# Patient Record
Sex: FEMALE | Race: WHITE | NOT HISPANIC OR LATINO
[De-identification: names, ages, dates, MRNs, and addresses within clinical notes are randomized per-mention and may not be internally consistent; named-entity substitution may affect disease eponyms.]

---

## 2018-03-27 ENCOUNTER — TRANSCRIPTION ENCOUNTER (OUTPATIENT)
Age: 73
End: 2018-03-27

## 2018-03-27 RX ORDER — ACETAMINOPHEN 500 MG
650 TABLET ORAL EVERY 6 HOURS
Qty: 0 | Refills: 0 | Status: DISCONTINUED | OUTPATIENT
Start: 2018-03-28 | End: 2018-03-31

## 2018-03-27 RX ORDER — ONDANSETRON 8 MG/1
4 TABLET, FILM COATED ORAL EVERY 6 HOURS
Qty: 0 | Refills: 0 | Status: DISCONTINUED | OUTPATIENT
Start: 2018-03-28 | End: 2018-03-31

## 2018-03-27 RX ORDER — DOCUSATE SODIUM 100 MG
100 CAPSULE ORAL THREE TIMES A DAY
Qty: 0 | Refills: 0 | Status: DISCONTINUED | OUTPATIENT
Start: 2018-03-28 | End: 2018-03-31

## 2018-03-27 RX ORDER — OXYCODONE HYDROCHLORIDE 5 MG/1
20 TABLET ORAL ONCE
Qty: 0 | Refills: 0 | Status: DISCONTINUED | OUTPATIENT
Start: 2018-03-28 | End: 2018-03-28

## 2018-03-27 RX ORDER — MAGNESIUM HYDROXIDE 400 MG/1
30 TABLET, CHEWABLE ORAL DAILY
Qty: 0 | Refills: 0 | Status: DISCONTINUED | OUTPATIENT
Start: 2018-03-28 | End: 2018-03-31

## 2018-03-27 RX ORDER — ACETAMINOPHEN 500 MG
650 TABLET ORAL ONCE
Qty: 0 | Refills: 0 | Status: COMPLETED | OUTPATIENT
Start: 2018-03-28 | End: 2018-03-28

## 2018-03-27 RX ORDER — SENNA PLUS 8.6 MG/1
2 TABLET ORAL AT BEDTIME
Qty: 0 | Refills: 0 | Status: DISCONTINUED | OUTPATIENT
Start: 2018-03-28 | End: 2018-03-31

## 2018-03-27 RX ORDER — OXYCODONE HYDROCHLORIDE 5 MG/1
10 TABLET ORAL EVERY 4 HOURS
Qty: 0 | Refills: 0 | Status: DISCONTINUED | OUTPATIENT
Start: 2018-03-28 | End: 2018-03-31

## 2018-03-27 RX ORDER — OXYCODONE HYDROCHLORIDE 5 MG/1
5 TABLET ORAL EVERY 4 HOURS
Qty: 0 | Refills: 0 | Status: DISCONTINUED | OUTPATIENT
Start: 2018-03-28 | End: 2018-03-31

## 2018-03-27 RX ORDER — CELECOXIB 200 MG/1
200 CAPSULE ORAL
Qty: 0 | Refills: 0 | Status: DISCONTINUED | OUTPATIENT
Start: 2018-03-29 | End: 2018-03-29

## 2018-03-27 RX ORDER — MORPHINE SULFATE 50 MG/1
4 CAPSULE, EXTENDED RELEASE ORAL ONCE
Qty: 0 | Refills: 0 | Status: DISCONTINUED | OUTPATIENT
Start: 2018-03-28 | End: 2018-03-31

## 2018-03-27 RX ORDER — SODIUM CHLORIDE 9 MG/ML
1000 INJECTION, SOLUTION INTRAVENOUS
Qty: 0 | Refills: 0 | Status: DISCONTINUED | OUTPATIENT
Start: 2018-03-28 | End: 2018-03-29

## 2018-03-27 RX ORDER — DEXAMETHASONE 0.5 MG/5ML
10 ELIXIR ORAL ONCE
Qty: 0 | Refills: 0 | Status: COMPLETED | OUTPATIENT
Start: 2018-03-29 | End: 2018-03-29

## 2018-03-27 RX ORDER — PANTOPRAZOLE SODIUM 20 MG/1
40 TABLET, DELAYED RELEASE ORAL DAILY
Qty: 0 | Refills: 0 | Status: DISCONTINUED | OUTPATIENT
Start: 2018-03-28 | End: 2018-03-31

## 2018-03-27 RX ORDER — ASCORBIC ACID 60 MG
500 TABLET,CHEWABLE ORAL
Qty: 0 | Refills: 0 | Status: DISCONTINUED | OUTPATIENT
Start: 2018-03-28 | End: 2018-03-31

## 2018-03-27 RX ORDER — POLYETHYLENE GLYCOL 3350 17 G/17G
17 POWDER, FOR SOLUTION ORAL DAILY
Qty: 0 | Refills: 0 | Status: DISCONTINUED | OUTPATIENT
Start: 2018-03-28 | End: 2018-03-31

## 2018-03-27 RX ORDER — ASPIRIN/CALCIUM CARB/MAGNESIUM 324 MG
325 TABLET ORAL
Qty: 0 | Refills: 0 | Status: DISCONTINUED | OUTPATIENT
Start: 2018-03-29 | End: 2018-03-29

## 2018-03-28 ENCOUNTER — TRANSCRIPTION ENCOUNTER (OUTPATIENT)
Age: 73
End: 2018-03-28

## 2018-03-28 ENCOUNTER — INPATIENT (INPATIENT)
Facility: HOSPITAL | Age: 73
LOS: 2 days | Discharge: HOME HEALTH | End: 2018-03-31
Attending: ORTHOPAEDIC SURGERY | Admitting: ORTHOPAEDIC SURGERY
Payer: OTHER MISCELLANEOUS

## 2018-03-28 ENCOUNTER — RESULT REVIEW (OUTPATIENT)
Age: 73
End: 2018-03-28

## 2018-03-28 VITALS
HEART RATE: 75 BPM | SYSTOLIC BLOOD PRESSURE: 136 MMHG | TEMPERATURE: 97 F | DIASTOLIC BLOOD PRESSURE: 60 MMHG | WEIGHT: 229.94 LBS | HEIGHT: 64 IN | RESPIRATION RATE: 14 BRPM | OXYGEN SATURATION: 96 %

## 2018-03-28 DIAGNOSIS — K44.9 DIAPHRAGMATIC HERNIA WITHOUT OBSTRUCTION OR GANGRENE: Chronic | ICD-10-CM

## 2018-03-28 DIAGNOSIS — Z96.611 PRESENCE OF RIGHT ARTIFICIAL SHOULDER JOINT: Chronic | ICD-10-CM

## 2018-03-28 DIAGNOSIS — Z96.651 PRESENCE OF RIGHT ARTIFICIAL KNEE JOINT: Chronic | ICD-10-CM

## 2018-03-28 LAB
ALBUMIN SERPL ELPH-MCNC: 2.3 G/DL — LOW (ref 3.3–5)
ALP SERPL-CCNC: 49 U/L — SIGNIFICANT CHANGE UP (ref 40–120)
ALT FLD-CCNC: 15 U/L — SIGNIFICANT CHANGE UP (ref 12–78)
ANION GAP SERPL CALC-SCNC: 11 MMOL/L — SIGNIFICANT CHANGE UP (ref 5–17)
APTT BLD: 31.7 SEC — SIGNIFICANT CHANGE UP (ref 27.5–37.4)
AST SERPL-CCNC: 18 U/L — SIGNIFICANT CHANGE UP (ref 15–37)
BILIRUB DIRECT SERPL-MCNC: 0.05 MG/DL — SIGNIFICANT CHANGE UP (ref 0.05–0.2)
BILIRUB INDIRECT FLD-MCNC: 0.2 MG/DL — SIGNIFICANT CHANGE UP (ref 0.2–1)
BILIRUB SERPL-MCNC: 0.2 MG/DL — SIGNIFICANT CHANGE UP (ref 0.2–1.2)
BLD GP AB SCN SERPL QL: SIGNIFICANT CHANGE UP
BUN SERPL-MCNC: 27 MG/DL — HIGH (ref 7–23)
CALCIUM SERPL-MCNC: 6 MG/DL — CRITICAL LOW (ref 8.5–10.1)
CHLORIDE SERPL-SCNC: 114 MMOL/L — HIGH (ref 96–108)
CO2 SERPL-SCNC: 19 MMOL/L — LOW (ref 22–31)
CREAT SERPL-MCNC: 1.31 MG/DL — HIGH (ref 0.5–1.3)
GLUCOSE SERPL-MCNC: 158 MG/DL — HIGH (ref 70–99)
GRAM STN FLD: SIGNIFICANT CHANGE UP
HCT VFR BLD CALC: 32.4 % — LOW (ref 34.5–45)
HGB BLD-MCNC: 10.5 G/DL — LOW (ref 11.5–15.5)
INR BLD: 1.02 RATIO — SIGNIFICANT CHANGE UP (ref 0.88–1.16)
MAGNESIUM SERPL-MCNC: 1.3 MG/DL — LOW (ref 1.6–2.6)
MCHC RBC-ENTMCNC: 28.8 PG — SIGNIFICANT CHANGE UP (ref 27–34)
MCHC RBC-ENTMCNC: 32.4 GM/DL — SIGNIFICANT CHANGE UP (ref 32–36)
MCV RBC AUTO: 89 FL — SIGNIFICANT CHANGE UP (ref 80–100)
NRBC # BLD: 0 /100 WBCS — SIGNIFICANT CHANGE UP (ref 0–0)
PHOSPHATE SERPL-MCNC: 4.1 MG/DL — SIGNIFICANT CHANGE UP (ref 2.5–4.5)
PLATELET # BLD AUTO: 265 K/UL — SIGNIFICANT CHANGE UP (ref 150–400)
POTASSIUM SERPL-MCNC: 3.5 MMOL/L — SIGNIFICANT CHANGE UP (ref 3.5–5.3)
POTASSIUM SERPL-SCNC: 3.5 MMOL/L — SIGNIFICANT CHANGE UP (ref 3.5–5.3)
PROT SERPL-MCNC: 4.8 GM/DL — LOW (ref 6–8.3)
PROTHROM AB SERPL-ACNC: 11.1 SEC — SIGNIFICANT CHANGE UP (ref 9.8–12.7)
RBC # BLD: 3.64 M/UL — LOW (ref 3.8–5.2)
RBC # FLD: 14.4 % — SIGNIFICANT CHANGE UP (ref 10.3–14.5)
SODIUM SERPL-SCNC: 144 MMOL/L — SIGNIFICANT CHANGE UP (ref 135–145)
SPECIMEN SOURCE: SIGNIFICANT CHANGE UP
SPECIMEN SOURCE: SIGNIFICANT CHANGE UP
WBC # BLD: 12.68 K/UL — HIGH (ref 3.8–10.5)
WBC # FLD AUTO: 12.68 K/UL — HIGH (ref 3.8–10.5)

## 2018-03-28 PROCEDURE — 88331 PATH CONSLTJ SURG 1 BLK 1SPC: CPT | Mod: 26

## 2018-03-28 PROCEDURE — 88305 TISSUE EXAM BY PATHOLOGIST: CPT | Mod: 26

## 2018-03-28 PROCEDURE — 99223 1ST HOSP IP/OBS HIGH 75: CPT

## 2018-03-28 PROCEDURE — 73560 X-RAY EXAM OF KNEE 1 OR 2: CPT | Mod: 26,RT

## 2018-03-28 PROCEDURE — 88300 SURGICAL PATH GROSS: CPT | Mod: 26,59

## 2018-03-28 RX ORDER — SUCRALFATE 1 G
1 TABLET ORAL
Qty: 0 | Refills: 0 | COMMUNITY

## 2018-03-28 RX ORDER — MAGNESIUM SULFATE 500 MG/ML
2 VIAL (ML) INJECTION ONCE
Qty: 0 | Refills: 0 | Status: COMPLETED | OUTPATIENT
Start: 2018-03-28 | End: 2018-03-28

## 2018-03-28 RX ORDER — CELECOXIB 200 MG/1
200 CAPSULE ORAL ONCE
Qty: 0 | Refills: 0 | Status: COMPLETED | OUTPATIENT
Start: 2018-03-28 | End: 2018-03-28

## 2018-03-28 RX ORDER — SODIUM CHLORIDE 9 MG/ML
1000 INJECTION, SOLUTION INTRAVENOUS
Qty: 0 | Refills: 0 | Status: DISCONTINUED | OUTPATIENT
Start: 2018-03-28 | End: 2018-03-28

## 2018-03-28 RX ORDER — SUCRALFATE 1 G
1 TABLET ORAL
Qty: 0 | Refills: 0 | Status: DISCONTINUED | OUTPATIENT
Start: 2018-03-28 | End: 2018-03-31

## 2018-03-28 RX ORDER — VANCOMYCIN HCL 1 G
1000 VIAL (EA) INTRAVENOUS EVERY 24 HOURS
Qty: 0 | Refills: 0 | Status: DISCONTINUED | OUTPATIENT
Start: 2018-03-28 | End: 2018-03-31

## 2018-03-28 RX ORDER — ACETAMINOPHEN 500 MG
1000 TABLET ORAL ONCE
Qty: 0 | Refills: 0 | Status: COMPLETED | OUTPATIENT
Start: 2018-03-28 | End: 2018-03-28

## 2018-03-28 RX ORDER — SUCRALFATE 1 G
1 TABLET ORAL
Qty: 0 | Refills: 0 | Status: DISCONTINUED | OUTPATIENT
Start: 2018-03-28 | End: 2018-03-28

## 2018-03-28 RX ORDER — SIMVASTATIN 20 MG/1
20 TABLET, FILM COATED ORAL AT BEDTIME
Qty: 0 | Refills: 0 | Status: DISCONTINUED | OUTPATIENT
Start: 2018-03-28 | End: 2018-03-31

## 2018-03-28 RX ORDER — CALCIUM GLUCONATE 100 MG/ML
1 VIAL (ML) INTRAVENOUS ONCE
Qty: 0 | Refills: 0 | Status: COMPLETED | OUTPATIENT
Start: 2018-03-28 | End: 2018-03-28

## 2018-03-28 RX ORDER — SODIUM CHLORIDE 9 MG/ML
500 INJECTION INTRAMUSCULAR; INTRAVENOUS; SUBCUTANEOUS ONCE
Qty: 0 | Refills: 0 | Status: COMPLETED | OUTPATIENT
Start: 2018-03-28 | End: 2018-03-28

## 2018-03-28 RX ORDER — HYDROMORPHONE HYDROCHLORIDE 2 MG/ML
1 INJECTION INTRAMUSCULAR; INTRAVENOUS; SUBCUTANEOUS
Qty: 0 | Refills: 0 | Status: DISCONTINUED | OUTPATIENT
Start: 2018-03-28 | End: 2018-03-28

## 2018-03-28 RX ORDER — ONDANSETRON 8 MG/1
4 TABLET, FILM COATED ORAL ONCE
Qty: 0 | Refills: 0 | Status: DISCONTINUED | OUTPATIENT
Start: 2018-03-28 | End: 2018-03-28

## 2018-03-28 RX ORDER — ACETAMINOPHEN 500 MG
1000 TABLET ORAL ONCE
Qty: 0 | Refills: 0 | Status: COMPLETED | OUTPATIENT
Start: 2018-03-28 | End: 2018-03-29

## 2018-03-28 RX ORDER — LOSARTAN POTASSIUM 100 MG/1
100 TABLET, FILM COATED ORAL DAILY
Qty: 0 | Refills: 0 | Status: DISCONTINUED | OUTPATIENT
Start: 2018-03-28 | End: 2018-03-29

## 2018-03-28 RX ORDER — TRANEXAMIC ACID 100 MG/ML
1000 INJECTION, SOLUTION INTRAVENOUS ONCE
Qty: 0 | Refills: 0 | Status: COMPLETED | OUTPATIENT
Start: 2018-03-28 | End: 2018-03-28

## 2018-03-28 RX ADMIN — Medication 650 MILLIGRAM(S): at 08:13

## 2018-03-28 RX ADMIN — Medication 100 MILLIGRAM(S): at 18:17

## 2018-03-28 RX ADMIN — Medication 500 MILLIGRAM(S): at 18:18

## 2018-03-28 RX ADMIN — Medication 50 GRAM(S): at 22:33

## 2018-03-28 RX ADMIN — Medication 1000 MILLIGRAM(S): at 13:45

## 2018-03-28 RX ADMIN — Medication 400 MILLIGRAM(S): at 13:26

## 2018-03-28 RX ADMIN — HYDROMORPHONE HYDROCHLORIDE 1 MILLIGRAM(S): 2 INJECTION INTRAMUSCULAR; INTRAVENOUS; SUBCUTANEOUS at 14:15

## 2018-03-28 RX ADMIN — Medication 200 GRAM(S): at 20:12

## 2018-03-28 RX ADMIN — SODIUM CHLORIDE 100 MILLILITER(S): 9 INJECTION, SOLUTION INTRAVENOUS at 18:51

## 2018-03-28 RX ADMIN — OXYCODONE HYDROCHLORIDE 10 MILLIGRAM(S): 5 TABLET ORAL at 21:42

## 2018-03-28 RX ADMIN — Medication 100 MILLIGRAM(S): at 21:11

## 2018-03-28 RX ADMIN — CELECOXIB 200 MILLIGRAM(S): 200 CAPSULE ORAL at 08:13

## 2018-03-28 RX ADMIN — SODIUM CHLORIDE 100 MILLILITER(S): 9 INJECTION, SOLUTION INTRAVENOUS at 21:12

## 2018-03-28 RX ADMIN — OXYCODONE HYDROCHLORIDE 20 MILLIGRAM(S): 5 TABLET ORAL at 08:13

## 2018-03-28 RX ADMIN — SODIUM CHLORIDE 75 MILLILITER(S): 9 INJECTION, SOLUTION INTRAVENOUS at 12:51

## 2018-03-28 RX ADMIN — TRANEXAMIC ACID 220 MILLIGRAM(S): 100 INJECTION, SOLUTION INTRAVENOUS at 12:46

## 2018-03-28 RX ADMIN — Medication 250 MILLIGRAM(S): at 21:09

## 2018-03-28 RX ADMIN — OXYCODONE HYDROCHLORIDE 10 MILLIGRAM(S): 5 TABLET ORAL at 22:40

## 2018-03-28 RX ADMIN — SIMVASTATIN 20 MILLIGRAM(S): 20 TABLET, FILM COATED ORAL at 21:11

## 2018-03-28 RX ADMIN — SODIUM CHLORIDE 1000 MILLILITER(S): 9 INJECTION INTRAMUSCULAR; INTRAVENOUS; SUBCUTANEOUS at 16:39

## 2018-03-28 RX ADMIN — HYDROMORPHONE HYDROCHLORIDE 1 MILLIGRAM(S): 2 INJECTION INTRAMUSCULAR; INTRAVENOUS; SUBCUTANEOUS at 14:00

## 2018-03-28 NOTE — DISCHARGE NOTE ADULT - CARE PROVIDER_API CALL
Fermin Cummings), Orthopaedic Surgery  [address] [place] [address]  Phone: [phone]  Fax: [phone]

## 2018-03-28 NOTE — H&P ADULT - NSHPPHYSICALEXAM_GEN_ALL_CORE
Lungs: CTAB  Heart: S1, S2 no murmurs   Abdomen: Bowel sounds present in all quadrants  RLE: Incision present well healed. +ROM hip/knee/ankle/toes. Calf: soft, compressible and nontender. DP/PT 2+ NVI.

## 2018-03-28 NOTE — DISCHARGE NOTE ADULT - NS AS ACTIVITY OBS
Stairs allowed/Showering allowed/No Heavy lifting/straining/Walking-Indoors allowed/Do not make important decisions/Do not drive or operate machinery/Walking-Outdoors allowed

## 2018-03-28 NOTE — H&P ADULT - HISTORY OF PRESENT ILLNESS
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 Pt is now present of for R TKA re-implantation. Pt admits currently she is in 0/10 pain, no pain radiating anywhere. Pt as occasional burning but nothing at this time. Pt would like to get her re-implantation of R TKA. Pt is currently ambulating with a cane. Pt currently takes an ASA 325mg once a day which she has stopped for surgery. Pt currently denies CP/SOB/N/V/D/numbness/tingling/bowel or bladder dysfunction.

## 2018-03-28 NOTE — DISCHARGE NOTE ADULT - MEDICATION SUMMARY - MEDICATIONS TO STOP TAKING
I will STOP taking the medications listed below when I get home from the hospital:    Aspir 81    Percocet 10/325    AcipHex 20 mg oral delayed release tablet  -- 1 tab(s) by mouth once a day I will STOP taking the medications listed below when I get home from the hospital:    Aspir 81    Percocet 10/325    AcipHex 20 mg oral delayed release tablet  -- 1 tab(s) by mouth once a day    Benicar HCT 40 mg-12.5 mg oral tablet  -- 1 tab(s) by mouth once a day

## 2018-03-28 NOTE — DISCHARGE NOTE ADULT - ADDITIONAL INSTRUCTIONS
Follow up with  in 2 weeks. Please call [phone] for appointment.      Please call your MD, if you have new onset of fevers, increased drainage, increased pain or increased redness around the incision site. Please return to the Emergency Department if you have chest pain or shortness of breath. Follow up with  in 2 weeks. Please call [phone] for appointment.      Please follow up with your primary care doctor in 1 week to monitor your kidney function levels.     Please call your MD, if you have new onset of fevers, increased drainage, increased pain or increased redness around the incision site. Please return to the Emergency Department if you have chest pain or shortness of breath. Follow up with  in 2 weeks. Please call [phone] for appointment.      Please follow up with your primary care doctor in 1 week to monitor your kidney function levels and to go over your blood pressure medications which was adjusted during your hospital stay.     Please call your MD, if you have new onset of fevers, increased drainage, increased pain or increased redness around the incision site. Please return to the Emergency Department if you have chest pain or shortness of breath.

## 2018-03-28 NOTE — DISCHARGE NOTE ADULT - PATIENT PORTAL LINK FT
You can access the [facility] Portal, offered by [facility], by registering with the following website: [email or web address]

## 2018-03-28 NOTE — PROGRESS NOTE ADULT - SUBJECTIVE AND OBJECTIVE BOX
72yFemale s/p R TKA re-implant POD#0. Pt seen and examined in NAD. Pain controlled. Pt denies any new complaints. Pt denies CP/SOB/N/V/D/numbness/tingling/bowel or bladder dysfunction.     PE:   RLE: dressing c/d/i. +ROM ankle/toes. Calf: soft, compressible and nontender. DP/PT 2+ NVI.                           10.5   12.68 )-----------( 265      ( 28 Mar 2018 13:07 )             32.4                 A/P: 72yFemale s/p R TKA re-implant POD#0.  Continue abx until final cultures are back   Pain controlled  PT: WBAT   DVT ppx: SCDs/ASA 325mg BID    Wound care, Isometric exercises, incentive spirometry   Discharge: planning for home   All the above discussed and understood by pt

## 2018-03-28 NOTE — CONSULT NOTE ADULT - ASSESSMENT
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 Pt is now present of for R TKA re-implantation. Pt admits currently she is in 0/10 pain, no pain radiating anywhere. Pt as occasional burning but nothing at this time. Pt would like to get her re-implantation of R TKA. Pt is currently ambulating with a cane. Pt currently takes an ASA 325mg once a day which she has stopped for surgery. Pt currently denies CP/SOB/N/V/D/numbness/tingling/bowel or bladder dysfunction. (28 Mar 2018 07:29)    S/p R TKA re-implant POD#0.   c/w pain control, bowel regiment  dvt ppx, , pt   abx as per ortho     Post OP-Hypotension  -give bolus of ns  -monitor bp  -hold anti htn meds for now  -continue to monitor

## 2018-03-28 NOTE — BRIEF OPERATIVE NOTE - PRE-OP DX
Chronic infection of knee joint prosthesis, subsequent encounter  03/28/2018    Active  Clayton Wilhelm

## 2018-03-28 NOTE — CONSULT NOTE ADULT - SUBJECTIVE AND OBJECTIVE BOX
HPI:  72F pmh HTN, multiple R knee surgery s/p R TKA infection with R TKA removal in 2017. Pt is now present of for R TKA re-implantation. Pt admits currently she is in 0/10 pain, no pain radiating anywhere. Pt as occasional burning but nothing at this time. Pt would like to get her re-implantation of R TKA. Pt is currently ambulating with a cane. Pt currently takes an ASA 325mg once a day which she has stopped for surgery. Pt currently denies CP/SOB/N/V/D/numbness/tingling/bowel or bladder dysfunction. (28 Mar 2018 07:29)    S/p R TKA re-implant POD#0. Seen at bedside, patient hypotensive, states she feels a littly lightheaded. Receiving fluid bolus. Denies headache,dizziness, chest pain, palpitations, sob.        PAST MEDICAL & SURGICAL HISTORY:  GERD (gastroesophageal reflux disease)  Hyperlipemia  Essential hypertension  S/P total knee arthroplasty, right  Status post total shoulder arthroplasty, right      REVIEW OF SYSTEMS:    CONSTITUTIONAL: No fever, weight loss, or fatigue  EYES: No eye pain, visual disturbances, or discharge  ENMT:  No difficulty hearing, tinnitus, vertigo; No sinus or throat pain  NECK: No pain or stiffness  BREASTS: No pain, masses, or nipple discharge  RESPIRATORY: No cough, wheezing, chills or hemoptysis; No shortness of breath  CARDIOVASCULAR: No chest pain, palpitations, dizziness, or leg swelling  GASTROINTESTINAL: No abdominal or epigastric pain. No nausea, vomiting, or hematemesis; No diarrhea or constipation. No melena or hematochezia.  GENITOURINARY: No dysuria, frequency, hematuria, or incontinence  NEUROLOGICAL: No headaches, memory loss, loss of strength, numbness, or tremors  SKIN: No itching, burning, rashes, or lesions   LYMPH NODES: No enlarged glands  ENDOCRINE: No heat or cold intolerance; No hair loss  MUSCULOSKELETAL: No joint pain or swelling; No muscle, back, or extremity pain  PSYCHIATRIC: No depression, anxiety, mood swings, or difficulty sleeping  HEME/LYMPH: No easy bruising, or bleeding gums  ALLERGY AND IMMUNOLOGIC: No hives or eczema      MEDICATIONS  (STANDING):  ascorbic acid 500 milliGRAM(s) Oral two times a day  clindamycin IVPB 900 milliGRAM(s) IV Intermittent every 8 hours  docusate sodium 100 milliGRAM(s) Oral three times a day  lactated ringers. 1000 milliLiter(s) (100 mL/Hr) IV Continuous <Continuous>  morphine  - Injectable 4 milliGRAM(s) IV Push once  multivitamin 1 Tablet(s) Oral daily  pantoprazole    Tablet 40 milliGRAM(s) Oral daily  polyethylene glycol 3350 17 Gram(s) Oral daily  vancomycin  IVPB 1000 milliGRAM(s) IV Intermittent every 12 hours    MEDICATIONS  (PRN):  acetaminophen   Tablet 650 milliGRAM(s) Oral every 6 hours PRN For Temp over 38.3 C (100.94 F)  aluminum hydroxide/magnesium hydroxide/simethicone Suspension 30 milliLiter(s) Oral four times a day PRN Indigestion  magnesium hydroxide Suspension 30 milliLiter(s) Oral daily PRN Constipation  ondansetron Injectable 4 milliGRAM(s) IV Push every 6 hours PRN Nausea and/or Vomiting  oxyCODONE    IR 5 milliGRAM(s) Oral every 4 hours PRN Mild Pain  oxyCODONE    IR 10 milliGRAM(s) Oral every 4 hours PRN Moderate Pain  senna 2 Tablet(s) Oral at bedtime PRN Constipation      Allergies    ceftriaxone (Rash)  Cipro (Unknown)  Flagyl (Diarrhea; Rash)    Intolerances        SOCIAL HISTORY:  no alcohol/tobacco/drugs  FAMILY HISTORY:    no pertinent fam hx  Vital Signs Last 24 Hrs  T(C): 35.4 (28 Mar 2018 16:33), Max: 36.3 (28 Mar 2018 12:24)  T(F): 95.8 (28 Mar 2018 16:33), Max: 97.4 (28 Mar 2018 12:24)  HR: 75 (28 Mar 2018 16:33) (63 - 87)  BP: 71/39 (28 Mar 2018 16:33) (71/39 - 136/60)  BP(mean): --  RR: 16 (28 Mar 2018 16:33) (11 - 20)  SpO2: 98% (28 Mar 2018 16:33) (96% - 98%)    PHYSICAL EXAM:    GENERAL: NAD, well-groomed, well-developed  HEAD:  Atraumatic, Normocephalic  EYES: EOMI, PERRLA, conjunctiva and sclera clear  ENMT: No tonsillar erythema, exudates, or enlargement; Moist mucous membranes, Good dentition, No lesions  NECK: Supple, No JVD, Normal thyroid  NERVOUS SYSTEM:  Alert & Oriented X3, Good concentration; Motor Strength 5/5 B/L upper and lower extremities; DTRs 2+ intact and symmetric  CHEST/LUNG: Clear to percussion bilaterally; No rales, rhonchi, wheezing, or rubs  HEART: Regular rate and rhythm; No murmurs, rubs, or gallops  ABDOMEN: Soft, Nontender, Nondistended; Bowel sounds present, obese  EXTREMITIES:  2+ Peripheral Pulses, No clubbing, cyanosis, or edema  RLE: dressing c/d/i  LYMPH: No lymphadenopathy noted  SKIN: No rashes or lesions      LABS:                        10.5   12.68 )-----------( 265      ( 28 Mar 2018 13:07 )             32.4           PT/INR - ( 28 Mar 2018 07:15 )   PT: 11.1 sec;   INR: 1.02 ratio         PTT - ( 28 Mar 2018 07:15 )  PTT:31.7 sec      RADIOLOGY & ADDITIONAL STUDIES:

## 2018-03-28 NOTE — DISCHARGE NOTE ADULT - MEDICATION SUMMARY - MEDICATIONS TO TAKE
I will START or STAY ON the medications listed below when I get home from the hospital:    acetaminophen 325 mg oral tablet  -- 3 tab(s) by mouth every 8 hours, As Needed for pain   -- Indication: For for pain control    oxyCODONE 10 mg oral tablet  -- 1 tab(s) by mouth every 4 hours, As needed for pain  MDD:6 tablets  -- Indication: For for pain control    aspirin 325 mg oral tablet  -- 1 tab(s) by mouth every 12 hours to prevent blood clots MDD:2  -- Indication: For to prevent blood clots     Zocor 20 mg oral tablet  -- 1 tab(s) by mouth once a day (at bedtime)  -- Indication: For for cholesterol    Benicar HCT 40 mg-12.5 mg oral tablet  -- 1 tab(s) by mouth once a day  -- Indication: For for blood pressure    bisacodyl 10 mg rectal suppository  -- 1 suppository(ies) rectally once a day, As needed, If no bowel movement by postoperative day #2  -- Indication: For for constipation    docusate sodium 100 mg oral capsule  -- 1 cap(s) by mouth 3 times a day  -- Indication: For to prevent constipation    polyethylene glycol 3350 oral powder for reconstitution  -- 17 gram(s) by mouth once a day  -- Indication: For to prevent constipation    Carafate 1 g oral tablet  -- resume home dose   -- Indication: For REsume home medication    Acidophilus oral capsule  -- resume home dose   -- Indication: For to protect your stomach    pantoprazole 40 mg oral delayed release tablet  -- 1 tab(s) by mouth once a day  -- Indication: For to protect your stomach lining    sulfamethoxazole-trimethoprim 800 mg-160 mg oral tablet  -- 1 tab(s) by mouth every 12 hours MDD:2 tablets  -- Indication: For for prophylaxis    Multiple Vitamins oral tablet  -- 1 tab(s) by mouth once a day  -- Indication: For  for wound healing    ascorbic acid 500 mg oral tablet  -- 1 tab(s) by mouth 2 times a day  -- Indication: For for wound healing I will START or STAY ON the medications listed below when I get home from the hospital:    acetaminophen 325 mg oral tablet  -- 3 tab(s) by mouth every 8 hours, As Needed for pain   -- Indication: For for pain control    oxyCODONE 10 mg oral tablet  -- 1 tab(s) by mouth every 4 hours, As needed for pain  MDD:6 tablets  -- Indication: For for pain control    aspirin 325 mg oral tablet  -- 1 tab(s) by mouth every 12 hours to prevent blood clots MDD:2  -- Indication: For to prevent blood clots     Benicar 20 mg oral tablet  -- 1 tab(s) by mouth once a day MDD:1  -- Do not take this drug if you are pregnant.  It is very important that you take or use this exactly as directed.  Do not skip doses or discontinue unless directed by your doctor.  Some non-prescription drugs may aggravate your condition.  Read all labels carefully.  If a warning appears, check with your doctor before taking.    -- Indication: For for blood pressure - this medication was re-adjusted     Zocor 20 mg oral tablet  -- 1 tab(s) by mouth once a day (at bedtime)  -- Indication: For for cholesterol    bisacodyl 10 mg rectal suppository  -- 1 suppository(ies) rectally once a day, As needed, If no bowel movement by postoperative day #2  -- Indication: For for constipation    docusate sodium 100 mg oral capsule  -- 1 cap(s) by mouth 3 times a day  -- Indication: For to prevent constipation    polyethylene glycol 3350 oral powder for reconstitution  -- 17 gram(s) by mouth once a day  -- Indication: For to prevent constipation    Carafate 1 g oral tablet  -- resume home dose   -- Indication: For REsume home medication    Acidophilus oral capsule  -- resume home dose   -- Indication: For to protect your stomach    pantoprazole 40 mg oral delayed release tablet  -- 1 tab(s) by mouth once a day  -- Indication: For to protect your stomach lining    sulfamethoxazole-trimethoprim 800 mg-160 mg oral tablet  -- 1 tab(s) by mouth every 12 hours MDD:2 tablets  -- Indication: For for prophylaxis    Multiple Vitamins oral tablet  -- 1 tab(s) by mouth once a day  -- Indication: For  for wound healing    ascorbic acid 500 mg oral tablet  -- 1 tab(s) by mouth 2 times a day  -- Indication: For for wound healing

## 2018-03-28 NOTE — DISCHARGE NOTE ADULT - HOSPITAL COURSE
72yFemale with history of R total knee infection with placement of antibiotic spacer presenting for R TKA re-implantation/revision and removal of antibiotic spacer by Dr. Cummings on 3/28/18. Risk and benefits of surgery were explained to the patient. The patient understood and agreed to proceed with surgery. Patient underwent the procedure with no intraoperative complications. Pt was brought in stable condition to the PACU. Once stable in PACU, pt was brought to the floor. During hospital stay pt was followed by Medicine, home care  during this admission. Pt had an uneventful hospital course. Pt is stable for discharge to home. 72yFemale with history of R total knee infection with placement of antibiotic spacer presenting for R TKA re-implantation/revision and removal of antibiotic spacer by Dr. Cummings on 3/28/18. Risk and benefits of surgery were explained to the patient. The patient understood and agreed to proceed with surgery. Patient underwent the procedure with no intraoperative complications. Pt was brought in stable condition to the PACU. Once stable in PACU, pt was brought to the floor. During hospital stay pt was followed by Medicine, home care  during this admission. Pt had some elevated creatinine levels which as due to vancomycin/celebrex which was stopped. Pt creatinine has improved within normal limits. Pt otherwise had an uneventful hospital course. Pt is stable for discharge to home. 72yFemale with history of R total knee infection with placement of antibiotic spacer presenting for R TKA re-implantation/revision and removal of antibiotic spacer by Dr. Cummings on 3/28/18. Risk and benefits of surgery were explained to the patient. The patient understood and agreed to proceed with surgery. Patient underwent the procedure with no intraoperative complications. Pt was brought in stable condition to the PACU. Once stable in PACU, pt was brought to the floor. During hospital stay pt was followed by Medicine, home care  during this admission. Pt had some elevated creatinine levels which as due to vancomycin/celebrex which was stopped.  Pt creatinine has improved within normal limits. Pt was also having some low blood pressures - and pt blood pressure medication was changed to benicar 20 once a day by medical team. Pt otherwise had an uneventful hospital course. Pt is stable for discharge to home.

## 2018-03-28 NOTE — BRIEF OPERATIVE NOTE - POST-OP DX
Chronic infection of knee joint prosthesis, initial encounter  03/28/2018    Active  Clayton Wilhelm

## 2018-03-28 NOTE — BRIEF OPERATIVE NOTE - PROCEDURE
<<-----Click on this checkbox to enter Procedure Revision total knee arthroplasty  03/28/2018    Active  [id number]

## 2018-03-28 NOTE — DISCHARGE NOTE ADULT - CARE PLAN
Principal Discharge DX:	S/P total knee arthroplasty, right  Goal:	improve ADLs/Decrease pain  Assessment and plan of treatment:	Keep Prineo Dressing Clean, Dry and Intact. May shower with Prineo Dressing. Please do not scrub, soak, peel or pick at the prineo dressing. No creams, lotions, or oils over dressing. May shower and let water run over incision, no baths. Pat dry once out of shower. Dressing to be removed in office at follow up visit in 2 weeks.

## 2018-03-29 LAB
ANION GAP SERPL CALC-SCNC: 9 MMOL/L — SIGNIFICANT CHANGE UP (ref 5–17)
BUN SERPL-MCNC: 39 MG/DL — HIGH (ref 7–23)
CALCIUM SERPL-MCNC: 8.6 MG/DL — SIGNIFICANT CHANGE UP (ref 8.5–10.1)
CHLORIDE SERPL-SCNC: 103 MMOL/L — SIGNIFICANT CHANGE UP (ref 96–108)
CO2 SERPL-SCNC: 24 MMOL/L — SIGNIFICANT CHANGE UP (ref 22–31)
CREAT SERPL-MCNC: 2 MG/DL — HIGH (ref 0.5–1.3)
GLUCOSE SERPL-MCNC: 119 MG/DL — HIGH (ref 70–99)
HCT VFR BLD CALC: 27.9 % — LOW (ref 34.5–45)
HGB BLD-MCNC: 9.2 G/DL — LOW (ref 11.5–15.5)
MAGNESIUM SERPL-MCNC: 2.5 MG/DL — SIGNIFICANT CHANGE UP (ref 1.6–2.6)
MCHC RBC-ENTMCNC: 29.4 PG — SIGNIFICANT CHANGE UP (ref 27–34)
MCHC RBC-ENTMCNC: 33 GM/DL — SIGNIFICANT CHANGE UP (ref 32–36)
MCV RBC AUTO: 89.1 FL — SIGNIFICANT CHANGE UP (ref 80–100)
NRBC # BLD: 0 /100 WBCS — SIGNIFICANT CHANGE UP (ref 0–0)
PHOSPHATE SERPL-MCNC: 5.5 MG/DL — HIGH (ref 2.5–4.5)
PLATELET # BLD AUTO: 271 K/UL — SIGNIFICANT CHANGE UP (ref 150–400)
POTASSIUM SERPL-MCNC: 4.3 MMOL/L — SIGNIFICANT CHANGE UP (ref 3.5–5.3)
POTASSIUM SERPL-SCNC: 4.3 MMOL/L — SIGNIFICANT CHANGE UP (ref 3.5–5.3)
RBC # BLD: 3.13 M/UL — LOW (ref 3.8–5.2)
RBC # FLD: 14.9 % — HIGH (ref 10.3–14.5)
SODIUM SERPL-SCNC: 136 MMOL/L — SIGNIFICANT CHANGE UP (ref 135–145)
SURGICAL PATHOLOGY FINAL REPORT - CH: SIGNIFICANT CHANGE UP
VANCOMYCIN TROUGH SERPL-MCNC: 9.8 UG/ML — LOW (ref 10–20)
WBC # BLD: 18.99 K/UL — HIGH (ref 3.8–10.5)
WBC # FLD AUTO: 18.99 K/UL — HIGH (ref 3.8–10.5)

## 2018-03-29 PROCEDURE — 99233 SBSQ HOSP IP/OBS HIGH 50: CPT

## 2018-03-29 RX ORDER — SODIUM CHLORIDE 9 MG/ML
500 INJECTION INTRAMUSCULAR; INTRAVENOUS; SUBCUTANEOUS ONCE
Qty: 0 | Refills: 0 | Status: COMPLETED | OUTPATIENT
Start: 2018-03-29 | End: 2018-03-29

## 2018-03-29 RX ORDER — SODIUM CHLORIDE 9 MG/ML
1000 INJECTION INTRAMUSCULAR; INTRAVENOUS; SUBCUTANEOUS
Qty: 0 | Refills: 0 | Status: DISCONTINUED | OUTPATIENT
Start: 2018-03-29 | End: 2018-03-30

## 2018-03-29 RX ORDER — HEPARIN SODIUM 5000 [USP'U]/ML
5000 INJECTION INTRAVENOUS; SUBCUTANEOUS EVERY 8 HOURS
Qty: 0 | Refills: 0 | Status: DISCONTINUED | OUTPATIENT
Start: 2018-03-29 | End: 2018-03-31

## 2018-03-29 RX ADMIN — SODIUM CHLORIDE 100 MILLILITER(S): 9 INJECTION INTRAMUSCULAR; INTRAVENOUS; SUBCUTANEOUS at 12:21

## 2018-03-29 RX ADMIN — Medication 500 MILLIGRAM(S): at 05:11

## 2018-03-29 RX ADMIN — Medication 100 MILLIGRAM(S): at 05:11

## 2018-03-29 RX ADMIN — LOSARTAN POTASSIUM 100 MILLIGRAM(S): 100 TABLET, FILM COATED ORAL at 07:11

## 2018-03-29 RX ADMIN — OXYCODONE HYDROCHLORIDE 10 MILLIGRAM(S): 5 TABLET ORAL at 23:48

## 2018-03-29 RX ADMIN — Medication 100 MILLIGRAM(S): at 17:17

## 2018-03-29 RX ADMIN — OXYCODONE HYDROCHLORIDE 10 MILLIGRAM(S): 5 TABLET ORAL at 05:51

## 2018-03-29 RX ADMIN — Medication 1000 MILLIGRAM(S): at 00:45

## 2018-03-29 RX ADMIN — Medication 100 MILLIGRAM(S): at 01:30

## 2018-03-29 RX ADMIN — SIMVASTATIN 20 MILLIGRAM(S): 20 TABLET, FILM COATED ORAL at 21:48

## 2018-03-29 RX ADMIN — HEPARIN SODIUM 5000 UNIT(S): 5000 INJECTION INTRAVENOUS; SUBCUTANEOUS at 15:31

## 2018-03-29 RX ADMIN — CELECOXIB 200 MILLIGRAM(S): 200 CAPSULE ORAL at 05:12

## 2018-03-29 RX ADMIN — OXYCODONE HYDROCHLORIDE 10 MILLIGRAM(S): 5 TABLET ORAL at 21:49

## 2018-03-29 RX ADMIN — Medication 100 MILLIGRAM(S): at 21:48

## 2018-03-29 RX ADMIN — Medication 1 GRAM(S): at 15:31

## 2018-03-29 RX ADMIN — OXYCODONE HYDROCHLORIDE 10 MILLIGRAM(S): 5 TABLET ORAL at 06:35

## 2018-03-29 RX ADMIN — HEPARIN SODIUM 5000 UNIT(S): 5000 INJECTION INTRAVENOUS; SUBCUTANEOUS at 21:45

## 2018-03-29 RX ADMIN — SODIUM CHLORIDE 1000 MILLILITER(S): 9 INJECTION INTRAMUSCULAR; INTRAVENOUS; SUBCUTANEOUS at 08:35

## 2018-03-29 RX ADMIN — OXYCODONE HYDROCHLORIDE 10 MILLIGRAM(S): 5 TABLET ORAL at 14:30

## 2018-03-29 RX ADMIN — PANTOPRAZOLE SODIUM 40 MILLIGRAM(S): 20 TABLET, DELAYED RELEASE ORAL at 08:05

## 2018-03-29 RX ADMIN — Medication 325 MILLIGRAM(S): at 05:11

## 2018-03-29 RX ADMIN — Medication 500 MILLIGRAM(S): at 17:17

## 2018-03-29 RX ADMIN — Medication 100 MILLIGRAM(S): at 08:05

## 2018-03-29 RX ADMIN — OXYCODONE HYDROCHLORIDE 10 MILLIGRAM(S): 5 TABLET ORAL at 13:30

## 2018-03-29 RX ADMIN — Medication 102 MILLIGRAM(S): at 05:12

## 2018-03-29 RX ADMIN — Medication 1 TABLET(S): at 12:05

## 2018-03-29 RX ADMIN — Medication 250 MILLIGRAM(S): at 21:45

## 2018-03-29 RX ADMIN — CELECOXIB 200 MILLIGRAM(S): 200 CAPSULE ORAL at 06:10

## 2018-03-29 RX ADMIN — SODIUM CHLORIDE 100 MILLILITER(S): 9 INJECTION INTRAMUSCULAR; INTRAVENOUS; SUBCUTANEOUS at 21:44

## 2018-03-29 RX ADMIN — Medication 400 MILLIGRAM(S): at 00:21

## 2018-03-29 NOTE — OCCUPATIONAL THERAPY INITIAL EVALUATION ADULT - ADDITIONAL COMMENTS
Patient lives in a private house with 3 steps to enter with bilateral handrails. Once inside, the patient will set-up on the first floor which has a bathroom and bedroom. The bathroom has a tub/shower combination with a regular toilet. The patient reports owning a commode. The patient ambulates with a rolling walker and owns a cane and w/c and crutches. The patient is R handed, wears glasses and does not drive.

## 2018-03-29 NOTE — PHYSICAL THERAPY INITIAL EVALUATION ADULT - PASSIVE RANGE OF MOTION EXAMINATION, REHAB EVAL
Limited R knee PROM/deficits as listed below R knee 5-50 degrees in flexion/deficits as listed below

## 2018-03-29 NOTE — PHYSICAL THERAPY INITIAL EVALUATION ADULT - ACTIVE RANGE OF MOTION EXAMINATION, REHAB EVAL
Limited R knee AROM/deficits as listed below deficits as listed below/R knee 5-50 degrees in flexion

## 2018-03-29 NOTE — PHYSICAL THERAPY INITIAL EVALUATION ADULT - TRANSFER SAFETY CONCERNS NOTED: SIT/STAND, REHAB EVAL
losing balance/decreased sequencing ability/decreased step length/decreased balance during turns decreased sequencing ability/decreased step length/decreased weight-shifting ability/decreased balance during turns

## 2018-03-29 NOTE — PHYSICAL THERAPY INITIAL EVALUATION ADULT - IMPAIRMENTS FOUND, PT EVAL
joint integrity and mobility/ROM/sensory integrity/circulation/aerobic capacity/endurance/muscle strength/posture/decreased midline orientation/fine motor/gait, locomotion, and balance/ergonomics and body mechanics/gross motor ROM/gait, locomotion, and balance/decreased midline orientation/aerobic capacity/endurance/posture/sensory integrity/ergonomics and body mechanics/joint integrity and mobility/muscle strength/circulation

## 2018-03-29 NOTE — OCCUPATIONAL THERAPY INITIAL EVALUATION ADULT - MODIFIED CLINICAL TEST OF SENSORY INTEGRATION IN BALANCE TEST
Barthel Index: Feeding Score___10___, Bathing Score___5___, Grooming Score__5___, Dressing Score__10___, Bowel Score___10__, Bladder Score___10___, Toilet Score__10___, Transfer Score___10___, Mobility Score_10____, Stairs Score__10___, Total Score_90____.

## 2018-03-29 NOTE — OCCUPATIONAL THERAPY INITIAL EVALUATION ADULT - SOCIAL CONCERNS
None/As per patient "My family will be around to help me after surgery with any of my daily tasks and needs when I get home".

## 2018-03-29 NOTE — OCCUPATIONAL THERAPY INITIAL EVALUATION ADULT - RANGE OF MOTION EXAMINATION, LOWER EXTREMITY
Left LE Passive ROM was WFL (w/i functional limits)/RLE AROM hip flexion WFL, RLE AROM knee flexion 0-80, RLE AROM distally to knee WFL/Left LE Active ROM was WFL (within functional limits)

## 2018-03-29 NOTE — OCCUPATIONAL THERAPY INITIAL EVALUATION ADULT - TRANSFER SAFETY CONCERNS NOTED: BED/CHAIR, REHAB EVAL
decreased step length/decreased balance during turns/decreased sequencing ability/inability to maintain weight-bearing restrictions w/o assist/decreased weight-shifting ability

## 2018-03-29 NOTE — PHYSICAL THERAPY INITIAL EVALUATION ADULT - CRITERIA FOR SKILLED THERAPEUTIC INTERVENTIONS
functional limitations in following categories/risk reduction/prevention/anticipated discharge recommendation/therapy frequency/impairments found/rehab potential/anticipated equipment needs at discharge/predicted duration of therapy intervention

## 2018-03-29 NOTE — PROGRESS NOTE ADULT - SUBJECTIVE AND OBJECTIVE BOX
Postoperative Day # 1 s/p Right TKR revision    Patient seen and examined bedside resting comfortably.  States that the cryovac wrap "is too tight" She has refused to wear it.  Denies nausea and vomiting. Tolerating diet.  Denies chest pain, dyspnea, cough.  T(F): 98.5 (03-29-18 @ 06:29), Max: 98.5 (03-29-18 @ 06:29)  HR: 85 (03-29-18 @ 06:29) (63 - 88)  BP: 115/57 (03-29-18 @ 06:29) (71/39 - 129/65)  RR: 18 (03-29-18 @ 06:29) (11 - 20)  SpO2: 94% (03-29-18 @ 06:29) (94% - 98%)    PHYSICAL EXAM:  General: NAD, obese   Neuro:  Alert & oriented x 3  right leg: Ace wrap removed from the right leg. Prenio dressing in place. Wound looks good. Incisional tenderness. NVS of right foot good.  Venodynes reapplied.      LABS:                        9.2    18.99 )-----------( 271      ( 29 Mar 2018 06:13 )             27.9     03-29    136  |  103  |  39<H>  ----------------------------<  119<H>  4.3   |  24  |  2.00<H>    Ca    8.6      29 Mar 2018 06:13  Phos  5.5     03-29  Mg     2.5     03-29    TPro  4.8<L>  /  Alb  2.3<L>  /  TBili  0.2  /  DBili  .05  /  AST  18  /  ALT  15  /  AlkPhos  49  03-28    PT/INR - ( 28 Mar 2018 07:15 )   PT: 11.1 sec;   INR: 1.02 ratio         PTT - ( 28 Mar 2018 07:15 )  PTT:31.7 sec    Culture - Tissue with Gram Stain (03.28.18 @ 18:07) in 2 specimens    Gram Stain:   Rare polymorphonuclear leukocytes per low power field  No organisms seen per oil power field    Specimen Source: .Tissue right knee synovium c/s #2        I&O's Detail    28 Mar 2018 07:01  -  29 Mar 2018 07:00  --------------------------------------------------------  IN:    lactated ringers.: 150 mL    lactated ringers.: 1400 mL    Oral Fluid: 500 mL    Sodium Chloride 0.9% IV Bolus: 500 mL    Solution: 110 mL    Solution: 200 mL    Solution: 50 mL    Solution: 50 mL  Total IN: 2960 mL    OUT:    Voided: 200 mL  Total OUT: 200 mL    Total NET: 2760 mL    medical consult noted.    Impression: 72y Female Postoperative Day # 1 s/p Right TKR revision  elevated BUN/Cr  leukocytosis - given steroids intraoperatively  hyperphosphatemia    Plan:  -continue VTE prophylaxis -venodynes & ASA  - continue IVAB - await final C&S report  - continue medical f/u  -Increase activity with PT, OOB, Ambulate  -Patient instructed on and encouraged incentive spirometry use  -continue local wound care  -f/u AM labs  -will discuss with attending about changing Vancomycin in view of increasing BUN/Cr.

## 2018-03-29 NOTE — PHYSICAL THERAPY INITIAL EVALUATION ADULT - PLANNED THERAPY INTERVENTIONS, PT EVAL
gait training/transfer training/joint mobilization/postural re-education/strengthening/balance training/bed mobility training/ROM/stretching

## 2018-03-29 NOTE — PHYSICAL THERAPY INITIAL EVALUATION ADULT - IMPAIRMENTS CONTRIBUTING IMPAIRED BED MOBILITY, REHAB EVAL
impaired sensory feedback/decreased strength/pain/impaired balance/impaired postural control/impaired sensation of R knee/decreased flexibility/decreased ROM/decreased sensation

## 2018-03-29 NOTE — PHYSICAL THERAPY INITIAL EVALUATION ADULT - IMPAIRMENTS CONTRIBUTING TO GAIT DEVIATIONS, PT EVAL
decreased sensation/impaired sensory feedback/decreased ROM/impaired balance/impaired coordination/impaired sensation of R knee/decreased strength/decreased flexibility/pain/impaired postural control

## 2018-03-29 NOTE — OCCUPATIONAL THERAPY INITIAL EVALUATION ADULT - TRANSFER SAFETY CONCERNS NOTED: SIT/STAND, REHAB EVAL
decreased safety awareness/decreased step length/inability to maintain weight-bearing restrictions w/o assist/decreased weight-shifting ability/decreased sequencing ability

## 2018-03-29 NOTE — OCCUPATIONAL THERAPY INITIAL EVALUATION ADULT - GENERAL OBSERVATIONS, REHAB EVAL
Pt was encountered OOB in chair; NAD, s/p R TKR POD 1, RLE WBAT, R knee dressing clean, dry and intact, AXOX4, cooperative, followed commands; pt c/o pain in R knee due to s/p R TKR which impacts pt performance with functional ADL's/transfers and mobility.

## 2018-03-29 NOTE — PHYSICAL THERAPY INITIAL EVALUATION ADULT - IMPAIRED TRANSFERS: SIT/STAND, REHAB EVAL
impaired sensation of R knee/impaired postural control/decreased ROM/impaired balance/impaired coordination/pain/impaired sensory feedback/decreased strength/decreased flexibility/decreased sensation

## 2018-03-29 NOTE — PHYSICAL THERAPY INITIAL EVALUATION ADULT - ADDITIONAL COMMENTS
Pt has 3 steps with bilateral rails (far apart) to enter home and 1 flight of stairs to go upstairs (but pt verbalized she will stay on ground floor). Pt already has a rolling walker, 3:1 commode, and crutches.

## 2018-03-29 NOTE — PHYSICAL THERAPY INITIAL EVALUATION ADULT - BALANCE DISTURBANCE, IDENTIFIED IMPAIRMENT CONTRIBUTE, REHAB EVAL
pain/impaired sensory feedback/decreased sensation/decreased strength/impaired postural control/decreased ROM/impaired sensation of R knee

## 2018-03-29 NOTE — PHYSICAL THERAPY INITIAL EVALUATION ADULT - MANUAL MUSCLE TESTING RESULTS, REHAB EVAL
except bilateral shoulder flexion 3+/5, L hip flexion 3+/5, R hip flexion 2+/5, Ankle dorsiflexion 3+/5, R Knee extension 2/5/no strength deficits were identified except bilateral shoulder flexion 3+/5, L hip flexion 3+/5, R hip flexion 2+/5, Ankle dorsiflexion 3+/5, R Knee extension 3-/5 due to pain and surgery/no strength deficits were identified except bilateral shoulder flexion 3+/5, L hip flexion 3+/5, R hip flexion 3-/5, R Ankle dorsiflexion 3+/5, R Knee extension 3-/5 due to pain and surgery/no strength deficits were identified

## 2018-03-29 NOTE — PHYSICAL THERAPY INITIAL EVALUATION ADULT - GAIT DEVIATIONS NOTED, PT EVAL
decreased weight-shifting ability/decreased velocity of limb motion/decreased step length/decreased stride length/decreased farida/increased time in double stance

## 2018-03-29 NOTE — OCCUPATIONAL THERAPY INITIAL EVALUATION ADULT - TRANSFER SAFETY CONCERNS NOTED: TOILET, REHAB EVAL
inability to maintain weight-bearing restrictions w/o assist/decreased weight-shifting ability/decreased sequencing ability/decreased balance during turns/decreased step length

## 2018-03-29 NOTE — PROGRESS NOTE ADULT - SUBJECTIVE AND OBJECTIVE BOX
Patient is a 72y old  Female who presents with a chief complaint of Right Total Knee Re-implantation/Revision (28 Mar 2018 14:36)      INTERVAL HPI/OVERNIGHT EVENTS:    MEDICATIONS  (STANDING):  ascorbic acid 500 milliGRAM(s) Oral two times a day  clindamycin IVPB 900 milliGRAM(s) IV Intermittent every 8 hours  docusate sodium 100 milliGRAM(s) Oral three times a day  heparin  Injectable 5000 Unit(s) SubCutaneous every 8 hours  losartan 100 milliGRAM(s) Oral daily  morphine  - Injectable 4 milliGRAM(s) IV Push once  multivitamin 1 Tablet(s) Oral daily  pantoprazole    Tablet 40 milliGRAM(s) Oral daily  polyethylene glycol 3350 17 Gram(s) Oral daily  simvastatin 20 milliGRAM(s) Oral at bedtime  sodium chloride 0.9%. 1000 milliLiter(s) (100 mL/Hr) IV Continuous <Continuous>  sucralfate 1 Gram(s) Oral <User Schedule>  vancomycin  IVPB 1000 milliGRAM(s) IV Intermittent every 24 hours    MEDICATIONS  (PRN):  acetaminophen   Tablet 650 milliGRAM(s) Oral every 6 hours PRN For Temp over 38.3 C (100.94 F)  aluminum hydroxide/magnesium hydroxide/simethicone Suspension 30 milliLiter(s) Oral four times a day PRN Indigestion  magnesium hydroxide Suspension 30 milliLiter(s) Oral daily PRN Constipation  ondansetron Injectable 4 milliGRAM(s) IV Push every 6 hours PRN Nausea and/or Vomiting  oxyCODONE    IR 5 milliGRAM(s) Oral every 4 hours PRN Mild Pain  oxyCODONE    IR 10 milliGRAM(s) Oral every 4 hours PRN Moderate Pain  senna 2 Tablet(s) Oral at bedtime PRN Constipation      Allergies    ceftriaxone (Rash)  Cipro (Unknown)  Flagyl (Diarrhea; Rash)    Intolerances        REVIEW OF SYSTEMS:  CONSTITUTIONAL: No fever, weight loss, or fatigue  EYES: No eye pain, visual disturbances, or discharge  ENMT:  No difficulty hearing, tinnitus, vertigo; No sinus or throat pain  NECK: No pain or stiffness  BREASTS: No pain, masses, or nipple discharge  RESPIRATORY: No cough, wheezing, chills or hemoptysis; No shortness of breath  CARDIOVASCULAR: No chest pain, palpitations, dizziness, or leg swelling  GASTROINTESTINAL: No abdominal or epigastric pain. No nausea, vomiting, or hematemesis; No diarrhea or constipation. No melena or hematochezia.  GENITOURINARY: No dysuria, frequency, hematuria, or incontinence  NEUROLOGICAL: No headaches, memory loss, loss of strength, numbness, or tremors  SKIN: No itching, burning, rashes, or lesions   LYMPH NODES: No enlarged glands  ENDOCRINE: No heat or cold intolerance; No hair loss  MUSCULOSKELETAL: No joint pain or swelling; No muscle, back, or extremity pain  PSYCHIATRIC: No depression, anxiety, mood swings, or difficulty sleeping  HEME/LYMPH: No easy bruising, or bleeding gums  ALLERGY AND IMMUNOLOGIC: No hives or eczema    Vital Signs Last 24 Hrs  T(C): 37 (29 Mar 2018 08:28), Max: 37 (29 Mar 2018 08:28)  T(F): 98.6 (29 Mar 2018 08:28), Max: 98.6 (29 Mar 2018 08:28)  HR: 83 (29 Mar 2018 08:28) (63 - 88)  BP: 96/46 (29 Mar 2018 08:28) (71/39 - 129/65)  BP(mean): --  RR: 16 (29 Mar 2018 08:28) (11 - 20)  SpO2: 94% (29 Mar 2018 08:28) (94% - 98%)    PHYSICAL EXAM:  GENERAL: NAD, well-groomed, well-developed  HEAD:  Atraumatic, Normocephalic  EYES: EOMI, PERRLA, conjunctiva and sclera clear  ENMT: No tonsillar erythema, exudates, or enlargement; Moist mucous membranes, Good dentition, No lesions  NECK: Supple, No JVD, Normal thyroid  NERVOUS SYSTEM:  Alert & Oriented X3, Good concentration; Motor Strength 5/5 B/L upper and lower extremities; DTRs 2+ intact and symmetric  CHEST/LUNG: Clear to percussion bilaterally; No rales, rhonchi, wheezing, or rubs  HEART: Regular rate and rhythm; No murmurs, rubs, or gallops  ABDOMEN: Soft, Nontender, Nondistended; Bowel sounds present, obese  EXTREMITIES:  2+ Peripheral Pulses, No clubbing, cyanosis, or edema  RLE: dressing c/d/i  LYMPH: No lymphadenopathy noted  SKIN: No rashes or lesions    LABS:                        9.2    18.99 )-----------( 271      ( 29 Mar 2018 06:13 )             27.9     03-29    136  |  103  |  39<H>  ----------------------------<  119<H>  4.3   |  24  |  2.00<H>    Ca    8.6      29 Mar 2018 06:13  Phos  5.5     03-29  Mg     2.5     03-29    TPro  4.8<L>  /  Alb  2.3<L>  /  TBili  0.2  /  DBili  .05  /  AST  18  /  ALT  15  /  AlkPhos  49  03-28    PT/INR - ( 28 Mar 2018 07:15 )   PT: 11.1 sec;   INR: 1.02 ratio         PTT - ( 28 Mar 2018 07:15 )  PTT:31.7 sec    CAPILLARY BLOOD GLUCOSE          RADIOLOGY & ADDITIONAL TESTS:    Imaging Personally Reviewed:  [ ] YES  [ ] NO    Consultant(s) Notes Reviewed:  [ ] YES  [ ] NO    Care Discussed with Consultants/Other Providers [ ] YES  [ ] NO

## 2018-03-30 LAB
ANION GAP SERPL CALC-SCNC: 9 MMOL/L — SIGNIFICANT CHANGE UP (ref 5–17)
BUN SERPL-MCNC: 36 MG/DL — HIGH (ref 7–23)
CALCIUM SERPL-MCNC: 9.3 MG/DL — SIGNIFICANT CHANGE UP (ref 8.5–10.1)
CHLORIDE SERPL-SCNC: 109 MMOL/L — HIGH (ref 96–108)
CO2 SERPL-SCNC: 24 MMOL/L — SIGNIFICANT CHANGE UP (ref 22–31)
CREAT SERPL-MCNC: 1.29 MG/DL — SIGNIFICANT CHANGE UP (ref 0.5–1.3)
GLUCOSE SERPL-MCNC: 106 MG/DL — HIGH (ref 70–99)
HCT VFR BLD CALC: 28.3 % — LOW (ref 34.5–45)
HGB BLD-MCNC: 9.2 G/DL — LOW (ref 11.5–15.5)
MCHC RBC-ENTMCNC: 29 PG — SIGNIFICANT CHANGE UP (ref 27–34)
MCHC RBC-ENTMCNC: 32.5 GM/DL — SIGNIFICANT CHANGE UP (ref 32–36)
MCV RBC AUTO: 89.3 FL — SIGNIFICANT CHANGE UP (ref 80–100)
NRBC # BLD: 0 /100 WBCS — SIGNIFICANT CHANGE UP (ref 0–0)
PLATELET # BLD AUTO: 249 K/UL — SIGNIFICANT CHANGE UP (ref 150–400)
POTASSIUM SERPL-MCNC: 4 MMOL/L — SIGNIFICANT CHANGE UP (ref 3.5–5.3)
POTASSIUM SERPL-SCNC: 4 MMOL/L — SIGNIFICANT CHANGE UP (ref 3.5–5.3)
RBC # BLD: 3.17 M/UL — LOW (ref 3.8–5.2)
RBC # FLD: 14.9 % — HIGH (ref 10.3–14.5)
SODIUM SERPL-SCNC: 142 MMOL/L — SIGNIFICANT CHANGE UP (ref 135–145)
WBC # BLD: 11.44 K/UL — HIGH (ref 3.8–10.5)
WBC # FLD AUTO: 11.44 K/UL — HIGH (ref 3.8–10.5)

## 2018-03-30 PROCEDURE — 99233 SBSQ HOSP IP/OBS HIGH 50: CPT

## 2018-03-30 PROCEDURE — 93970 EXTREMITY STUDY: CPT | Mod: 26

## 2018-03-30 RX ORDER — ACETAMINOPHEN 500 MG
1000 TABLET ORAL ONCE
Qty: 0 | Refills: 0 | Status: COMPLETED | OUTPATIENT
Start: 2018-03-30 | End: 2018-03-30

## 2018-03-30 RX ADMIN — OXYCODONE HYDROCHLORIDE 10 MILLIGRAM(S): 5 TABLET ORAL at 22:12

## 2018-03-30 RX ADMIN — Medication 100 MILLIGRAM(S): at 01:06

## 2018-03-30 RX ADMIN — Medication 100 MILLIGRAM(S): at 17:44

## 2018-03-30 RX ADMIN — OXYCODONE HYDROCHLORIDE 10 MILLIGRAM(S): 5 TABLET ORAL at 23:12

## 2018-03-30 RX ADMIN — HEPARIN SODIUM 5000 UNIT(S): 5000 INJECTION INTRAVENOUS; SUBCUTANEOUS at 21:23

## 2018-03-30 RX ADMIN — SIMVASTATIN 20 MILLIGRAM(S): 20 TABLET, FILM COATED ORAL at 21:23

## 2018-03-30 RX ADMIN — Medication 250 MILLIGRAM(S): at 21:23

## 2018-03-30 RX ADMIN — Medication 1000 MILLIGRAM(S): at 21:38

## 2018-03-30 RX ADMIN — Medication 1 GRAM(S): at 18:05

## 2018-03-30 RX ADMIN — OXYCODONE HYDROCHLORIDE 10 MILLIGRAM(S): 5 TABLET ORAL at 13:15

## 2018-03-30 RX ADMIN — OXYCODONE HYDROCHLORIDE 10 MILLIGRAM(S): 5 TABLET ORAL at 19:03

## 2018-03-30 RX ADMIN — Medication 400 MILLIGRAM(S): at 21:23

## 2018-03-30 RX ADMIN — OXYCODONE HYDROCHLORIDE 10 MILLIGRAM(S): 5 TABLET ORAL at 10:14

## 2018-03-30 RX ADMIN — OXYCODONE HYDROCHLORIDE 10 MILLIGRAM(S): 5 TABLET ORAL at 09:14

## 2018-03-30 RX ADMIN — HEPARIN SODIUM 5000 UNIT(S): 5000 INJECTION INTRAVENOUS; SUBCUTANEOUS at 05:57

## 2018-03-30 RX ADMIN — Medication 100 MILLIGRAM(S): at 09:15

## 2018-03-30 RX ADMIN — PANTOPRAZOLE SODIUM 40 MILLIGRAM(S): 20 TABLET, DELAYED RELEASE ORAL at 11:53

## 2018-03-30 RX ADMIN — HEPARIN SODIUM 5000 UNIT(S): 5000 INJECTION INTRAVENOUS; SUBCUTANEOUS at 13:18

## 2018-03-30 RX ADMIN — OXYCODONE HYDROCHLORIDE 10 MILLIGRAM(S): 5 TABLET ORAL at 14:15

## 2018-03-30 RX ADMIN — OXYCODONE HYDROCHLORIDE 10 MILLIGRAM(S): 5 TABLET ORAL at 18:03

## 2018-03-30 RX ADMIN — Medication 500 MILLIGRAM(S): at 17:45

## 2018-03-30 RX ADMIN — Medication 500 MILLIGRAM(S): at 05:57

## 2018-03-30 RX ADMIN — Medication 1 TABLET(S): at 11:53

## 2018-03-30 NOTE — PROGRESS NOTE ADULT - SUBJECTIVE AND OBJECTIVE BOX
72yFemale s/p R TKA POD#2. Pt seen and examined in NAD. Pain uncontrolled. Pt denies any new complaints. Pt denies CP/SOB/N/V/D/numbness/tingling/bowel or bladder dysfunction.     PE:   RLE: dressing c/d/i. +ROM ankle/toes. Calf: firm and tender but compressible . DP/PT 2+ NVI.                           9.2    11.44 )-----------( 249      ( 30 Mar 2018 09:03 )             28.3       03-30    142  |  109<H>  |  36<H>  ----------------------------<  106<H>  4.0   |  24  |  1.29    Ca    9.3      30 Mar 2018 09:03  Phos  5.5     03-29  Mg     2.5     03-29    TPro  4.8<L>  /  Alb  2.3<L>  /  TBili  0.2  /  DBili  .05  /  AST  18  /  ALT  15  /  AlkPhos  49  03-28        A/P: 72yFemale s/p R TKA POD#2.  F/u Bilateral LE venous doppler  Pain control: Pt has not taken pain medication since 9:49pm last night - which is most likely the reason she is currently having pain - encouraged pt to stay on top of pain by taking adequate amount of pain medications   PT: WBAT   DVT ppx: SCDs/ASA 325mg bid - pt was placed on heparin 5000 q8H due to creatinine increase - will discuss change with Dr. Cummings -   Elevated creatinine has improved.   Wound care, Isometric exercises, incentive spirometry   Discharge: planning to home on saturday  All the above discussed and understood by pt

## 2018-03-30 NOTE — PROGRESS NOTE ADULT - SUBJECTIVE AND OBJECTIVE BOX
Patient is a 72y old  Female who presents with a chief complaint of Right Total Knee Re-implantation/Revision (28 Mar 2018 14:36)      INTERVAL HPI/OVERNIGHT EVENTS: no events     MEDICATIONS  (STANDING):  ascorbic acid 500 milliGRAM(s) Oral two times a day  clindamycin IVPB 900 milliGRAM(s) IV Intermittent every 8 hours  docusate sodium 100 milliGRAM(s) Oral three times a day  heparin  Injectable 5000 Unit(s) SubCutaneous every 8 hours  morphine  - Injectable 4 milliGRAM(s) IV Push once  multivitamin 1 Tablet(s) Oral daily  pantoprazole    Tablet 40 milliGRAM(s) Oral daily  polyethylene glycol 3350 17 Gram(s) Oral daily  simvastatin 20 milliGRAM(s) Oral at bedtime  sodium chloride 0.9%. 1000 milliLiter(s) (100 mL/Hr) IV Continuous <Continuous>  sucralfate 1 Gram(s) Oral <User Schedule>  vancomycin  IVPB 1000 milliGRAM(s) IV Intermittent every 24 hours    MEDICATIONS  (PRN):  acetaminophen   Tablet 650 milliGRAM(s) Oral every 6 hours PRN For Temp over 38.3 C (100.94 F)  aluminum hydroxide/magnesium hydroxide/simethicone Suspension 30 milliLiter(s) Oral four times a day PRN Indigestion  bisacodyl Suppository 10 milliGRAM(s) Rectal daily PRN If no bowel movement by postoperative day #2  magnesium hydroxide Suspension 30 milliLiter(s) Oral daily PRN Constipation  ondansetron Injectable 4 milliGRAM(s) IV Push every 6 hours PRN Nausea and/or Vomiting  oxyCODONE    IR 5 milliGRAM(s) Oral every 4 hours PRN Mild Pain  oxyCODONE    IR 10 milliGRAM(s) Oral every 4 hours PRN Moderate Pain  senna 2 Tablet(s) Oral at bedtime PRN Constipation      Allergies    ceftriaxone (Rash)  Cipro (Unknown)  Flagyl (Diarrhea; Rash)    Intolerances        REVIEW OF SYSTEMS:  CONSTITUTIONAL: No fever, weight loss, or fatigue  EYES: No eye pain, visual disturbances, or discharge  ENMT:  No difficulty hearing, tinnitus, vertigo; No sinus or throat pain  NECK: No pain or stiffness  BREASTS: No pain, masses, or nipple discharge  RESPIRATORY: No cough, wheezing, chills or hemoptysis; No shortness of breath  CARDIOVASCULAR: No chest pain, palpitations, dizziness, or leg swelling  GASTROINTESTINAL: No abdominal or epigastric pain. No nausea, vomiting, or hematemesis; No diarrhea or constipation. No melena or hematochezia.  GENITOURINARY: No dysuria, frequency, hematuria, or incontinence  NEUROLOGICAL: No headaches, memory loss, loss of strength, numbness, or tremors  SKIN: No itching, burning, rashes, or lesions   LYMPH NODES: No enlarged glands  ENDOCRINE: No heat or cold intolerance; No hair loss  MUSCULOSKELETAL: No joint pain or swelling; No muscle, back, or extremity pain  PSYCHIATRIC: No depression, anxiety, mood swings, or difficulty sleeping  HEME/LYMPH: No easy bruising, or bleeding gums  ALLERGY AND IMMUNOLOGIC: No hives or eczema    Vital Signs Last 24 Hrs  T(C): 36.6 (30 Mar 2018 05:00), Max: 37 (29 Mar 2018 21:23)  T(F): 97.9 (30 Mar 2018 05:00), Max: 98.6 (29 Mar 2018 21:23)  HR: 83 (30 Mar 2018 13:21) (83 - 86)  BP: 150/70 (30 Mar 2018 13:21) (113/58 - 150/70)  BP(mean): --  RR: 16 (30 Mar 2018 13:21) (16 - 18)  SpO2: 95% (30 Mar 2018 13:21) (95% - 97%)    PHYSICAL EXAM:  GENERAL: NAD, well-groomed, well-developed  HEAD:  Atraumatic, Normocephalic  EYES: EOMI, PERRLA, conjunctiva and sclera clear  ENMT: No tonsillar erythema, exudates, or enlargement; Moist mucous membranes, Good dentition, No lesions  NECK: Supple, No JVD, Normal thyroid  NERVOUS SYSTEM:  Alert & Oriented X3, Good concentration; Motor Strength 5/5 B/L upper and lower extremities; DTRs 2+ intact and symmetric  CHEST/LUNG: Clear to percussion bilaterally; No rales, rhonchi, wheezing, or rubs  HEART: Regular rate and rhythm; No murmurs, rubs, or gallops  ABDOMEN: Soft, Nontender, Nondistended; Bowel sounds present, obese  EXTREMITIES:  2+ Peripheral Pulses, No clubbing, cyanosis, or edema  RLE: dressing c/d/i  LYMPH: No lymphadenopathy noted  SKIN: No rashes or lesions    LABS:                        9.2    11.44 )-----------( 249      ( 30 Mar 2018 09:03 )             28.3     03-30    142  |  109<H>  |  36<H>  ----------------------------<  106<H>  4.0   |  24  |  1.29    Ca    9.3      30 Mar 2018 09:03  Phos  5.5     03-29  Mg     2.5     03-29          CAPILLARY BLOOD GLUCOSE          RADIOLOGY & ADDITIONAL TESTS:    Imaging Personally Reviewed:  [ ] YES  [ ] NO    Consultant(s) Notes Reviewed:  [ ] YES  [ ] NO    Care Discussed with Consultants/Other Providers [ ] YES  [ ] NO

## 2018-03-31 VITALS
HEART RATE: 89 BPM | DIASTOLIC BLOOD PRESSURE: 80 MMHG | OXYGEN SATURATION: 96 % | SYSTOLIC BLOOD PRESSURE: 126 MMHG | RESPIRATION RATE: 17 BRPM | TEMPERATURE: 98 F

## 2018-03-31 LAB
ANION GAP SERPL CALC-SCNC: 8 MMOL/L — SIGNIFICANT CHANGE UP (ref 5–17)
BUN SERPL-MCNC: 28 MG/DL — HIGH (ref 7–23)
CALCIUM SERPL-MCNC: 8.1 MG/DL — LOW (ref 8.5–10.1)
CHLORIDE SERPL-SCNC: 108 MMOL/L — SIGNIFICANT CHANGE UP (ref 96–108)
CO2 SERPL-SCNC: 24 MMOL/L — SIGNIFICANT CHANGE UP (ref 22–31)
CREAT SERPL-MCNC: 1.1 MG/DL — SIGNIFICANT CHANGE UP (ref 0.5–1.3)
GLUCOSE SERPL-MCNC: 122 MG/DL — HIGH (ref 70–99)
HCT VFR BLD CALC: 26 % — LOW (ref 34.5–45)
HGB BLD-MCNC: 8.6 G/DL — LOW (ref 11.5–15.5)
MCHC RBC-ENTMCNC: 29.3 PG — SIGNIFICANT CHANGE UP (ref 27–34)
MCHC RBC-ENTMCNC: 33.1 GM/DL — SIGNIFICANT CHANGE UP (ref 32–36)
MCV RBC AUTO: 88.4 FL — SIGNIFICANT CHANGE UP (ref 80–100)
NRBC # BLD: 0 /100 WBCS — SIGNIFICANT CHANGE UP (ref 0–0)
PLATELET # BLD AUTO: 237 K/UL — SIGNIFICANT CHANGE UP (ref 150–400)
POTASSIUM SERPL-MCNC: 3.9 MMOL/L — SIGNIFICANT CHANGE UP (ref 3.5–5.3)
POTASSIUM SERPL-SCNC: 3.9 MMOL/L — SIGNIFICANT CHANGE UP (ref 3.5–5.3)
RBC # BLD: 2.94 M/UL — LOW (ref 3.8–5.2)
RBC # FLD: 14.9 % — HIGH (ref 10.3–14.5)
SODIUM SERPL-SCNC: 140 MMOL/L — SIGNIFICANT CHANGE UP (ref 135–145)
WBC # BLD: 9.6 K/UL — SIGNIFICANT CHANGE UP (ref 3.8–10.5)
WBC # FLD AUTO: 9.6 K/UL — SIGNIFICANT CHANGE UP (ref 3.8–10.5)

## 2018-03-31 PROCEDURE — 99233 SBSQ HOSP IP/OBS HIGH 50: CPT

## 2018-03-31 RX ORDER — ASPIRIN/CALCIUM CARB/MAGNESIUM 324 MG
1 TABLET ORAL
Qty: 60 | Refills: 0 | OUTPATIENT
Start: 2018-03-31 | End: 2018-04-29

## 2018-03-31 RX ORDER — SUCRALFATE 1 G
1 TABLET ORAL
Qty: 0 | Refills: 0 | COMMUNITY

## 2018-03-31 RX ORDER — OXYCODONE HYDROCHLORIDE 5 MG/1
1 TABLET ORAL
Qty: 30 | Refills: 0 | OUTPATIENT
Start: 2018-03-31 | End: 2018-04-04

## 2018-03-31 RX ORDER — OLMESARTAN MEDOXOMIL-HYDROCHLOROTHIAZIDE 25; 40 MG/1; MG/1
1 TABLET, FILM COATED ORAL
Qty: 0 | Refills: 0 | COMMUNITY

## 2018-03-31 RX ORDER — ASPIRIN/CALCIUM CARB/MAGNESIUM 324 MG
325 TABLET ORAL EVERY 12 HOURS
Qty: 0 | Refills: 0 | Status: DISCONTINUED | OUTPATIENT
Start: 2018-03-31 | End: 2018-03-31

## 2018-03-31 RX ORDER — ACETAMINOPHEN 500 MG
3 TABLET ORAL
Qty: 0 | Refills: 0 | COMMUNITY
Start: 2018-03-31

## 2018-03-31 RX ORDER — POLYETHYLENE GLYCOL 3350 17 G/17G
17 POWDER, FOR SOLUTION ORAL
Qty: 0 | Refills: 0 | COMMUNITY
Start: 2018-03-31

## 2018-03-31 RX ORDER — DOCUSATE SODIUM 100 MG
1 CAPSULE ORAL
Qty: 0 | Refills: 0 | COMMUNITY
Start: 2018-03-31

## 2018-03-31 RX ORDER — OLMESARTAN MEDOXOMIL 5 MG/1
1 TABLET, FILM COATED ORAL
Qty: 30 | Refills: 0 | OUTPATIENT
Start: 2018-03-31 | End: 2018-04-29

## 2018-03-31 RX ORDER — LACTOBACILLUS ACIDOPHILUS 100MM CELL
0 CAPSULE ORAL
Qty: 0 | Refills: 0 | COMMUNITY

## 2018-03-31 RX ORDER — RABEPRAZOLE 20 MG/1
1 TABLET, DELAYED RELEASE ORAL
Qty: 0 | Refills: 0 | COMMUNITY

## 2018-03-31 RX ORDER — ASCORBIC ACID 60 MG
1 TABLET,CHEWABLE ORAL
Qty: 0 | Refills: 0 | COMMUNITY
Start: 2018-03-31

## 2018-03-31 RX ORDER — PANTOPRAZOLE SODIUM 20 MG/1
1 TABLET, DELAYED RELEASE ORAL
Qty: 0 | Refills: 0 | COMMUNITY
Start: 2018-03-31

## 2018-03-31 RX ORDER — ASPIRIN/CALCIUM CARB/MAGNESIUM 324 MG
0 TABLET ORAL
Qty: 0 | Refills: 0 | COMMUNITY

## 2018-03-31 RX ADMIN — OXYCODONE HYDROCHLORIDE 10 MILLIGRAM(S): 5 TABLET ORAL at 10:48

## 2018-03-31 RX ADMIN — Medication 100 MILLIGRAM(S): at 00:42

## 2018-03-31 RX ADMIN — HEPARIN SODIUM 5000 UNIT(S): 5000 INJECTION INTRAVENOUS; SUBCUTANEOUS at 06:12

## 2018-03-31 RX ADMIN — OXYCODONE HYDROCHLORIDE 10 MILLIGRAM(S): 5 TABLET ORAL at 02:28

## 2018-03-31 RX ADMIN — Medication 1 TABLET(S): at 11:21

## 2018-03-31 RX ADMIN — Medication 500 MILLIGRAM(S): at 06:12

## 2018-03-31 RX ADMIN — Medication 1 GRAM(S): at 10:01

## 2018-03-31 RX ADMIN — OXYCODONE HYDROCHLORIDE 10 MILLIGRAM(S): 5 TABLET ORAL at 06:29

## 2018-03-31 RX ADMIN — OXYCODONE HYDROCHLORIDE 10 MILLIGRAM(S): 5 TABLET ORAL at 07:29

## 2018-03-31 RX ADMIN — Medication 100 MILLIGRAM(S): at 10:02

## 2018-03-31 RX ADMIN — OXYCODONE HYDROCHLORIDE 10 MILLIGRAM(S): 5 TABLET ORAL at 03:28

## 2018-03-31 RX ADMIN — PANTOPRAZOLE SODIUM 40 MILLIGRAM(S): 20 TABLET, DELAYED RELEASE ORAL at 11:22

## 2018-03-31 RX ADMIN — OXYCODONE HYDROCHLORIDE 10 MILLIGRAM(S): 5 TABLET ORAL at 11:48

## 2018-03-31 NOTE — PROGRESS NOTE ADULT - SUBJECTIVE AND OBJECTIVE BOX
Patient is a 72y old  Female who presents with a chief complaint of Right Total Knee Re-implantation/Revision (28 Mar 2018 14:36)      INTERVAL HPI/OVERNIGHT EVENTS: no events     MEDICATIONS  (STANDING):  ascorbic acid 500 milliGRAM(s) Oral two times a day  aspirin 325 milliGRAM(s) Oral every 12 hours  clindamycin IVPB 900 milliGRAM(s) IV Intermittent every 8 hours  docusate sodium 100 milliGRAM(s) Oral three times a day  heparin  Injectable 5000 Unit(s) SubCutaneous every 8 hours  morphine  - Injectable 4 milliGRAM(s) IV Push once  multivitamin 1 Tablet(s) Oral daily  pantoprazole    Tablet 40 milliGRAM(s) Oral daily  polyethylene glycol 3350 17 Gram(s) Oral daily  simvastatin 20 milliGRAM(s) Oral at bedtime  sucralfate 1 Gram(s) Oral <User Schedule>  trimethoprim  160 mG/sulfamethoxazole 800 mG 1 Tablet(s) Oral every 12 hours  vancomycin  IVPB 1000 milliGRAM(s) IV Intermittent every 24 hours    MEDICATIONS  (PRN):  acetaminophen   Tablet 650 milliGRAM(s) Oral every 6 hours PRN For Temp over 38.3 C (100.94 F)  aluminum hydroxide/magnesium hydroxide/simethicone Suspension 30 milliLiter(s) Oral four times a day PRN Indigestion  bisacodyl Suppository 10 milliGRAM(s) Rectal daily PRN If no bowel movement by postoperative day #2  magnesium hydroxide Suspension 30 milliLiter(s) Oral daily PRN Constipation  ondansetron Injectable 4 milliGRAM(s) IV Push every 6 hours PRN Nausea and/or Vomiting  oxyCODONE    IR 5 milliGRAM(s) Oral every 4 hours PRN Mild Pain  oxyCODONE    IR 10 milliGRAM(s) Oral every 4 hours PRN Moderate Pain  senna 2 Tablet(s) Oral at bedtime PRN Constipation      Allergies    ceftriaxone (Rash)  Cipro (Unknown)  Flagyl (Diarrhea; Rash)    Intolerances        REVIEW OF SYSTEMS:  CONSTITUTIONAL: No fever, weight loss, or fatigue  EYES: No eye pain, visual disturbances, or discharge  ENMT:  No difficulty hearing, tinnitus, vertigo; No sinus or throat pain  NECK: No pain or stiffness  BREASTS: No pain, masses, or nipple discharge  RESPIRATORY: No cough, wheezing, chills or hemoptysis; No shortness of breath  CARDIOVASCULAR: No chest pain, palpitations, dizziness, or leg swelling  GASTROINTESTINAL: No abdominal or epigastric pain. No nausea, vomiting, or hematemesis; No diarrhea or constipation. No melena or hematochezia.  GENITOURINARY: No dysuria, frequency, hematuria, or incontinence  NEUROLOGICAL: No headaches, memory loss, loss of strength, numbness, or tremors  SKIN: No itching, burning, rashes, or lesions   LYMPH NODES: No enlarged glands  ENDOCRINE: No heat or cold intolerance; No hair loss  MUSCULOSKELETAL: No joint pain or swelling; No muscle, back, or extremity pain  PSYCHIATRIC: No depression, anxiety, mood swings, or difficulty sleeping  HEME/LYMPH: No easy bruising, or bleeding gums  ALLERGY AND IMMUNOLOGIC: No hives or eczema    Vital Signs Last 24 Hrs  T(C): 36.9 (31 Mar 2018 06:26), Max: 37.5 (31 Mar 2018 00:16)  T(F): 98.4 (31 Mar 2018 06:26), Max: 99.5 (31 Mar 2018 00:16)  HR: 78 (31 Mar 2018 06:26) (78 - 92)  BP: 121/67 (31 Mar 2018 06:26) (121/67 - 150/70)  BP(mean): --  RR: 16 (31 Mar 2018 06:26) (16 - 17)  SpO2: 95% (31 Mar 2018 06:26) (95% - 95%)    PHYSICAL EXAM:  GENERAL: NAD, well-groomed, well-developed  HEAD:  Atraumatic, Normocephalic  EYES: EOMI, PERRLA, conjunctiva and sclera clear  ENMT: No tonsillar erythema, exudates, or enlargement; Moist mucous membranes, Good dentition, No lesions  NECK: Supple, No JVD, Normal thyroid  NERVOUS SYSTEM:  Alert & Oriented X3, Good concentration; Motor Strength 5/5 B/L upper and lower extremities; DTRs 2+ intact and symmetric  CHEST/LUNG: Clear to percussion bilaterally; No rales, rhonchi, wheezing, or rubs  HEART: Regular rate and rhythm; No murmurs, rubs, or gallops  ABDOMEN: Soft, Nontender, Nondistended; Bowel sounds present, obese  EXTREMITIES:  2+ Peripheral Pulses, No clubbing, cyanosis, or edema  RLE: dressing c/d/i  LYMPH: No lymphadenopathy noted  SKIN: No rashes or lesions  LABS:                        9.2    11.44 )-----------( 249      ( 30 Mar 2018 09:03 )             28.3     03-30    142  |  109<H>  |  36<H>  ----------------------------<  106<H>  4.0   |  24  |  1.29    Ca    9.3      30 Mar 2018 09:03          CAPILLARY BLOOD GLUCOSE          RADIOLOGY & ADDITIONAL TESTS:    Imaging Personally Reviewed:  [ ] YES  [ ] NO    Consultant(s) Notes Reviewed:  [ ] YES  [ ] NO    Care Discussed with Consultants/Other Providers [ ] YES  [ ] NO

## 2018-03-31 NOTE — PROGRESS NOTE ADULT - ASSESSMENT
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 Pt is now present of for R TKA re-implantation. Pt admits currently she is in 0/10 pain, no pain radiating anywhere. Pt as occasional burning but nothing at this time. Pt would like to get her re-implantation of R TKA. Pt is currently ambulating with a cane. Pt currently takes an ASA 325mg once a day which she has stopped for surgery. Pt currently denies CP/SOB/N/V/D/numbness/tingling/bowel or bladder dysfunction. (28 Mar 2018 07:29)      OSVALDO  -improving   -resume aspirin at d/c, can resume bp meds at dc as well. Monitor bp overnight, trending up  -abx as per ortho    hypomagnesemia/calcemia  -corrected, monitor    S/p R TKA re-implant POD#1   c/w pain control, bowel regiment  dvt ppx, , pt   abx as per ortho     Post OP-Hypotension  -monitor bp
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 Pt is now present of for R TKA re-implantation. Pt admits currently she is in 0/10 pain, no pain radiating anywhere. Pt as occasional burning but nothing at this time. Pt would like to get her re-implantation of R TKA. Pt is currently ambulating with a cane. Pt currently takes an ASA 325mg once a day which she has stopped for surgery. Pt currently denies CP/SOB/N/V/D/numbness/tingling/bowel or bladder dysfunction. (28 Mar 2018 07:29)      OSVALDO  -improving   -resume aspirin at d/c, can d/c patient on benicar 20mg daily, stop benicar hct, As bp has been lower off meds. Pt to f/u for bp check in 1 week. All explained to patient.   -abx as per ortho    hypomagnesemia/calcemia  -corrected, monitor    S/p R TKA re-implant  c/w pain control, bowel regiment  dvt ppx, , pt   abx as per ortho
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 Pt is now present of for R TKA re-implantation. Pt admits currently she is in 0/10 pain, no pain radiating anywhere. Pt as occasional burning but nothing at this time. Pt would like to get her re-implantation of R TKA. Pt is currently ambulating with a cane. Pt currently takes an ASA 325mg once a day which she has stopped for surgery. Pt currently denies CP/SOB/N/V/D/numbness/tingling/bowel or bladder dysfunction. (28 Mar 2018 07:29)      OSVALDO  -start ns ivf  -stop nsaids, switch to heparin dvt ppx, hold losartan   -vanco trough before next dose    hypomagnesemia/calcemia  -corrected, monitor    S/p R TKA re-implant POD#1   c/w pain control, bowel regiment  dvt ppx, , pt   abx as per ortho     Post OP-Hypotension  -monitor bp  -hold anti htn meds for now  -continue to monitor
72yFemale s/p R TKA POD#3
R TKA POD#2
R TKA re-implant POD#0

## 2018-03-31 NOTE — PROGRESS NOTE ADULT - SUBJECTIVE AND OBJECTIVE BOX
72yFemale s/p R TKA revision POD#3. Pt seen and examined in NAD. Pain controlled. Pt denies any new complaints. Pt denies CP/SOB/N/V/D/numbness/tingling/bowel or bladder dysfunction.     PE:   RLE: dressing c/d/i. +ROM ankle/toes. Calf: firm and tender but compressible . DP/PT 2+ NVI.         A/P: 72yFemale s/p R TKA  revision POD#3.  F/u AM labs   B/L venous doppler - negative for DVT.   Pain controlled.  PT: WBAT   DVT ppx: SCDs/ will restart ASA 325mg BID on discharge  Pt will be discharged on Bactrim DS orally - will discontinue IV abx   Elevated creatinine has improved.   Wound care, Isometric exercises, incentive spirometry   Discharge: home today  All the above discussed and understood by pt 72yFemale s/p R TKA revision POD#3. Pt seen and examined in NAD. Pain controlled. Pt denies any new complaints. Pt denies CP/dizziness, lightheadness,SOB/N/V/D/numbness/tingling/bowel or bladder dysfunction.     PE:   RLE: dressing c/d/i. +ROM ankle/toes. Calf: firm and tender but compressible . DP/PT 2+ NVI.         A/P: 72yFemale s/p R TKA  revision POD#3.  F/u AM labs   B/L venous doppler - negative for DVT.   Pain controlled.  PT: WBAT   DVT ppx: SCDs/ will restart ASA 325mg BID on discharge  Pt will be discharged on Bactrim DS orally - will discontinue IV abx   Elevated creatinine has improved.   Wound care, Isometric exercises, incentive spirometry   Discharge: home today  All the above discussed and understood by pt 72yFemale s/p R TKA revision POD#3. Pt seen and examined in NAD. Pain controlled. Pt denies any new complaints. Pt denies CP/dizziness, lightheadness,SOB/N/V/D/numbness/tingling/bowel or bladder dysfunction.     PE:   RLE: dressing c/d/i. +ROM ankle/toes. Calf: firm and tender but compressible . DP/PT 2+ NVI.         A/P: 72yFemale s/p R TKA  revision POD#3.  F/u AM labs   As per medical team - will d/c pt on benicar 20mg once a day - pt to follow up with PCP in 1 week to monitor blood pressure and kidney function  B/L venous doppler - negative for DVT.   Pain controlled.  PT: WBAT   DVT ppx: SCDs/ will restart ASA 325mg BID on discharge  Pt will be discharged on Bactrim DS orally - will discontinue IV abx   Elevated creatinine has improved.   Wound care, Isometric exercises, incentive spirometry   Discharge: home today  All the above discussed and understood by pt

## 2018-04-02 LAB
CULTURE RESULTS: SIGNIFICANT CHANGE UP
CULTURE RESULTS: SIGNIFICANT CHANGE UP
GRAM STN FLD: SIGNIFICANT CHANGE UP
GRAM STN FLD: SIGNIFICANT CHANGE UP
SPECIMEN SOURCE: SIGNIFICANT CHANGE UP
SPECIMEN SOURCE: SIGNIFICANT CHANGE UP

## 2018-04-04 DIAGNOSIS — I95.81 POSTPROCEDURAL HYPOTENSION: ICD-10-CM

## 2018-04-04 DIAGNOSIS — K21.9 GASTRO-ESOPHAGEAL REFLUX DISEASE WITHOUT ESOPHAGITIS: ICD-10-CM

## 2018-04-04 DIAGNOSIS — Z96.652 PRESENCE OF LEFT ARTIFICIAL KNEE JOINT: ICD-10-CM
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
## 2018-04-04 DIAGNOSIS — E83.51 HYPOCALCEMIA: ICD-10-CM

## 2018-04-04 DIAGNOSIS — M17.11 UNILATERAL PRIMARY OSTEOARTHRITIS, RIGHT KNEE: ICD-10-CM

## 2018-04-04 DIAGNOSIS — Z96.611 PRESENCE OF RIGHT ARTIFICIAL SHOULDER JOINT: ICD-10-CM

## 2018-04-04 DIAGNOSIS — I10 ESSENTIAL (PRIMARY) HYPERTENSION: ICD-10-CM

## 2018-04-04 DIAGNOSIS — E78.5 HYPERLIPIDEMIA, UNSPECIFIED: ICD-10-CM

## 2018-04-04 DIAGNOSIS — E83.42 HYPOMAGNESEMIA: ICD-10-CM

## 2019-02-15 PROBLEM — I10 ESSENTIAL (PRIMARY) HYPERTENSION: Chronic | Status: ACTIVE | Noted: 2018-03-28

## 2019-02-15 PROBLEM — K21.9 GASTRO-ESOPHAGEAL REFLUX DISEASE WITHOUT ESOPHAGITIS: Chronic | Status: ACTIVE | Noted: 2018-03-28

## 2019-02-15 PROBLEM — E78.5 HYPERLIPIDEMIA, UNSPECIFIED: Chronic | Status: ACTIVE | Noted: 2018-03-28

## 2019-02-25 ENCOUNTER — OUTPATIENT (OUTPATIENT)
Dept: OUTPATIENT SERVICES | Facility: HOSPITAL | Age: 74
LOS: 1 days | Discharge: HOME | End: 2019-02-25

## 2019-02-25 VITALS
WEIGHT: 277.78 LBS | DIASTOLIC BLOOD PRESSURE: 64 MMHG | SYSTOLIC BLOOD PRESSURE: 150 MMHG | TEMPERATURE: 98 F | HEART RATE: 72 BPM | OXYGEN SATURATION: 97 % | RESPIRATION RATE: 17 BRPM | HEIGHT: 64 IN

## 2019-02-25 DIAGNOSIS — Z96.611 PRESENCE OF RIGHT ARTIFICIAL SHOULDER JOINT: Chronic | ICD-10-CM

## 2019-02-25 DIAGNOSIS — Z96.659 PRESENCE OF UNSPECIFIED ARTIFICIAL KNEE JOINT: Chronic | ICD-10-CM

## 2019-02-25 DIAGNOSIS — M19.012 PRIMARY OSTEOARTHRITIS, LEFT SHOULDER: ICD-10-CM

## 2019-02-25 DIAGNOSIS — Z01.812 ENCOUNTER FOR PREPROCEDURAL LABORATORY EXAMINATION: ICD-10-CM

## 2019-02-25 DIAGNOSIS — M25.519 PAIN IN UNSPECIFIED SHOULDER: ICD-10-CM

## 2019-02-25 DIAGNOSIS — Z96.651 PRESENCE OF RIGHT ARTIFICIAL KNEE JOINT: Chronic | ICD-10-CM

## 2019-02-25 DIAGNOSIS — Z01.818 ENCOUNTER FOR OTHER PREPROCEDURAL EXAMINATION: ICD-10-CM

## 2019-02-25 DIAGNOSIS — Z90.49 ACQUIRED ABSENCE OF OTHER SPECIFIED PARTS OF DIGESTIVE TRACT: Chronic | ICD-10-CM

## 2019-02-25 DIAGNOSIS — I10 ESSENTIAL (PRIMARY) HYPERTENSION: ICD-10-CM

## 2019-02-25 DIAGNOSIS — K21.9 GASTRO-ESOPHAGEAL REFLUX DISEASE WITHOUT ESOPHAGITIS: ICD-10-CM

## 2019-02-25 DIAGNOSIS — E78.5 HYPERLIPIDEMIA, UNSPECIFIED: ICD-10-CM

## 2019-02-25 LAB
ANION GAP SERPL CALC-SCNC: 10 MMOL/L — SIGNIFICANT CHANGE UP (ref 5–17)
APTT BLD: 32.3 SEC — SIGNIFICANT CHANGE UP (ref 27.5–36.3)
BUN SERPL-MCNC: 20 MG/DL — SIGNIFICANT CHANGE UP (ref 7–23)
CALCIUM SERPL-MCNC: 8.9 MG/DL — SIGNIFICANT CHANGE UP (ref 8.5–10.1)
CHLORIDE SERPL-SCNC: 105 MMOL/L — SIGNIFICANT CHANGE UP (ref 96–108)
CO2 SERPL-SCNC: 25 MMOL/L — SIGNIFICANT CHANGE UP (ref 22–31)
CREAT SERPL-MCNC: 1.04 MG/DL — SIGNIFICANT CHANGE UP (ref 0.5–1.3)
GLUCOSE SERPL-MCNC: 114 MG/DL — HIGH (ref 70–99)
HBA1C BLD-MCNC: 5.8 % — HIGH (ref 4–5.6)
HCT VFR BLD CALC: 39.1 % — SIGNIFICANT CHANGE UP (ref 34.5–45)
HGB BLD-MCNC: 12.9 G/DL — SIGNIFICANT CHANGE UP (ref 11.5–15.5)
INR BLD: 0.97 RATIO — SIGNIFICANT CHANGE UP (ref 0.88–1.16)
MCHC RBC-ENTMCNC: 30.3 PG — SIGNIFICANT CHANGE UP (ref 27–34)
MCHC RBC-ENTMCNC: 33 GM/DL — SIGNIFICANT CHANGE UP (ref 32–36)
MCV RBC AUTO: 91.8 FL — SIGNIFICANT CHANGE UP (ref 80–100)
MRSA PCR RESULT.: SIGNIFICANT CHANGE UP
NRBC # BLD: 0 /100 WBCS — SIGNIFICANT CHANGE UP (ref 0–0)
PLATELET # BLD AUTO: 302 K/UL — SIGNIFICANT CHANGE UP (ref 150–400)
POTASSIUM SERPL-MCNC: 3.8 MMOL/L — SIGNIFICANT CHANGE UP (ref 3.5–5.3)
POTASSIUM SERPL-SCNC: 3.8 MMOL/L — SIGNIFICANT CHANGE UP (ref 3.5–5.3)
PROTHROM AB SERPL-ACNC: 10.8 SEC — SIGNIFICANT CHANGE UP (ref 10–12.9)
RBC # BLD: 4.26 M/UL — SIGNIFICANT CHANGE UP (ref 3.8–5.2)
RBC # FLD: 13.9 % — SIGNIFICANT CHANGE UP (ref 10.3–14.5)
S AUREUS DNA NOSE QL NAA+PROBE: SIGNIFICANT CHANGE UP
SODIUM SERPL-SCNC: 140 MMOL/L — SIGNIFICANT CHANGE UP (ref 135–145)
WBC # BLD: 11.14 K/UL — HIGH (ref 3.8–10.5)
WBC # FLD AUTO: 11.14 K/UL — HIGH (ref 3.8–10.5)

## 2019-02-25 RX ORDER — SUCRALFATE 1 G
1 TABLET ORAL
Qty: 0 | Refills: 0 | COMMUNITY

## 2019-02-25 RX ORDER — LACTOBACILLUS ACIDOPHILUS 100MM CELL
0 CAPSULE ORAL
Qty: 0 | Refills: 0 | COMMUNITY

## 2019-02-25 RX ORDER — SODIUM CHLORIDE 9 MG/ML
3 INJECTION INTRAMUSCULAR; INTRAVENOUS; SUBCUTANEOUS EVERY 8 HOURS
Qty: 0 | Refills: 0 | Status: DISCONTINUED | OUTPATIENT
Start: 2019-03-11 | End: 2019-03-11

## 2019-02-25 NOTE — H&P PST ADULT - ASSESSMENT
left shoulder osteoarthritis-   CAPRINI SCORE    AGE RELATED RISK FACTORS                                                       MOBILITY RELATED FACTORS  [ ] Age 41-60 years                                            (1 Point)                  [ ] Bed rest                                                        (1 Point)  [ ] Age: 61-74 years                                           (2 Points)                [ ] Plaster cast                                                   (2 Points)  [x ] Age= 75 years                                              (3 Points)                 [ ] Bed bound for more than 72 hours                   (2 Points)    DISEASE RELATED RISK FACTORS                                               GENDER SPECIFIC FACTORS  [x Edema in the lower extremities                       (1 Point)                  [ ] Pregnancy                                                     (1 Point)  [ ] Varicose veins                                               (1 Point)                  [ ] Post-partum < 6 weeks                                   (1 Point)             [x ] BMI > 25 Kg/m2                                            (1 Point)                  [ ] Hormonal therapy  or oral contraception            (1 Point)                 [ ] Sepsis (in the previous month)                        (1 Point)                  [ ] History of pregnancy complications  [ ] Pneumonia or serious lung disease                                               [ ] Unexplained or recurrent                       (1 Point)           (in the previous month)                               (1 Point)  [ ] Abnormal pulmonary function test                     (1 Point)                 SURGERY RELATED RISK FACTORS  [ ] Acute myocardial infarction                              (1 Point)                 [ ]  Section                                            (1 Point)  [ ] Congestive heart failure (in the previous month)  (1 Point)                 [ ] Minor surgery                                                 (1 Point)   [ ] Inflammatory bowel disease                             (1 Point)                 [ ] Arthroscopic surgery                                        (2 Points)  [ ] Central venous access                                    (2 Points)                [ ] General surgery lasting more than 45 minutes   (2 Points)       [ ] Stroke (in the previous month)                          (5 Points)               [x ] Elective arthroplasty                                        (5 Points)                                                                                                                                               HEMATOLOGY RELATED FACTORS                                                 TRAUMA RELATED RISK FACTORS  [ ] Prior episodes of VTE                                     (3 Points)                 [ ] Fracture of the hip, pelvis, or leg                       (5 Points)  [ ] Positive family history for VTE                         (3 Points)                 [ ] Acute spinal cord injury (in the previous month)  (5 Points)  [ ] Prothrombin 83734 A                                      (3 Points)                 [ ] Paralysis  (less than 1 month)                          (5 Points)  [ ] Factor V Leiden                                             (3 Points)                 [ ] Multiple Trauma within 1 month                         (5 Points)  [ ] Lupus anticoagulants                                     (3 Points)                                                           [ ] Anticardiolipin antibodies                                (3 Points)                                                       [ ] High homocysteine in the blood                      (3 Points)                                             [ ] Other congenital or acquired thrombophilia       (3 Points)                                                [ ] Heparin induced thrombocytopenia                  (3 Points)                                          Total Score [      10    ]

## 2019-02-25 NOTE — H&P PST ADULT - PSH
S/P cholecystectomy    S/P knee replacement  2004 left knee and revision  S/P knee replacement  Left ( 2007 )  S/P total knee arthroplasty, right    Status post total shoulder arthroplasty, right

## 2019-02-25 NOTE — H&P PST ADULT - FAMILY HISTORY
Mother  Still living? No  Family history of diabetes mellitus, Age at diagnosis: Age Unknown  Family history of stroke, Age at diagnosis: Age Unknown     Father  Still living? No  Family history of stroke, Age at diagnosis: Age Unknown

## 2019-02-25 NOTE — H&P PST ADULT - PROBLEM SELECTOR PROBLEM 3
c/o right lower pelvic pain that began three days ago pt seen by GYN and given Naproxen but has had no relief, denies n/v/d
Hyperlipemia

## 2019-03-10 ENCOUNTER — TRANSCRIPTION ENCOUNTER (OUTPATIENT)
Age: 74
End: 2019-03-10

## 2019-03-11 ENCOUNTER — RESULT REVIEW (OUTPATIENT)
Age: 74
End: 2019-03-11

## 2019-03-11 ENCOUNTER — INPATIENT (INPATIENT)
Facility: HOSPITAL | Age: 74
LOS: 1 days | Discharge: HOME | End: 2019-03-13
Attending: ORTHOPAEDIC SURGERY | Admitting: ORTHOPAEDIC SURGERY
Payer: OTHER MISCELLANEOUS

## 2019-03-11 ENCOUNTER — TRANSCRIPTION ENCOUNTER (OUTPATIENT)
Age: 74
End: 2019-03-11

## 2019-03-11 VITALS
RESPIRATION RATE: 18 BRPM | HEART RATE: 70 BPM | SYSTOLIC BLOOD PRESSURE: 119 MMHG | TEMPERATURE: 97 F | OXYGEN SATURATION: 96 % | HEIGHT: 64 IN | WEIGHT: 237 LBS | DIASTOLIC BLOOD PRESSURE: 56 MMHG

## 2019-03-11 DIAGNOSIS — Z90.49 ACQUIRED ABSENCE OF OTHER SPECIFIED PARTS OF DIGESTIVE TRACT: Chronic | ICD-10-CM

## 2019-03-11 DIAGNOSIS — Z96.651 PRESENCE OF RIGHT ARTIFICIAL KNEE JOINT: Chronic | ICD-10-CM

## 2019-03-11 DIAGNOSIS — Z96.659 PRESENCE OF UNSPECIFIED ARTIFICIAL KNEE JOINT: Chronic | ICD-10-CM

## 2019-03-11 DIAGNOSIS — Z96.611 PRESENCE OF RIGHT ARTIFICIAL SHOULDER JOINT: Chronic | ICD-10-CM

## 2019-03-11 LAB
HCT VFR BLD CALC: 39.1 % — SIGNIFICANT CHANGE UP (ref 34.5–45)
HGB BLD-MCNC: 12.5 G/DL — SIGNIFICANT CHANGE UP (ref 11.5–15.5)
MCHC RBC-ENTMCNC: 29.9 PG — SIGNIFICANT CHANGE UP (ref 27–34)
MCHC RBC-ENTMCNC: 32 GM/DL — SIGNIFICANT CHANGE UP (ref 32–36)
MCV RBC AUTO: 93.5 FL — SIGNIFICANT CHANGE UP (ref 80–100)
NRBC # BLD: 0 /100 WBCS — SIGNIFICANT CHANGE UP (ref 0–0)
PLATELET # BLD AUTO: 309 K/UL — SIGNIFICANT CHANGE UP (ref 150–400)
RBC # BLD: 4.18 M/UL — SIGNIFICANT CHANGE UP (ref 3.8–5.2)
RBC # FLD: 14 % — SIGNIFICANT CHANGE UP (ref 10.3–14.5)
WBC # BLD: 16.19 K/UL — HIGH (ref 3.8–10.5)
WBC # FLD AUTO: 16.19 K/UL — HIGH (ref 3.8–10.5)

## 2019-03-11 PROCEDURE — 88311 DECALCIFY TISSUE: CPT | Mod: 26

## 2019-03-11 PROCEDURE — 88309 TISSUE EXAM BY PATHOLOGIST: CPT | Mod: 26

## 2019-03-11 RX ORDER — SENNA PLUS 8.6 MG/1
2 TABLET ORAL AT BEDTIME
Qty: 0 | Refills: 0 | Status: DISCONTINUED | OUTPATIENT
Start: 2019-03-11 | End: 2019-03-13

## 2019-03-11 RX ORDER — SIMVASTATIN 20 MG/1
20 TABLET, FILM COATED ORAL AT BEDTIME
Qty: 0 | Refills: 0 | Status: DISCONTINUED | OUTPATIENT
Start: 2019-03-11 | End: 2019-03-13

## 2019-03-11 RX ORDER — SODIUM CHLORIDE 9 MG/ML
1000 INJECTION, SOLUTION INTRAVENOUS
Qty: 0 | Refills: 0 | Status: DISCONTINUED | OUTPATIENT
Start: 2019-03-11 | End: 2019-03-12

## 2019-03-11 RX ORDER — VANCOMYCIN HCL 1 G
1500 VIAL (EA) INTRAVENOUS ONCE
Qty: 0 | Refills: 0 | Status: COMPLETED | OUTPATIENT
Start: 2019-03-11 | End: 2019-03-11

## 2019-03-11 RX ORDER — HYDROCHLOROTHIAZIDE 25 MG
12.5 TABLET ORAL DAILY
Qty: 0 | Refills: 0 | Status: DISCONTINUED | OUTPATIENT
Start: 2019-03-11 | End: 2019-03-12

## 2019-03-11 RX ORDER — DOCUSATE SODIUM 100 MG
100 CAPSULE ORAL THREE TIMES A DAY
Qty: 0 | Refills: 0 | Status: DISCONTINUED | OUTPATIENT
Start: 2019-03-11 | End: 2019-03-13

## 2019-03-11 RX ORDER — SODIUM CHLORIDE 9 MG/ML
1000 INJECTION, SOLUTION INTRAVENOUS
Qty: 0 | Refills: 0 | Status: DISCONTINUED | OUTPATIENT
Start: 2019-03-11 | End: 2019-03-11

## 2019-03-11 RX ORDER — ONDANSETRON 8 MG/1
4 TABLET, FILM COATED ORAL
Qty: 0 | Refills: 0 | Status: DISCONTINUED | OUTPATIENT
Start: 2019-03-11 | End: 2019-03-11

## 2019-03-11 RX ORDER — HYDROMORPHONE HYDROCHLORIDE 2 MG/ML
0.5 INJECTION INTRAMUSCULAR; INTRAVENOUS; SUBCUTANEOUS
Qty: 0 | Refills: 0 | Status: DISCONTINUED | OUTPATIENT
Start: 2019-03-11 | End: 2019-03-11

## 2019-03-11 RX ORDER — OXYCODONE HYDROCHLORIDE 5 MG/1
5 TABLET ORAL EVERY 4 HOURS
Qty: 0 | Refills: 0 | Status: DISCONTINUED | OUTPATIENT
Start: 2019-03-11 | End: 2019-03-13

## 2019-03-11 RX ORDER — ACETAMINOPHEN 500 MG
650 TABLET ORAL EVERY 6 HOURS
Qty: 0 | Refills: 0 | Status: DISCONTINUED | OUTPATIENT
Start: 2019-03-11 | End: 2019-03-13

## 2019-03-11 RX ORDER — HYDROMORPHONE HYDROCHLORIDE 2 MG/ML
1 INJECTION INTRAMUSCULAR; INTRAVENOUS; SUBCUTANEOUS
Qty: 0 | Refills: 0 | Status: DISCONTINUED | OUTPATIENT
Start: 2019-03-11 | End: 2019-03-11

## 2019-03-11 RX ORDER — ASPIRIN/CALCIUM CARB/MAGNESIUM 324 MG
325 TABLET ORAL DAILY
Qty: 0 | Refills: 0 | Status: DISCONTINUED | OUTPATIENT
Start: 2019-03-11 | End: 2019-03-11

## 2019-03-11 RX ORDER — OXYCODONE HYDROCHLORIDE 5 MG/1
10 TABLET ORAL EVERY 4 HOURS
Qty: 0 | Refills: 0 | Status: DISCONTINUED | OUTPATIENT
Start: 2019-03-11 | End: 2019-03-13

## 2019-03-11 RX ORDER — MORPHINE SULFATE 50 MG/1
2 CAPSULE, EXTENDED RELEASE ORAL
Qty: 0 | Refills: 0 | Status: DISCONTINUED | OUTPATIENT
Start: 2019-03-11 | End: 2019-03-13

## 2019-03-11 RX ORDER — LOSARTAN POTASSIUM 100 MG/1
50 TABLET, FILM COATED ORAL DAILY
Qty: 0 | Refills: 0 | Status: DISCONTINUED | OUTPATIENT
Start: 2019-03-11 | End: 2019-03-12

## 2019-03-11 RX ORDER — POLYETHYLENE GLYCOL 3350 17 G/17G
17 POWDER, FOR SOLUTION ORAL DAILY
Qty: 0 | Refills: 0 | Status: DISCONTINUED | OUTPATIENT
Start: 2019-03-11 | End: 2019-03-13

## 2019-03-11 RX ORDER — ONDANSETRON 8 MG/1
4 TABLET, FILM COATED ORAL EVERY 6 HOURS
Qty: 0 | Refills: 0 | Status: DISCONTINUED | OUTPATIENT
Start: 2019-03-11 | End: 2019-03-13

## 2019-03-11 RX ORDER — ASPIRIN/CALCIUM CARB/MAGNESIUM 324 MG
325 TABLET ORAL
Qty: 0 | Refills: 0 | Status: DISCONTINUED | OUTPATIENT
Start: 2019-03-12 | End: 2019-03-13

## 2019-03-11 RX ADMIN — Medication 650 MILLIGRAM(S): at 18:08

## 2019-03-11 RX ADMIN — Medication 650 MILLIGRAM(S): at 12:26

## 2019-03-11 RX ADMIN — Medication 650 MILLIGRAM(S): at 18:11

## 2019-03-11 RX ADMIN — OXYCODONE HYDROCHLORIDE 10 MILLIGRAM(S): 5 TABLET ORAL at 15:24

## 2019-03-11 RX ADMIN — Medication 100 MILLIGRAM(S): at 14:50

## 2019-03-11 RX ADMIN — SODIUM CHLORIDE 75 MILLILITER(S): 9 INJECTION, SOLUTION INTRAVENOUS at 12:26

## 2019-03-11 RX ADMIN — Medication 300 MILLIGRAM(S): at 20:49

## 2019-03-11 RX ADMIN — Medication 650 MILLIGRAM(S): at 12:50

## 2019-03-11 RX ADMIN — POLYETHYLENE GLYCOL 3350 17 GRAM(S): 17 POWDER, FOR SOLUTION ORAL at 12:26

## 2019-03-11 RX ADMIN — OXYCODONE HYDROCHLORIDE 10 MILLIGRAM(S): 5 TABLET ORAL at 16:15

## 2019-03-11 RX ADMIN — SODIUM CHLORIDE 75 MILLILITER(S): 9 INJECTION, SOLUTION INTRAVENOUS at 18:08

## 2019-03-11 RX ADMIN — SIMVASTATIN 20 MILLIGRAM(S): 20 TABLET, FILM COATED ORAL at 21:18

## 2019-03-11 RX ADMIN — SODIUM CHLORIDE 100 MILLILITER(S): 9 INJECTION, SOLUTION INTRAVENOUS at 11:01

## 2019-03-11 RX ADMIN — OXYCODONE HYDROCHLORIDE 10 MILLIGRAM(S): 5 TABLET ORAL at 20:48

## 2019-03-11 RX ADMIN — OXYCODONE HYDROCHLORIDE 10 MILLIGRAM(S): 5 TABLET ORAL at 21:48

## 2019-03-11 RX ADMIN — SODIUM CHLORIDE 3 MILLILITER(S): 9 INJECTION INTRAMUSCULAR; INTRAVENOUS; SUBCUTANEOUS at 06:57

## 2019-03-11 RX ADMIN — Medication 1 TABLET(S): at 12:26

## 2019-03-11 NOTE — DISCHARGE NOTE PROVIDER - NSDCFUADDINST_GEN_ALL_CORE_FT
Please call your MD, if you have new onset of fevers, increased drainage, increased pain or increased redness around the incision site. Please return to the Emergency Department if you have chest pain or shortness of breath, F/U with Labs with your PMD within 1 week    Keep Mepelix Dressing Clean, Dry and Intact. May shower on POD#5 with Dressing on. Dressing may be removed on POD#7. Please do not scrub, soak, peel or pick at the mepelix dressing. No creams, lotions, or oils over dressing. May shower on POD#5 and let water run over dressing, no baths. Pat dry once out of shower.     If dressing is saturated from border to border. Remove dressing and cover with clean dry dressing.

## 2019-03-11 NOTE — DISCHARGE NOTE PROVIDER - NSDCCPCAREPLAN_GEN_ALL_CORE_FT
PRINCIPAL DISCHARGE DIAGNOSIS  Problem: Osteoarthritis of left glenohumeral joint  Assessment and Plan of Treatment:

## 2019-03-11 NOTE — PHYSICAL THERAPY INITIAL EVALUATION ADULT - ADDITIONAL COMMENTS
Patient reports, with daughter at bed side, that patient lives with daughter in private house with 3 steps to enter with 2 hand rails. Pt has 1 flight to bedroom but reports she will be sleeping in recliner on main level upon d/c home. Pt has standard toilet, tub/shower combo, 3:1 commode and straight cane at home. Pt reports she was independent with straight cane with functional mobility and basic ADLs. Pt and daughter report at bed side that patient's daughter will assist patient PRN upon d/c home.  Pt's daughter also reports pt has fall history during right total shoulder replacement

## 2019-03-11 NOTE — PROGRESS NOTE ADULT - SUBJECTIVE AND OBJECTIVE BOX
Post-op Check   POD#0 S/P Left TSA   73yFemale Patient seen and examined, Pain controlled  Patient Denies SOB, CP, N/V/D       PE: Left Shoulder/UE: Dressing C/D/I, HV intact, Sensation/motor intact, Radial 2+, Moving wrist/fingers                          12.5   16.19 )-----------( 309      ( 11 Mar 2019 11:12 )             39.1                 A: As above   P: Pain Control       DVT Prophylaxis      Incentive spirometry      Monitor HV Output       PT WBAT NWB LUE       PROM FF: 140 ER:40      Isometric exercises      Discharge Planning      All the above discussed and understood by pt       Ortho to F/U

## 2019-03-11 NOTE — OCCUPATIONAL THERAPY INITIAL EVALUATION ADULT - ADDITIONAL COMMENTS
Patient reports, with daughter at bed side, that patient lives with daughter in private house with 3 steps to enter with 2 hand rails. Pt has 1 flight to bedroom but reports she will be sleeping in recliner on main level upon d/c home. Pt has standard toilet, tub/shower combo, 3:1 commode and straight cane at home. Pt reports she was independent with straight cane with functional mobility and basic ADLs. Pt and daughter report at bed side that patient's daughter will assist patient PRN upon d/c home.

## 2019-03-11 NOTE — OCCUPATIONAL THERAPY INITIAL EVALUATION ADULT - RANGE OF MOTION EXAMINATION, UPPER EXTREMITY
Right UE Active ROM was WFL (within functional limits)/Left UE no shoulder AROM, elbow/wrist/hand allowed

## 2019-03-11 NOTE — DISCHARGE NOTE PROVIDER - CARE PROVIDER_API CALL
Amarjit Eaton)  Orthopaedic Surgery  [address] [place] [address]  Phone: [phone]  Fax: [phone]  Follow Up Time:

## 2019-03-11 NOTE — CONSULT NOTE ADULT - SUBJECTIVE AND OBJECTIVE BOX
EVERETT WAY is a 73y Female s/p LEFT TOTAL SHOULDER ARTHROPLASTY REVERSE SHOULDER ARTHROPLAS    w/ h/o Eczema  Obese  GERD (gastroesophageal reflux disease)  Hyperlipemia  Essential hypertension    denies any chest pain shortness of breath palpitation dizziness lightheadedness nausea vomiting fever or chills    S/P knee replacement  S/P cholecystectomy  S/P knee replacement  S/P total knee arthroplasty, right  Status post total shoulder arthroplasty, right  Hiatal hernia    Family history of stroke (Mother, Father)  Family history of diabetes mellitus (Mother)    SH: doesnot smoke or drink at this time    Bee Stings (Anaphylaxis)  ceftriaxone (Rash)  Cipro (Unknown)  Flagyl (Diarrhea; Rash)  hibiclens - skin swelling (Other)    acetaminophen   Tablet .. 650 milliGRAM(s) Oral every 6 hours  aluminum hydroxide/magnesium hydroxide/simethicone Suspension 30 milliLiter(s) Oral four times a day PRN  docusate sodium 100 milliGRAM(s) Oral three times a day  hydrochlorothiazide 12.5 milliGRAM(s) Oral daily  lactated ringers. 1000 milliLiter(s) IV Continuous <Continuous>  losartan 50 milliGRAM(s) Oral daily  morphine  - Injectable 2 milliGRAM(s) IV Push every 3 hours PRN  multivitamin 1 Tablet(s) Oral daily  ondansetron Injectable 4 milliGRAM(s) IV Push every 6 hours PRN  oxyCODONE    IR 5 milliGRAM(s) Oral every 4 hours PRN  oxyCODONE    IR 10 milliGRAM(s) Oral every 4 hours PRN  polyethylene glycol 3350 17 Gram(s) Oral daily  senna 2 Tablet(s) Oral at bedtime PRN  simvastatin 20 milliGRAM(s) Oral at bedtime  vancomycin  IVPB 1500 milliGRAM(s) IV Intermittent once    T(C): 36.5 (03-11-19 @ 14:30), Max: 36.7 (03-11-19 @ 09:59)  HR: 78 (03-11-19 @ 15:24) (58 - 80)  BP: 124/44 (03-11-19 @ 15:24) (96/48 - 124/44)  RR: 16 (03-11-19 @ 15:24) (13 - 19)  SpO2: 92% (03-11-19 @ 15:24) (92% - 98%)  HEENT unremarkable  neck no JVD or bruit  heart normal S1 S2 RRR no gallops or rubs  chest clear to auscultation  abd sof nontender non distended +bs  ext no calf tenderness    A/P   DVT PX  pain control  bowel regimen   wound care as per ortho  GI PX  antiemetics prn  incentive spirometer  chol control  bp control

## 2019-03-11 NOTE — DISCHARGE NOTE PROVIDER - NSDCACTIVITY_GEN_ALL_CORE
Do not drive or operate machinery/Walking - Outdoors allowed/Showering allowed/No heavy lifting/straining/Stairs allowed/Walking - Indoors allowed

## 2019-03-11 NOTE — ASU PREOP CHECKLIST - PATIENT PROBLEMS/NEEDS
Patient expressed no known problems or needs
Patient/Caregiver provided printed discharge information.

## 2019-03-11 NOTE — BRIEF OPERATIVE NOTE - PROCEDURE
<<-----Click on this checkbox to enter Procedure Total shoulder arthroplasty  03/11/2019    Active  Cornell Goyal

## 2019-03-11 NOTE — DISCHARGE NOTE PROVIDER - HOSPITAL COURSE
73yFemale with history of Left Shoulder Pain presenting for Left TSA by Dr. Eaton on 3/11/19. Risk and benefits of surgery were explained to the patient. The patient understood and agreed to proceed with surgery. Patient underwent the procedure with no intraoperative complications. Pt was brought in stable condition to the PACU. Once stable in PACU, pt was brought to the floor. During hospital stay pt was followed by Medicine during this admission. Pt had an uneventful hospital course. Pt is stable for discharge to Home with [facility] and PT

## 2019-03-11 NOTE — OCCUPATIONAL THERAPY INITIAL EVALUATION ADULT - PRECAUTIONS/LIMITATIONS, REHAB EVAL
No shoulder AROM, PROM shoulder 140 degrees forward flexion, external rotation 40 degrees, sling donned

## 2019-03-12 ENCOUNTER — TRANSCRIPTION ENCOUNTER (OUTPATIENT)
Age: 74
End: 2019-03-12

## 2019-03-12 LAB
ANION GAP SERPL CALC-SCNC: 9 MMOL/L — SIGNIFICANT CHANGE UP (ref 5–17)
BUN SERPL-MCNC: 48 MG/DL — HIGH (ref 7–23)
CALCIUM SERPL-MCNC: 8 MG/DL — LOW (ref 8.5–10.1)
CHLORIDE SERPL-SCNC: 104 MMOL/L — SIGNIFICANT CHANGE UP (ref 96–108)
CO2 SERPL-SCNC: 23 MMOL/L — SIGNIFICANT CHANGE UP (ref 22–31)
CREAT SERPL-MCNC: 1.94 MG/DL — HIGH (ref 0.5–1.3)
GLUCOSE SERPL-MCNC: 131 MG/DL — HIGH (ref 70–99)
HCT VFR BLD CALC: 34.3 % — LOW (ref 34.5–45)
HGB BLD-MCNC: 11.3 G/DL — LOW (ref 11.5–15.5)
MCHC RBC-ENTMCNC: 30.5 PG — SIGNIFICANT CHANGE UP (ref 27–34)
MCHC RBC-ENTMCNC: 32.9 GM/DL — SIGNIFICANT CHANGE UP (ref 32–36)
MCV RBC AUTO: 92.5 FL — SIGNIFICANT CHANGE UP (ref 80–100)
NRBC # BLD: 0 /100 WBCS — SIGNIFICANT CHANGE UP (ref 0–0)
PLATELET # BLD AUTO: 296 K/UL — SIGNIFICANT CHANGE UP (ref 150–400)
POTASSIUM SERPL-MCNC: 4.2 MMOL/L — SIGNIFICANT CHANGE UP (ref 3.5–5.3)
POTASSIUM SERPL-SCNC: 4.2 MMOL/L — SIGNIFICANT CHANGE UP (ref 3.5–5.3)
RBC # BLD: 3.71 M/UL — LOW (ref 3.8–5.2)
RBC # FLD: 14.2 % — SIGNIFICANT CHANGE UP (ref 10.3–14.5)
SODIUM SERPL-SCNC: 136 MMOL/L — SIGNIFICANT CHANGE UP (ref 135–145)
WBC # BLD: 18.57 K/UL — HIGH (ref 3.8–10.5)
WBC # FLD AUTO: 18.57 K/UL — HIGH (ref 3.8–10.5)

## 2019-03-12 PROCEDURE — 73030 X-RAY EXAM OF SHOULDER: CPT | Mod: 26,LT

## 2019-03-12 RX ORDER — SODIUM CHLORIDE 9 MG/ML
1000 INJECTION, SOLUTION INTRAVENOUS
Qty: 0 | Refills: 0 | Status: DISCONTINUED | OUTPATIENT
Start: 2019-03-12 | End: 2019-03-13

## 2019-03-12 RX ORDER — LOPERAMIDE HCL 2 MG
2 TABLET ORAL DAILY
Qty: 0 | Refills: 0 | Status: DISCONTINUED | OUTPATIENT
Start: 2019-03-12 | End: 2019-03-13

## 2019-03-12 RX ORDER — SODIUM CHLORIDE 9 MG/ML
1000 INJECTION INTRAMUSCULAR; INTRAVENOUS; SUBCUTANEOUS ONCE
Qty: 0 | Refills: 0 | Status: COMPLETED | OUTPATIENT
Start: 2019-03-12 | End: 2019-03-12

## 2019-03-12 RX ADMIN — Medication 325 MILLIGRAM(S): at 10:21

## 2019-03-12 RX ADMIN — ONDANSETRON 4 MILLIGRAM(S): 8 TABLET, FILM COATED ORAL at 00:36

## 2019-03-12 RX ADMIN — OXYCODONE HYDROCHLORIDE 10 MILLIGRAM(S): 5 TABLET ORAL at 08:22

## 2019-03-12 RX ADMIN — SODIUM CHLORIDE 100 MILLILITER(S): 9 INJECTION, SOLUTION INTRAVENOUS at 15:20

## 2019-03-12 RX ADMIN — Medication 650 MILLIGRAM(S): at 07:12

## 2019-03-12 RX ADMIN — Medication 650 MILLIGRAM(S): at 01:39

## 2019-03-12 RX ADMIN — OXYCODONE HYDROCHLORIDE 10 MILLIGRAM(S): 5 TABLET ORAL at 09:22

## 2019-03-12 RX ADMIN — SIMVASTATIN 20 MILLIGRAM(S): 20 TABLET, FILM COATED ORAL at 22:42

## 2019-03-12 RX ADMIN — Medication 650 MILLIGRAM(S): at 17:23

## 2019-03-12 RX ADMIN — OXYCODONE HYDROCHLORIDE 10 MILLIGRAM(S): 5 TABLET ORAL at 22:42

## 2019-03-12 RX ADMIN — SODIUM CHLORIDE 1000 MILLILITER(S): 9 INJECTION INTRAMUSCULAR; INTRAVENOUS; SUBCUTANEOUS at 13:05

## 2019-03-12 RX ADMIN — Medication 650 MILLIGRAM(S): at 13:06

## 2019-03-12 RX ADMIN — Medication 650 MILLIGRAM(S): at 06:31

## 2019-03-12 RX ADMIN — Medication 650 MILLIGRAM(S): at 14:06

## 2019-03-12 RX ADMIN — Medication 30 MILLILITER(S): at 16:47

## 2019-03-12 RX ADMIN — OXYCODONE HYDROCHLORIDE 10 MILLIGRAM(S): 5 TABLET ORAL at 23:40

## 2019-03-12 RX ADMIN — Medication 650 MILLIGRAM(S): at 02:45

## 2019-03-12 RX ADMIN — Medication 1 TABLET(S): at 13:06

## 2019-03-12 RX ADMIN — Medication 650 MILLIGRAM(S): at 18:23

## 2019-03-12 RX ADMIN — Medication 2 MILLIGRAM(S): at 10:22

## 2019-03-12 NOTE — DISCHARGE NOTE NURSING/CASE MANAGEMENT/SOCIAL WORK - NSDCDPATPORTLINK_GEN_ALL_CORE
You can access the [facility] Portal, offered by [facility], by registering with the following website: [email or web address]

## 2019-03-12 NOTE — PHARMACY COMMUNICATION NOTE - COMMENTS
3/12/19 Spoke w Talon Hawkins. He wants given as daily ( not prn) for now. Will monitor & reassess if needed

## 2019-03-12 NOTE — PROGRESS NOTE ADULT - SUBJECTIVE AND OBJECTIVE BOX
EVERETT WAY is a 73y Female s/p LEFT TOTAL SHOULDER ARTHROPLASTY REVERSE SHOULDER ARTHROPLAS      denies any chest pain shortness of breath palpitation dizziness lightheadedness nausea vomiting fever or chills    T(C): 36.6 (03-12-19 @ 11:34), Max: 36.6 (03-12-19 @ 08:15)  HR: 84 (03-12-19 @ 11:34) (71 - 98)  BP: 116/55 (03-12-19 @ 11:34) (92/44 - 133/56)  RR: 17 (03-12-19 @ 11:34) (16 - 17)  SpO2: 94% (03-12-19 @ 11:34) (91% - 95%)  no jvd/bruit  s1 s2 rrr  cta  s/nt/nd  no calf tend                        11.3   18.57 )-----------( 296      ( 12 Mar 2019 08:06 )             34.3   03-12    136  |  104  |  48<H>  ----------------------------<  131<H>  4.2   |  23  |  1.94<H>    Ca    8.0<L>      12 Mar 2019 08:06    ^wbc post op fu op  ^cr st f/u op  c/o diarrhea h/o ibs f/u pmd o/p  cont dvt px  pain control  bowel regimen  antiemetics  incentive spirometer EVERETT WAY is a 73y Female s/p LEFT TOTAL SHOULDER ARTHROPLASTY REVERSE SHOULDER ARTHROPLAS      denies any chest pain shortness of breath palpitation dizziness lightheadedness nausea vomiting fever or chills    T(C): 36.6 (03-12-19 @ 11:34), Max: 36.6 (03-12-19 @ 08:15)  HR: 84 (03-12-19 @ 11:34) (71 - 98)  BP: 116/55 (03-12-19 @ 11:34) (92/44 - 133/56)  RR: 17 (03-12-19 @ 11:34) (16 - 17)  SpO2: 94% (03-12-19 @ 11:34) (91% - 95%)  no jvd/bruit  s1 s2 rrr  cta  s/nt/nd  no calf tend                        11.3   18.57 )-----------( 296      ( 12 Mar 2019 08:06 )             34.3   03-12    136  |  104  |  48<H>  ----------------------------<  131<H>  4.2   |  23  |  1.94<H>    Ca    8.0<L>      12 Mar 2019 08:06    ^wbc post op fu op  ^cr d/c hctz losartan iv hydration f/u labs  c/o diarrhea h/o ibs agree w/ immodium f/u pmd o/p  cont dvt px  pain control  bowel regimen  antiemetics  incentive spirometer

## 2019-03-12 NOTE — PROGRESS NOTE ADULT - SUBJECTIVE AND OBJECTIVE BOX
POD#1 S/P Right Reverse TSA  73yFemale Patient seen and examined, Pain controlled  Patient Denies SOB, CP, N/V/D       PE: Right Shoulder/UE: Dressing C/D/I removed, HV intact removed, Steri-strips C/D/I, No Erythema, Sensation/motor intact, Radial 2+, FROM wrist/fingers   New Sterile Dressing applied                           11.3   18.57 )-----------( 296      ( 12 Mar 2019 08:06 )             34.3       03-12    136  |  104  |  48<H>  ----------------------------<  131<H>  4.2   |  23  |  1.94<H>    Ca    8.0<L>      12 Mar 2019 08:06          A: As above   P: Pain Control       DVT Prophylaxis      Incentive spirometry      Passive /40      PT NWB RUE      Isometric exercises      Discharge Planning      All the above discussed and understood by pt       Ortho to F/U POD#1 S/P Right Reverse TSA  73yFemale Patient seen and examined, Pain controlled  Patient Denies SOB, CP, N/V/D       PE: Right Shoulder/UE: Dressing C/D/I removed, HV intact removed, Steri-strips C/D/I, No Erythema, Sensation/motor intact, Radial 2+, FROM wrist/fingers   New Sterile Dressing applied                           11.3   18.57 )-----------( 296      ( 12 Mar 2019 08:06 )             34.3       03-12    136  |  104  |  48<H>  ----------------------------<  131<H>  4.2   |  23  |  1.94<H>    Ca    8.0<L>      12 Mar 2019 08:06          A: As above   P: Pain Control       DVT Prophylaxis      Incentive spirometry      Passive /40      PT NWB RUE      Monitor B/Cr      IV Fluids       Hydration       Isometric exercises      Discharge Planning      All the above discussed and understood by pt       Ortho to F/U

## 2019-03-13 VITALS
HEART RATE: 84 BPM | RESPIRATION RATE: 16 BRPM | DIASTOLIC BLOOD PRESSURE: 49 MMHG | SYSTOLIC BLOOD PRESSURE: 121 MMHG | TEMPERATURE: 99 F | OXYGEN SATURATION: 91 %

## 2019-03-13 LAB
ANION GAP SERPL CALC-SCNC: 7 MMOL/L — SIGNIFICANT CHANGE UP (ref 5–17)
BUN SERPL-MCNC: 38 MG/DL — HIGH (ref 7–23)
CALCIUM SERPL-MCNC: 8.4 MG/DL — LOW (ref 8.5–10.1)
CHLORIDE SERPL-SCNC: 108 MMOL/L — SIGNIFICANT CHANGE UP (ref 96–108)
CO2 SERPL-SCNC: 24 MMOL/L — SIGNIFICANT CHANGE UP (ref 22–31)
CREAT SERPL-MCNC: 1.13 MG/DL — SIGNIFICANT CHANGE UP (ref 0.5–1.3)
GLUCOSE SERPL-MCNC: 105 MG/DL — HIGH (ref 70–99)
POTASSIUM SERPL-MCNC: 4.5 MMOL/L — SIGNIFICANT CHANGE UP (ref 3.5–5.3)
POTASSIUM SERPL-SCNC: 4.5 MMOL/L — SIGNIFICANT CHANGE UP (ref 3.5–5.3)
SODIUM SERPL-SCNC: 139 MMOL/L — SIGNIFICANT CHANGE UP (ref 135–145)

## 2019-03-13 RX ORDER — ASPIRIN/CALCIUM CARB/MAGNESIUM 324 MG
1 TABLET ORAL
Qty: 30 | Refills: 0
Start: 2019-03-13 | End: 2019-04-11

## 2019-03-13 RX ORDER — OXYCODONE HYDROCHLORIDE 5 MG/1
1 TABLET ORAL
Qty: 42 | Refills: 0
Start: 2019-03-13 | End: 2019-03-19

## 2019-03-13 RX ORDER — ASPIRIN/CALCIUM CARB/MAGNESIUM 324 MG
1 TABLET ORAL
Qty: 0 | Refills: 0 | COMMUNITY

## 2019-03-13 RX ADMIN — OXYCODONE HYDROCHLORIDE 10 MILLIGRAM(S): 5 TABLET ORAL at 11:49

## 2019-03-13 RX ADMIN — Medication 325 MILLIGRAM(S): at 10:49

## 2019-03-13 RX ADMIN — Medication 650 MILLIGRAM(S): at 12:25

## 2019-03-13 RX ADMIN — MORPHINE SULFATE 2 MILLIGRAM(S): 50 CAPSULE, EXTENDED RELEASE ORAL at 03:28

## 2019-03-13 RX ADMIN — Medication 650 MILLIGRAM(S): at 02:25

## 2019-03-13 RX ADMIN — MORPHINE SULFATE 2 MILLIGRAM(S): 50 CAPSULE, EXTENDED RELEASE ORAL at 03:13

## 2019-03-13 RX ADMIN — Medication 650 MILLIGRAM(S): at 07:15

## 2019-03-13 RX ADMIN — OXYCODONE HYDROCHLORIDE 10 MILLIGRAM(S): 5 TABLET ORAL at 10:49

## 2019-03-13 RX ADMIN — Medication 650 MILLIGRAM(S): at 01:04

## 2019-03-13 RX ADMIN — Medication 650 MILLIGRAM(S): at 13:25

## 2019-03-13 RX ADMIN — Medication 2 MILLIGRAM(S): at 12:25

## 2019-03-13 NOTE — PROGRESS NOTE ADULT - SUBJECTIVE AND OBJECTIVE BOX
POD#2 S/P Left TSA   73yFemale Patient seen and examined, Pain controlled  Patient Denies SOB, CP, N/V/D       PE: Left Shoulder/UE: Dressing C/D/I, Sensation/motor intact, Radial 2+, FROM wrist/fingers   B/L UE: Skin intact. +ROM shoulder/elbow/wrist/fingers. +ok/thumbsup/fingercross signs.  strength: 5/5.  RP2+ NVI                          11.3   18.57 )-----------( 296      ( 12 Mar 2019 08:06 )             34.3       03-13    139  |  108  |  38<H>  ----------------------------<  105<H>  4.5   |  24  |  1.13    Ca    8.4<L>      13 Mar 2019 06:45          A: As above   P: Pain Control       DVT Prophylaxis      Incentive spirometry      Passive ROM FF: 140 ER: 40      PT NWB LUE      Isometric exercises      Discharge Planning      All the above discussed and understood by pt       Ortho to F/U

## 2019-03-13 NOTE — PROGRESS NOTE ADULT - SUBJECTIVE AND OBJECTIVE BOX
EVERETT WAY is a 73y Female s/p LEFT TOTAL SHOULDER ARTHROPLASTY REVERSE SHOULDER ARTHROPLAS      denies any chest pain shortness of breath palpitation dizziness lightheadedness nausea vomiting fever or chills    T(C): 36.6 (03-13-19 @ 05:30), Max: 37.1 (03-12-19 @ 23:30)  HR: 81 (03-13-19 @ 05:30) (81 - 86)  BP: 150/47 (03-13-19 @ 05:30) (107/51 - 150/47)  RR: 18 (03-13-19 @ 05:30) (17 - 18)  SpO2: 92% (03-13-19 @ 05:30) (92% - 96%)  no jvd/bruit  s1 s2 rrr  cta  s/nt/nd  no calf tend    cr improved   cont dvt px  pain control  bowel regimen  antiemetics  incentive spirometer

## 2019-03-19 DIAGNOSIS — Z88.0 ALLERGY STATUS TO PENICILLIN: ICD-10-CM

## 2019-03-19 DIAGNOSIS — M19.012 PRIMARY OSTEOARTHRITIS, LEFT SHOULDER: ICD-10-CM

## 2019-03-19 DIAGNOSIS — L30.9 DERMATITIS, UNSPECIFIED: ICD-10-CM

## 2019-03-19 DIAGNOSIS — Z91.030 BEE ALLERGY STATUS: ICD-10-CM

## 2019-03-19 DIAGNOSIS — I10 ESSENTIAL (PRIMARY) HYPERTENSION: ICD-10-CM

## 2019-03-19 DIAGNOSIS — K21.9 GASTRO-ESOPHAGEAL REFLUX DISEASE WITHOUT ESOPHAGITIS: ICD-10-CM

## 2019-03-19 DIAGNOSIS — Z96.651 PRESENCE OF RIGHT ARTIFICIAL KNEE JOINT: ICD-10-CM

## 2019-03-19 DIAGNOSIS — E78.5 HYPERLIPIDEMIA, UNSPECIFIED: ICD-10-CM

## 2020-02-18 PROBLEM — L30.9 DERMATITIS, UNSPECIFIED: Chronic | Status: ACTIVE | Noted: 2019-02-25

## 2020-02-18 PROBLEM — E66.9 OBESITY, UNSPECIFIED: Chronic | Status: ACTIVE | Noted: 2019-02-25

## 2020-02-24 ENCOUNTER — OUTPATIENT (OUTPATIENT)
Dept: OUTPATIENT SERVICES | Facility: HOSPITAL | Age: 75
LOS: 1 days | Discharge: HOME | End: 2020-02-24
Payer: OTHER MISCELLANEOUS

## 2020-02-24 VITALS
WEIGHT: 273.59 LBS | OXYGEN SATURATION: 96 % | DIASTOLIC BLOOD PRESSURE: 89 MMHG | HEART RATE: 70 BPM | SYSTOLIC BLOOD PRESSURE: 142 MMHG | HEIGHT: 64 IN | RESPIRATION RATE: 17 BRPM | TEMPERATURE: 98 F

## 2020-02-24 DIAGNOSIS — M25.519 PAIN IN UNSPECIFIED SHOULDER: ICD-10-CM

## 2020-02-24 DIAGNOSIS — Z96.612 PRESENCE OF LEFT ARTIFICIAL SHOULDER JOINT: ICD-10-CM

## 2020-02-24 DIAGNOSIS — Z96.651 PRESENCE OF RIGHT ARTIFICIAL KNEE JOINT: Chronic | ICD-10-CM

## 2020-02-24 DIAGNOSIS — E78.5 HYPERLIPIDEMIA, UNSPECIFIED: ICD-10-CM

## 2020-02-24 DIAGNOSIS — Z96.659 PRESENCE OF UNSPECIFIED ARTIFICIAL KNEE JOINT: Chronic | ICD-10-CM

## 2020-02-24 DIAGNOSIS — I10 ESSENTIAL (PRIMARY) HYPERTENSION: ICD-10-CM

## 2020-02-24 DIAGNOSIS — Z90.49 ACQUIRED ABSENCE OF OTHER SPECIFIED PARTS OF DIGESTIVE TRACT: Chronic | ICD-10-CM

## 2020-02-24 DIAGNOSIS — Z96.611 PRESENCE OF RIGHT ARTIFICIAL SHOULDER JOINT: Chronic | ICD-10-CM

## 2020-02-24 DIAGNOSIS — Z01.818 ENCOUNTER FOR OTHER PREPROCEDURAL EXAMINATION: ICD-10-CM

## 2020-02-24 LAB
ANION GAP SERPL CALC-SCNC: 8 MMOL/L — SIGNIFICANT CHANGE UP (ref 5–17)
BASOPHILS # BLD AUTO: 0.06 K/UL — SIGNIFICANT CHANGE UP (ref 0–0.2)
BASOPHILS NFR BLD AUTO: 0.6 % — SIGNIFICANT CHANGE UP (ref 0–2)
BUN SERPL-MCNC: 19 MG/DL — SIGNIFICANT CHANGE UP (ref 7–23)
CALCIUM SERPL-MCNC: 9.5 MG/DL — SIGNIFICANT CHANGE UP (ref 8.5–10.1)
CHLORIDE SERPL-SCNC: 109 MMOL/L — HIGH (ref 96–108)
CO2 SERPL-SCNC: 24 MMOL/L — SIGNIFICANT CHANGE UP (ref 22–31)
CREAT SERPL-MCNC: 1.08 MG/DL — SIGNIFICANT CHANGE UP (ref 0.5–1.3)
EOSINOPHIL # BLD AUTO: 0.19 K/UL — SIGNIFICANT CHANGE UP (ref 0–0.5)
EOSINOPHIL NFR BLD AUTO: 1.8 % — SIGNIFICANT CHANGE UP (ref 0–6)
GLUCOSE SERPL-MCNC: 128 MG/DL — HIGH (ref 70–99)
HCT VFR BLD CALC: 42.6 % — SIGNIFICANT CHANGE UP (ref 34.5–45)
HGB BLD-MCNC: 14 G/DL — SIGNIFICANT CHANGE UP (ref 11.5–15.5)
IMM GRANULOCYTES NFR BLD AUTO: 0.5 % — SIGNIFICANT CHANGE UP (ref 0–1.5)
LYMPHOCYTES # BLD AUTO: 19.1 % — SIGNIFICANT CHANGE UP (ref 13–44)
LYMPHOCYTES # BLD AUTO: 2.03 K/UL — SIGNIFICANT CHANGE UP (ref 1–3.3)
MCHC RBC-ENTMCNC: 30.2 PG — SIGNIFICANT CHANGE UP (ref 27–34)
MCHC RBC-ENTMCNC: 32.9 GM/DL — SIGNIFICANT CHANGE UP (ref 32–36)
MCV RBC AUTO: 91.8 FL — SIGNIFICANT CHANGE UP (ref 80–100)
MONOCYTES # BLD AUTO: 0.61 K/UL — SIGNIFICANT CHANGE UP (ref 0–0.9)
MONOCYTES NFR BLD AUTO: 5.7 % — SIGNIFICANT CHANGE UP (ref 2–14)
NEUTROPHILS # BLD AUTO: 7.67 K/UL — HIGH (ref 1.8–7.4)
NEUTROPHILS NFR BLD AUTO: 72.3 % — SIGNIFICANT CHANGE UP (ref 43–77)
NRBC # BLD: 0 /100 WBCS — SIGNIFICANT CHANGE UP (ref 0–0)
PLATELET # BLD AUTO: 347 K/UL — SIGNIFICANT CHANGE UP (ref 150–400)
POTASSIUM SERPL-MCNC: 4.2 MMOL/L — SIGNIFICANT CHANGE UP (ref 3.5–5.3)
POTASSIUM SERPL-SCNC: 4.2 MMOL/L — SIGNIFICANT CHANGE UP (ref 3.5–5.3)
RBC # BLD: 4.64 M/UL — SIGNIFICANT CHANGE UP (ref 3.8–5.2)
RBC # FLD: 14.3 % — SIGNIFICANT CHANGE UP (ref 10.3–14.5)
SODIUM SERPL-SCNC: 141 MMOL/L — SIGNIFICANT CHANGE UP (ref 135–145)
WBC # BLD: 10.61 K/UL — HIGH (ref 3.8–10.5)
WBC # FLD AUTO: 10.61 K/UL — HIGH (ref 3.8–10.5)

## 2020-02-24 PROCEDURE — 93010 ELECTROCARDIOGRAM REPORT: CPT

## 2020-02-24 RX ORDER — SODIUM CHLORIDE 9 MG/ML
3 INJECTION INTRAMUSCULAR; INTRAVENOUS; SUBCUTANEOUS EVERY 8 HOURS
Refills: 0 | Status: DISCONTINUED | OUTPATIENT
Start: 2020-03-02 | End: 2020-03-02

## 2020-02-24 NOTE — H&P PST ADULT - ASSESSMENT
left shoulder osteoarthritis-   CAPRINI SCORE    AGE RELATED RISK FACTORS                                                       MOBILITY RELATED FACTORS  [ ] Age 41-60 years                                            (1 Point)                  [ ] Bed rest                                                        (1 Point)  [ ] Age: 61-74 years                                           (2 Points)                [ ] Plaster cast                                                   (2 Points)  [x ] Age= 75 years                                              (3 Points)                 [ ] Bed bound for more than 72 hours                   (2 Points)    DISEASE RELATED RISK FACTORS                                               GENDER SPECIFIC FACTORS  [x Edema in the lower extremities                       (1 Point)                  [ ] Pregnancy                                                     (1 Point)  [ ] Varicose veins                                               (1 Point)                  [ ] Post-partum < 6 weeks                                   (1 Point)             [x ] BMI > 25 Kg/m2                                            (1 Point)                  [ ] Hormonal therapy  or oral contraception            (1 Point)                 [ ] Sepsis (in the previous month)                        (1 Point)                  [ ] History of pregnancy complications  [ ] Pneumonia or serious lung disease                                               [ ] Unexplained or recurrent                       (1 Point)           (in the previous month)                               (1 Point)  [ ] Abnormal pulmonary function test                     (1 Point)                 SURGERY RELATED RISK FACTORS  [ ] Acute myocardial infarction                              (1 Point)                 [ ]  Section                                            (1 Point)  [ ] Congestive heart failure (in the previous month)  (1 Point)                 [ ] Minor surgery                                                 (1 Point)   [ ] Inflammatory bowel disease                             (1 Point)                 [ ] Arthroscopic surgery                                        (2 Points)  [ ] Central venous access                                    (2 Points)                [ ] General surgery lasting more than 45 minutes   (2 Points)       [ ] Stroke (in the previous month)                          (5 Points)               [x ] Elective arthroplasty                                        (5 Points)                                                                                                                                               HEMATOLOGY RELATED FACTORS                                                 TRAUMA RELATED RISK FACTORS  [ ] Prior episodes of VTE                                     (3 Points)                 [ ] Fracture of the hip, pelvis, or leg                       (5 Points)  [ ] Positive family history for VTE                         (3 Points)                 [ ] Acute spinal cord injury (in the previous month)  (5 Points)  [ ] Prothrombin 71769 A                                      (3 Points)                 [ ] Paralysis  (less than 1 month)                          (5 Points)  [ ] Factor V Leiden                                             (3 Points)                 [ ] Multiple Trauma within 1 month                         (5 Points)  [ ] Lupus anticoagulants                                     (3 Points)                                                           [ ] Anticardiolipin antibodies                                (3 Points)                                                       [ ] High homocysteine in the blood                      (3 Points)                                             [ ] Other congenital or acquired thrombophilia       (3 Points)                                                [ ] Heparin induced thrombocytopenia                  (3 Points)                                          Total Score [      10    ]  Caprini Score 0-2: Low risk, No VTE Prophylaxis required for most patient's, encourage ambulation  Caprini Score 3-6: At Risk, Pharmacologic VTE prophylaxis is indicated for most patients ( in the absence of a contraindication)  Caprini Score Greater than or = 7: High Risk , pharmacologic VTE is indicated for most patients ( in the absence of a contraindication)    Caprini score indicates that the patient is high risk for VTE event ( score 6 or greater). Surgical patient's in this group will benefit from both pharmacologic prophylaxis and intermittent compression devices . Surgical team will determine the balance between VTE  risk and bleeding risk and other clinical considerations

## 2020-02-24 NOTE — H&P PST ADULT - NSICDXPASTSURGICALHX_GEN_ALL_CORE_FT
PAST SURGICAL HISTORY:  S/P cholecystectomy     S/P knee replacement 2004 left knee and revision    S/P knee replacement Left ( 2007 )    S/P total knee arthroplasty, right     Status post total shoulder arthroplasty, right

## 2020-02-24 NOTE — H&P PST ADULT - NSICDXFAMILYHX_GEN_ALL_CORE_FT
FAMILY HISTORY:  Father  Still living? No  Family history of stroke, Age at diagnosis: Age Unknown    Mother  Still living? No  Family history of diabetes mellitus, Age at diagnosis: Age Unknown  Family history of stroke, Age at diagnosis: Age Unknown

## 2020-02-24 NOTE — H&P PST ADULT - NSICDXPASTMEDICALHX_GEN_ALL_CORE_FT
PAST MEDICAL HISTORY:  Eczema     Essential hypertension     GERD (gastroesophageal reflux disease)     Hyperlipemia     Obese

## 2020-02-24 NOTE — H&P PST ADULT - HISTORY OF PRESENT ILLNESS
75 yo female s/p left shoulder arthroplasty , now c/o decrease movement and severe pain - scheduled for left shoulder arthroscopy

## 2020-02-24 NOTE — H&P PST ADULT - NSICDXPROBLEM_GEN_ALL_CORE_FT
PROBLEM DIAGNOSES  Problem: Shoulder pain  Assessment and Plan: scheduled for left shoulder arthroscopy    Problem: HLD (hyperlipidemia)  Assessment and Plan: continue meds    Problem: HTN (hypertension)  Assessment and Plan: continue meds

## 2020-03-01 ENCOUNTER — TRANSCRIPTION ENCOUNTER (OUTPATIENT)
Age: 75
End: 2020-03-01

## 2020-03-02 ENCOUNTER — OUTPATIENT (OUTPATIENT)
Dept: OUTPATIENT SERVICES | Facility: HOSPITAL | Age: 75
LOS: 1 days | Discharge: HOME | End: 2020-03-02

## 2020-03-02 VITALS
HEART RATE: 73 BPM | DIASTOLIC BLOOD PRESSURE: 70 MMHG | OXYGEN SATURATION: 97 % | HEIGHT: 64 IN | TEMPERATURE: 98 F | RESPIRATION RATE: 18 BRPM | WEIGHT: 273.37 LBS | SYSTOLIC BLOOD PRESSURE: 147 MMHG

## 2020-03-02 VITALS
HEART RATE: 78 BPM | DIASTOLIC BLOOD PRESSURE: 56 MMHG | OXYGEN SATURATION: 96 % | RESPIRATION RATE: 16 BRPM | SYSTOLIC BLOOD PRESSURE: 102 MMHG | TEMPERATURE: 98 F

## 2020-03-02 DIAGNOSIS — Z96.659 PRESENCE OF UNSPECIFIED ARTIFICIAL KNEE JOINT: Chronic | ICD-10-CM

## 2020-03-02 DIAGNOSIS — Z96.611 PRESENCE OF RIGHT ARTIFICIAL SHOULDER JOINT: Chronic | ICD-10-CM

## 2020-03-02 DIAGNOSIS — Z96.651 PRESENCE OF RIGHT ARTIFICIAL KNEE JOINT: Chronic | ICD-10-CM

## 2020-03-02 DIAGNOSIS — Z90.49 ACQUIRED ABSENCE OF OTHER SPECIFIED PARTS OF DIGESTIVE TRACT: Chronic | ICD-10-CM

## 2020-03-02 RX ORDER — CALCIUM CARBONATE 500(1250)
2 TABLET ORAL
Qty: 0 | Refills: 0 | DISCHARGE

## 2020-03-02 RX ORDER — HYDROMORPHONE HYDROCHLORIDE 2 MG/ML
0.5 INJECTION INTRAMUSCULAR; INTRAVENOUS; SUBCUTANEOUS
Refills: 0 | Status: DISCONTINUED | OUTPATIENT
Start: 2020-03-02 | End: 2020-03-02

## 2020-03-02 RX ORDER — SIMVASTATIN 20 MG/1
1 TABLET, FILM COATED ORAL
Qty: 0 | Refills: 0 | DISCHARGE

## 2020-03-02 RX ORDER — LACTOBACILLUS ACIDOPHILUS 100MM CELL
1 CAPSULE ORAL
Qty: 0 | Refills: 0 | DISCHARGE

## 2020-03-02 RX ORDER — ONDANSETRON 8 MG/1
4 TABLET, FILM COATED ORAL ONCE
Refills: 0 | Status: DISCONTINUED | OUTPATIENT
Start: 2020-03-02 | End: 2020-03-02

## 2020-03-02 RX ORDER — SODIUM CHLORIDE 9 MG/ML
1000 INJECTION, SOLUTION INTRAVENOUS
Refills: 0 | Status: DISCONTINUED | OUTPATIENT
Start: 2020-03-02 | End: 2020-03-02

## 2020-03-02 RX ORDER — SUCRALFATE 1 G
1 TABLET ORAL
Qty: 0 | Refills: 0 | DISCHARGE

## 2020-03-02 RX ORDER — HYDROMORPHONE HYDROCHLORIDE 2 MG/ML
1 INJECTION INTRAMUSCULAR; INTRAVENOUS; SUBCUTANEOUS
Refills: 0 | Status: DISCONTINUED | OUTPATIENT
Start: 2020-03-02 | End: 2020-03-02

## 2020-03-02 RX ORDER — OLMESARTAN MEDOXOMIL-HYDROCHLOROTHIAZIDE 25; 40 MG/1; MG/1
1 TABLET, FILM COATED ORAL
Qty: 0 | Refills: 0 | DISCHARGE

## 2020-03-02 RX ADMIN — SODIUM CHLORIDE 150 MILLILITER(S): 9 INJECTION, SOLUTION INTRAVENOUS at 16:35

## 2020-03-02 RX ADMIN — HYDROMORPHONE HYDROCHLORIDE 1 MILLIGRAM(S): 2 INJECTION INTRAMUSCULAR; INTRAVENOUS; SUBCUTANEOUS at 17:23

## 2020-03-02 RX ADMIN — SODIUM CHLORIDE 3 MILLILITER(S): 9 INJECTION INTRAMUSCULAR; INTRAVENOUS; SUBCUTANEOUS at 16:40

## 2020-03-02 RX ADMIN — HYDROMORPHONE HYDROCHLORIDE 1 MILLIGRAM(S): 2 INJECTION INTRAMUSCULAR; INTRAVENOUS; SUBCUTANEOUS at 18:09

## 2020-03-02 RX ADMIN — HYDROMORPHONE HYDROCHLORIDE 1 MILLIGRAM(S): 2 INJECTION INTRAMUSCULAR; INTRAVENOUS; SUBCUTANEOUS at 16:48

## 2020-03-02 RX ADMIN — HYDROMORPHONE HYDROCHLORIDE 1 MILLIGRAM(S): 2 INJECTION INTRAMUSCULAR; INTRAVENOUS; SUBCUTANEOUS at 17:29

## 2020-03-02 NOTE — ASU PATIENT PROFILE, ADULT - PSH
S/P cholecystectomy    S/P knee replacement  2004 left knee and revision  S/P knee replacement  Left ( 2007 )  S/P total knee arthroplasty, right    Status post total shoulder arthroplasty, right S/P cholecystectomy    S/P knee replacement  2004 left knee and revision  S/P knee replacement  Left ( 2007 )  S/P total knee arthroplasty, right    Status post total shoulder arthroplasty, right  and left

## 2020-03-06 DIAGNOSIS — Z96.612 PRESENCE OF LEFT ARTIFICIAL SHOULDER JOINT: ICD-10-CM

## 2020-03-06 DIAGNOSIS — Z79.82 LONG TERM (CURRENT) USE OF ASPIRIN: ICD-10-CM

## 2020-03-06 DIAGNOSIS — Z96.653 PRESENCE OF ARTIFICIAL KNEE JOINT, BILATERAL: ICD-10-CM

## 2020-03-06 DIAGNOSIS — E78.5 HYPERLIPIDEMIA, UNSPECIFIED: ICD-10-CM

## 2020-03-06 DIAGNOSIS — I10 ESSENTIAL (PRIMARY) HYPERTENSION: ICD-10-CM

## 2020-03-06 DIAGNOSIS — M25.512 PAIN IN LEFT SHOULDER: ICD-10-CM

## 2020-03-06 DIAGNOSIS — K21.9 GASTRO-ESOPHAGEAL REFLUX DISEASE WITHOUT ESOPHAGITIS: ICD-10-CM

## 2020-03-06 DIAGNOSIS — Z79.891 LONG TERM (CURRENT) USE OF OPIATE ANALGESIC: ICD-10-CM

## 2020-06-02 ENCOUNTER — OUTPATIENT (OUTPATIENT)
Dept: OUTPATIENT SERVICES | Facility: HOSPITAL | Age: 75
LOS: 1 days | Discharge: HOME | End: 2020-06-02
Payer: OTHER MISCELLANEOUS

## 2020-06-02 VITALS
RESPIRATION RATE: 18 BRPM | TEMPERATURE: 98 F | OXYGEN SATURATION: 98 % | HEIGHT: 64 IN | DIASTOLIC BLOOD PRESSURE: 79 MMHG | HEART RATE: 72 BPM | SYSTOLIC BLOOD PRESSURE: 168 MMHG | WEIGHT: 273.37 LBS

## 2020-06-02 DIAGNOSIS — Z96.9 PRESENCE OF FUNCTIONAL IMPLANT, UNSPECIFIED: ICD-10-CM

## 2020-06-02 DIAGNOSIS — Z98.890 OTHER SPECIFIED POSTPROCEDURAL STATES: Chronic | ICD-10-CM

## 2020-06-02 DIAGNOSIS — Z01.818 ENCOUNTER FOR OTHER PREPROCEDURAL EXAMINATION: ICD-10-CM

## 2020-06-02 DIAGNOSIS — Z96.611 PRESENCE OF RIGHT ARTIFICIAL SHOULDER JOINT: Chronic | ICD-10-CM

## 2020-06-02 DIAGNOSIS — Z96.659 PRESENCE OF UNSPECIFIED ARTIFICIAL KNEE JOINT: Chronic | ICD-10-CM

## 2020-06-02 DIAGNOSIS — M19.012 PRIMARY OSTEOARTHRITIS, LEFT SHOULDER: ICD-10-CM

## 2020-06-02 DIAGNOSIS — I10 ESSENTIAL (PRIMARY) HYPERTENSION: ICD-10-CM

## 2020-06-02 DIAGNOSIS — Z96.651 PRESENCE OF RIGHT ARTIFICIAL KNEE JOINT: Chronic | ICD-10-CM

## 2020-06-02 DIAGNOSIS — E78.5 HYPERLIPIDEMIA, UNSPECIFIED: ICD-10-CM

## 2020-06-02 DIAGNOSIS — Z90.49 ACQUIRED ABSENCE OF OTHER SPECIFIED PARTS OF DIGESTIVE TRACT: Chronic | ICD-10-CM

## 2020-06-02 DIAGNOSIS — E66.9 OBESITY, UNSPECIFIED: ICD-10-CM

## 2020-06-02 LAB
A1C WITH ESTIMATED AVERAGE GLUCOSE RESULT: 6 % — HIGH (ref 4–5.6)
ANION GAP SERPL CALC-SCNC: 2 MMOL/L — LOW (ref 5–17)
APTT BLD: 33.2 SEC — SIGNIFICANT CHANGE UP (ref 27.5–36.3)
BASOPHILS # BLD AUTO: 0.08 K/UL — SIGNIFICANT CHANGE UP (ref 0–0.2)
BASOPHILS NFR BLD AUTO: 0.7 % — SIGNIFICANT CHANGE UP (ref 0–2)
BLD GP AB SCN SERPL QL: SIGNIFICANT CHANGE UP
BUN SERPL-MCNC: 21 MG/DL — SIGNIFICANT CHANGE UP (ref 7–23)
CALCIUM SERPL-MCNC: 9.3 MG/DL — SIGNIFICANT CHANGE UP (ref 8.5–10.1)
CHLORIDE SERPL-SCNC: 107 MMOL/L — SIGNIFICANT CHANGE UP (ref 96–108)
CO2 SERPL-SCNC: 30 MMOL/L — SIGNIFICANT CHANGE UP (ref 22–31)
CREAT SERPL-MCNC: 1.01 MG/DL — SIGNIFICANT CHANGE UP (ref 0.5–1.3)
EOSINOPHIL # BLD AUTO: 0.29 K/UL — SIGNIFICANT CHANGE UP (ref 0–0.5)
EOSINOPHIL NFR BLD AUTO: 2.7 % — SIGNIFICANT CHANGE UP (ref 0–6)
ESTIMATED AVERAGE GLUCOSE: 126 MG/DL — HIGH (ref 68–114)
GLUCOSE SERPL-MCNC: 106 MG/DL — HIGH (ref 70–99)
HCT VFR BLD CALC: 43.1 % — SIGNIFICANT CHANGE UP (ref 34.5–45)
HGB BLD-MCNC: 13.9 G/DL — SIGNIFICANT CHANGE UP (ref 11.5–15.5)
IMM GRANULOCYTES NFR BLD AUTO: 0.6 % — SIGNIFICANT CHANGE UP (ref 0–1.5)
INR BLD: 1.04 RATIO — SIGNIFICANT CHANGE UP (ref 0.88–1.16)
LYMPHOCYTES # BLD AUTO: 2.37 K/UL — SIGNIFICANT CHANGE UP (ref 1–3.3)
LYMPHOCYTES # BLD AUTO: 21.9 % — SIGNIFICANT CHANGE UP (ref 13–44)
MCHC RBC-ENTMCNC: 30.4 PG — SIGNIFICANT CHANGE UP (ref 27–34)
MCHC RBC-ENTMCNC: 32.3 GM/DL — SIGNIFICANT CHANGE UP (ref 32–36)
MCV RBC AUTO: 94.3 FL — SIGNIFICANT CHANGE UP (ref 80–100)
MONOCYTES # BLD AUTO: 0.67 K/UL — SIGNIFICANT CHANGE UP (ref 0–0.9)
MONOCYTES NFR BLD AUTO: 6.2 % — SIGNIFICANT CHANGE UP (ref 2–14)
MRSA PCR RESULT.: SIGNIFICANT CHANGE UP
NEUTROPHILS # BLD AUTO: 7.33 K/UL — SIGNIFICANT CHANGE UP (ref 1.8–7.4)
NEUTROPHILS NFR BLD AUTO: 67.9 % — SIGNIFICANT CHANGE UP (ref 43–77)
NRBC # BLD: 0 /100 WBCS — SIGNIFICANT CHANGE UP (ref 0–0)
PLATELET # BLD AUTO: 381 K/UL — SIGNIFICANT CHANGE UP (ref 150–400)
POTASSIUM SERPL-MCNC: 4.4 MMOL/L — SIGNIFICANT CHANGE UP (ref 3.5–5.3)
POTASSIUM SERPL-SCNC: 4.4 MMOL/L — SIGNIFICANT CHANGE UP (ref 3.5–5.3)
PROTHROM AB SERPL-ACNC: 11.6 SEC — SIGNIFICANT CHANGE UP (ref 10–12.9)
RBC # BLD: 4.57 M/UL — SIGNIFICANT CHANGE UP (ref 3.8–5.2)
RBC # FLD: 14.4 % — SIGNIFICANT CHANGE UP (ref 10.3–14.5)
S AUREUS DNA NOSE QL NAA+PROBE: SIGNIFICANT CHANGE UP
SODIUM SERPL-SCNC: 139 MMOL/L — SIGNIFICANT CHANGE UP (ref 135–145)
WBC # BLD: 10.8 K/UL — HIGH (ref 3.8–10.5)
WBC # FLD AUTO: 10.8 K/UL — HIGH (ref 3.8–10.5)

## 2020-06-02 PROCEDURE — 93010 ELECTROCARDIOGRAM REPORT: CPT

## 2020-06-02 RX ORDER — SODIUM CHLORIDE 9 MG/ML
3 INJECTION INTRAMUSCULAR; INTRAVENOUS; SUBCUTANEOUS EVERY 8 HOURS
Refills: 0 | Status: DISCONTINUED | OUTPATIENT
Start: 2020-06-15 | End: 2020-06-15

## 2020-06-02 NOTE — H&P PST ADULT - NSICDXPROBLEM_GEN_ALL_CORE_FT
PROBLEM DIAGNOSES  Problem: Presence of other functional implants  Assessment and Plan: Left shoulder removal of prosthesis, incision and drainage with placement of antibiotic spacer  labs - cbc,pt/ptt,bmp,t&s,nose cx,ekg  M/C required  preop 3 day hibiclens instruction reviewed and given .instructed on if  nose cx positive use mupuricin 5 days and checklist given  take routine meds DOS with sips of water. avoid NSAID and OTC supplements. verbalized understanding  information on proper nutrition , increase protein and better food choices provided in packet   Patient to return on 6- for COVID testing    Problem: Primary osteoarthritis of left shoulder  Assessment and Plan: left shoulder removal of prosthesis, insicion and drainage with placement of antibiotic spacer    Problem: HTN (hypertension)  Assessment and Plan: Continue current regimen and medications.     Problem: Obesity  Assessment and Plan: ABDIEL oercautions    Problem: HLD (hyperlipidemia)  Assessment and Plan: Continue current regimen and medications.

## 2020-06-02 NOTE — H&P PST ADULT - ASSESSMENT
74F pmh eczema htn, gerd, hld c/o left shoulder pain and decreased after left shoulder arthroplasty ,left shoulder arthroscopy performed 3/2020 patient here today for PST for scheduled Left shoulder removal of prosthesis, incision and drainage with placement of antibiotic spacer.  CAPRINI SCORE    AGE RELATED RISK FACTORS                                                       MOBILITY RELATED FACTORS  [ ] Age 41-60 years                                            (1 Point)                  [ ] Bed rest                                                        (1 Point)  [ ] Age: 61-74 years                                           (2 Points)                [ ] Plaster cast                                                   (2 Points)  [ x] Age= 75 years                                              (3 Points)                 [ ] Bed bound for more than 72 hours                   (2 Points)    DISEASE RELATED RISK FACTORS                                               GENDER SPECIFIC FACTORS  [x ] Edema in the lower extremities                       (1 Point)                  [ ] Pregnancy                                                     (1 Point)  [ ] Varicose veins                                               (1 Point)                  [ ] Post-partum < 6 weeks                                   (1 Point)             [x ] BMI > 25 Kg/m2                                            (1 Point)                  [ ] Hormonal therapy  or oral contraception            (1 Point)                 [ ] Sepsis (in the previous month)                        (1 Point)                  [ ] History of pregnancy complications  [ ] Pneumonia or serious lung disease                                               [ ] Unexplained or recurrent                       (1 Point)           (in the previous month)                               (1 Point)  [ ] Abnormal pulmonary function test                     (1 Point)                 SURGERY RELATED RISK FACTORS  [ ] Acute myocardial infarction                              (1 Point)                 [ ]  Section                                            (1 Point)  [ ] Congestive heart failure (in the previous month)  (1 Point)                 [ ] Minor surgery                                                 (1 Point)   [ ] Inflammatory bowel disease                             (1 Point)                 [ ] Arthroscopic surgery                                        (2 Points)  [ ] Central venous access                                    (2 Points)                [x ] General surgery lasting more than 45 minutes   (2 Points)       [ ] Stroke (in the previous month)                          (5 Points)               [ ] Elective arthroplasty                                        (5 Points)                                                                                                                                               HEMATOLOGY RELATED FACTORS                                                 TRAUMA RELATED RISK FACTORS  [ ] Prior episodes of VTE                                     (3 Points)                 [ ] Fracture of the hip, pelvis, or leg                       (5 Points)  [ ] Positive family history for VTE                         (3 Points)                 [ ] Acute spinal cord injury (in the previous month)  (5 Points)  [ ] Prothrombin 96577 A                                      (3 Points)                 [ ] Paralysis  (less than 1 month)                          (5 Points)  [ ] Factor V Leiden                                             (3 Points)                 [ ] Multiple Trauma within 1 month                         (5 Points)  [ ] Lupus anticoagulants                                     (3 Points)                                                           [ ] Anticardiolipin antibodies                                (3 Points)                                                       [ ] High homocysteine in the blood                      (3 Points)                                             [ ] Other congenital or acquired thrombophilia       (3 Points)                                                [ ] Heparin induced thrombocytopenia                  (3 Points)                                          Total Score [        7  ]

## 2020-06-02 NOTE — H&P PST ADULT - NSICDXPASTSURGICALHX_GEN_ALL_CORE_FT
PAST SURGICAL HISTORY:  S/P arthroscopy of shoulder left shoulder    S/P cholecystectomy     S/P knee replacement 2004 left knee and revision    S/P knee replacement Left ( 2007 )    S/P total knee arthroplasty, right     Status post total shoulder arthroplasty, right and left

## 2020-06-02 NOTE — H&P PST ADULT - HISTORY OF PRESENT ILLNESS
74F pmh eczema htn, gerd, hld c/o left shoulder pain and decreased after left shoulder arthroplasty ,left shoulder arthroscopy performed 3/2020 patient here today for PST for scheduled Left shoulder removal of prosthesis, incision and drainage with placement of antibiotic spacer.  This patient denies any fever, cough, sob, rash, flu like symptoms or travel outside of the [place] in the past 30 days

## 2020-06-12 LAB — SARS-COV-2 RNA SPEC QL NAA+PROBE: SIGNIFICANT CHANGE UP

## 2020-06-14 ENCOUNTER — TRANSCRIPTION ENCOUNTER (OUTPATIENT)
Age: 75
End: 2020-06-14

## 2020-06-15 ENCOUNTER — TRANSCRIPTION ENCOUNTER (OUTPATIENT)
Age: 75
End: 2020-06-15

## 2020-06-15 ENCOUNTER — RESULT REVIEW (OUTPATIENT)
Age: 75
End: 2020-06-15

## 2020-06-15 ENCOUNTER — INPATIENT (INPATIENT)
Facility: HOSPITAL | Age: 75
LOS: 8 days | Discharge: HOME HEALTH | End: 2020-06-24
Attending: ORTHOPAEDIC SURGERY | Admitting: ORTHOPAEDIC SURGERY
Payer: OTHER MISCELLANEOUS

## 2020-06-15 VITALS
HEIGHT: 64 IN | SYSTOLIC BLOOD PRESSURE: 150 MMHG | OXYGEN SATURATION: 98 % | RESPIRATION RATE: 16 BRPM | TEMPERATURE: 98 F | HEART RATE: 73 BPM | WEIGHT: 273.37 LBS | DIASTOLIC BLOOD PRESSURE: 60 MMHG

## 2020-06-15 DIAGNOSIS — Z98.890 OTHER SPECIFIED POSTPROCEDURAL STATES: Chronic | ICD-10-CM

## 2020-06-15 DIAGNOSIS — Z96.651 PRESENCE OF RIGHT ARTIFICIAL KNEE JOINT: Chronic | ICD-10-CM
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
## 2020-06-15 DIAGNOSIS — Z96.659 PRESENCE OF UNSPECIFIED ARTIFICIAL KNEE JOINT: Chronic | ICD-10-CM

## 2020-06-15 DIAGNOSIS — Z90.49 ACQUIRED ABSENCE OF OTHER SPECIFIED PARTS OF DIGESTIVE TRACT: Chronic | ICD-10-CM

## 2020-06-15 DIAGNOSIS — Z96.611 PRESENCE OF RIGHT ARTIFICIAL SHOULDER JOINT: Chronic | ICD-10-CM

## 2020-06-15 LAB
ERYTHROCYTE [SEDIMENTATION RATE] IN BLOOD: 28 MM/HR — HIGH (ref 0–20)
HCT VFR BLD CALC: 37.2 % — SIGNIFICANT CHANGE UP (ref 34.5–45)
HGB BLD-MCNC: 12.6 G/DL — SIGNIFICANT CHANGE UP (ref 11.5–15.5)
MCHC RBC-ENTMCNC: 31.2 PG — SIGNIFICANT CHANGE UP (ref 27–34)
MCHC RBC-ENTMCNC: 33.9 GM/DL — SIGNIFICANT CHANGE UP (ref 32–36)
MCV RBC AUTO: 92.1 FL — SIGNIFICANT CHANGE UP (ref 80–100)
NRBC # BLD: 0 /100 WBCS — SIGNIFICANT CHANGE UP (ref 0–0)
PLATELET # BLD AUTO: 336 K/UL — SIGNIFICANT CHANGE UP (ref 150–400)
RBC # BLD: 4.04 M/UL — SIGNIFICANT CHANGE UP (ref 3.8–5.2)
RBC # FLD: 14.4 % — SIGNIFICANT CHANGE UP (ref 10.3–14.5)
WBC # BLD: 16.64 K/UL — HIGH (ref 3.8–10.5)
WBC # FLD AUTO: 16.64 K/UL — HIGH (ref 3.8–10.5)

## 2020-06-15 PROCEDURE — 36573 INSJ PICC RS&I 5 YR+: CPT

## 2020-06-15 PROCEDURE — 88300 SURGICAL PATH GROSS: CPT | Mod: 26,59

## 2020-06-15 RX ORDER — SODIUM CHLORIDE 9 MG/ML
1000 INJECTION, SOLUTION INTRAVENOUS
Refills: 0 | Status: DISCONTINUED | OUTPATIENT
Start: 2020-06-15 | End: 2020-06-15

## 2020-06-15 RX ORDER — OXYCODONE HYDROCHLORIDE 5 MG/1
5 TABLET ORAL EVERY 4 HOURS
Refills: 0 | Status: DISCONTINUED | OUTPATIENT
Start: 2020-06-15 | End: 2020-06-16

## 2020-06-15 RX ORDER — POLYETHYLENE GLYCOL 3350 17 G/17G
17 POWDER, FOR SOLUTION ORAL DAILY
Refills: 0 | Status: DISCONTINUED | OUTPATIENT
Start: 2020-06-15 | End: 2020-06-24

## 2020-06-15 RX ORDER — ASPIRIN/CALCIUM CARB/MAGNESIUM 324 MG
325 TABLET ORAL DAILY
Refills: 0 | Status: DISCONTINUED | OUTPATIENT
Start: 2020-06-16 | End: 2020-06-24

## 2020-06-15 RX ORDER — ACETAMINOPHEN 500 MG
650 TABLET ORAL EVERY 6 HOURS
Refills: 0 | Status: DISCONTINUED | OUTPATIENT
Start: 2020-06-15 | End: 2020-06-24

## 2020-06-15 RX ORDER — SENNA PLUS 8.6 MG/1
2 TABLET ORAL AT BEDTIME
Refills: 0 | Status: DISCONTINUED | OUTPATIENT
Start: 2020-06-15 | End: 2020-06-24

## 2020-06-15 RX ORDER — FENTANYL CITRATE 50 UG/ML
50 INJECTION INTRAVENOUS
Refills: 0 | Status: DISCONTINUED | OUTPATIENT
Start: 2020-06-15 | End: 2020-06-15

## 2020-06-15 RX ORDER — OXYCODONE HYDROCHLORIDE 5 MG/1
10 TABLET ORAL EVERY 4 HOURS
Refills: 0 | Status: DISCONTINUED | OUTPATIENT
Start: 2020-06-15 | End: 2020-06-16

## 2020-06-15 RX ORDER — MAGNESIUM HYDROXIDE 400 MG/1
30 TABLET, CHEWABLE ORAL DAILY
Refills: 0 | Status: DISCONTINUED | OUTPATIENT
Start: 2020-06-15 | End: 2020-06-24

## 2020-06-15 RX ORDER — HYDROMORPHONE HYDROCHLORIDE 2 MG/ML
0.5 INJECTION INTRAMUSCULAR; INTRAVENOUS; SUBCUTANEOUS
Refills: 0 | Status: DISCONTINUED | OUTPATIENT
Start: 2020-06-15 | End: 2020-06-20

## 2020-06-15 RX ORDER — ONDANSETRON 8 MG/1
4 TABLET, FILM COATED ORAL EVERY 6 HOURS
Refills: 0 | Status: DISCONTINUED | OUTPATIENT
Start: 2020-06-15 | End: 2020-06-24

## 2020-06-15 RX ORDER — ONDANSETRON 8 MG/1
4 TABLET, FILM COATED ORAL ONCE
Refills: 0 | Status: DISCONTINUED | OUTPATIENT
Start: 2020-06-15 | End: 2020-06-15

## 2020-06-15 RX ORDER — ACETAMINOPHEN 500 MG
1000 TABLET ORAL ONCE
Refills: 0 | Status: COMPLETED | OUTPATIENT
Start: 2020-06-15 | End: 2020-06-15

## 2020-06-15 RX ORDER — LANOLIN ALCOHOL/MO/W.PET/CERES
3 CREAM (GRAM) TOPICAL AT BEDTIME
Refills: 0 | Status: DISCONTINUED | OUTPATIENT
Start: 2020-06-15 | End: 2020-06-24

## 2020-06-15 RX ORDER — FENTANYL CITRATE 50 UG/ML
25 INJECTION INTRAVENOUS
Refills: 0 | Status: DISCONTINUED | OUTPATIENT
Start: 2020-06-15 | End: 2020-06-15

## 2020-06-15 RX ADMIN — SODIUM CHLORIDE 100 MILLILITER(S): 9 INJECTION, SOLUTION INTRAVENOUS at 16:51

## 2020-06-15 RX ADMIN — HYDROMORPHONE HYDROCHLORIDE 0.5 MILLIGRAM(S): 2 INJECTION INTRAMUSCULAR; INTRAVENOUS; SUBCUTANEOUS at 22:23

## 2020-06-15 RX ADMIN — OXYCODONE HYDROCHLORIDE 10 MILLIGRAM(S): 5 TABLET ORAL at 19:52

## 2020-06-15 RX ADMIN — FENTANYL CITRATE 50 MICROGRAM(S): 50 INJECTION INTRAVENOUS at 16:59

## 2020-06-15 RX ADMIN — Medication 400 MILLIGRAM(S): at 16:49

## 2020-06-15 RX ADMIN — Medication 3 MILLIGRAM(S): at 22:57

## 2020-06-15 NOTE — DISCHARGE NOTE PROVIDER - HOSPITAL COURSE
75yFemale with history of Left shoulder I&D with insertion of ABX spacer by Dr. Eaton on 6/15/2020. Risk and benefits of surgery were explained to the patient. The patient understood and agreed to proceed with surgery. Patient underwent the procedure with no intraoperative complications. Pt was brought in stable condition to the PACU. Once stable in PACU, pt was brought to the floor. During hospital stay pt was followed by Medicine, physical therapy, Home Care during this admission. Pt had an uneventful hospital course. Pt is stable for discharge to home 75yFemale with history of Left shoulder I&D with insertion of ABX spacer by Dr. Eaton on 6/15/2020. Risk and benefits of surgery were explained to the patient. The patient understood and agreed to proceed with surgery. Patient underwent the procedure with no intraoperative complications. Pt was brought in stable condition to the PACU. Once stable in PACU, pt was brought to the floor. During hospital stay pt was followed by Medicine, physical therapy, Home Care during this admission. Pt had an uneventful hospital course. Pt is stable for discharge to home POD #8 75yFemale with history of Left shoulder I&D with insertion of ABX spacer by Dr. Eaton on 6/15/2020. Risk and benefits of surgery were explained to the patient. The patient understood and agreed to proceed with surgery. Patient underwent the procedure with no intraoperative complications. Pt was brought in stable condition to the PACU. Once stable in PACU, pt was brought to the floor. During hospital stay pt was followed by Medicine, physical therapy, Home Care during this admission. Pt had an uneventful hospital course. Patient had a PICC placed on 6/18/2020 for at home IV Antibiotic treatment. Pt is stable for discharge to home POD #9

## 2020-06-15 NOTE — PROGRESS NOTE ADULT - SUBJECTIVE AND OBJECTIVE BOX
Post op check  75yFemale s/p L shoulder I&D/insertion of ABX spacer POD #0  Patient c/o procedural pain in L shoulder.  Pt seen and examined in NAD. Pt denies any new complaints.   Pt denies CP/SOB/N/V/D/numbness/tingling/bowel or bladder dysfunction.     PE:   L Shoulder: Dressing c/d/i +HV. Arm in sling  LUE: +ROM elbow/wrist/fingers. +ok/thumbsup/fingercross signs.  strength: 5/5.  RP2+ NVI.   RUE: +ROM Shoulder/elbow/wrist/fingers. +ok/thumbsup/fingercross signs.  strength: 5/5.  RP2+ NVI.                             12.6   16.64 )-----------( 336      ( 15 Adalid 2020 18:08 )             37.2     A/P: 75yFemale s/p L shoulder I&D/insertion of ABX spacer POD #0  Dressing CDI   Pain control  Shoulder precautions  PT: Left arm non weight bearing  DVT ppx: SCDs and ASA  Wound care, Isometric exercises, incentive spirometry   Discharge: planning   All the above discussed and understood by pt Post op check  75yFemale s/p L shoulder I&D/insertion of ABX spacer POD #0  Patient c/o procedural pain in L shoulder.  Pt seen and examined in NAD. Pt denies any new complaints.   Pt denies CP/SOB/N/V/D/numbness/tingling/bowel or bladder dysfunction.     PE:   L Shoulder: Dressing c/d/i +HV. Arm in sling  LUE: +ROM elbow/wrist/fingers. +ok/thumbsup/fingercross signs.  strength: 5/5.  RP2+ NVI.   RUE: +ROM Shoulder/elbow/wrist/fingers. +ok/thumbsup/fingercross signs.  strength: 5/5.  RP2+ NVI.                             12.6   16.64 )-----------( 336      ( 15 Adalid 2020 18:08 )             37.2     A/P: 75yFemale s/p L shoulder I&D/insertion of ABX spacer POD #0  Dressing CDI   Pain control  Shoulder precautions Sling/Non weight bearing operative shoulder.      than 24 hours call your surgeon.   PT: Left arm non weight bearing  DVT ppx: SCDs and ASA  Wound care, Isometric exercises, incentive spirometry   F/u ID recommendations  F/u OR cultures/BCx  Discharge: planning   All the above discussed and understood by pt

## 2020-06-15 NOTE — DISCHARGE NOTE PROVIDER - NSDCMRMEDTOKEN_GEN_ALL_CORE_FT
Acidophilus: 1 tab(s) orally 2 times a day  AcipHex: 1 tab(s) orally once a day  aspirin 81 mg oral tablet: 1 tab(s) orally once a day  Benicar HCT: 1 tab(s) orally once a day  biotin: 1 tab(s) orally once a day  Carafate: 1 tab(s) orally once a day  Vitamin D2: 1 tab(s) orally once a day  Zocor: 1 tab(s) orally once a day acetaminophen 325 mg oral tablet: 2 tab(s) orally every 6 hours, As needed, Mild Pain (1 - 3), Moderate Pain (4 - 6), Severe Pain (7 - 10)  Adult Aspirin 325 mg oral tablet: 1 tab(s) orally once a day MDD:1   Benicar HCT: 1 tab(s) orally once a day  biotin: 1 tab(s) orally once a day  Carafate: 1 tab(s) orally once a day  Colace 50 mg oral capsule: 1 cap(s) orally 2 times a day  HYDROmorphone 2 mg oral tablet: 1 tab(s) orally every 4 to 6 hours, As Needed -pain  MDD:8   meropenem: 1 gram(s) intravenous every 8 hours  polyethylene glycol 3350 oral powder for reconstitution: 17 gram(s) orally once a day  senna oral tablet: 2 tab(s) orally once a day (at bedtime), As needed, Constipation  vancomycin: 1500 milligram(s) intravenous once a day  Vitamin D2: 1 tab(s) orally once a day  Zocor: 1 tab(s) orally once a day

## 2020-06-15 NOTE — DISCHARGE NOTE PROVIDER - NSDCFUADDAPPT_GEN_ALL_CORE_FT
Follow up with your surgeon in two weeks. Call for appointment.  If you need more pain medication, call your surgeon's office. For medication refills or authorizations, please call [phone] xt 2301  We recommend that you call and schedule a follow up appointment with your primary care physician for repeat blood work (CBC and BMP) for post hospital discharge follow-up care.  Call your surgeon if you have increased redness/pain/drainage or fever. Return to ER for shortness of breath/calf tenderness. Follow up with your surgeon in two weeks. Call for appointment.  If you need more pain medication, call your surgeon's office. For medication refills or authorizations, please call [phone] xt 2301  We recommend that you call and schedule a follow up appointment with your primary care physician for repeat blood work (CBC and BMP) for post hospital discharge follow-up care.  Call your surgeon if you have increased redness/pain/drainage or fever. Return to ER for shortness of breath/calf tenderness.    Weekly lab work while on Antibiotics: CBC, BMP, Vanco trough Follow up with your surgeon in two weeks. Call for appointment.  If you need more pain medication, call your surgeon's office. For medication refills or authorizations, please call [phone] xt 2301  We recommend that you call and schedule a follow up appointment with your primary care physician for repeat blood work (CBC and BMP) for post hospital discharge follow-up care.  Call your surgeon if you have increased redness/pain/drainage or fever. Return to ER for shortness of breath/calf tenderness.    Weekly lab work while on Antibiotics: CBC, CMP, ESR, CRP, Vanco trough.   Fax to ID specialist    Follow up with ID 2-3 weeks after discharge Follow up with your surgeon in two weeks. Call for appointment.  If you need more pain medication, call your surgeon's office. For medication refills or authorizations, please call [phone] xt 2301  We recommend that you call and schedule a follow up appointment with your primary care physician for repeat blood work (CBC and BMP) for post hospital discharge follow-up care.  Call your surgeon if you have increased redness/pain/drainage or fever. Return to ER for shortness of breath/calf tenderness.    Weekly lab work while on Antibiotics: CBC, CMP, ESR, CRP, Vanco trough.   Fax results to ID specialist    Follow up with ID 2-3 weeks after discharge Follow up with your surgeon in two weeks. Call for appointment.  If you need more pain medication, call your surgeon's office. For medication refills or authorizations, please call [phone] xt 2301  We recommend that you call and schedule a follow up appointment with your primary care physician for repeat blood work (CBC and BMP) for post hospital discharge follow-up care.  Call your surgeon if you have increased redness/pain/drainage or fever. Return to ER for shortness of breath/calf tenderness.    Weekly lab work while on Antibiotics: CBC, CMP, ESR, CRP, Vanco trough.   ABX to finish 7/27  Fax results to ID specialist    Follow up with ID 2-3 weeks after discharge

## 2020-06-15 NOTE — BRIEF OPERATIVE NOTE - NSICDXBRIEFPROCEDURE_GEN_ALL_CORE_FT
PROCEDURES:  Incision and drainage of left shoulder 15-Adalid-2020 16:35:24  Eveline Clifton  Insertion of spacer into shoulder joint 15-Adalid-2020 16:35:06  Eveline Clifton  Removal, hardware, metallic, shoulder 15-Adalid-2020 16:34:38  Eveline Clifton

## 2020-06-15 NOTE — DISCHARGE NOTE PROVIDER - PROVIDER TOKENS
PROVIDER:[TOKEN:[[id number]]] PROVIDER:[TOKEN:[[id number]]],PROVIDER:[TOKEN:[[id number]]]

## 2020-06-15 NOTE — DISCHARGE NOTE PROVIDER - NSDCFUADDINST_GEN_ALL_CORE_FT
Sling/Non weight bearing operative shoulder. Range of motion 140/40.    If you have a new onset of numbness or tingling in your arm or hand that was not present before surgery that lasts longer than 24 hours call your surgeon. Sling/Non weight bearing operative shoulder. Range of motion 140/40.    If you have a new onset of numbness or tingling in your arm or hand that was not present before surgery that lasts longer than 24 hours call your surgeon.    Keep Prineo Dressing Clean, Dry and Intact. May shower with Prineo Dressing. Please do not scrub, soak, peel or pick at the prineo dressing. No creams, lotions, or oils over dressing. May shower and let water run over incision, no baths. Pat dry once out of shower. Dressing to be removed in office at follow up visit in 2 weeks. There are no staples or stitches that need to be removed.    PICC Line care daily Sling/Non weight bearing operative shoulder.   If you have a new onset of numbness or tingling in your arm or hand that was not present before surgery that lasts longer than 24 hours call your surgeon.    Keep Prineo Dressing Clean, Dry and Intact. May shower with Prineo Dressing. Please do not scrub, soak, peel or pick at the prineo dressing. No creams, lotions, or oils over dressing. May shower and let water run over incision, no baths. Pat dry once out of shower. Dressing to be removed in office at follow up visit in 2 weeks. There are no staples or stitches that need to be removed.    PICC Line care daily

## 2020-06-15 NOTE — DISCHARGE NOTE PROVIDER - CARE PROVIDER_API CALL
Amarjit Eaton  ORTHOPAEDIC SURGERY  [address]  [place] [address]  Phone: [phone]  Fax: [phone]  Follow Up Time: Amarjit Eaton  ORTHOPAEDIC SURGERY  [address]  [place] [address]  Phone: [phone]  Fax: [phone]  Follow Up Time:     Alma Delia Paredes  INFECTIOUS DISEASE  [address], [address]  [place] [address]  Phone: [phone]  Fax: [phone]  Follow Up Time:

## 2020-06-15 NOTE — DISCHARGE NOTE PROVIDER - NSDCACTIVITY_GEN_ALL_CORE
Stairs allowed/Do not drive or operate machinery/Showering allowed/No heavy lifting/straining/Walking - Indoors allowed/Walking - Outdoors allowed

## 2020-06-15 NOTE — ASU PATIENT PROFILE, ADULT - PSH
S/P arthroscopy of shoulder  left shoulder  S/P cholecystectomy    S/P knee replacement  2004 left knee and revision  S/P knee replacement  Left ( 2007 )  S/P total knee arthroplasty, right    Status post total shoulder arthroplasty, right  and left

## 2020-06-16 LAB
ANION GAP SERPL CALC-SCNC: 10 MMOL/L — SIGNIFICANT CHANGE UP (ref 5–17)
BUN SERPL-MCNC: 34 MG/DL — HIGH (ref 7–23)
CALCIUM SERPL-MCNC: 8.3 MG/DL — LOW (ref 8.5–10.1)
CHLORIDE SERPL-SCNC: 103 MMOL/L — SIGNIFICANT CHANGE UP (ref 96–108)
CO2 SERPL-SCNC: 23 MMOL/L — SIGNIFICANT CHANGE UP (ref 22–31)
CREAT SERPL-MCNC: 1.89 MG/DL — HIGH (ref 0.5–1.3)
CRP SERPL-MCNC: 0.96 MG/DL — HIGH (ref 0–0.4)
GLUCOSE SERPL-MCNC: 140 MG/DL — HIGH (ref 70–99)
HCT VFR BLD CALC: 38.2 % — SIGNIFICANT CHANGE UP (ref 34.5–45)
HGB BLD-MCNC: 12.1 G/DL — SIGNIFICANT CHANGE UP (ref 11.5–15.5)
MCHC RBC-ENTMCNC: 30.1 PG — SIGNIFICANT CHANGE UP (ref 27–34)
MCHC RBC-ENTMCNC: 31.7 GM/DL — LOW (ref 32–36)
MCV RBC AUTO: 95 FL — SIGNIFICANT CHANGE UP (ref 80–100)
NRBC # BLD: 0 /100 WBCS — SIGNIFICANT CHANGE UP (ref 0–0)
PLATELET # BLD AUTO: 393 K/UL — SIGNIFICANT CHANGE UP (ref 150–400)
POTASSIUM SERPL-MCNC: 4.3 MMOL/L — SIGNIFICANT CHANGE UP (ref 3.5–5.3)
POTASSIUM SERPL-SCNC: 4.3 MMOL/L — SIGNIFICANT CHANGE UP (ref 3.5–5.3)
RBC # BLD: 4.02 M/UL — SIGNIFICANT CHANGE UP (ref 3.8–5.2)
RBC # FLD: 14.6 % — HIGH (ref 10.3–14.5)
SODIUM SERPL-SCNC: 136 MMOL/L — SIGNIFICANT CHANGE UP (ref 135–145)
WBC # BLD: 20.74 K/UL — HIGH (ref 3.8–10.5)
WBC # FLD AUTO: 20.74 K/UL — HIGH (ref 3.8–10.5)

## 2020-06-16 RX ORDER — VANCOMYCIN HCL 1 G
1750 VIAL (EA) INTRAVENOUS EVERY 12 HOURS
Refills: 0 | Status: DISCONTINUED | OUTPATIENT
Start: 2020-06-16 | End: 2020-06-16

## 2020-06-16 RX ORDER — VANCOMYCIN HCL 1 G
1500 VIAL (EA) INTRAVENOUS EVERY 24 HOURS
Refills: 0 | Status: DISCONTINUED | OUTPATIENT
Start: 2020-06-17 | End: 2020-06-24

## 2020-06-16 RX ORDER — HYDROMORPHONE HYDROCHLORIDE 2 MG/ML
2 INJECTION INTRAMUSCULAR; INTRAVENOUS; SUBCUTANEOUS
Refills: 0 | Status: DISCONTINUED | OUTPATIENT
Start: 2020-06-16 | End: 2020-06-18

## 2020-06-16 RX ORDER — LACTOBACILLUS ACIDOPHILUS 100MM CELL
1 CAPSULE ORAL
Refills: 0 | Status: DISCONTINUED | OUTPATIENT
Start: 2020-06-16 | End: 2020-06-24

## 2020-06-16 RX ORDER — SODIUM CHLORIDE 9 MG/ML
500 INJECTION INTRAMUSCULAR; INTRAVENOUS; SUBCUTANEOUS ONCE
Refills: 0 | Status: COMPLETED | OUTPATIENT
Start: 2020-06-16 | End: 2020-06-16

## 2020-06-16 RX ORDER — VANCOMYCIN HCL 1 G
VIAL (EA) INTRAVENOUS
Refills: 0 | Status: DISCONTINUED | OUTPATIENT
Start: 2020-06-16 | End: 2020-06-24

## 2020-06-16 RX ORDER — PIPERACILLIN AND TAZOBACTAM 4; .5 G/20ML; G/20ML
3.38 INJECTION, POWDER, LYOPHILIZED, FOR SOLUTION INTRAVENOUS EVERY 8 HOURS
Refills: 0 | Status: DISCONTINUED | OUTPATIENT
Start: 2020-06-16 | End: 2020-06-23

## 2020-06-16 RX ORDER — HYDROMORPHONE HYDROCHLORIDE 2 MG/ML
4 INJECTION INTRAMUSCULAR; INTRAVENOUS; SUBCUTANEOUS
Refills: 0 | Status: DISCONTINUED | OUTPATIENT
Start: 2020-06-16 | End: 2020-06-23

## 2020-06-16 RX ORDER — VANCOMYCIN HCL 1 G
1500 VIAL (EA) INTRAVENOUS ONCE
Refills: 0 | Status: COMPLETED | OUTPATIENT
Start: 2020-06-16 | End: 2020-06-16

## 2020-06-16 RX ORDER — PIPERACILLIN AND TAZOBACTAM 4; .5 G/20ML; G/20ML
3.38 INJECTION, POWDER, LYOPHILIZED, FOR SOLUTION INTRAVENOUS ONCE
Refills: 0 | Status: DISCONTINUED | OUTPATIENT
Start: 2020-06-16 | End: 2020-06-16

## 2020-06-16 RX ORDER — VANCOMYCIN HCL 1 G
VIAL (EA) INTRAVENOUS
Refills: 0 | Status: DISCONTINUED | OUTPATIENT
Start: 2020-06-16 | End: 2020-06-16

## 2020-06-16 RX ADMIN — PIPERACILLIN AND TAZOBACTAM 25 GRAM(S): 4; .5 INJECTION, POWDER, LYOPHILIZED, FOR SOLUTION INTRAVENOUS at 19:25

## 2020-06-16 RX ADMIN — Medication 300 MILLIGRAM(S): at 14:56

## 2020-06-16 RX ADMIN — HYDROMORPHONE HYDROCHLORIDE 4 MILLIGRAM(S): 2 INJECTION INTRAMUSCULAR; INTRAVENOUS; SUBCUTANEOUS at 21:06

## 2020-06-16 RX ADMIN — Medication 3 MILLIGRAM(S): at 21:08

## 2020-06-16 RX ADMIN — HYDROMORPHONE HYDROCHLORIDE 0.5 MILLIGRAM(S): 2 INJECTION INTRAMUSCULAR; INTRAVENOUS; SUBCUTANEOUS at 03:14

## 2020-06-16 RX ADMIN — Medication 325 MILLIGRAM(S): at 11:48

## 2020-06-16 RX ADMIN — HYDROMORPHONE HYDROCHLORIDE 0.5 MILLIGRAM(S): 2 INJECTION INTRAMUSCULAR; INTRAVENOUS; SUBCUTANEOUS at 06:12

## 2020-06-16 RX ADMIN — SODIUM CHLORIDE 500 MILLILITER(S): 9 INJECTION INTRAMUSCULAR; INTRAVENOUS; SUBCUTANEOUS at 15:13

## 2020-06-16 RX ADMIN — HYDROMORPHONE HYDROCHLORIDE 0.5 MILLIGRAM(S): 2 INJECTION INTRAMUSCULAR; INTRAVENOUS; SUBCUTANEOUS at 11:44

## 2020-06-16 RX ADMIN — Medication 1 TABLET(S): at 18:47

## 2020-06-16 RX ADMIN — OXYCODONE HYDROCHLORIDE 10 MILLIGRAM(S): 5 TABLET ORAL at 00:59

## 2020-06-16 NOTE — PHYSICAL THERAPY INITIAL EVALUATION ADULT - ADDITIONAL COMMENTS
pt uses w/c, rw, sac, has 3 steps to get into the house and 12 steps to her bedroom. used sac in the room, rw and w/c for community amb.

## 2020-06-16 NOTE — CONSULT NOTE ADULT - ASSESSMENT
74 year old female patient with medical history of eczema htn, gerd, hld c/o left shoulder pain and decreased after left shoulder arthroplasty ,left shoulder arthroscopy performed 3/2020.  Admitted for Left shoulder removal of prosthesis, incision and drainage with placement of antibiotic spacer.  Consulted to ID for further antibiotics recommendations.    s/p left shoulder explant/abx spacer (6/15)  Left shoulder fluid cx: negative so far    No fever  Significant leukocytosis noted.  ESR: 28  CRP: 0.96    COVID-19 PCR: negative  MRSA PCR: negative    Dx:   s/p left shoulder explant/abx spacer     Rec:  -Continue broad spectrum Abx  -Left shoulder fluid cx in progress  -BC x2    Thank you for the courtesy of this consultation.  Patient will be follow up closely with you. 74 year old female patient with medical history of eczema htn, gerd, hld c/o left shoulder pain and decreased after left shoulder arthroplasty ,left shoulder arthroscopy performed 3/2020.  History of right knee joint infection in the past (as per patient)    Admitted for Left shoulder removal of prosthesis, incision and drainage with placement of antibiotic spacer.  Consulted to ID for further antibiotics recommendations.    s/p left shoulder explant/abx spacer (6/15)  Left shoulder fluid cx: negative so far    No fever  Significant leukocytosis noted.  ESR: 28  CRP: 0.96    COVID-19 PCR: negative  MRSA PCR: negative    Dx:   s/p left shoulder explant/abx spacer     Rec:  -Continue broad spectrum Abx  -Vanco level in AM  -Left shoulder fluid cx in progress  -BC x2    Thank you for the courtesy of this consultation.  Patient will be follow up closely with you.

## 2020-06-16 NOTE — OCCUPATIONAL THERAPY INITIAL EVALUATION ADULT - ADDITIONAL COMMENTS
Social history taken from previous surgeries & confirmed at bedside: Patient reports that patient lives with daughter in private house with steps to enter. Pt has 2 3:1 commodes and straight cane at home. Pt a that patient's daughter will assist patient PRN upon d/c home.

## 2020-06-16 NOTE — CONSULT NOTE ADULT - SUBJECTIVE AND OBJECTIVE BOX
HPI:  74 year old female patient with medical history of eczema htn, gerd, hld c/o left shoulder pain and decreased after left shoulder arthroplasty ,left shoulder arthroscopy performed 3/2020.  Admitted for Left shoulder removal of prosthesis, incision and drainage with placement of antibiotic spacer.  Consulted to ID for further antibiotics recommendations    PAST MEDICAL & SURGICAL HISTORY:  Eczema  Obese  GERD (gastroesophageal reflux disease)  Hyperlipemia  Essential hypertension  S/P arthroscopy of shoulder: left shoulder  S/P knee replacement: 2004 left knee and revision  S/P cholecystectomy  S/P knee replacement: Left ( 2007 )  S/P total knee arthroplasty, right  Status post total shoulder arthroplasty, right: and left      SOCHX:   tobacco,  -  alcohol    FMHX: FA/MO  - contributory       Recent Travel:    Immunizations:    Allergies    Bee Stings (Anaphylaxis)  ceftriaxone (Rash)  Cipro (Unknown)  Flagyl (Diarrhea; Rash)  hibiclens - skin swelling (Other)    Intolerances        MEDICATIONS  (STANDING):  aspirin enteric coated 325 milliGRAM(s) Oral daily  melatonin 3 milliGRAM(s) Oral at bedtime  piperacillin/tazobactam IVPB. 3.375 Gram(s) IV Intermittent once  piperacillin/tazobactam IVPB.. 3.375 Gram(s) IV Intermittent every 8 hours  polyethylene glycol 3350 17 Gram(s) Oral daily  vancomycin  IVPB        MEDICATIONS  (PRN):  acetaminophen   Tablet .. 650 milliGRAM(s) Oral every 6 hours PRN Mild Pain (1 - 3), Moderate Pain (4 - 6), Severe Pain (7 - 10)  aluminum hydroxide/magnesium hydroxide/simethicone Suspension 30 milliLiter(s) Oral four times a day PRN Indigestion  HYDROmorphone   Tablet 2 milliGRAM(s) Oral every 3 hours PRN pain (0-5)  HYDROmorphone   Tablet 4 milliGRAM(s) Oral every 3 hours PRN pain (6-10)  HYDROmorphone  Injectable 0.5 milliGRAM(s) IV Push every 3 hours PRN Breakthrough pain  magnesium hydroxide Suspension 30 milliLiter(s) Oral daily PRN Constipation  ondansetron Injectable 4 milliGRAM(s) IV Push every 6 hours PRN Nausea and/or Vomiting  senna 2 Tablet(s) Oral at bedtime PRN Constipation      REVIEW OF SYSTEMS:  CONSTITUTIONAL: No fever, weight loss, or fatigue  EYES: No eye pain, visual disturbances, or discharge  ENMT:  No difficulty hearing, tinnitus, vertigo; No sinus or throat pain  NECK: No pain or stiffness  BREASTS: No pain, masses, or nipple discharge  RESPIRATORY: No cough, wheezing, chills or hemoptysis; No shortness of breath  CARDIOVASCULAR: No chest pain, palpitations, dizziness, or leg swelling  GASTROINTESTINAL: No abdominal or epigastric pain. No nausea, vomiting, or hematemesis; No diarrhea or constipation. No melena or hematochezia.  GENITOURINARY: No dysuria, frequency, hematuria, or incontinence  NEUROLOGICAL: No headaches, memory loss, loss of strength, numbness, or tremors  SKIN: No itching, burning, rashes, or lesions   LYMPH NODES: No enlarged glands  ENDOCRINE: No heat or cold intolerance; No hair loss  MUSCULOSKELETAL: No joint pain or swelling; No muscle, back, or extremity pain  PSYCHIATRIC: No depression, anxiety, mood swings, or difficulty sleeping  HEME/LYMPH: No easy bruising, or bleeding gums  ALLERGY AND IMMUNOLOGIC: No hives or eczema    VITAL SIGNS:    T(C): 37.1 (06-16-20 @ 09:36), Max: 37.1 (06-16-20 @ 09:36)  T(F): 98.7 (06-16-20 @ 09:36), Max: 98.7 (06-16-20 @ 09:36)  HR: 79 (06-16-20 @ 09:36) (69 - 86)  BP: 111/50 (06-16-20 @ 09:36) (79/52 - 136/55)  RR: 17 (06-16-20 @ 09:36) (15 - 19)  SpO2: 91% (06-16-20 @ 09:36) (91% - 97%)    PHYSICAL EXAM:    GENERAL: NAD, well-groomed, well-developed  HEAD:  Atraumatic, Normocephalic  EYES: EOMI, PERRLA, conjunctiva and sclera clear  ENMT: No tonsillar erythema, exudates, or enlargement; Moist mucous membranes, Good dentition, No lesions  NECK: Supple, No JVD, Normal thyroid  NERVOUS SYSTEM:  Alert & Oriented X3, Good concentration; Motor Strength 5/5 B/L upper and lower extremities; DTRs 2+ intact and symmetric  CHEST/LUNG: Clear bilaterally; No rales, rhonchi, wheezing bilaterally  HEART: Regular rate and rhythm; No murmurs, rubs, or gallops  ABDOMEN: Soft, Nontender, Nondistended; Bowel sounds present  EXTREMITIES:  2+ Peripheral Pulses, No clubbing, cyanosis, or edema  LYMPH: No lymphadenopathy noted  SKIN: No rashes or lesions  BACK: no pressor sore     LABS:                         12.1   20.74 )-----------( 393      ( 16 Jun 2020 07:48 )             38.2     06-16    136  |  103  |  34<H>  ----------------------------<  140<H>  4.3   |  23  |  1.89<H>    Ca    8.3<L>      16 Jun 2020 07:48      Sedimentation Rate, Erythrocyte: 28 mm/hr (06-15 @ 18:08)        Radiology: HPI:  74 year old female patient with medical history of eczema htn, gerd, hld c/o left shoulder pain and decreased after left shoulder arthroplasty ,left shoulder arthroscopy performed 3/2020.  Admitted for Left shoulder removal of prosthesis, incision and drainage with placement of antibiotic spacer.  Consulted to ID for further antibiotics recommendations    PAST MEDICAL & SURGICAL HISTORY:  Eczema  Obese  GERD (gastroesophageal reflux disease)  Hyperlipemia  Essential hypertension  S/P arthroscopy of shoulder: left shoulder  S/P knee replacement: 2004 left knee and revision  S/P cholecystectomy  S/P knee replacement: Left ( 2007 )  S/P total knee arthroplasty, right  Status post total shoulder arthroplasty, right: and left      SOCHX:   tobacco,  -  alcohol    FMHX: FA/MO  - contributory       Recent Travel:    Immunizations:    Allergies    Bee Stings (Anaphylaxis)  ceftriaxone (Rash)  Cipro (Unknown)  Flagyl (Diarrhea; Rash)  hibiclens - skin swelling (Other)    Intolerances        MEDICATIONS  (STANDING):  aspirin enteric coated 325 milliGRAM(s) Oral daily  melatonin 3 milliGRAM(s) Oral at bedtime  piperacillin/tazobactam IVPB. 3.375 Gram(s) IV Intermittent once  piperacillin/tazobactam IVPB.. 3.375 Gram(s) IV Intermittent every 8 hours  polyethylene glycol 3350 17 Gram(s) Oral daily  vancomycin  IVPB        MEDICATIONS  (PRN):  acetaminophen   Tablet .. 650 milliGRAM(s) Oral every 6 hours PRN Mild Pain (1 - 3), Moderate Pain (4 - 6), Severe Pain (7 - 10)  aluminum hydroxide/magnesium hydroxide/simethicone Suspension 30 milliLiter(s) Oral four times a day PRN Indigestion  HYDROmorphone   Tablet 2 milliGRAM(s) Oral every 3 hours PRN pain (0-5)  HYDROmorphone   Tablet 4 milliGRAM(s) Oral every 3 hours PRN pain (6-10)  HYDROmorphone  Injectable 0.5 milliGRAM(s) IV Push every 3 hours PRN Breakthrough pain  magnesium hydroxide Suspension 30 milliLiter(s) Oral daily PRN Constipation  ondansetron Injectable 4 milliGRAM(s) IV Push every 6 hours PRN Nausea and/or Vomiting  senna 2 Tablet(s) Oral at bedtime PRN Constipation      REVIEW OF SYSTEMS:  CONSTITUTIONAL: No fever, weight loss, or fatigue  EYES: No eye pain, visual disturbances, or discharge  ENMT:  No difficulty hearing, tinnitus, vertigo; No sinus or throat pain  NECK: No pain or stiffness  BREASTS: No pain, masses, or nipple discharge  RESPIRATORY: No cough, wheezing, chills or hemoptysis; No shortness of breath  CARDIOVASCULAR: No chest pain, palpitations, dizziness, or leg swelling  GASTROINTESTINAL: No abdominal or epigastric pain. No nausea, vomiting, or hematemesis; No diarrhea or constipation. No melena or hematochezia.  GENITOURINARY: No dysuria, frequency, hematuria, or incontinence  NEUROLOGICAL: No headaches, memory loss, loss of strength, numbness, or tremors  SKIN: No itching, burning, rashes, or lesions   LYMPH NODES: No enlarged glands  ENDOCRINE: No heat or cold intolerance; No hair loss  MUSCULOSKELETAL: No joint pain or swelling; No muscle, back, or extremity pain  PSYCHIATRIC: No depression, anxiety, mood swings, or difficulty sleeping  HEME/LYMPH: No easy bruising, or bleeding gums  ALLERGY AND IMMUNOLOGIC: No hives or eczema    VITAL SIGNS:    T(C): 37.1 (06-16-20 @ 09:36), Max: 37.1 (06-16-20 @ 09:36)  T(F): 98.7 (06-16-20 @ 09:36), Max: 98.7 (06-16-20 @ 09:36)  HR: 79 (06-16-20 @ 09:36) (69 - 86)  BP: 111/50 (06-16-20 @ 09:36) (79/52 - 136/55)  RR: 17 (06-16-20 @ 09:36) (15 - 19)  SpO2: 91% (06-16-20 @ 09:36) (91% - 97%)    PHYSICAL EXAM:    GENERAL: NAD, well-groomed, well-developed, MO  HEAD:  Atraumatic, Normocephalic  EYES: EOMI, PERRLA, conjunctiva and sclera clear  ENMT: No tonsillar erythema, exudates, or enlargement; Moist mucous membranes, Good dentition, No lesions  NECK: Supple, No JVD, Normal thyroid  NERVOUS SYSTEM:  Alert & Oriented X3, Good concentration; Motor Strength 5/5 B/L upper and lower extremities; DTRs 2+ intact and symmetric  CHEST/LUNG: Clear bilaterally; No rales, rhonchi, wheezing bilaterally  HEART: Regular rate and rhythm; No murmurs, rubs, or gallops  ABDOMEN: Soft, Nontender, Nondistended; Bowel sounds present  EXTREMITIES:  Left shoulder surgical scar, mild erythema noted, HV on place,  B/l knee replacement surgery scar    LABS:                         12.1   20.74 )-----------( 393      ( 16 Jun 2020 07:48 )             38.2     06-16    136  |  103  |  34<H>  ----------------------------<  140<H>  4.3   |  23  |  1.89<H>    Ca    8.3<L>      16 Jun 2020 07:48      Sedimentation Rate, Erythrocyte: 28 mm/hr (06-15 @ 18:08)        Radiology:

## 2020-06-16 NOTE — PROGRESS NOTE ADULT - SUBJECTIVE AND OBJECTIVE BOX
75yFemale s/p left shoulder explant/abx spacer POD#1. Pt seen and examined in NAD. Pain is not controlled. Was taking dilaudid at home or percocet. Pt denies any new complaints. Pt denies CP/SOB/N/V/D/numbness/tingling/bowel or bladder dysfunction.   HV: 47    PE:   Neuro: AAOX3  LUE: HV draining serosanguinous. Sling in place. Prineo dressing C/D/I. +ROM elbow/wrist/fingers. +ok/thumbsup/fingercross signs.  strength: 5/5.  RP2+ NVI.  B/L UE: Skin intact. +ROM shoulder/elbow/wrist/fingers. +ok/thumbsup/fingercross signs.  strength: 5/5.  RP2+ NVI.   B/L LE: Skin intact. +ROM hip/knee/ankle/toes. Ankle Dorsi/plantarflexion: 5/5. Calf: soft, compressible and nontender. DP/PT 2+ NVI.                             12.1   20.74 )-----------( 393      ( 16 Jun 2020 07:48 )             38.2       06-16    136  |  103  |  34<H>  ----------------------------<  140<H>  4.3   |  23  |  1.89<H>    Ca    8.3<L>      16 Jun 2020 07:48          A/P: 75yFemale s/p left shoulder explant/abx spacer POD#1.  Pain control: Checked with [facility] - percocet 5/325 is the only Rx on file from march 2020. Will trial dilaudid 2/4mg PO,   PT: NWB LUE  DVT ppx: SCDs, ASA 325mg daily  Wound care, Isometric exercises, incentive spirometry   Medical consult appreciated  F/U OR cx's, F/U Bcx's.   F/U ID plan: Vanco for now.   Monitor HV output.   Discharge: planning   All the above discussed and understood by pt

## 2020-06-16 NOTE — OCCUPATIONAL THERAPY INITIAL EVALUATION ADULT - GENERAL OBSERVATIONS, REHAB EVAL
Pt was encountered in bathroom, requesting to return to bed; NAD, sling to left UE, hemovac intact, AXOX4. Pt c/o pain s/p left shoulder removal of hardware with antibiotic spacer.

## 2020-06-17 LAB
ANION GAP SERPL CALC-SCNC: 8 MMOL/L — SIGNIFICANT CHANGE UP (ref 5–17)
ANION GAP SERPL CALC-SCNC: 8 MMOL/L — SIGNIFICANT CHANGE UP (ref 5–17)
BUN SERPL-MCNC: 43 MG/DL — HIGH (ref 7–23)
BUN SERPL-MCNC: 43 MG/DL — HIGH (ref 7–23)
CALCIUM SERPL-MCNC: 8.3 MG/DL — LOW (ref 8.5–10.1)
CALCIUM SERPL-MCNC: 8.9 MG/DL — SIGNIFICANT CHANGE UP (ref 8.5–10.1)
CHLORIDE SERPL-SCNC: 102 MMOL/L — SIGNIFICANT CHANGE UP (ref 96–108)
CHLORIDE SERPL-SCNC: 106 MMOL/L — SIGNIFICANT CHANGE UP (ref 96–108)
CO2 SERPL-SCNC: 20 MMOL/L — LOW (ref 22–31)
CO2 SERPL-SCNC: 24 MMOL/L — SIGNIFICANT CHANGE UP (ref 22–31)
CREAT SERPL-MCNC: 1.9 MG/DL — HIGH (ref 0.5–1.3)
CREAT SERPL-MCNC: 2.25 MG/DL — HIGH (ref 0.5–1.3)
GLUCOSE SERPL-MCNC: 141 MG/DL — HIGH (ref 70–99)
GLUCOSE SERPL-MCNC: 147 MG/DL — HIGH (ref 70–99)
GRAM STN FLD: SIGNIFICANT CHANGE UP
HCT VFR BLD CALC: 34.1 % — LOW (ref 34.5–45)
HGB BLD-MCNC: 11 G/DL — LOW (ref 11.5–15.5)
MCHC RBC-ENTMCNC: 30 PG — SIGNIFICANT CHANGE UP (ref 27–34)
MCHC RBC-ENTMCNC: 32.3 GM/DL — SIGNIFICANT CHANGE UP (ref 32–36)
MCV RBC AUTO: 92.9 FL — SIGNIFICANT CHANGE UP (ref 80–100)
NRBC # BLD: 0 /100 WBCS — SIGNIFICANT CHANGE UP (ref 0–0)
PLATELET # BLD AUTO: 306 K/UL — SIGNIFICANT CHANGE UP (ref 150–400)
POTASSIUM SERPL-MCNC: 3.9 MMOL/L — SIGNIFICANT CHANGE UP (ref 3.5–5.3)
POTASSIUM SERPL-MCNC: 4.3 MMOL/L — SIGNIFICANT CHANGE UP (ref 3.5–5.3)
POTASSIUM SERPL-SCNC: 3.9 MMOL/L — SIGNIFICANT CHANGE UP (ref 3.5–5.3)
POTASSIUM SERPL-SCNC: 4.3 MMOL/L — SIGNIFICANT CHANGE UP (ref 3.5–5.3)
RBC # BLD: 3.67 M/UL — LOW (ref 3.8–5.2)
RBC # FLD: 15 % — HIGH (ref 10.3–14.5)
SODIUM SERPL-SCNC: 134 MMOL/L — LOW (ref 135–145)
SODIUM SERPL-SCNC: 134 MMOL/L — LOW (ref 135–145)
SPECIMEN SOURCE: SIGNIFICANT CHANGE UP
VANCOMYCIN FLD-MCNC: 20.4 UG/ML — SIGNIFICANT CHANGE UP
WBC # BLD: 14.19 K/UL — HIGH (ref 3.8–10.5)
WBC # FLD AUTO: 14.19 K/UL — HIGH (ref 3.8–10.5)

## 2020-06-17 PROCEDURE — 73030 X-RAY EXAM OF SHOULDER: CPT | Mod: 26,LT

## 2020-06-17 RX ORDER — SODIUM CHLORIDE 9 MG/ML
1000 INJECTION INTRAMUSCULAR; INTRAVENOUS; SUBCUTANEOUS ONCE
Refills: 0 | Status: COMPLETED | OUTPATIENT
Start: 2020-06-17 | End: 2020-06-17

## 2020-06-17 RX ORDER — SODIUM CHLORIDE 9 MG/ML
1000 INJECTION INTRAMUSCULAR; INTRAVENOUS; SUBCUTANEOUS
Refills: 0 | Status: DISCONTINUED | OUTPATIENT
Start: 2020-06-17 | End: 2020-06-24

## 2020-06-17 RX ADMIN — PIPERACILLIN AND TAZOBACTAM 25 GRAM(S): 4; .5 INJECTION, POWDER, LYOPHILIZED, FOR SOLUTION INTRAVENOUS at 11:11

## 2020-06-17 RX ADMIN — HYDROMORPHONE HYDROCHLORIDE 4 MILLIGRAM(S): 2 INJECTION INTRAMUSCULAR; INTRAVENOUS; SUBCUTANEOUS at 05:43

## 2020-06-17 RX ADMIN — HYDROMORPHONE HYDROCHLORIDE 2 MILLIGRAM(S): 2 INJECTION INTRAMUSCULAR; INTRAVENOUS; SUBCUTANEOUS at 22:43

## 2020-06-17 RX ADMIN — Medication 3 MILLIGRAM(S): at 22:44

## 2020-06-17 RX ADMIN — Medication 1 TABLET(S): at 05:41

## 2020-06-17 RX ADMIN — PIPERACILLIN AND TAZOBACTAM 25 GRAM(S): 4; .5 INJECTION, POWDER, LYOPHILIZED, FOR SOLUTION INTRAVENOUS at 19:27

## 2020-06-17 RX ADMIN — PIPERACILLIN AND TAZOBACTAM 25 GRAM(S): 4; .5 INJECTION, POWDER, LYOPHILIZED, FOR SOLUTION INTRAVENOUS at 03:48

## 2020-06-17 RX ADMIN — SODIUM CHLORIDE 100 MILLILITER(S): 9 INJECTION INTRAMUSCULAR; INTRAVENOUS; SUBCUTANEOUS at 12:18

## 2020-06-17 RX ADMIN — SODIUM CHLORIDE 1000 MILLILITER(S): 9 INJECTION INTRAMUSCULAR; INTRAVENOUS; SUBCUTANEOUS at 08:01

## 2020-06-17 RX ADMIN — HYDROMORPHONE HYDROCHLORIDE 2 MILLIGRAM(S): 2 INJECTION INTRAMUSCULAR; INTRAVENOUS; SUBCUTANEOUS at 19:44

## 2020-06-17 RX ADMIN — Medication 300 MILLIGRAM(S): at 13:32

## 2020-06-17 RX ADMIN — HYDROMORPHONE HYDROCHLORIDE 2 MILLIGRAM(S): 2 INJECTION INTRAMUSCULAR; INTRAVENOUS; SUBCUTANEOUS at 09:31

## 2020-06-17 RX ADMIN — Medication 1 TABLET(S): at 17:13

## 2020-06-17 RX ADMIN — Medication 325 MILLIGRAM(S): at 11:12

## 2020-06-17 NOTE — PROGRESS NOTE ADULT - ASSESSMENT
74 year old female patient with medical history of eczema htn, gerd, hld c/o left shoulder pain and decreased after left shoulder arthroplasty ,left shoulder arthroscopy performed 3/2020.  History of right knee joint infection in the past (as per patient)    Patient is being seen by ID for Left shoulder removal of prosthesis, incision and drainage with placement of antibiotic spacer.      s/p left shoulder explant/abx spacer (6/15)  Left shoulder fluid cx: negative so far    BC: negative so far    Patient has remain afebrile  Trending down leukocytosis  ESR: 28  CRP: 0.96    COVID-19 PCR: negative  MRSA PCR: negative    Dx:   s/p left shoulder explant/abx spacer     Rec:  -Continue broad spectrum Abx  Vanco level: 20.4  -Vanco level in AM

## 2020-06-17 NOTE — DIETITIAN INITIAL EVALUATION ADULT. - OTHER INFO
Pt reported good appetite, po intake 100%. Pt lives at home with daughter, who does food shopping/cooking. Pt likes healthy, low sodium foods, limits processed foods. Pt reported she has mobility issues (pt morbidly obese & multiple knee replacements), admits sedentary behavior. Provided pt with Wt Loss Tips; pt receptive to diet education. Pt denied difficulty chewing/swallowing, denied n/v/d/c.

## 2020-06-17 NOTE — CHART NOTE - NSCHARTNOTEFT_GEN_A_CORE
Upon Nutritional Assessment by the Registered Dietitian your patient was determined to meet criteria / has evidence of the following diagnosis/diagnoses:          [ ]  Mild Protein Calorie Malnutrition        [ ]  Moderate Protein Calorie Malnutrition        [ ] Severe Protein Calorie Malnutrition        [ ] Unspecified Protein Calorie Malnutrition        [ ] Underweight / BMI <19        [x] Morbid Obesity / BMI > 40      Findings as based on:  •  Comprehensive nutrition assessment and consultation  •  Calorie counts (nutrient intake analysis)  •  Food acceptance and intake status from observations by staff  •  Follow up  •  Patient education  •  Intervention secondary to interdisciplinary rounds  •   concerns      Treatment:    The following diet has been recommended:  DASH/TLC diet    PROVIDER Section:     By signing this assessment you are acknowledging and agree with the diagnosis/diagnoses assigned by the Registered Dietitian    Comments:

## 2020-06-17 NOTE — CONSULT NOTE ADULT - SUBJECTIVE AND OBJECTIVE BOX
EVERETT WAY is a 75y Female s/p LEFT SHOULDER REMOVAL OF PROSTHESIS, INCISION AND DRAINAGE W   explant with insertion of Antibiotic spacer by Dr. Eaton on 6/16/20. complains of postop pain; patient tolerated surgery well.   patient developed and rise in creatinine     PMH: w/ h/o Eczema  Obese  GERD (gastroesophageal reflux disease)  Hyperlipemia  Essential hypertension    ROS: no fevers, chills, headache, dizziness, lightheadedness, chest pain, palpitations, shortness of breath, cough, phlegm, wheezing, abdominal pain, nausea, vomiting, diarrhea, constipation or urinary symptoms     PSH: S/P arthroscopy of shoulder  S/P knee replacement  S/P cholecystectomy  S/P knee replacement  S/P total knee arthroplasty, right  Status post total shoulder arthroplasty, right  Hiatal hernia    Family history of stroke (Mother, Father)  Family history of diabetes mellitus (Mother)    SH: does not smoke or drink at this time    ALLERGIES: Bee Stings (Anaphylaxis)  ceftriaxone (Rash)  Cipro (Unknown)  Flagyl (Diarrhea; Rash)  hibiclens - skin swelling (Other)    MEDS: acetaminophen   Tablet .. 650 milliGRAM(s) Oral every 6 hours PRN  aluminum hydroxide/magnesium hydroxide/simethicone Suspension 30 milliLiter(s) Oral four times a day PRN  aspirin enteric coated 325 milliGRAM(s) Oral daily  bisacodyl Suppository 10 milliGRAM(s) Rectal daily PRN  HYDROmorphone   Tablet 2 milliGRAM(s) Oral every 3 hours PRN  HYDROmorphone   Tablet 4 milliGRAM(s) Oral every 3 hours PRN  HYDROmorphone  Injectable 0.5 milliGRAM(s) IV Push every 3 hours PRN  lactobacillus acidophilus 1 Tablet(s) Oral two times a day  magnesium hydroxide Suspension 30 milliLiter(s) Oral daily PRN  melatonin 3 milliGRAM(s) Oral at bedtime  ondansetron Injectable 4 milliGRAM(s) IV Push every 6 hours PRN  piperacillin/tazobactam IVPB.. 3.375 Gram(s) IV Intermittent every 8 hours  polyethylene glycol 3350 17 Gram(s) Oral daily  senna 2 Tablet(s) Oral at bedtime PRN  sodium chloride 0.9%. 1000 milliLiter(s) IV Continuous <Continuous>  vancomycin  IVPB      vancomycin  IVPB 1500 milliGRAM(s) IV Intermittent every 24 hours    PHYS: T(C): 37.1 (06-17-20 @ 05:48), Max: 37.1 (06-16-20 @ 09:36)  HR: 83 (06-17-20 @ 05:48) (79 - 85)  BP: 128/50 (06-17-20 @ 05:48) (85/33 - 128/50)  RR: 18 (06-17-20 @ 05:48) (17 - 18)  SpO2: 95% (06-17-20 @ 05:48) (91% - 95%)  HEENT unremarkable  neck no JVD or bruit  lungs, clear bilaterally  heart, regular rhythm, normal S1, S2, no murmurs, rubs or gallops  abdomen, soft, non tender, no organomegaly, normal bowel sounds  no cyanosis, clubbing, edema or calf tenderness  neuro, grossly normal                        11.0   14.19 )-----------( 306      ( 17 Jun 2020 04:10 )             34.1     06-17    134<L>  |  102  |  43<H>  ----------------------------<  147<H>  3.9   |  24  |  2.25<H>    Ca    8.3<L>      17 Jun 2020 04:10    Assessment and Plan: status post ltsr explant with insertion of antibiotic spacer; Hypertension; hyperlipidemia; Gastroesophageal reflux disease; eczema; postop anemia; postop leucocytosis; morbid obesity (BMI=46.9); acute kidney injury likely due to dehydration; pain control; deep vein thrombophlebitis prophylaxis; intravenous Antibiotics per infectious disease; physical therapy; bowel regimen; nutrition support; follow up labs; will follow.

## 2020-06-17 NOTE — PROGRESS NOTE ADULT - SUBJECTIVE AND OBJECTIVE BOX
75yFemale s/p left shoulder DARIO/Abx spacer . Pt seen and examined in NAD. Pain is better controlled. Pt denies any new complaints. Pt denies CP/SOB/N/V/D/numbness/tingling/bowel or bladder dysfunction.   HV 47/47    PE:   Neuro: AAOX3  LUE: HV in place. Scant serosanguinous. Prineo dressing C/D/I. Sling in place. +ROM elbow/wrist/fingers. +ok/thumbsup/fingercross signs.  strength: 5/5.  RP2+ NVI.  B/L UE: Skin intact. +ROM shoulder/elbow/wrist/fingers. +ok/thumbsup/fingercross signs.  strength: 5/5.  RP2+ NVI.   B/L LE: Skin intact. +ROM hip/knee/ankle/toes. Ankle Dorsi/plantarflexion: 5/5. Calf: soft, compressible and nontender. DP/PT 2+ NVI.                             11.0   14.19 )-----------( 306      ( 17 Jun 2020 04:10 )             34.1       06-17    134<L>  |  106  |  43<H>  ----------------------------<  141<H>  4.3   |  20<L>  |  1.90<H>    Ca    8.9      17 Jun 2020 14:10          A/P: 75yFemale s/p left shoulder DARIO/Abx spacer POD#2  Pain controlled  PT: NWB LUE  DVT ppx: SCDs  Acute kidney injury - likely pre-renal azotemia improving with IVF NS.    Wound care, Isometric exercises, incentive spirometry   Medical consult appreciated  Vanco/zosyn pending final ID cultures.   Pt seen with DR Eaton  Discharge: planning pending final ID plan  All the above discussed and understood by pt

## 2020-06-17 NOTE — DIETITIAN INITIAL EVALUATION ADULT. - PERTINENT MEDS FT
MEDICATIONS  (STANDING):  aspirin enteric coated 325 milliGRAM(s) Oral daily  lactobacillus acidophilus 1 Tablet(s) Oral two times a day  melatonin 3 milliGRAM(s) Oral at bedtime  piperacillin/tazobactam IVPB.. 3.375 Gram(s) IV Intermittent every 8 hours  polyethylene glycol 3350 17 Gram(s) Oral daily  sodium chloride 0.9%. 1000 milliLiter(s) (100 mL/Hr) IV Continuous <Continuous>  vancomycin  IVPB      vancomycin  IVPB 1500 milliGRAM(s) IV Intermittent every 24 hours    MEDICATIONS  (PRN):  acetaminophen   Tablet .. 650 milliGRAM(s) Oral every 6 hours PRN Mild Pain (1 - 3), Moderate Pain (4 - 6), Severe Pain (7 - 10)  aluminum hydroxide/magnesium hydroxide/simethicone Suspension 30 milliLiter(s) Oral four times a day PRN Indigestion  bisacodyl Suppository 10 milliGRAM(s) Rectal daily PRN If no bowel movement by postoperative day #2  HYDROmorphone   Tablet 2 milliGRAM(s) Oral every 3 hours PRN pain (0-5)  HYDROmorphone   Tablet 4 milliGRAM(s) Oral every 3 hours PRN pain (6-10)  HYDROmorphone  Injectable 0.5 milliGRAM(s) IV Push every 3 hours PRN Breakthrough pain  magnesium hydroxide Suspension 30 milliLiter(s) Oral daily PRN Constipation  ondansetron Injectable 4 milliGRAM(s) IV Push every 6 hours PRN Nausea and/or Vomiting  senna 2 Tablet(s) Oral at bedtime PRN Constipation

## 2020-06-17 NOTE — PROGRESS NOTE ADULT - SUBJECTIVE AND OBJECTIVE BOX
HPI:  74 year old female patient with medical history of eczema htn, gerd, hld c/o left shoulder pain and decreased after left shoulder arthroplasty ,left shoulder arthroscopy performed 3/2020.  History of right knee joint infection in the past (as per patient)  Admitted for Left shoulder removal of prosthesis, incision and drainage with placement of antibiotic spacer.  Consulted to ID for further antibiotics recommendations.      Allergies    Bee Stings (Anaphylaxis)  ceftriaxone (Rash)  Cipro (Unknown)  Flagyl (Diarrhea; Rash)  hibiclens - skin swelling (Other)    Intolerances        MEDICATIONS  (STANDING):  aspirin enteric coated 325 milliGRAM(s) Oral daily  lactobacillus acidophilus 1 Tablet(s) Oral two times a day  melatonin 3 milliGRAM(s) Oral at bedtime  piperacillin/tazobactam IVPB.. 3.375 Gram(s) IV Intermittent every 8 hours  polyethylene glycol 3350 17 Gram(s) Oral daily  sodium chloride 0.9%. 1000 milliLiter(s) (100 mL/Hr) IV Continuous <Continuous>  vancomycin  IVPB      vancomycin  IVPB 1500 milliGRAM(s) IV Intermittent every 24 hours    MEDICATIONS  (PRN):  acetaminophen   Tablet .. 650 milliGRAM(s) Oral every 6 hours PRN Mild Pain (1 - 3), Moderate Pain (4 - 6), Severe Pain (7 - 10)  aluminum hydroxide/magnesium hydroxide/simethicone Suspension 30 milliLiter(s) Oral four times a day PRN Indigestion  bisacodyl Suppository 10 milliGRAM(s) Rectal daily PRN If no bowel movement by postoperative day #2  HYDROmorphone   Tablet 2 milliGRAM(s) Oral every 3 hours PRN pain (0-5)  HYDROmorphone   Tablet 4 milliGRAM(s) Oral every 3 hours PRN pain (6-10)  HYDROmorphone  Injectable 0.5 milliGRAM(s) IV Push every 3 hours PRN Breakthrough pain  magnesium hydroxide Suspension 30 milliLiter(s) Oral daily PRN Constipation  ondansetron Injectable 4 milliGRAM(s) IV Push every 6 hours PRN Nausea and/or Vomiting  senna 2 Tablet(s) Oral at bedtime PRN Constipation      REVIEW OF SYSTEMS:    CONSTITUTIONAL: No fever, chills, weight loss, or fatigue  HEENT: No sore throat, runny nose, ear ache  RESPIRATORY: No cough, wheezing, No shortness of breath  CARDIOVASCULAR: No chest pain, palpitations, dizziness  GASTROINTESTINAL: No abdominal pain. No nausea, vomiting, diarrhea  GENITOURINARY: No dysuria, increase frequency, hematuria, or incontinence  NEUROLOGICAL: No headaches, memory loss, loss of strength, numbness, or tremors, no weakness  EXTREMITY: No pedal edema BLE  SKIN: No itching, burning, rashes, or lesions     VITAL SIGNS:  T(C): 36.7 (06-17-20 @ 11:42), Max: 37.1 (06-17-20 @ 05:48)  T(F): 98.1 (06-17-20 @ 11:42), Max: 98.8 (06-17-20 @ 05:48)  HR: 80 (06-17-20 @ 11:42) (80 - 85)  BP: 122/42 (06-17-20 @ 11:42) (85/33 - 128/50)  RR: 18 (06-17-20 @ 11:42) (18 - 18)  SpO2: 96% (06-17-20 @ 12:27) (91% - 96%)  Wt(kg): --    PHYSICAL EXAM:    GENERAL: Alert, oriented x3, NAD, MO  HEENT: VINICIO, EOMI, MOM  CHEST/LUNG: Clear to ausculation B/L  HEART: RRR, s1/s2 present  ABDOMEN: BS present, soft, depressible, no tender  EXTREMITIES: Left shoulder surgical scar, mild erythema noted, HV on place,  B/l knee replacement surgery scar    LABS:                         11.0   14.19 )-----------( 306      ( 17 Jun 2020 04:10 )             34.1     06-17    134<L>  |  102  |  43<H>  ----------------------------<  147<H>  3.9   |  24  |  2.25<H>    Ca    8.3<L>      17 Jun 2020 04:10      Sedimentation Rate, Erythrocyte: 28 mm/hr (06-15 @ 18:08)    Culture Results:   No growth (06-16 @ 06:09)  Culture Results:   No growth (06-16 @ 06:09)  Culture Results:   No growth (06-16 @ 06:09)  Culture Results:   No growth to date. (06-16 @ 00:42)        Radiology:    Shoulder X ray:  INTERPRETATION:  Evaluate postoperative left shoulder.    AP  portable left shoulder. Prior 3/12/2019.    Impression: Prior left shoulder arthroplasty has been removed. There is a new ball-shaped replacement of the femoral head. Alignment appears satisfactory. No fractures. Surgical drain and postoperative soft tissue changes noted.

## 2020-06-18 LAB
ANION GAP SERPL CALC-SCNC: 6 MMOL/L — SIGNIFICANT CHANGE UP (ref 5–17)
BUN SERPL-MCNC: 33 MG/DL — HIGH (ref 7–23)
CALCIUM SERPL-MCNC: 8.4 MG/DL — LOW (ref 8.5–10.1)
CHLORIDE SERPL-SCNC: 109 MMOL/L — HIGH (ref 96–108)
CO2 SERPL-SCNC: 25 MMOL/L — SIGNIFICANT CHANGE UP (ref 22–31)
CREAT SERPL-MCNC: 1.28 MG/DL — SIGNIFICANT CHANGE UP (ref 0.5–1.3)
GLUCOSE SERPL-MCNC: 122 MG/DL — HIGH (ref 70–99)
HCT VFR BLD CALC: 31.4 % — LOW (ref 34.5–45)
HGB BLD-MCNC: 10.4 G/DL — LOW (ref 11.5–15.5)
MCHC RBC-ENTMCNC: 30.5 PG — SIGNIFICANT CHANGE UP (ref 27–34)
MCHC RBC-ENTMCNC: 33.1 GM/DL — SIGNIFICANT CHANGE UP (ref 32–36)
MCV RBC AUTO: 92.1 FL — SIGNIFICANT CHANGE UP (ref 80–100)
NRBC # BLD: 0 /100 WBCS — SIGNIFICANT CHANGE UP (ref 0–0)
PLATELET # BLD AUTO: 275 K/UL — SIGNIFICANT CHANGE UP (ref 150–400)
POTASSIUM SERPL-MCNC: 3.9 MMOL/L — SIGNIFICANT CHANGE UP (ref 3.5–5.3)
POTASSIUM SERPL-SCNC: 3.9 MMOL/L — SIGNIFICANT CHANGE UP (ref 3.5–5.3)
RBC # BLD: 3.41 M/UL — LOW (ref 3.8–5.2)
RBC # FLD: 14.7 % — HIGH (ref 10.3–14.5)
SODIUM SERPL-SCNC: 140 MMOL/L — SIGNIFICANT CHANGE UP (ref 135–145)
VANCOMYCIN FLD-MCNC: 20.3 UG/ML — SIGNIFICANT CHANGE UP
WBC # BLD: 10.59 K/UL — HIGH (ref 3.8–10.5)
WBC # FLD AUTO: 10.59 K/UL — HIGH (ref 3.8–10.5)

## 2020-06-18 PROCEDURE — 36569 INSJ PICC 5 YR+ W/O IMAGING: CPT

## 2020-06-18 RX ORDER — CHLORHEXIDINE GLUCONATE 213 G/1000ML
1 SOLUTION TOPICAL
Refills: 0 | Status: DISCONTINUED | OUTPATIENT
Start: 2020-06-18 | End: 2020-06-24

## 2020-06-18 RX ADMIN — PIPERACILLIN AND TAZOBACTAM 25 GRAM(S): 4; .5 INJECTION, POWDER, LYOPHILIZED, FOR SOLUTION INTRAVENOUS at 20:15

## 2020-06-18 RX ADMIN — SODIUM CHLORIDE 100 MILLILITER(S): 9 INJECTION INTRAMUSCULAR; INTRAVENOUS; SUBCUTANEOUS at 11:03

## 2020-06-18 RX ADMIN — Medication 3 MILLIGRAM(S): at 21:52

## 2020-06-18 RX ADMIN — PIPERACILLIN AND TAZOBACTAM 25 GRAM(S): 4; .5 INJECTION, POWDER, LYOPHILIZED, FOR SOLUTION INTRAVENOUS at 11:03

## 2020-06-18 RX ADMIN — Medication 1 TABLET(S): at 05:53

## 2020-06-18 RX ADMIN — PIPERACILLIN AND TAZOBACTAM 25 GRAM(S): 4; .5 INJECTION, POWDER, LYOPHILIZED, FOR SOLUTION INTRAVENOUS at 03:15

## 2020-06-18 RX ADMIN — Medication 325 MILLIGRAM(S): at 11:16

## 2020-06-18 RX ADMIN — HYDROMORPHONE HYDROCHLORIDE 4 MILLIGRAM(S): 2 INJECTION INTRAMUSCULAR; INTRAVENOUS; SUBCUTANEOUS at 13:32

## 2020-06-18 RX ADMIN — HYDROMORPHONE HYDROCHLORIDE 4 MILLIGRAM(S): 2 INJECTION INTRAMUSCULAR; INTRAVENOUS; SUBCUTANEOUS at 21:52

## 2020-06-18 RX ADMIN — Medication 1 TABLET(S): at 17:15

## 2020-06-18 RX ADMIN — HYDROMORPHONE HYDROCHLORIDE 2 MILLIGRAM(S): 2 INJECTION INTRAMUSCULAR; INTRAVENOUS; SUBCUTANEOUS at 11:03

## 2020-06-18 RX ADMIN — Medication 300 MILLIGRAM(S): at 20:10

## 2020-06-18 NOTE — PROGRESS NOTE ADULT - SUBJECTIVE AND OBJECTIVE BOX
75yFemale s/p DARIO/Abx spacer L shoulder POD#3. Pt seen and examined in NAD. Pain controlled. Pt denies any new complaints. Pt denies CP/SOB/N/V/D/numbness/tingling/bowel or bladder dysfunction. +BM yesterday.   HV 30/70    PE:   Neuro: AAOX3  LUE: HV with serosanguinous. Prineo C/D/I. +ROM elbow/wrist/fingers. +ok/thumbsup/fingercross signs.  strength: 5/5.  RP2+ NVI.  LUE: Skin intact. +ROM shoulder/elbow/wrist/fingers. +ok/thumbsup/fingercross signs.  strength: 5/5.  RP2+ NVI.   B/L LE: Skin intact. +ROM hip/knee/ankle/toes. Ankle Dorsi/plantarflexion: 5/5. Calf: soft, compressible and nontender. DP/PT 2+ NVI.                             10.4   10.59 )-----------( 275      ( 18 Jun 2020 07:20 )             31.4       06-18    140  |  109<H>  |  33<H>  ----------------------------<  122<H>  3.9   |  25  |  1.28    Ca    8.4<L>      18 Jun 2020 07:20          A/P: 75yFemale s/p DARIO/Abx spacer L shoulder POD#3  HV DC'd. Dry clean dressing applied.   Pain controlled with dilaudid 4mg.   PT: NWB LUE no ROM to shoulder.   DVT ppx: SCDs asa 325mg Daily.   Acute kidney injury resolved.  Abx for now: Vanco/zosyn  Wound care, Isometric exercises, incentive spirometry   Medical follow up appreciated  ID follow up appreciated - PICC line placed today for difficult IV access and pt requiring prolonged abx treatment  Discharge: planning home pending final cx/final ID plan  All the above discussed and understood by pt

## 2020-06-18 NOTE — PROGRESS NOTE ADULT - SUBJECTIVE AND OBJECTIVE BOX
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 Eaton on 6/15/20. post op pain is getting better.    ROS: no pulmonary, cardiovascular or gastrointestinal symptoms     PHYS: T(C): 36.8 (06-18-20 @ 05:56), Max: 36.8 (06-17-20 @ 16:56)  HR: 65 (06-18-20 @ 05:56) (65 - 82)  BP: 130/54 (06-18-20 @ 05:56) (104/50 - 130/54)  RR: 18 (06-18-20 @ 05:56) (18 - 18)  SpO2: 98% (06-18-20 @ 05:56) (91% - 98%)  afebrile; lungs, clear; heart, regular rhythm; abdomen, soft; no calf tenderness                         10.4   10.59 )-----------( 275      ( 18 Jun 2020 07:20 )             31.4   06-17    134<L>  |  106  |  43<H>  ----------------------------<  141<H>  4.3   |  20<L>  |  1.90<H>    Ca    8.9      17 Jun 2020 14:10      Assessment and Plan: 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

## 2020-06-18 NOTE — PROGRESS NOTE ADULT - SUBJECTIVE AND OBJECTIVE BOX
HPI:  74 year old female patient with medical history of eczema htn, gerd, hld c/o left shoulder pain and decreased after left shoulder arthroplasty ,left shoulder arthroscopy performed 3/2020.  History of right knee joint infection in the past (as per patient)      Allergies    Bee Stings (Anaphylaxis)  ceftriaxone (Rash)  Cipro (Unknown)  Flagyl (Diarrhea; Rash)  hibiclens - skin swelling (Other)    Intolerances        MEDICATIONS  (STANDING):  aspirin enteric coated 325 milliGRAM(s) Oral daily  chlorhexidine 4% Liquid 1 Application(s) Topical <User Schedule>  lactobacillus acidophilus 1 Tablet(s) Oral two times a day  melatonin 3 milliGRAM(s) Oral at bedtime  piperacillin/tazobactam IVPB.. 3.375 Gram(s) IV Intermittent every 8 hours  polyethylene glycol 3350 17 Gram(s) Oral daily  sodium chloride 0.9%. 1000 milliLiter(s) (100 mL/Hr) IV Continuous <Continuous>  vancomycin  IVPB      vancomycin  IVPB 1500 milliGRAM(s) IV Intermittent every 24 hours    MEDICATIONS  (PRN):  acetaminophen   Tablet .. 650 milliGRAM(s) Oral every 6 hours PRN Mild Pain (1 - 3), Moderate Pain (4 - 6), Severe Pain (7 - 10)  aluminum hydroxide/magnesium hydroxide/simethicone Suspension 30 milliLiter(s) Oral four times a day PRN Indigestion  bisacodyl Suppository 10 milliGRAM(s) Rectal daily PRN If no bowel movement by postoperative day #2  HYDROmorphone   Tablet 2 milliGRAM(s) Oral every 3 hours PRN pain (0-5)  HYDROmorphone   Tablet 4 milliGRAM(s) Oral every 3 hours PRN pain (6-10)  HYDROmorphone  Injectable 0.5 milliGRAM(s) IV Push every 3 hours PRN Breakthrough pain  magnesium hydroxide Suspension 30 milliLiter(s) Oral daily PRN Constipation  ondansetron Injectable 4 milliGRAM(s) IV Push every 6 hours PRN Nausea and/or Vomiting  senna 2 Tablet(s) Oral at bedtime PRN Constipation      REVIEW OF SYSTEMS:    CONSTITUTIONAL: No fever, chills, weight loss, or fatigue  HEENT: No sore throat, runny nose, ear ache  RESPIRATORY: No cough, wheezing, No shortness of breath  CARDIOVASCULAR: No chest pain, palpitations, dizziness  GASTROINTESTINAL: No abdominal pain. No nausea, vomiting, diarrhea  GENITOURINARY: No dysuria, increase frequency, hematuria, or incontinence  NEUROLOGICAL: No headaches, memory loss, loss of strength, numbness, or tremors, no weakness  EXTREMITY: No pedal edema BLE  SKIN: No itching, burning, rashes, or lesions     VITAL SIGNS:  T(C): 36.9 (06-18-20 @ 12:35), Max: 36.9 (06-18-20 @ 12:35)  T(F): 98.5 (06-18-20 @ 12:35), Max: 98.5 (06-18-20 @ 12:35)  HR: 66 (06-18-20 @ 12:35) (65 - 82)  BP: 160/53 (06-18-20 @ 12:35) (104/50 - 160/53)  RR: 18 (06-18-20 @ 12:35) (18 - 18)  SpO2: 96% (06-18-20 @ 12:35) (92% - 98%)  Wt(kg): --    PHYSICAL EXAM:    GENERAL: Alert, oriented x3, NAD, MO  HEENT: VINICIO, EOMI, MOM  CHEST/LUNG: Clear to ausculation B/L  HEART: RRR, s1/s2 present  ABDOMEN: BS present, soft, depressible, no tender  EXTREMITIES: Left shoulder surgical scar- healing well,  B/l knee replacement surgery scar    LABS:                         10.4   10.59 )-----------( 275      ( 18 Jun 2020 07:20 )             31.4     06-18    140  |  109<H>  |  33<H>  ----------------------------<  122<H>  3.9   |  25  |  1.28    Ca    8.4<L>      18 Jun 2020 07:20      Sedimentation Rate, Erythrocyte: 28 mm/hr (06-15 @ 18:08)      Culture Results:   Growth in aerobic bottle: Gram Positive Rods (06-17 @ 08:50)  Culture Results:   No growth to date. (06-17 @ 08:50)  Culture Results:   No growth (06-16 @ 06:09)  Culture Results:   No growth (06-16 @ 06:09)  Culture Results:   No growth (06-16 @ 06:09)  Culture Results:   No growth to date. (06-16 @ 00:42)        Radiology:

## 2020-06-18 NOTE — PROGRESS NOTE ADULT - ASSESSMENT
74 year old female patient with medical history of eczema htn, gerd, hld c/o left shoulder pain and decreased after left shoulder arthroplasty ,left shoulder arthroscopy performed 3/2020.  History of right knee joint infection in the past (as per patient)    Patient is being seen by ID for Left shoulder removal of prosthesis, incision and drainage with placement of antibiotic spacer.    s/p PICC line placement today on the left arm  HV removed today      s/p left shoulder explant/abx spacer (6/15)  Left shoulder fluid cx: negative so far    6/16 BC: negative so far  6/17 BC: growing gram positive rods    Patient has remain afebrile  Trending down leukocytosis  ESR: 28  CRP: 0.96    COVID-19 PCR: negative  MRSA PCR: negative    Dx:   s/p left shoulder explant/abx spacer     Rec:  -Continue broad spectrum Abx  Vanco level: 20.3

## 2020-06-19 LAB
ANION GAP SERPL CALC-SCNC: 6 MMOL/L — SIGNIFICANT CHANGE UP (ref 5–17)
BUN SERPL-MCNC: 21 MG/DL — SIGNIFICANT CHANGE UP (ref 7–23)
CALCIUM SERPL-MCNC: 8 MG/DL — LOW (ref 8.5–10.1)
CHLORIDE SERPL-SCNC: 111 MMOL/L — HIGH (ref 96–108)
CO2 SERPL-SCNC: 24 MMOL/L — SIGNIFICANT CHANGE UP (ref 22–31)
CREAT SERPL-MCNC: 1.06 MG/DL — SIGNIFICANT CHANGE UP (ref 0.5–1.3)
GLUCOSE SERPL-MCNC: 122 MG/DL — HIGH (ref 70–99)
HCT VFR BLD CALC: 31.1 % — LOW (ref 34.5–45)
HGB BLD-MCNC: 10 G/DL — LOW (ref 11.5–15.5)
MCHC RBC-ENTMCNC: 30.5 PG — SIGNIFICANT CHANGE UP (ref 27–34)
MCHC RBC-ENTMCNC: 32.2 GM/DL — SIGNIFICANT CHANGE UP (ref 32–36)
MCV RBC AUTO: 94.8 FL — SIGNIFICANT CHANGE UP (ref 80–100)
METHOD TYPE: SIGNIFICANT CHANGE UP
NRBC # BLD: 0 /100 WBCS — SIGNIFICANT CHANGE UP (ref 0–0)
PLATELET # BLD AUTO: 279 K/UL — SIGNIFICANT CHANGE UP (ref 150–400)
POTASSIUM SERPL-MCNC: 3.9 MMOL/L — SIGNIFICANT CHANGE UP (ref 3.5–5.3)
POTASSIUM SERPL-SCNC: 3.9 MMOL/L — SIGNIFICANT CHANGE UP (ref 3.5–5.3)
RBC # BLD: 3.28 M/UL — LOW (ref 3.8–5.2)
RBC # FLD: 14.6 % — HIGH (ref 10.3–14.5)
SODIUM SERPL-SCNC: 141 MMOL/L — SIGNIFICANT CHANGE UP (ref 135–145)
VANCOMYCIN TROUGH SERPL-MCNC: 11.7 UG/ML — SIGNIFICANT CHANGE UP (ref 10–20)
WBC # BLD: 10.65 K/UL — HIGH (ref 3.8–10.5)
WBC # FLD AUTO: 10.65 K/UL — HIGH (ref 3.8–10.5)

## 2020-06-19 RX ADMIN — PIPERACILLIN AND TAZOBACTAM 25 GRAM(S): 4; .5 INJECTION, POWDER, LYOPHILIZED, FOR SOLUTION INTRAVENOUS at 18:40

## 2020-06-19 RX ADMIN — SODIUM CHLORIDE 100 MILLILITER(S): 9 INJECTION INTRAMUSCULAR; INTRAVENOUS; SUBCUTANEOUS at 16:57

## 2020-06-19 RX ADMIN — PIPERACILLIN AND TAZOBACTAM 25 GRAM(S): 4; .5 INJECTION, POWDER, LYOPHILIZED, FOR SOLUTION INTRAVENOUS at 12:16

## 2020-06-19 RX ADMIN — Medication 1 TABLET(S): at 18:39

## 2020-06-19 RX ADMIN — PIPERACILLIN AND TAZOBACTAM 25 GRAM(S): 4; .5 INJECTION, POWDER, LYOPHILIZED, FOR SOLUTION INTRAVENOUS at 02:32

## 2020-06-19 RX ADMIN — Medication 3 MILLIGRAM(S): at 21:26

## 2020-06-19 RX ADMIN — Medication 300 MILLIGRAM(S): at 22:29

## 2020-06-19 RX ADMIN — CHLORHEXIDINE GLUCONATE 1 APPLICATION(S): 213 SOLUTION TOPICAL at 05:53

## 2020-06-19 RX ADMIN — Medication 325 MILLIGRAM(S): at 12:16

## 2020-06-19 RX ADMIN — Medication 1 TABLET(S): at 05:48

## 2020-06-19 RX ADMIN — HYDROMORPHONE HYDROCHLORIDE 4 MILLIGRAM(S): 2 INJECTION INTRAMUSCULAR; INTRAVENOUS; SUBCUTANEOUS at 21:24

## 2020-06-19 RX ADMIN — SODIUM CHLORIDE 100 MILLILITER(S): 9 INJECTION INTRAMUSCULAR; INTRAVENOUS; SUBCUTANEOUS at 00:32

## 2020-06-19 NOTE — PROGRESS NOTE ADULT - SUBJECTIVE AND OBJECTIVE BOX
HPI:  74 year old female patient with medical history of eczema htn, gerd, hld c/o left shoulder pain and decreased after left shoulder arthroplasty ,left shoulder arthroscopy performed 3/2020.  History of right knee joint infection in the past (as per patient)      Allergies    Bee Stings (Anaphylaxis)  ceftriaxone (Rash)  Cipro (Unknown)  Flagyl (Diarrhea; Rash)  hibiclens - skin swelling (Other)    Intolerances        MEDICATIONS  (STANDING):  aspirin enteric coated 325 milliGRAM(s) Oral daily  chlorhexidine 4% Liquid 1 Application(s) Topical <User Schedule>  lactobacillus acidophilus 1 Tablet(s) Oral two times a day  melatonin 3 milliGRAM(s) Oral at bedtime  piperacillin/tazobactam IVPB.. 3.375 Gram(s) IV Intermittent every 8 hours  polyethylene glycol 3350 17 Gram(s) Oral daily  sodium chloride 0.9%. 1000 milliLiter(s) (100 mL/Hr) IV Continuous <Continuous>  vancomycin  IVPB      vancomycin  IVPB 1500 milliGRAM(s) IV Intermittent every 24 hours    MEDICATIONS  (PRN):  acetaminophen   Tablet .. 650 milliGRAM(s) Oral every 6 hours PRN Mild Pain (1 - 3), Moderate Pain (4 - 6), Severe Pain (7 - 10)  aluminum hydroxide/magnesium hydroxide/simethicone Suspension 30 milliLiter(s) Oral four times a day PRN Indigestion  bisacodyl Suppository 10 milliGRAM(s) Rectal daily PRN If no bowel movement by postoperative day #2  HYDROmorphone   Tablet 2 milliGRAM(s) Oral every 3 hours PRN pain (0-5)  HYDROmorphone   Tablet 4 milliGRAM(s) Oral every 3 hours PRN pain (6-10)  HYDROmorphone  Injectable 0.5 milliGRAM(s) IV Push every 3 hours PRN Breakthrough pain  magnesium hydroxide Suspension 30 milliLiter(s) Oral daily PRN Constipation  ondansetron Injectable 4 milliGRAM(s) IV Push every 6 hours PRN Nausea and/or Vomiting  senna 2 Tablet(s) Oral at bedtime PRN Constipation      REVIEW OF SYSTEMS:    CONSTITUTIONAL: No fever, chills, weight loss, or fatigue  HEENT: No sore throat, runny nose, ear ache  RESPIRATORY: No cough, wheezing, No shortness of breath  CARDIOVASCULAR: No chest pain, palpitations, dizziness  GASTROINTESTINAL: No abdominal pain. No nausea, vomiting, diarrhea  GENITOURINARY: No dysuria, increase frequency, hematuria, or incontinence  NEUROLOGICAL: No headaches, memory loss, loss of strength, numbness, or tremors, no weakness  EXTREMITY: No pedal edema BLE  SKIN: No itching, burning, rashes, or lesions     VITAL SIGNS:  T(C): 36.8 (06-19-20 @ 11:25), Max: 36.8 (06-19-20 @ 11:25)  T(F): 98.3 (06-19-20 @ 11:25), Max: 98.3 (06-19-20 @ 11:25)  HR: 63 (06-19-20 @ 11:25) (63 - 74)  BP: 121/50 (06-19-20 @ 11:25) (120/54 - 139/52)  RR: 16 (06-19-20 @ 11:25) (16 - 19)  SpO2: 97% (06-19-20 @ 11:51) (95% - 97%)  Wt(kg): --    PHYSICAL EXAM:    GENERAL: Alert, oriented x3, NAD, MO  HEENT: VINICIO, EOMI, MOM  CHEST/LUNG: Clear to ausculation B/L  HEART: RRR, s1/s2 present  ABDOMEN: BS present, soft, depressible, no tender  EXTREMITIES: Left shoulder surgical scar- healing well,  B/l knee replacement surgery scar    LABS:                         10.0   10.65 )-----------( 279      ( 19 Jun 2020 07:00 )             31.1     06-19    141  |  111<H>  |  21  ----------------------------<  122<H>  3.9   |  24  |  1.06    Ca    8.0<L>      19 Jun 2020 07:00          Sedimentation Rate, Erythrocyte: 28 mm/hr (06-15 @ 18:08)      Culture Results:   Growth in aerobic bottle: Gram Positive Rods (06-17 @ 08:50)  Culture Results:   No growth to date. (06-17 @ 08:50)  Culture Results:   No growth (06-16 @ 06:09)  Culture Results:   No growth (06-16 @ 06:09)  Culture Results:   No growth (06-16 @ 06:09)  Culture Results:   No growth to date. (06-16 @ 00:42)        Radiology:

## 2020-06-19 NOTE — PROGRESS NOTE ADULT - SUBJECTIVE AND OBJECTIVE BOX
75yFemale s/p DARIO LUE/Abx spacer POD#4 . Pt seen and examined in NAD. Pain controlled. Pt denies any new complaints. Pt denies CP/SOB/N/V/D/numbness/tingling/bowel or bladder dysfunction.     PE:   Neuro: AAOX3  RUE: PICC in place.  Skin intact. +ROM shoulder/elbow/wrist/fingers. +ok/thumbsup/fingercross signs.  strength: 5/5.  RP2+ NVI.    LUE: Sling in place. Prineo dressing C/D/I. Skin intact. +ROM elbow/wrist/fingers. +edema.  +ok/thumbsup/fingercross signs.  strength: 5/5.  RP2+ NVI.   B/L LE: Skin intact. +ROM hip/knee/ankle/toes. Ankle Dorsi/plantarflexion: 5/5. Calf: soft, compressible and nontender. DP/PT 2+ NVI.                             10.0   10.65 )-----------( 279      ( 19 Jun 2020 07:00 )             31.1       06-19    141  |  111<H>  |  21  ----------------------------<  122<H>  3.9   |  24  |  1.06    Ca    8.0<L>      19 Jun 2020 07:00          A/P: 75yFemale s/p DARIO LUE/Abx spacer POD#4   Pain controlled  PT: NWB LUE  PICC placed yesterday for IV access.   DVT ppx: SCDs ASA 325mg BID  Wound care, Isometric exercises, incentive spirometry   Medical consult appreciated  F/U ID final plan,   DC planning with home care needs to arrange contract with workers comp once final abx plan in place.  All the above discussed and understood by pt

## 2020-06-19 NOTE — PROGRESS NOTE ADULT - ASSESSMENT
74 year old female patient with medical history of eczema htn, gerd, hld c/o left shoulder pain and decreased after left shoulder arthroplasty ,left shoulder arthroscopy performed 3/2020.  History of right knee joint infection in the past (as per patient)    Patient is being seen by ID for Left shoulder removal of prosthesis, incision and drainage with placement of antibiotic spacer.    s/p PICC line placement on the left arm  s/p HV       s/p left shoulder explant/abx spacer (6/15)  Left shoulder fluid cx: negative so far    6/16 BC: negative so far  6/17 BC: growing gram positive rods    Patient has remain afebrile  Trending down leukocytosis  ESR: 28  CRP: 0.96    COVID-19 PCR: negative  MRSA PCR: negative    Dx:   s/p left shoulder explant/abx spacer     Rec:  -Plan to continue current Abx for a total of 6 weeks  Vanco level: 20.3  Surveillance Bc

## 2020-06-19 NOTE — PROGRESS NOTE ADULT - SUBJECTIVE AND OBJECTIVE BOX
EVERETT WAY is a 75y Female s/p LEFT SHOULDER REMOVAL OF PROSTHESIS, INCISION AND DRAINAGE W  status post explant with insertion of Antibiotic spacer.    ROS: no pulmonary, cardiovascular or gastrointestinal symptoms     PHYS: T(C): 36.6 (06-19-20 @ 06:12), Max: 36.9 (06-18-20 @ 12:35)  HR: 67 (06-19-20 @ 06:12) (66 - 74)  BP: 139/52 (06-19-20 @ 06:12) (120/54 - 160/53)  RR: 18 (06-19-20 @ 06:12) (18 - 19)  SpO2: 97% (06-19-20 @ 06:12) (95% - 98%)  lungs, clear; heart, regular rhythm; abdomen, soft; no calf tenderness                         10.0   10.65 )-----------( 279      ( 19 Jun 2020 07:00 )             31.1   06-19    141  |  111<H>  |  21  ----------------------------<  122<H>  3.9   |  24  |  1.06    Ca    8.0<L>      19 Jun 2020 07:00    Assessment and Plan: status post explant left total shoulder replacement with insertion of antibiotic spacer; intravenous Antibiotics per infectious disease Hypertension; hyperlipidemia; Gastroesophageal reflux disease; acute kidney injury improved; eczema; postop anemia; postop leucocytosis; morbid obesity (BMI=46.9); pain control; deep vein thrombophlebitis prophylaxis; physical therapy; bowel regimen; nutrition support; follow up labs; will follow.

## 2020-06-20 LAB
ANION GAP SERPL CALC-SCNC: 6 MMOL/L — SIGNIFICANT CHANGE UP (ref 5–17)
BUN SERPL-MCNC: 18 MG/DL — SIGNIFICANT CHANGE UP (ref 7–23)
CALCIUM SERPL-MCNC: 8.2 MG/DL — LOW (ref 8.5–10.1)
CHLORIDE SERPL-SCNC: 111 MMOL/L — HIGH (ref 96–108)
CO2 SERPL-SCNC: 24 MMOL/L — SIGNIFICANT CHANGE UP (ref 22–31)
CREAT SERPL-MCNC: 0.9 MG/DL — SIGNIFICANT CHANGE UP (ref 0.5–1.3)
GLUCOSE SERPL-MCNC: 106 MG/DL — HIGH (ref 70–99)
HCT VFR BLD CALC: 29.5 % — LOW (ref 34.5–45)
HGB BLD-MCNC: 9.7 G/DL — LOW (ref 11.5–15.5)
MCHC RBC-ENTMCNC: 30.7 PG — SIGNIFICANT CHANGE UP (ref 27–34)
MCHC RBC-ENTMCNC: 32.9 GM/DL — SIGNIFICANT CHANGE UP (ref 32–36)
MCV RBC AUTO: 93.4 FL — SIGNIFICANT CHANGE UP (ref 80–100)
NRBC # BLD: 0 /100 WBCS — SIGNIFICANT CHANGE UP (ref 0–0)
PLATELET # BLD AUTO: 286 K/UL — SIGNIFICANT CHANGE UP (ref 150–400)
POTASSIUM SERPL-MCNC: 3.7 MMOL/L — SIGNIFICANT CHANGE UP (ref 3.5–5.3)
POTASSIUM SERPL-SCNC: 3.7 MMOL/L — SIGNIFICANT CHANGE UP (ref 3.5–5.3)
RBC # BLD: 3.16 M/UL — LOW (ref 3.8–5.2)
RBC # FLD: 14.6 % — HIGH (ref 10.3–14.5)
SODIUM SERPL-SCNC: 141 MMOL/L — SIGNIFICANT CHANGE UP (ref 135–145)
WBC # BLD: 9.17 K/UL — SIGNIFICANT CHANGE UP (ref 3.8–10.5)
WBC # FLD AUTO: 9.17 K/UL — SIGNIFICANT CHANGE UP (ref 3.8–10.5)

## 2020-06-20 RX ADMIN — HYDROMORPHONE HYDROCHLORIDE 4 MILLIGRAM(S): 2 INJECTION INTRAMUSCULAR; INTRAVENOUS; SUBCUTANEOUS at 05:04

## 2020-06-20 RX ADMIN — HYDROMORPHONE HYDROCHLORIDE 4 MILLIGRAM(S): 2 INJECTION INTRAMUSCULAR; INTRAVENOUS; SUBCUTANEOUS at 01:26

## 2020-06-20 RX ADMIN — PIPERACILLIN AND TAZOBACTAM 25 GRAM(S): 4; .5 INJECTION, POWDER, LYOPHILIZED, FOR SOLUTION INTRAVENOUS at 22:01

## 2020-06-20 RX ADMIN — Medication 1 TABLET(S): at 17:12

## 2020-06-20 RX ADMIN — PIPERACILLIN AND TAZOBACTAM 25 GRAM(S): 4; .5 INJECTION, POWDER, LYOPHILIZED, FOR SOLUTION INTRAVENOUS at 10:46

## 2020-06-20 RX ADMIN — Medication 3 MILLIGRAM(S): at 22:01

## 2020-06-20 RX ADMIN — CHLORHEXIDINE GLUCONATE 1 APPLICATION(S): 213 SOLUTION TOPICAL at 04:57

## 2020-06-20 RX ADMIN — PIPERACILLIN AND TAZOBACTAM 25 GRAM(S): 4; .5 INJECTION, POWDER, LYOPHILIZED, FOR SOLUTION INTRAVENOUS at 04:56

## 2020-06-20 RX ADMIN — HYDROMORPHONE HYDROCHLORIDE 4 MILLIGRAM(S): 2 INJECTION INTRAMUSCULAR; INTRAVENOUS; SUBCUTANEOUS at 10:53

## 2020-06-20 RX ADMIN — SODIUM CHLORIDE 100 MILLILITER(S): 9 INJECTION INTRAMUSCULAR; INTRAVENOUS; SUBCUTANEOUS at 22:01

## 2020-06-20 RX ADMIN — HYDROMORPHONE HYDROCHLORIDE 0.5 MILLIGRAM(S): 2 INJECTION INTRAMUSCULAR; INTRAVENOUS; SUBCUTANEOUS at 19:52

## 2020-06-20 RX ADMIN — Medication 1 TABLET(S): at 04:57

## 2020-06-20 RX ADMIN — Medication 325 MILLIGRAM(S): at 10:46

## 2020-06-20 RX ADMIN — Medication 300 MILLIGRAM(S): at 22:01

## 2020-06-20 NOTE — PROGRESS NOTE ADULT - SUBJECTIVE AND OBJECTIVE BOX
EVERETT WAY is a 75y Female s/p LEFT SHOULDER REMOVAL OF PROSTHESIS, INCISION AND DRAINAGE W  by Dr. Eaton on 6/15/20. pain is improved.    ROS: no pulmonary, cardiovascular, gastrointestinal or urological symptoms     PHYS: T(C): 36.8 (06-20-20 @ 17:11), Max: 36.8 (06-20-20 @ 11:31)  HR: 64 (06-20-20 @ 17:11) (59 - 69)  BP: 131/46 (06-20-20 @ 17:11) (131/46 - 156/50)  RR: 19 (06-20-20 @ 17:11) (18 - 19)  SpO2: 97% (06-20-20 @ 17:11) (96% - 99%)  vss; lungs, clear, heart, reg rhythm, abd, soft non tender, no calf tenderness                         9.7    9.17  )-----------( 286      ( 20 Jun 2020 06:50 )             29.5   06-20    141  |  111<H>  |  18  ----------------------------<  106<H>  3.7   |  24  |  0.90    Ca    8.2<L>      20 Jun 2020 06:50      Assessment and Plan: infected left total shoulder replacement, status post removal of hardware and placement of antibiotic spacer; Hypertension; hyperlipidemia; Gastroesophageal reflux disease; eczema; acute kidney injury, resoved; morbid obesity (BMI=46.0); postop anemia; postop leucocytosis; intravenous Antibiotics per infectious disease; pain control; deep vein thrombophlebitis prophylaxis; physical therapy; bowel regimen; nutrition support; follow up labs; will follow.

## 2020-06-21 LAB
ANION GAP SERPL CALC-SCNC: 6 MMOL/L — SIGNIFICANT CHANGE UP (ref 5–17)
BUN SERPL-MCNC: 17 MG/DL — SIGNIFICANT CHANGE UP (ref 7–23)
CALCIUM SERPL-MCNC: 8.2 MG/DL — LOW (ref 8.5–10.1)
CHLORIDE SERPL-SCNC: 110 MMOL/L — HIGH (ref 96–108)
CO2 SERPL-SCNC: 23 MMOL/L — SIGNIFICANT CHANGE UP (ref 22–31)
CREAT SERPL-MCNC: 1.02 MG/DL — SIGNIFICANT CHANGE UP (ref 0.5–1.3)
CULTURE RESULTS: SIGNIFICANT CHANGE UP
GLUCOSE SERPL-MCNC: 111 MG/DL — HIGH (ref 70–99)
HCT VFR BLD CALC: 30.4 % — LOW (ref 34.5–45)
HGB BLD-MCNC: 10 G/DL — LOW (ref 11.5–15.5)
MCHC RBC-ENTMCNC: 30.8 PG — SIGNIFICANT CHANGE UP (ref 27–34)
MCHC RBC-ENTMCNC: 32.9 GM/DL — SIGNIFICANT CHANGE UP (ref 32–36)
MCV RBC AUTO: 93.5 FL — SIGNIFICANT CHANGE UP (ref 80–100)
NRBC # BLD: 0 /100 WBCS — SIGNIFICANT CHANGE UP (ref 0–0)
PLATELET # BLD AUTO: 301 K/UL — SIGNIFICANT CHANGE UP (ref 150–400)
POTASSIUM SERPL-MCNC: 3.7 MMOL/L — SIGNIFICANT CHANGE UP (ref 3.5–5.3)
POTASSIUM SERPL-SCNC: 3.7 MMOL/L — SIGNIFICANT CHANGE UP (ref 3.5–5.3)
RBC # BLD: 3.25 M/UL — LOW (ref 3.8–5.2)
RBC # FLD: 14.5 % — SIGNIFICANT CHANGE UP (ref 10.3–14.5)
SODIUM SERPL-SCNC: 139 MMOL/L — SIGNIFICANT CHANGE UP (ref 135–145)
SPECIMEN SOURCE: SIGNIFICANT CHANGE UP
WBC # BLD: 9.49 K/UL — SIGNIFICANT CHANGE UP (ref 3.8–10.5)
WBC # FLD AUTO: 9.49 K/UL — SIGNIFICANT CHANGE UP (ref 3.8–10.5)

## 2020-06-21 RX ORDER — ATORVASTATIN CALCIUM 80 MG/1
40 TABLET, FILM COATED ORAL AT BEDTIME
Refills: 0 | Status: DISCONTINUED | OUTPATIENT
Start: 2020-06-21 | End: 2020-06-24

## 2020-06-21 RX ADMIN — Medication 300 MILLIGRAM(S): at 21:31

## 2020-06-21 RX ADMIN — Medication 3 MILLIGRAM(S): at 21:30

## 2020-06-21 RX ADMIN — PIPERACILLIN AND TAZOBACTAM 25 GRAM(S): 4; .5 INJECTION, POWDER, LYOPHILIZED, FOR SOLUTION INTRAVENOUS at 05:21

## 2020-06-21 RX ADMIN — PIPERACILLIN AND TAZOBACTAM 25 GRAM(S): 4; .5 INJECTION, POWDER, LYOPHILIZED, FOR SOLUTION INTRAVENOUS at 21:31

## 2020-06-21 RX ADMIN — Medication 1 TABLET(S): at 05:21

## 2020-06-21 RX ADMIN — HYDROMORPHONE HYDROCHLORIDE 4 MILLIGRAM(S): 2 INJECTION INTRAMUSCULAR; INTRAVENOUS; SUBCUTANEOUS at 21:30

## 2020-06-21 RX ADMIN — PIPERACILLIN AND TAZOBACTAM 25 GRAM(S): 4; .5 INJECTION, POWDER, LYOPHILIZED, FOR SOLUTION INTRAVENOUS at 13:41

## 2020-06-21 RX ADMIN — Medication 325 MILLIGRAM(S): at 12:10

## 2020-06-21 RX ADMIN — Medication 1 TABLET(S): at 17:54

## 2020-06-21 RX ADMIN — CHLORHEXIDINE GLUCONATE 1 APPLICATION(S): 213 SOLUTION TOPICAL at 05:21

## 2020-06-21 RX ADMIN — ATORVASTATIN CALCIUM 40 MILLIGRAM(S): 80 TABLET, FILM COATED ORAL at 21:31

## 2020-06-21 RX ADMIN — HYDROMORPHONE HYDROCHLORIDE 4 MILLIGRAM(S): 2 INJECTION INTRAMUSCULAR; INTRAVENOUS; SUBCUTANEOUS at 00:36

## 2020-06-21 RX ADMIN — HYDROMORPHONE HYDROCHLORIDE 4 MILLIGRAM(S): 2 INJECTION INTRAMUSCULAR; INTRAVENOUS; SUBCUTANEOUS at 04:55

## 2020-06-21 RX ADMIN — HYDROMORPHONE HYDROCHLORIDE 4 MILLIGRAM(S): 2 INJECTION INTRAMUSCULAR; INTRAVENOUS; SUBCUTANEOUS at 13:41

## 2020-06-21 NOTE — PROGRESS NOTE ADULT - SUBJECTIVE AND OBJECTIVE BOX
EVERETT WAY is a 75y Female s/p LEFT SHOULDER REMOVAL OF PROSTHESIS, INCISION AND DRAINAGE W  by Dr. Eaton on 6/15/20. patient on intravenous Antibiotics.    ROS: no pulmonary, cardiovascular, gastrointestinal, urological or neurological symptoms.     PHYS: T(C): 36.7 (06-21-20 @ 04:50), Max: 36.8 (06-20-20 @ 17:11)  HR: 64 (06-21-20 @ 04:50) (64 - 67)  BP: 134/59 (06-21-20 @ 04:50) (131/46 - 151/88)  RR: 18 (06-21-20 @ 04:50) (18 - 19)  SpO2: 94% (06-21-20 @ 04:50) (94% - 97%)  vss; lungs, clear; heart, reg rhythm, no murmurs, rubs or gallops;   abd, soft, non tender, normal bowel sounds, no calf tenderness or edema                         10.0   9.49  )-----------( 301      ( 21 Jun 2020 07:23 )             30.4   06-21    139  |  110<H>  |  17  ----------------------------<  111<H>  3.7   |  23  |  1.02    Ca    8.2<L>      21 Jun 2020 07:23    Assessment and Plan: left total shoulder replacement, status post explant with insertion of antibiotic spacer; Hypertension; hyperlipidemia; Gastroesophageal reflux disease; acute kidney injury, resolved; eczema; postop anemia; postop leucocytosis morbid obesity (BMI=46.9); intravenous Antibiotics; pain control; deep vein thrombophlebitis prophylaxis; physical therapy; bowel regimen; nutrition support; follow up labs; will follow.

## 2020-06-21 NOTE — PROGRESS NOTE ADULT - SUBJECTIVE AND OBJECTIVE BOX
75yFemale s/p  DARIO/Abx spacer Left shoulder POD#6. Pt seen and examined in NAD. Pain controlled. Pt denies any new complaints. Pt denies CP/SOB/N/V/D/numbness/tingling/bowel or bladder dysfunction.     PE:   Neuro: AAOX3  LUE: Prineo dressing C/D/I. Generalized edema. Sling in place. +ROM elbow/wrist/fingers. +ok/thumbsup/fingercross signs.  strength: 5/5.  RP2+ NVI.   RUE: Skin intact. PICC in place. +ROM shoulder/elbow/wrist/fingers. +ok/thumbsup/fingercross signs.  strength: 5/5.  RP2+ NVI.   B/L LE: Skin intact. +ROM hip/knee/ankle/toes. Ankle Dorsi/plantarflexion: 5/5. Calf: soft, compressible and nontender. DP/PT 2+ NVI.                             10.0   9.49  )-----------( 301      ( 21 Jun 2020 07:23 )             30.4       06-21    139  |  110<H>  |  17  ----------------------------<  111<H>  3.7   |  23  |  1.02    Ca    8.2<L>      21 Jun 2020 07:23          A/P: 75yFemale s/p DARIO/Abx spacer Left shoulder POD#6.  Pain controlled dilaudid 4mg  PT: NWB LUE   DVT ppx: SCDs ASA 325mg daily.   PICC in RUE. On vanco/zosyn. Await final ID plan.   Wound care, Isometric exercises, incentive spirometry   Medical consult appreciated  Discharge: planning home pending home care/abx.   All the above discussed and understood by pt

## 2020-06-21 NOTE — PROGRESS NOTE ADULT - SUBJECTIVE AND OBJECTIVE BOX
S:  no new complaints   no vomiting c/o change in taste no fevers   O: vital signs reviewed     T 98  P 60  /51  R 16    Physical exam     Gen: AAO x 3 No acute distress  Heent: PERRLA EOMI  Heart: RRR S1S2 no murmur  Lungs: Clear to auscultation  Abd: BS + soft and depressible non tender  Ext: No cyanosis or edema  Neuro: no deficit, neck supple  Skin: No rash     labs   blood culture GPR   wbc 9  hgb 10  plt 301  bun 17  creat 1.02  ESR 28  CRP 0.96  blood culture 6/19 neg 6/17 GPR  OR cultures neg

## 2020-06-21 NOTE — PROGRESS NOTE ADULT - ASSESSMENT
prosthetic shoulder infection  status post DARIO with antibiotic spacer  OR cultures neg so far   blood culture gpr      P:   follow susceptibility and ID of org  case discuss with family  all questions addressed  discuss with ortho PA  on antibiotics

## 2020-06-22 LAB
CULTURE RESULTS: SIGNIFICANT CHANGE UP
SPECIMEN SOURCE: SIGNIFICANT CHANGE UP
VANCOMYCIN TROUGH SERPL-MCNC: 13.8 UG/ML — SIGNIFICANT CHANGE UP (ref 10–20)

## 2020-06-22 RX ADMIN — HYDROMORPHONE HYDROCHLORIDE 4 MILLIGRAM(S): 2 INJECTION INTRAMUSCULAR; INTRAVENOUS; SUBCUTANEOUS at 20:23

## 2020-06-22 RX ADMIN — PIPERACILLIN AND TAZOBACTAM 25 GRAM(S): 4; .5 INJECTION, POWDER, LYOPHILIZED, FOR SOLUTION INTRAVENOUS at 13:28

## 2020-06-22 RX ADMIN — Medication 1 TABLET(S): at 17:30

## 2020-06-22 RX ADMIN — ATORVASTATIN CALCIUM 40 MILLIGRAM(S): 80 TABLET, FILM COATED ORAL at 21:16

## 2020-06-22 RX ADMIN — Medication 1 TABLET(S): at 06:13

## 2020-06-22 RX ADMIN — CHLORHEXIDINE GLUCONATE 1 APPLICATION(S): 213 SOLUTION TOPICAL at 06:13

## 2020-06-22 RX ADMIN — HYDROMORPHONE HYDROCHLORIDE 4 MILLIGRAM(S): 2 INJECTION INTRAMUSCULAR; INTRAVENOUS; SUBCUTANEOUS at 10:34

## 2020-06-22 RX ADMIN — PIPERACILLIN AND TAZOBACTAM 25 GRAM(S): 4; .5 INJECTION, POWDER, LYOPHILIZED, FOR SOLUTION INTRAVENOUS at 06:13

## 2020-06-22 RX ADMIN — HYDROMORPHONE HYDROCHLORIDE 4 MILLIGRAM(S): 2 INJECTION INTRAMUSCULAR; INTRAVENOUS; SUBCUTANEOUS at 14:09

## 2020-06-22 RX ADMIN — Medication 300 MILLIGRAM(S): at 21:06

## 2020-06-22 RX ADMIN — HYDROMORPHONE HYDROCHLORIDE 4 MILLIGRAM(S): 2 INJECTION INTRAMUSCULAR; INTRAVENOUS; SUBCUTANEOUS at 04:19

## 2020-06-22 RX ADMIN — Medication 3 MILLIGRAM(S): at 21:17

## 2020-06-22 RX ADMIN — Medication 325 MILLIGRAM(S): at 11:47

## 2020-06-22 RX ADMIN — PIPERACILLIN AND TAZOBACTAM 25 GRAM(S): 4; .5 INJECTION, POWDER, LYOPHILIZED, FOR SOLUTION INTRAVENOUS at 21:16

## 2020-06-22 NOTE — PROGRESS NOTE ADULT - SUBJECTIVE AND OBJECTIVE BOX
Progress note    75yFemale s/p  DARIO/Abx spacer Left shoulder POD #7.   Pt seen and examined in NAD. Pain controlled. Pt denies any new complaints.   Pt denies CP/SOB/N/V/D/numbness/tingling/bowel or bladder dysfunction.     Vital Signs Last 24 Hrs  T(F): 98.2 (06-22-20 @ 11:10), Max: 99 (06-22-20 @ 00:02)  HR: 65 (06-22-20 @ 11:10)  BP: 164/53 (06-22-20 @ 11:10)  RR: 18 (06-22-20 @ 11:10)  SpO2: 94% (06-22-20 @ 11:10)  Wt(kg): --   CAPILLARY BLOOD GLUCOSE          GENERAL: Alert, NAD A&Ox3  LUE: Prineo dressing inplace CDI in sling.  RUE: PICC line in place  UPPER EXTREMITIES: B/l Pulse, motor, sensory intact b/l. Strength intact 5/5 b/l. Neuro intact. No deficits noted.   LOWER EXTREMITIES: B/l no calf tenderness, No edema, intermittent compression devices in place bilaterally    A/P: 75yFemale s/p DARIO/Abx spacer Left shoulder POD#7  Pain controlled dilaudid 4mg  PT: NWB LUE   DVT ppx: SCDs ASA 325mg daily.   PICC in RUE. On vanco/zosyn. Await final ID plan.   Wound care, Isometric exercises, incentive spirometry   Medical consult appreciated  Discharge: planning home pending home care/abx.   All the above discussed and understood by pt

## 2020-06-22 NOTE — PROGRESS NOTE ADULT - SUBJECTIVE AND OBJECTIVE BOX
HPI:      Allergies    Bee Stings (Anaphylaxis)  ceftriaxone (Rash)  Cipro (Unknown)  Flagyl (Diarrhea; Rash)  hibiclens - skin swelling (Other)    Intolerances        MEDICATIONS  (STANDING):  aspirin enteric coated 325 milliGRAM(s) Oral daily  atorvastatin 40 milliGRAM(s) Oral at bedtime  chlorhexidine 4% Liquid 1 Application(s) Topical <User Schedule>  lactobacillus acidophilus 1 Tablet(s) Oral two times a day  melatonin 3 milliGRAM(s) Oral at bedtime  piperacillin/tazobactam IVPB.. 3.375 Gram(s) IV Intermittent every 8 hours  polyethylene glycol 3350 17 Gram(s) Oral daily  sodium chloride 0.9%. 1000 milliLiter(s) (100 mL/Hr) IV Continuous <Continuous>  vancomycin  IVPB      vancomycin  IVPB 1500 milliGRAM(s) IV Intermittent every 24 hours    MEDICATIONS  (PRN):  acetaminophen   Tablet .. 650 milliGRAM(s) Oral every 6 hours PRN Mild Pain (1 - 3), Moderate Pain (4 - 6), Severe Pain (7 - 10)  aluminum hydroxide/magnesium hydroxide/simethicone Suspension 30 milliLiter(s) Oral four times a day PRN Indigestion  bisacodyl Suppository 10 milliGRAM(s) Rectal daily PRN If no bowel movement by postoperative day #2  HYDROmorphone   Tablet 2 milliGRAM(s) Oral every 3 hours PRN pain (0-5)  HYDROmorphone   Tablet 4 milliGRAM(s) Oral every 3 hours PRN pain (6-10)  HYDROmorphone  Injectable 0.5 milliGRAM(s) IV Push every 3 hours PRN Breakthrough pain  magnesium hydroxide Suspension 30 milliLiter(s) Oral daily PRN Constipation  ondansetron Injectable 4 milliGRAM(s) IV Push every 6 hours PRN Nausea and/or Vomiting  senna 2 Tablet(s) Oral at bedtime PRN Constipation      REVIEW OF SYSTEMS:    CONSTITUTIONAL: No fever, chills, weight loss, or fatigue  HEENT: No sore throat, runny nose, ear ache  RESPIRATORY: No cough, wheezing, No shortness of breath  CARDIOVASCULAR: No chest pain, palpitations, dizziness  GASTROINTESTINAL: No abdominal pain. No nausea, vomiting, diarrhea  GENITOURINARY: No dysuria, increase frequency, hematuria, or incontinence  NEUROLOGICAL: No headaches, memory loss, loss of strength, numbness, or tremors, no weakness  EXTREMITY: No pedal edema BLE  SKIN: No itching, burning, rashes, or lesions     VITAL SIGNS:  T(C): 36.8 (06-22-20 @ 11:10), Max: 37.2 (06-22-20 @ 00:02)  T(F): 98.2 (06-22-20 @ 11:10), Max: 99 (06-22-20 @ 00:02)  HR: 65 (06-22-20 @ 11:10) (65 - 80)  BP: 164/53 (06-22-20 @ 11:10) (135/95 - 165/63)  RR: 18 (06-22-20 @ 11:10) (17 - 18)  SpO2: 94% (06-22-20 @ 11:10) (94% - 98%)  Wt(kg): --    PHYSICAL EXAM:    GENERAL: not in any distress  HEENT: Neck is supple, normocephalic, atraumatic   CHEST/LUNG: Clear to percussion bilaterally; No rales, rhonchi, wheezing  HEART: Regular rate and rhythm; No murmurs, rubs, or gallops  ABDOMEN: Soft, Nontender, Nondistended; Bowel sounds present, no rebound   EXTREMITIES:  2+ Peripheral Pulses, No clubbing/ shoulder wound look ok   SKIN: No rashes or lesions  BACK: no pressor sore   NERVOUS SYSTEM:  Alert & Oriented X3, Good concentration      LABS:                         10.0   9.49  )-----------( 301      ( 21 Jun 2020 07:23 )             30.4     06-21    139  |  110<H>  |  17  ----------------------------<  111<H>  3.7   |  23  |  1.02    Ca    8.2<L>      21 Jun 2020 07:23                          Vancomycin Level, Trough: 11.7 ug/mL (06-19 @ 21:43)        Culture Results:   No growth to date. (06-19 @ 22:59)  Culture Results:   No growth to date. (06-19 @ 14:12)  Culture Results:   Growth in aerobic bottle: Gram Positive Rods (06-17 @ 08:50)  Culture Results:   No Growth Final (06-17 @ 08:50)  Culture Results:   No growth (06-16 @ 06:09)  Culture Results:   No growth (06-16 @ 06:09)  Culture Results:   No growth at 5 days (06-16 @ 06:09)  Culture Results:   No growth at 5 days (06-16 @ 06:09)  Culture Results:   No Growth Final (06-16 @ 00:42)                Radiology:

## 2020-06-22 NOTE — PROGRESS NOTE ADULT - ASSESSMENT
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

## 2020-06-23 RX ORDER — ASPIRIN/CALCIUM CARB/MAGNESIUM 324 MG
1 TABLET ORAL
Qty: 0 | Refills: 0 | DISCHARGE

## 2020-06-23 RX ORDER — MEROPENEM 1 G/30ML
1000 INJECTION INTRAVENOUS ONCE
Refills: 0 | Status: COMPLETED | OUTPATIENT
Start: 2020-06-23 | End: 2020-06-23

## 2020-06-23 RX ORDER — MEROPENEM 1 G/30ML
1 INJECTION INTRAVENOUS
Qty: 0 | Refills: 0 | DISCHARGE
Start: 2020-06-23 | End: 2020-07-27

## 2020-06-23 RX ORDER — HYDROMORPHONE HYDROCHLORIDE 2 MG/ML
1 INJECTION INTRAMUSCULAR; INTRAVENOUS; SUBCUTANEOUS
Qty: 30 | Refills: 0
Start: 2020-06-23 | End: 2020-06-27

## 2020-06-23 RX ORDER — POLYETHYLENE GLYCOL 3350 17 G/17G
17 POWDER, FOR SOLUTION ORAL
Qty: 0 | Refills: 0 | DISCHARGE
Start: 2020-06-23

## 2020-06-23 RX ORDER — VANCOMYCIN HCL 1 G
1500 VIAL (EA) INTRAVENOUS
Qty: 0 | Refills: 0 | DISCHARGE
Start: 2020-06-23 | End: 2020-07-27

## 2020-06-23 RX ORDER — ASPIRIN/CALCIUM CARB/MAGNESIUM 324 MG
1 TABLET ORAL
Qty: 14 | Refills: 0
Start: 2020-06-23 | End: 2020-07-06

## 2020-06-23 RX ORDER — MEROPENEM 1 G/30ML
1000 INJECTION INTRAVENOUS EVERY 8 HOURS
Refills: 0 | Status: DISCONTINUED | OUTPATIENT
Start: 2020-06-23 | End: 2020-06-24

## 2020-06-23 RX ORDER — ACETAMINOPHEN 500 MG
2 TABLET ORAL
Qty: 0 | Refills: 0 | DISCHARGE
Start: 2020-06-23

## 2020-06-23 RX ORDER — MEROPENEM 1 G/30ML
INJECTION INTRAVENOUS
Refills: 0 | Status: DISCONTINUED | OUTPATIENT
Start: 2020-06-23 | End: 2020-06-24

## 2020-06-23 RX ORDER — LACTOBACILLUS ACIDOPHILUS 100MM CELL
1 CAPSULE ORAL
Qty: 0 | Refills: 0 | DISCHARGE

## 2020-06-23 RX ORDER — SENNA PLUS 8.6 MG/1
2 TABLET ORAL
Qty: 0 | Refills: 0 | DISCHARGE
Start: 2020-06-23

## 2020-06-23 RX ORDER — RABEPRAZOLE 20 MG/1
1 TABLET, DELAYED RELEASE ORAL
Qty: 0 | Refills: 0 | DISCHARGE

## 2020-06-23 RX ADMIN — Medication 1 TABLET(S): at 17:39

## 2020-06-23 RX ADMIN — Medication 3 MILLIGRAM(S): at 21:05

## 2020-06-23 RX ADMIN — HYDROMORPHONE HYDROCHLORIDE 4 MILLIGRAM(S): 2 INJECTION INTRAMUSCULAR; INTRAVENOUS; SUBCUTANEOUS at 19:55

## 2020-06-23 RX ADMIN — Medication 1 TABLET(S): at 05:22

## 2020-06-23 RX ADMIN — ATORVASTATIN CALCIUM 40 MILLIGRAM(S): 80 TABLET, FILM COATED ORAL at 21:05

## 2020-06-23 RX ADMIN — MEROPENEM 100 MILLIGRAM(S): 1 INJECTION INTRAVENOUS at 14:20

## 2020-06-23 RX ADMIN — HYDROMORPHONE HYDROCHLORIDE 4 MILLIGRAM(S): 2 INJECTION INTRAMUSCULAR; INTRAVENOUS; SUBCUTANEOUS at 01:20

## 2020-06-23 RX ADMIN — PIPERACILLIN AND TAZOBACTAM 25 GRAM(S): 4; .5 INJECTION, POWDER, LYOPHILIZED, FOR SOLUTION INTRAVENOUS at 05:22

## 2020-06-23 RX ADMIN — MEROPENEM 100 MILLIGRAM(S): 1 INJECTION INTRAVENOUS at 21:06

## 2020-06-23 RX ADMIN — Medication 325 MILLIGRAM(S): at 11:47

## 2020-06-23 RX ADMIN — Medication 300 MILLIGRAM(S): at 21:06

## 2020-06-23 RX ADMIN — HYDROMORPHONE HYDROCHLORIDE 4 MILLIGRAM(S): 2 INJECTION INTRAMUSCULAR; INTRAVENOUS; SUBCUTANEOUS at 14:06

## 2020-06-23 RX ADMIN — CHLORHEXIDINE GLUCONATE 1 APPLICATION(S): 213 SOLUTION TOPICAL at 05:22

## 2020-06-23 NOTE — CHART NOTE - NSCHARTNOTEFT_GEN_A_CORE
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     Factors impacting intake: [ ] none [ ] nausea  [ ] vomiting [ ] diarrhea [ ] constipation  [ ]chewing problems [ ] swallowing issues  [X ] other:  pain    Diet Prescription: Diet, DASH/TLC:   Sodium & Cholesterol Restricted (06-17-20 @ 16:22)    Intake:  50 - 75% most meals    Current Weight:   no new wt available; 124 kg (6/15)  % Weight Change: unable to determine; request new wt    Physical Appearance:  no edema noted    Pertinent Medications: MEDICATIONS  (STANDING):  aspirin enteric coated 325 milliGRAM(s) Oral daily  atorvastatin 40 milliGRAM(s) Oral at bedtime  chlorhexidine 4% Liquid 1 Application(s) Topical <User Schedule>  lactobacillus acidophilus 1 Tablet(s) Oral two times a day  melatonin 3 milliGRAM(s) Oral at bedtime  meropenem  IVPB      meropenem  IVPB 1000 milliGRAM(s) IV Intermittent once  meropenem  IVPB 1000 milliGRAM(s) IV Intermittent every 8 hours  polyethylene glycol 3350 17 Gram(s) Oral daily  sodium chloride 0.9%. 1000 milliLiter(s) (100 mL/Hr) IV Continuous <Continuous>  vancomycin  IVPB      vancomycin  IVPB 1500 milliGRAM(s) IV Intermittent every 24 hours    MEDICATIONS  (PRN):  acetaminophen   Tablet .. 650 milliGRAM(s) Oral every 6 hours PRN Mild Pain (1 - 3), Moderate Pain (4 - 6), Severe Pain (7 - 10)  aluminum hydroxide/magnesium hydroxide/simethicone Suspension 30 milliLiter(s) Oral four times a day PRN Indigestion  bisacodyl Suppository 10 milliGRAM(s) Rectal daily PRN If no bowel movement by postoperative day #2  HYDROmorphone   Tablet 2 milliGRAM(s) Oral every 3 hours PRN pain (0-5)  HYDROmorphone   Tablet 4 milliGRAM(s) Oral every 3 hours PRN pain (6-10)  magnesium hydroxide Suspension 30 milliLiter(s) Oral daily PRN Constipation  ondansetron Injectable 4 milliGRAM(s) IV Push every 6 hours PRN Nausea and/or Vomiting  senna 2 Tablet(s) Oral at bedtime PRN Constipation    Pertinent Labs:  06-21 Na139 mmol/L Glu 111 mg/dL<H> K+ 3.7 mmol/L Cr  1.02 mg/dL BUN 17 mg/dL    Skin:   WDL; surgical incision noted    Estimated Needs:   [X ] no change since previous assessment  (6/17)   [ ] recalculated:     Previous Nutrition Diagnosis:     [X ] Overweight/Obesity   Etiology:  Excessive energy intake  Signs & Symptoms:  BMI 46.9    GOAL:  Promote desirable wt loss of 1-2#/week - unable to determine    Nutrition Diagnosis is [X ] ongoing  [ ] resolved [ ] not applicable     New Nutrition Diagnosis: [X ] not applicable    Interventions:   continue current diet rx as noted  Recommend  [ ] Change Diet To:  [ ] Nutrition Supplement  [ ] Nutrition Support  [ ] Other:     Monitoring and Evaluation:   [ ] PO intake [ x ] Tolerance to diet prescription [ x ] weights [ x ] labs[ x ] follow up per protocol  [ ] other:

## 2020-06-23 NOTE — PROGRESS NOTE ADULT - SUBJECTIVE AND OBJECTIVE BOX
HPI:      Allergies    Bee Stings (Anaphylaxis)  ceftriaxone (Rash)  Cipro (Unknown)  Flagyl (Diarrhea; Rash)  hibiclens - skin swelling (Other)    Intolerances        MEDICATIONS  (STANDING):  aspirin enteric coated 325 milliGRAM(s) Oral daily  atorvastatin 40 milliGRAM(s) Oral at bedtime  chlorhexidine 4% Liquid 1 Application(s) Topical <User Schedule>  lactobacillus acidophilus 1 Tablet(s) Oral two times a day  melatonin 3 milliGRAM(s) Oral at bedtime  piperacillin/tazobactam IVPB.. 3.375 Gram(s) IV Intermittent every 8 hours  polyethylene glycol 3350 17 Gram(s) Oral daily  sodium chloride 0.9%. 1000 milliLiter(s) (100 mL/Hr) IV Continuous <Continuous>  vancomycin  IVPB      vancomycin  IVPB 1500 milliGRAM(s) IV Intermittent every 24 hours    MEDICATIONS  (PRN):  acetaminophen   Tablet .. 650 milliGRAM(s) Oral every 6 hours PRN Mild Pain (1 - 3), Moderate Pain (4 - 6), Severe Pain (7 - 10)  aluminum hydroxide/magnesium hydroxide/simethicone Suspension 30 milliLiter(s) Oral four times a day PRN Indigestion  bisacodyl Suppository 10 milliGRAM(s) Rectal daily PRN If no bowel movement by postoperative day #2  HYDROmorphone   Tablet 2 milliGRAM(s) Oral every 3 hours PRN pain (0-5)  HYDROmorphone   Tablet 4 milliGRAM(s) Oral every 3 hours PRN pain (6-10)  magnesium hydroxide Suspension 30 milliLiter(s) Oral daily PRN Constipation  ondansetron Injectable 4 milliGRAM(s) IV Push every 6 hours PRN Nausea and/or Vomiting  senna 2 Tablet(s) Oral at bedtime PRN Constipation      REVIEW OF SYSTEMS:    CONSTITUTIONAL: No fever, chills, weight loss, or fatigue  HEENT: No sore throat, runny nose, ear ache  RESPIRATORY: No cough, wheezing, No shortness of breath  CARDIOVASCULAR: No chest pain, palpitations, dizziness  GASTROINTESTINAL: No abdominal pain. No nausea, vomiting, diarrhea  GENITOURINARY: No dysuria, increase frequency, hematuria, or incontinence  NEUROLOGICAL: No headaches, memory loss, loss of strength, numbness, or tremors, no weakness  EXTREMITY: No pedal edema BLE  SKIN: No itching, burning, rashes, or lesions     VITAL SIGNS:  T(C): 36.6 (06-23-20 @ 05:16), Max: 36.8 (06-22-20 @ 17:03)  T(F): 97.9 (06-23-20 @ 05:16), Max: 98.2 (06-22-20 @ 17:03)  HR: 76 (06-23-20 @ 05:16) (69 - 76)  BP: 150/60 (06-23-20 @ 05:16) (139/89 - 150/60)  RR: 18 (06-23-20 @ 05:16) (18 - 18)  SpO2: 95% (06-23-20 @ 05:16) (93% - 95%)  Wt(kg): --    PHYSICAL EXAM:    GENERAL: not in any distress  HEENT: Neck is supple, normocephalic, atraumatic   CHEST/LUNG: Clear to percussion bilaterally; No rales, rhonchi, wheezing  HEART: Regular rate and rhythm; No murmurs, rubs, or gallops  ABDOMEN: Soft, Nontender, Nondistended; Bowel sounds present, no rebound   EXTREMITIES:  2+ Peripheral Pulses, No clubbing, cyanosis, or edema  SKIN: No rashes or lesions  BACK: no pressor sore   NERVOUS SYSTEM:  Alert & Oriented X3    LABS:                               Vancomycin Level, Trough: 13.8 ug/mL (06-22 @ 20:45)        Culture Results:   No growth to date. (06-19 @ 22:59)  Culture Results:   No growth to date. (06-19 @ 14:12)  Culture Results:   Growth in aerobic bottle: Gram Positive Rods (06-17 @ 08:50)  Culture Results:   No Growth Final (06-17 @ 08:50)                Radiology:

## 2020-06-23 NOTE — PROGRESS NOTE ADULT - ASSESSMENT
Prosthetic shoulder infection  status post DARIO with antibiotic spacer  OR cultures neg so far   blood culture gpr pending ID , spoke with micro lab multiple times ( [phone] ) regarding to identification, they sent it to [facility] lab today and it will probably take 1 more week  GPR likely Bacillus but dont know exactly ID as per lab Ms Culver.     all wound culture pending so far neg   patient insisted to go home   i will switch to broader coverage aquilino   pt is allergic to ceftriaxone   zosyn cannot be given with home infusion Q 6 hr     PLAN - 5 MORE WEEKS OF AQUILINO 1 GM Q 8 HR AND VANCO 1500 MG DAILY UNTIL 7/27 .  Patient was explained the complication of not knowing the identity of blood and wound cultures   fu blood cultures   vanco trough level ok   case discussed with patient, infusion reagent care and

## 2020-06-23 NOTE — PROGRESS NOTE ADULT - SUBJECTIVE AND OBJECTIVE BOX
EVERETT WAY is a 75y Female s/p LEFT SHOULDER REMOVAL OF PROSTHESIS, INCISION AND DRAINAGE W  left total shoulder replacement explant with antibiotic spacer by Dr. Eaton on 6/15/20.   Antibiotics per infectious disease.    ROS: no pulmonary, cardiovascular or gastrointestinal symptoms     PHYS: T(C): 36.6 (06-23-20 @ 05:16), Max: 36.8 (06-22-20 @ 11:10)  HR: 76 (06-23-20 @ 05:16) (65 - 76)  BP: 150/60 (06-23-20 @ 05:16) (139/89 - 164/53)  RR: 18 (06-23-20 @ 05:16) (18 - 18)  SpO2: 95% (06-23-20 @ 05:16) (93% - 95%)  lungs, clear; heart, regular rhythm; abdomen, soft; no calf tenderness       Assessment and Plan: status post left total shoulder replacement explant with insertion of antibiotic spacer; Hypertension; hyperlipidemia; Gastroesophageal reflux disease; postop anemia; Antibiotics per infectious disease; pain control; deep vein thrombophlebitis prophylaxis; physical therapy; bowel regimen; nutrition support; follow up labs; will follow; discharge planning.

## 2020-06-23 NOTE — PROGRESS NOTE ADULT - SUBJECTIVE AND OBJECTIVE BOX
Progress note    75yFemale s/p  DARIO/Abx spacer Left shoulder POD #8  Pt seen and examined in NAD. Pain controlled. Pt denies any new complaints.   Pt denies CP/SOB/N/V/D/numbness/tingling/bowel or bladder dysfunction    Vital Signs Last 24 Hrs  T(C): 36.6 (23 Jun 2020 05:16), Max: 36.8 (22 Jun 2020 11:10)  T(F): 97.9 (23 Jun 2020 05:16), Max: 98.2 (22 Jun 2020 11:10)  HR: 76 (23 Jun 2020 05:16) (65 - 76)  BP: 150/60 (23 Jun 2020 05:16) (139/89 - 164/53)  BP(mean): --  RR: 18 (23 Jun 2020 05:16) (18 - 18)  SpO2: 95% (23 Jun 2020 05:16) (93% - 95%)    PE:  GENERAL: Alert, NAD A&Ox3  LUE: Prineo dressing in place CDI in sling.  RUE: PICC line in place  UPPER EXTREMITIES: B/l Pulse, motor, sensory intact b/l. Strength intact 5/5 b/l. Neuro intact. No deficits noted.   LOWER EXTREMITIES: B/l no calf tenderness, No edema, intermittent compression devices in place bilaterally    A/P: 75yFemale s/p DARIO/Abx spacer Left shoulder POD#8  Pain controlled dilaudid 4mg  PT: NWB LUE   DVT ppx: SCDs ASA 325mg daily.   PICC in RUE. On vanco/zosyn. Await final ID plan.   Wound care, Isometric exercises, incentive spirometry   Medical consult appreciated  Discharge: planning home pending home care/abx.   All the above discussed and understood by pt

## 2020-06-24 ENCOUNTER — TRANSCRIPTION ENCOUNTER (OUTPATIENT)
Age: 75
End: 2020-06-24

## 2020-06-24 VITALS
TEMPERATURE: 99 F | RESPIRATION RATE: 18 BRPM | SYSTOLIC BLOOD PRESSURE: 170 MMHG | HEART RATE: 64 BPM | OXYGEN SATURATION: 96 % | DIASTOLIC BLOOD PRESSURE: 62 MMHG

## 2020-06-24 LAB
CULTURE RESULTS: SIGNIFICANT CHANGE UP
CULTURE RESULTS: SIGNIFICANT CHANGE UP
SPECIMEN SOURCE: SIGNIFICANT CHANGE UP
SPECIMEN SOURCE: SIGNIFICANT CHANGE UP

## 2020-06-24 RX ORDER — HYDROCHLOROTHIAZIDE 25 MG
12.5 TABLET ORAL DAILY
Refills: 0 | Status: DISCONTINUED | OUTPATIENT
Start: 2020-06-24 | End: 2020-06-24

## 2020-06-24 RX ORDER — LOSARTAN POTASSIUM 100 MG/1
25 TABLET, FILM COATED ORAL DAILY
Refills: 0 | Status: DISCONTINUED | OUTPATIENT
Start: 2020-06-24 | End: 2020-06-24

## 2020-06-24 RX ORDER — HYDROMORPHONE HYDROCHLORIDE 2 MG/ML
4 INJECTION INTRAMUSCULAR; INTRAVENOUS; SUBCUTANEOUS ONCE
Refills: 0 | Status: DISCONTINUED | OUTPATIENT
Start: 2020-06-24 | End: 2020-06-24

## 2020-06-24 RX ADMIN — HYDROMORPHONE HYDROCHLORIDE 4 MILLIGRAM(S): 2 INJECTION INTRAMUSCULAR; INTRAVENOUS; SUBCUTANEOUS at 00:06

## 2020-06-24 RX ADMIN — HYDROMORPHONE HYDROCHLORIDE 4 MILLIGRAM(S): 2 INJECTION INTRAMUSCULAR; INTRAVENOUS; SUBCUTANEOUS at 11:34

## 2020-06-24 RX ADMIN — Medication 12.5 MILLIGRAM(S): at 08:55

## 2020-06-24 RX ADMIN — LOSARTAN POTASSIUM 25 MILLIGRAM(S): 100 TABLET, FILM COATED ORAL at 11:32

## 2020-06-24 RX ADMIN — Medication 325 MILLIGRAM(S): at 11:32

## 2020-06-24 RX ADMIN — CHLORHEXIDINE GLUCONATE 1 APPLICATION(S): 213 SOLUTION TOPICAL at 05:21

## 2020-06-24 RX ADMIN — Medication 1 TABLET(S): at 05:21

## 2020-06-24 RX ADMIN — MEROPENEM 100 MILLIGRAM(S): 1 INJECTION INTRAVENOUS at 05:21

## 2020-06-24 NOTE — DISCHARGE NOTE NURSING/CASE MANAGEMENT/SOCIAL WORK - NSDCFUADDAPPT_GEN_ALL_CORE_FT
Follow up with your surgeon in two weeks. Call for appointment.  If you need more pain medication, call your surgeon's office. For medication refills or authorizations, please call [phone]  We recommend that you call and schedule a follow up appointment with your primary care physician for repeat blood work (CBC and BMP) for post hospital discharge follow-up care.  Call your surgeon if you have increased redness/pain/drainage or fever. Return to ER for shortness of breath/calf tenderness.    Weekly lab work while on Antibiotics: CBC, CMP, ESR, CRP, Vanco trough.   Fax results to ID specialist    Follow up with ID 2-3 weeks after discharge

## 2020-06-24 NOTE — DISCHARGE NOTE NURSING/CASE MANAGEMENT/SOCIAL WORK - PATIENT PORTAL LINK FT
You can access the FollowMyHealth Patient Portal offered by [facility] by registering at the following website: [email or web address]. By joining [facility]’s FollowMyHealth portal, you will also be able to view your health information using other applications (apps) compatible with our system.

## 2020-06-24 NOTE — PROGRESS NOTE ADULT - SUBJECTIVE AND OBJECTIVE BOX
EVERETT WAY is a 75y Female s/p LEFT SHOULDER REMOVAL OF PROSTHESIS, INCISION AND DRAINAGE W  left total shoulder replacement explant with antibiotic spacer by Dr. Eaton on 6/15/20.  blood pressure started to go up; patient had been on benicar hct at home; will start hctz and losartan with parameters.    ROS: no pulmonary, cardiovascular or gastrointestinal symptoms     T(C): 36.5 (06-24-20 @ 05:31), Max: 36.8 (06-23-20 @ 13:08)  HR: 61 (06-24-20 @ 05:31) (61 - 70)  BP: 121/59 (06-24-20 @ 05:31) (121/59 - 187/77)  RR: 18 (06-24-20 @ 05:31) (18 - 18)  SpO2: 99% (06-24-20 @ 05:31) (93% - 99%)  lungs, clear; heart, regular rhythm; abdomen, soft; no calf tenderness       Assessment and Plan: status post left total shoulder replacement explant with antibiotic spacer; Hypertension; hyperlipidemia; Gastroesophageal reflux disease; postop anemia; morbid obesity (BMI=46.9); pain control; deep vein thrombophlebitis prophylaxis; physical therapy; bowel regimen; nutrition support; follow up labs; will follow.

## 2020-06-24 NOTE — PROGRESS NOTE ADULT - SUBJECTIVE AND OBJECTIVE BOX
Progress note    75yFemale s/p  DARIO/Abx spacer Left shoulder POD #8  Pt seen and examined in NAD. Pain controlled. Pt denies any new complaints.   Pt denies CP/SOB/N/V/D/numbness/tingling/bowel or bladder dysfunction  PE:         Vital Signs Last 24 Hrs  T(C): 36.5 (24 Jun 2020 05:31), Max: 36.8 (23 Jun 2020 13:08)  T(F): 97.7 (24 Jun 2020 05:31), Max: 98.2 (23 Jun 2020 13:08)  HR: 61 (24 Jun 2020 05:31) (61 - 70)  BP: 121/59 (24 Jun 2020 05:31) (121/59 - 187/77)  BP(mean): --  RR: 18 (24 Jun 2020 05:31) (18 - 18)  SpO2: 99% (24 Jun 2020 05:31) (93% - 99%)      A/P: 75yFemale s/p DARIO/Abx spacer Left shoulder POD#9  Pain controlled dilaudid 4mg  PT: NWB LUE   DVT ppx: SCDs ASA 325mg daily.   PICC in RUE. On Donna/Vanco at home as per ID  Wound care, Isometric exercises, incentive spirometry   Medical consult appreciated  Discharge: planning home pending home care/abx.   All the above discussed and understood by pt

## 2020-06-24 NOTE — PROGRESS NOTE ADULT - REASON FOR ADMISSION
infected left total shoulder replacement
left total shoulder replacement explant
left total shoulder replacement explant with antibiotic spacer
left total shoulder replacement explant with insertion of antibiotic spacer
left total shoulder replacement removal of hardware
pt seen with shoulder pain s/p annalisa and antibiotics spacer  blood culture POSITIVE gpr ID pending  surveillance blood culture neg  doing well
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
## 2020-06-26 DIAGNOSIS — D62 ACUTE POSTHEMORRHAGIC ANEMIA: ICD-10-CM

## 2020-06-26 DIAGNOSIS — N17.9 ACUTE KIDNEY FAILURE, UNSPECIFIED: ICD-10-CM

## 2020-06-26 DIAGNOSIS — Z91.030 BEE ALLERGY STATUS: ICD-10-CM

## 2020-06-26 DIAGNOSIS — T84.84XA PAIN DUE TO INTERNAL ORTHOPEDIC PROSTHETIC DEVICES, IMPLANTS AND GRAFTS, INITIAL ENCOUNTER: ICD-10-CM

## 2020-06-26 DIAGNOSIS — E66.01 MORBID (SEVERE) OBESITY DUE TO EXCESS CALORIES: ICD-10-CM

## 2020-06-26 DIAGNOSIS — E78.5 HYPERLIPIDEMIA, UNSPECIFIED: ICD-10-CM
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
## 2020-06-26 DIAGNOSIS — I10 ESSENTIAL (PRIMARY) HYPERTENSION: ICD-10-CM

## 2020-06-26 DIAGNOSIS — Z79.82 LONG TERM (CURRENT) USE OF ASPIRIN: ICD-10-CM

## 2020-06-26 DIAGNOSIS — Z96.652 PRESENCE OF LEFT ARTIFICIAL KNEE JOINT: ICD-10-CM

## 2020-06-26 DIAGNOSIS — L30.9 DERMATITIS, UNSPECIFIED: ICD-10-CM

## 2020-06-26 DIAGNOSIS — Z88.1 ALLERGY STATUS TO OTHER ANTIBIOTIC AGENTS STATUS: ICD-10-CM

## 2020-06-26 DIAGNOSIS — K21.9 GASTRO-ESOPHAGEAL REFLUX DISEASE WITHOUT ESOPHAGITIS: ICD-10-CM

## 2020-06-26 DIAGNOSIS — M25.512 PAIN IN LEFT SHOULDER: ICD-10-CM

## 2020-07-07 LAB
CULTURE RESULTS: SIGNIFICANT CHANGE UP
GRAM STN FLD: SIGNIFICANT CHANGE UP
ORGANISM # SPEC MICROSCOPIC CNT: SIGNIFICANT CHANGE UP
ORGANISM # SPEC MICROSCOPIC CNT: SIGNIFICANT CHANGE UP
SPECIMEN SOURCE: SIGNIFICANT CHANGE UP

## 2020-07-07 NOTE — PATIENT PROFILE ADULT - LAST BOWEL MOVEMENT DATE
Pt arrives to ED c/o left sided neck pain x3 weeks. Pt states he had major accident several years ago, thinks he \"tweaked\" something. A&ox4 denies other complaints.   
11-Mar-2019

## 2020-09-17 ENCOUNTER — OUTPATIENT (OUTPATIENT)
Dept: OUTPATIENT SERVICES | Facility: HOSPITAL | Age: 75
LOS: 1 days | Discharge: HOME | End: 2020-09-17
Payer: OTHER MISCELLANEOUS

## 2020-09-17 DIAGNOSIS — Z96.659 PRESENCE OF UNSPECIFIED ARTIFICIAL KNEE JOINT: Chronic | ICD-10-CM

## 2020-09-17 DIAGNOSIS — Z01.818 ENCOUNTER FOR OTHER PREPROCEDURAL EXAMINATION: ICD-10-CM

## 2020-09-17 DIAGNOSIS — Z96.651 PRESENCE OF RIGHT ARTIFICIAL KNEE JOINT: Chronic | ICD-10-CM

## 2020-09-17 DIAGNOSIS — Z96.612 PRESENCE OF LEFT ARTIFICIAL SHOULDER JOINT: ICD-10-CM

## 2020-09-17 DIAGNOSIS — Z98.890 OTHER SPECIFIED POSTPROCEDURAL STATES: Chronic | ICD-10-CM

## 2020-09-17 DIAGNOSIS — Z90.49 ACQUIRED ABSENCE OF OTHER SPECIFIED PARTS OF DIGESTIVE TRACT: Chronic | ICD-10-CM

## 2020-09-17 DIAGNOSIS — Z96.611 PRESENCE OF RIGHT ARTIFICIAL SHOULDER JOINT: Chronic | ICD-10-CM

## 2020-09-17 LAB
MRSA PCR RESULT.: SIGNIFICANT CHANGE UP
S AUREUS DNA NOSE QL NAA+PROBE: SIGNIFICANT CHANGE UP

## 2020-09-17 PROCEDURE — 93010 ELECTROCARDIOGRAM REPORT: CPT

## 2020-09-17 NOTE — H&P PST ADULT - HISTORY OF PRESENT ILLNESS
73 yo female s/p left shoulder arthroplasty , now c/o decrease movement and severe pain - scheduled for left shoulder arthroscopy 76 yo female s/p left shoulder arthroplasty ,  developed infection - required antibiotic spacer and IV antibiotic - nos scheduled for removal of left shoulder spacer , incision and drainage , revision of left shoulder arthroplasty    denies recent travels in the past 30 days. No fever, SOB, cough, flu like symptoms or body rash- covid screen

## 2020-09-17 NOTE — H&P PST ADULT - ASSESSMENT
left shoulder pain  CAPRINI SCORE    AGE RELATED RISK FACTORS                                                       MOBILITY RELATED FACTORS  [ ] Age 41-60 years                                            (1 Point)                  [ ] Bed rest                                                        (1 Point)  [ ] Age: 61-74 years                                           (2 Points)                [ ] Plaster cast                                                   (2 Points)  [x ] Age= 75 years                                              (3 Points)                 [ ] Bed bound for more than 72 hours                   (2 Points)    DISEASE RELATED RISK FACTORS                                               GENDER SPECIFIC FACTORS  [ ] Edema in the lower extremities                       (1 Point)                  [ ] Pregnancy                                                     (1 Point)  [ ] Varicose veins                                               (1 Point)                  [ ] Post-partum < 6 weeks                                   (1 Point)             x[ ] BMI > 25 Kg/m2                                            (1 Point)                  [ ] Hormonal therapy  or oral contraception            (1 Point)                 [ ] Sepsis (in the previous month)                        (1 Point)                  [ ] History of pregnancy complications  [ ] Pneumonia or serious lung disease                                               [ ] Unexplained or recurrent                       (1 Point)           (in the previous month)                               (1 Point)  [ ] Abnormal pulmonary function test                     (1 Point)                 SURGERY RELATED RISK FACTORS  [ ] Acute myocardial infarction                              (1 Point)                 [ ]  Section                                            (1 Point)  [ ] Congestive heart failure (in the previous month)  (1 Point)                 [ ] Minor surgery                                                 (1 Point)   [ ] Inflammatory bowel disease                             (1 Point)                 [ ] Arthroscopic surgery                                        (2 Points)  [ ] Central venous access                                    (2 Points)                [ ] General surgery lasting more than 45 minutes   (2 Points)       [ ] Stroke (in the previous month)                          (5 Points)               [x ] Elective arthroplasty                                        (5 Points)                                                                                                                                               HEMATOLOGY RELATED FACTORS                                                 TRAUMA RELATED RISK FACTORS  [ ] Prior episodes of VTE                                     (3 Points)                 [ ] Fracture of the hip, pelvis, or leg                       (5 Points)  [ ] Positive family history for VTE                         (3 Points)                 [ ] Acute spinal cord injury (in the previous month)  (5 Points)  [ ] Prothrombin 03043 A                                      (3 Points)                 [ ] Paralysis  (less than 1 month)                          (5 Points)  [ ] Factor V Leiden                                             (3 Points)                 [ ] Multiple Trauma within 1 month                         (5 Points)  [ ] Lupus anticoagulants                                     (3 Points)                                                           [ ] Anticardiolipin antibodies                                (3 Points)                                                       [ ] High homocysteine in the blood                      (3 Points)                                             [ ] Other congenital or acquired thrombophilia       (3 Points)                                                [ ] Heparin induced thrombocytopenia                  (3 Points)                                          Total Score [       9   ]  Caprini Score 0-2: Low risk, No VTE Prophylaxis required for most patient's, encourage ambulation  Caprini Score 3-6: At Risk, Pharmacologic VTE prophylaxis is indicated for most patients ( in the absence of a contraindication)  Caprini Score Greater than or = 7: High Risk , pharmacologic VTE is indicated for most patients ( in the absence of a contraindication)    Caprini score indicates that the patient is high risk for VTE event ( score 6 or greater). Surgical patient's in this group will benefit from both pharmacologic prophylaxis and intermittent compression devices . Surgical team will determine the balance between VTE  risk and bleeding risk and other clinical considerations

## 2020-09-18 DIAGNOSIS — E78.5 HYPERLIPIDEMIA, UNSPECIFIED: ICD-10-CM

## 2020-09-18 DIAGNOSIS — Z01.818 ENCOUNTER FOR OTHER PREPROCEDURAL EXAMINATION: ICD-10-CM

## 2020-09-18 DIAGNOSIS — I10 ESSENTIAL (PRIMARY) HYPERTENSION: ICD-10-CM

## 2020-09-18 DIAGNOSIS — M25.512 PAIN IN LEFT SHOULDER: ICD-10-CM

## 2020-09-27 ENCOUNTER — TRANSCRIPTION ENCOUNTER (OUTPATIENT)
Age: 75
End: 2020-09-27

## 2020-09-28 ENCOUNTER — INPATIENT (INPATIENT)
Facility: HOSPITAL | Age: 75
LOS: 0 days | Discharge: HOME | End: 2020-09-29
Attending: ORTHOPAEDIC SURGERY | Admitting: ORTHOPAEDIC SURGERY
Payer: OTHER MISCELLANEOUS

## 2020-09-28 VITALS
RESPIRATION RATE: 14 BRPM | DIASTOLIC BLOOD PRESSURE: 59 MMHG | SYSTOLIC BLOOD PRESSURE: 125 MMHG | OXYGEN SATURATION: 97 % | HEART RATE: 81 BPM | TEMPERATURE: 98 F | HEIGHT: 64 IN | WEIGHT: 268.96 LBS

## 2020-09-28 DIAGNOSIS — Z96.611 PRESENCE OF RIGHT ARTIFICIAL SHOULDER JOINT: Chronic | ICD-10-CM

## 2020-09-28 DIAGNOSIS — Z98.890 OTHER SPECIFIED POSTPROCEDURAL STATES: Chronic | ICD-10-CM

## 2020-09-28 DIAGNOSIS — Z96.659 PRESENCE OF UNSPECIFIED ARTIFICIAL KNEE JOINT: Chronic | ICD-10-CM

## 2020-09-28 DIAGNOSIS — Z90.49 ACQUIRED ABSENCE OF OTHER SPECIFIED PARTS OF DIGESTIVE TRACT: Chronic | ICD-10-CM

## 2020-09-28 DIAGNOSIS — Z96.651 PRESENCE OF RIGHT ARTIFICIAL KNEE JOINT: Chronic | ICD-10-CM

## 2020-09-28 RX ORDER — ATORVASTATIN CALCIUM 80 MG/1
40 TABLET, FILM COATED ORAL AT BEDTIME
Refills: 0 | Status: DISCONTINUED | OUTPATIENT
Start: 2020-09-28 | End: 2020-09-29

## 2020-09-28 RX ORDER — VANCOMYCIN HCL 1 G
1750 VIAL (EA) INTRAVENOUS ONCE
Refills: 0 | Status: COMPLETED | OUTPATIENT
Start: 2020-09-28 | End: 2020-09-28

## 2020-09-28 RX ORDER — OXYCODONE HYDROCHLORIDE 5 MG/1
10 TABLET ORAL EVERY 4 HOURS
Refills: 0 | Status: DISCONTINUED | OUTPATIENT
Start: 2020-09-28 | End: 2020-09-28

## 2020-09-28 RX ORDER — TRANEXAMIC ACID 100 MG/ML
1000 INJECTION, SOLUTION INTRAVENOUS ONCE
Refills: 0 | Status: COMPLETED | OUTPATIENT
Start: 2020-09-28 | End: 2020-09-28

## 2020-09-28 RX ORDER — ACETAMINOPHEN 500 MG
1000 TABLET ORAL ONCE
Refills: 0 | Status: COMPLETED | OUTPATIENT
Start: 2020-09-28 | End: 2020-09-29

## 2020-09-28 RX ORDER — ONDANSETRON 8 MG/1
4 TABLET, FILM COATED ORAL EVERY 6 HOURS
Refills: 0 | Status: DISCONTINUED | OUTPATIENT
Start: 2020-09-28 | End: 2020-09-29

## 2020-09-28 RX ORDER — SODIUM CHLORIDE 9 MG/ML
3 INJECTION INTRAMUSCULAR; INTRAVENOUS; SUBCUTANEOUS EVERY 8 HOURS
Refills: 0 | Status: DISCONTINUED | OUTPATIENT
Start: 2020-09-28 | End: 2020-09-28

## 2020-09-28 RX ORDER — ASPIRIN/CALCIUM CARB/MAGNESIUM 324 MG
325 TABLET ORAL DAILY
Refills: 0 | Status: DISCONTINUED | OUTPATIENT
Start: 2020-09-28 | End: 2020-09-28

## 2020-09-28 RX ORDER — OXYCODONE HYDROCHLORIDE 5 MG/1
5 TABLET ORAL EVERY 4 HOURS
Refills: 0 | Status: DISCONTINUED | OUTPATIENT
Start: 2020-09-28 | End: 2020-09-28

## 2020-09-28 RX ORDER — SODIUM CHLORIDE 9 MG/ML
1000 INJECTION INTRAMUSCULAR; INTRAVENOUS; SUBCUTANEOUS
Refills: 0 | Status: DISCONTINUED | OUTPATIENT
Start: 2020-09-28 | End: 2020-09-29

## 2020-09-28 RX ORDER — VANCOMYCIN HCL 1 G
1500 VIAL (EA) INTRAVENOUS EVERY 12 HOURS
Refills: 0 | Status: DISCONTINUED | OUTPATIENT
Start: 2020-09-28 | End: 2020-09-28

## 2020-09-28 RX ORDER — HYDROMORPHONE HYDROCHLORIDE 2 MG/ML
1 INJECTION INTRAMUSCULAR; INTRAVENOUS; SUBCUTANEOUS
Refills: 0 | Status: DISCONTINUED | OUTPATIENT
Start: 2020-09-28 | End: 2020-09-28

## 2020-09-28 RX ORDER — OXYCODONE HYDROCHLORIDE 5 MG/1
15 TABLET ORAL EVERY 4 HOURS
Refills: 0 | Status: DISCONTINUED | OUTPATIENT
Start: 2020-09-28 | End: 2020-09-29

## 2020-09-28 RX ORDER — HYDROMORPHONE HYDROCHLORIDE 2 MG/ML
0.5 INJECTION INTRAMUSCULAR; INTRAVENOUS; SUBCUTANEOUS
Refills: 0 | Status: DISCONTINUED | OUTPATIENT
Start: 2020-09-28 | End: 2020-09-28

## 2020-09-28 RX ORDER — SODIUM CHLORIDE 9 MG/ML
1000 INJECTION, SOLUTION INTRAVENOUS ONCE
Refills: 0 | Status: COMPLETED | OUTPATIENT
Start: 2020-09-28 | End: 2020-09-28

## 2020-09-28 RX ORDER — VANCOMYCIN HCL 1 G
1500 VIAL (EA) INTRAVENOUS ONCE
Refills: 0 | Status: COMPLETED | OUTPATIENT
Start: 2020-09-28 | End: 2020-09-28

## 2020-09-28 RX ORDER — SENNA PLUS 8.6 MG/1
2 TABLET ORAL AT BEDTIME
Refills: 0 | Status: DISCONTINUED | OUTPATIENT
Start: 2020-09-28 | End: 2020-09-28

## 2020-09-28 RX ORDER — SODIUM CHLORIDE 9 MG/ML
1000 INJECTION, SOLUTION INTRAVENOUS
Refills: 0 | Status: DISCONTINUED | OUTPATIENT
Start: 2020-09-28 | End: 2020-09-28

## 2020-09-28 RX ORDER — ACETAMINOPHEN 500 MG
1000 TABLET ORAL ONCE
Refills: 0 | Status: COMPLETED | OUTPATIENT
Start: 2020-09-28 | End: 2020-09-28

## 2020-09-28 RX ORDER — SODIUM CHLORIDE 9 MG/ML
500 INJECTION, SOLUTION INTRAVENOUS
Refills: 0 | Status: DISCONTINUED | OUTPATIENT
Start: 2020-09-28 | End: 2020-09-29

## 2020-09-28 RX ORDER — OXYCODONE HYDROCHLORIDE 5 MG/1
10 TABLET ORAL EVERY 4 HOURS
Refills: 0 | Status: DISCONTINUED | OUTPATIENT
Start: 2020-09-28 | End: 2020-09-29

## 2020-09-28 RX ORDER — ONDANSETRON 8 MG/1
4 TABLET, FILM COATED ORAL ONCE
Refills: 0 | Status: DISCONTINUED | OUTPATIENT
Start: 2020-09-28 | End: 2020-09-28

## 2020-09-28 RX ADMIN — HYDROMORPHONE HYDROCHLORIDE 0.5 MILLIGRAM(S): 2 INJECTION INTRAMUSCULAR; INTRAVENOUS; SUBCUTANEOUS at 15:15

## 2020-09-28 RX ADMIN — SODIUM CHLORIDE 75 MILLILITER(S): 9 INJECTION INTRAMUSCULAR; INTRAVENOUS; SUBCUTANEOUS at 19:45

## 2020-09-28 RX ADMIN — Medication 1000 MILLIGRAM(S): at 17:09

## 2020-09-28 RX ADMIN — Medication 400 MILLIGRAM(S): at 16:47

## 2020-09-28 RX ADMIN — OXYCODONE HYDROCHLORIDE 10 MILLIGRAM(S): 5 TABLET ORAL at 19:43

## 2020-09-28 RX ADMIN — SODIUM CHLORIDE 1000 MILLILITER(S): 9 INJECTION, SOLUTION INTRAVENOUS at 15:13

## 2020-09-28 RX ADMIN — OXYCODONE HYDROCHLORIDE 10 MILLIGRAM(S): 5 TABLET ORAL at 20:40

## 2020-09-28 RX ADMIN — SODIUM CHLORIDE 100 MILLILITER(S): 9 INJECTION, SOLUTION INTRAVENOUS at 14:25

## 2020-09-28 RX ADMIN — OXYCODONE HYDROCHLORIDE 15 MILLIGRAM(S): 5 TABLET ORAL at 23:44

## 2020-09-28 RX ADMIN — ATORVASTATIN CALCIUM 40 MILLIGRAM(S): 80 TABLET, FILM COATED ORAL at 21:28

## 2020-09-28 RX ADMIN — HYDROMORPHONE HYDROCHLORIDE 0.5 MILLIGRAM(S): 2 INJECTION INTRAMUSCULAR; INTRAVENOUS; SUBCUTANEOUS at 14:57

## 2020-09-28 RX ADMIN — SODIUM CHLORIDE 500 MILLILITER(S): 9 INJECTION, SOLUTION INTRAVENOUS at 17:17

## 2020-09-28 RX ADMIN — TRANEXAMIC ACID 220 MILLIGRAM(S): 100 INJECTION, SOLUTION INTRAVENOUS at 14:45

## 2020-09-28 RX ADMIN — Medication 250 MILLIGRAM(S): at 23:01

## 2020-09-28 RX ADMIN — Medication 300 MILLIGRAM(S): at 11:20

## 2020-09-28 NOTE — BRIEF OPERATIVE NOTE - NSICDXBRIEFPROCEDURE_GEN_ALL_CORE_FT
PROCEDURES:  Reverse total shoulder arthroplasty 28-Sep-2020 14:08:04  China Gonzalez  Arthrotomy of shoulder for infection 28-Sep-2020 14:07:53  China Gonzalez

## 2020-09-28 NOTE — PHYSICAL THERAPY INITIAL EVALUATION ADULT - LIVES WITH, PROFILE
daughter in pvt house 3 steps to enter. Daughter will be able to help with recovery after sx.. daughter in pvt house 3 steps to enter. Pt will stay on 1st level. Pt ambulates ad diogo with AD. Daughter will be able to help with recovery after sx..

## 2020-09-29 ENCOUNTER — TRANSCRIPTION ENCOUNTER (OUTPATIENT)
Age: 75
End: 2020-09-29

## 2020-09-29 VITALS
RESPIRATION RATE: 16 BRPM | OXYGEN SATURATION: 96 % | TEMPERATURE: 99 F | SYSTOLIC BLOOD PRESSURE: 153 MMHG | HEART RATE: 91 BPM | DIASTOLIC BLOOD PRESSURE: 61 MMHG

## 2020-09-29 LAB
ALBUMIN SERPL ELPH-MCNC: 2.8 G/DL — LOW (ref 3.3–5)
ALP SERPL-CCNC: 57 U/L — SIGNIFICANT CHANGE UP (ref 40–120)
ALT FLD-CCNC: 26 U/L — SIGNIFICANT CHANGE UP (ref 12–78)
ANION GAP SERPL CALC-SCNC: 10 MMOL/L — SIGNIFICANT CHANGE UP (ref 5–17)
AST SERPL-CCNC: 29 U/L — SIGNIFICANT CHANGE UP (ref 15–37)
BASOPHILS # BLD AUTO: 0.04 K/UL — SIGNIFICANT CHANGE UP (ref 0–0.2)
BASOPHILS NFR BLD AUTO: 0.2 % — SIGNIFICANT CHANGE UP (ref 0–2)
BILIRUB SERPL-MCNC: 0.5 MG/DL — SIGNIFICANT CHANGE UP (ref 0.2–1.2)
BUN SERPL-MCNC: 25 MG/DL — HIGH (ref 7–23)
CALCIUM SERPL-MCNC: 8.5 MG/DL — SIGNIFICANT CHANGE UP (ref 8.5–10.1)
CHLORIDE SERPL-SCNC: 101 MMOL/L — SIGNIFICANT CHANGE UP (ref 96–108)
CO2 SERPL-SCNC: 23 MMOL/L — SIGNIFICANT CHANGE UP (ref 22–31)
CREAT SERPL-MCNC: 1.32 MG/DL — HIGH (ref 0.5–1.3)
EOSINOPHIL # BLD AUTO: 0.03 K/UL — SIGNIFICANT CHANGE UP (ref 0–0.5)
EOSINOPHIL NFR BLD AUTO: 0.2 % — SIGNIFICANT CHANGE UP (ref 0–6)
GLUCOSE SERPL-MCNC: 119 MG/DL — HIGH (ref 70–99)
HCT VFR BLD CALC: 35 % — SIGNIFICANT CHANGE UP (ref 34.5–45)
HGB BLD-MCNC: 11.4 G/DL — LOW (ref 11.5–15.5)
IMM GRANULOCYTES NFR BLD AUTO: 0.6 % — SIGNIFICANT CHANGE UP (ref 0–1.5)
LYMPHOCYTES # BLD AUTO: 1.39 K/UL — SIGNIFICANT CHANGE UP (ref 1–3.3)
LYMPHOCYTES # BLD AUTO: 8.1 % — LOW (ref 13–44)
MCHC RBC-ENTMCNC: 29.6 PG — SIGNIFICANT CHANGE UP (ref 27–34)
MCHC RBC-ENTMCNC: 32.6 GM/DL — SIGNIFICANT CHANGE UP (ref 32–36)
MCV RBC AUTO: 90.9 FL — SIGNIFICANT CHANGE UP (ref 80–100)
MONOCYTES # BLD AUTO: 0.89 K/UL — SIGNIFICANT CHANGE UP (ref 0–0.9)
MONOCYTES NFR BLD AUTO: 5.2 % — SIGNIFICANT CHANGE UP (ref 2–14)
NEUTROPHILS # BLD AUTO: 14.66 K/UL — HIGH (ref 1.8–7.4)
NEUTROPHILS NFR BLD AUTO: 85.7 % — HIGH (ref 43–77)
NRBC # BLD: 0 /100 WBCS — SIGNIFICANT CHANGE UP (ref 0–0)
PLATELET # BLD AUTO: 367 K/UL — SIGNIFICANT CHANGE UP (ref 150–400)
POTASSIUM SERPL-MCNC: 3.9 MMOL/L — SIGNIFICANT CHANGE UP (ref 3.5–5.3)
POTASSIUM SERPL-SCNC: 3.9 MMOL/L — SIGNIFICANT CHANGE UP (ref 3.5–5.3)
PROT SERPL-MCNC: 6.4 GM/DL — SIGNIFICANT CHANGE UP (ref 6–8.3)
RBC # BLD: 3.85 M/UL — SIGNIFICANT CHANGE UP (ref 3.8–5.2)
RBC # FLD: 15.4 % — HIGH (ref 10.3–14.5)
SODIUM SERPL-SCNC: 134 MMOL/L — LOW (ref 135–145)
WBC # BLD: 17.11 K/UL — HIGH (ref 3.8–10.5)
WBC # FLD AUTO: 17.11 K/UL — HIGH (ref 3.8–10.5)

## 2020-09-29 PROCEDURE — 73030 X-RAY EXAM OF SHOULDER: CPT | Mod: 26,LT

## 2020-09-29 RX ORDER — OLMESARTAN MEDOXOMIL-HYDROCHLOROTHIAZIDE 25; 40 MG/1; MG/1
1 TABLET, FILM COATED ORAL
Qty: 0 | Refills: 0 | DISCHARGE

## 2020-09-29 RX ORDER — SUCRALFATE 1 G
1 TABLET ORAL
Refills: 0 | Status: DISCONTINUED | OUTPATIENT
Start: 2020-09-29 | End: 2020-09-29

## 2020-09-29 RX ORDER — HYDROCHLOROTHIAZIDE 25 MG
12.5 TABLET ORAL DAILY
Refills: 0 | Status: DISCONTINUED | OUTPATIENT
Start: 2020-09-29 | End: 2020-09-29

## 2020-09-29 RX ORDER — HYDROMORPHONE HYDROCHLORIDE 2 MG/ML
2 INJECTION INTRAMUSCULAR; INTRAVENOUS; SUBCUTANEOUS
Refills: 0 | Status: DISCONTINUED | OUTPATIENT
Start: 2020-09-29 | End: 2020-09-29

## 2020-09-29 RX ORDER — OXYCODONE HYDROCHLORIDE 5 MG/1
1 TABLET ORAL
Qty: 30 | Refills: 0
Start: 2020-09-29 | End: 2020-10-03

## 2020-09-29 RX ORDER — ASPIRIN/CALCIUM CARB/MAGNESIUM 324 MG
325 TABLET ORAL DAILY
Refills: 0 | Status: DISCONTINUED | OUTPATIENT
Start: 2020-09-29 | End: 2020-09-29

## 2020-09-29 RX ORDER — LOSARTAN POTASSIUM 100 MG/1
100 TABLET, FILM COATED ORAL DAILY
Refills: 0 | Status: DISCONTINUED | OUTPATIENT
Start: 2020-09-29 | End: 2020-09-29

## 2020-09-29 RX ORDER — HYDROMORPHONE HYDROCHLORIDE 2 MG/ML
4 INJECTION INTRAMUSCULAR; INTRAVENOUS; SUBCUTANEOUS
Refills: 0 | Status: DISCONTINUED | OUTPATIENT
Start: 2020-09-29 | End: 2020-09-29

## 2020-09-29 RX ORDER — ASPIRIN/CALCIUM CARB/MAGNESIUM 324 MG
1 TABLET ORAL
Qty: 14 | Refills: 0
Start: 2020-09-29 | End: 2020-10-12

## 2020-09-29 RX ORDER — HYDROMORPHONE HYDROCHLORIDE 2 MG/ML
1 INJECTION INTRAMUSCULAR; INTRAVENOUS; SUBCUTANEOUS
Qty: 30 | Refills: 0
Start: 2020-09-29 | End: 2020-10-03

## 2020-09-29 RX ADMIN — OXYCODONE HYDROCHLORIDE 15 MILLIGRAM(S): 5 TABLET ORAL at 06:04

## 2020-09-29 RX ADMIN — OXYCODONE HYDROCHLORIDE 15 MILLIGRAM(S): 5 TABLET ORAL at 10:03

## 2020-09-29 RX ADMIN — OXYCODONE HYDROCHLORIDE 15 MILLIGRAM(S): 5 TABLET ORAL at 07:00

## 2020-09-29 RX ADMIN — HYDROMORPHONE HYDROCHLORIDE 4 MILLIGRAM(S): 2 INJECTION INTRAMUSCULAR; INTRAVENOUS; SUBCUTANEOUS at 15:33

## 2020-09-29 RX ADMIN — OXYCODONE HYDROCHLORIDE 15 MILLIGRAM(S): 5 TABLET ORAL at 00:40

## 2020-09-29 RX ADMIN — ONDANSETRON 4 MILLIGRAM(S): 8 TABLET, FILM COATED ORAL at 10:55

## 2020-09-29 RX ADMIN — HYDROMORPHONE HYDROCHLORIDE 4 MILLIGRAM(S): 2 INJECTION INTRAMUSCULAR; INTRAVENOUS; SUBCUTANEOUS at 16:20

## 2020-09-29 RX ADMIN — Medication 1 TABLET(S): at 10:54

## 2020-09-29 RX ADMIN — Medication 325 MILLIGRAM(S): at 10:54

## 2020-09-29 RX ADMIN — SODIUM CHLORIDE 75 MILLILITER(S): 9 INJECTION INTRAMUSCULAR; INTRAVENOUS; SUBCUTANEOUS at 10:03

## 2020-09-29 RX ADMIN — Medication 400 MILLIGRAM(S): at 05:10

## 2020-09-29 RX ADMIN — ONDANSETRON 4 MILLIGRAM(S): 8 TABLET, FILM COATED ORAL at 05:06

## 2020-09-29 RX ADMIN — Medication 1000 MILLIGRAM(S): at 05:40

## 2020-09-29 RX ADMIN — OXYCODONE HYDROCHLORIDE 15 MILLIGRAM(S): 5 TABLET ORAL at 10:49

## 2020-09-29 RX ADMIN — HYDROMORPHONE HYDROCHLORIDE 4 MILLIGRAM(S): 2 INJECTION INTRAMUSCULAR; INTRAVENOUS; SUBCUTANEOUS at 20:05

## 2020-09-29 RX ADMIN — HYDROMORPHONE HYDROCHLORIDE 4 MILLIGRAM(S): 2 INJECTION INTRAMUSCULAR; INTRAVENOUS; SUBCUTANEOUS at 21:05

## 2020-09-29 NOTE — OCCUPATIONAL THERAPY INITIAL EVALUATION ADULT - RANGE OF MOTION, PT EVAL
Pt will increase ROM in left elbow by 10 degrees to safely and independently perform self care tasks within 30 days .

## 2020-09-29 NOTE — CONSULT NOTE ADULT - ASSESSMENT
75  year old female patient with medical history of s/p left shoulder arthroplasty ,  developed infection - required antibiotic spacer and IV antibiotic - nos scheduled for removal of left shoulder spacer , incision and drainage , revision of left shoulder arthroplasty.  denies recent travels in the past 30 days. No fever, SOB, cough, flu like symptoms or body rash- covid screen.  ID was called for further recommendations.    Patient recently admitted on June 2020 because of left shoulder pain/ Prosthetic shoulder infection.  Discharge on  Merrem and Vanco for 6 weeks, until  7/27 .    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  denies recent travels in the past 30 days. No fever, SOB, cough, flu like symptoms or body rash- covid screen.  ID was called for further recommendations.    Patient recently admitted on June 2020 because of left shoulder pain/ Prosthetic shoulder infection.  Discharge on  Merrem and Vanco for 6 weeks, until  7/27 .    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

## 2020-09-29 NOTE — OCCUPATIONAL THERAPY INITIAL EVALUATION ADULT - PRECAUTIONS/LIMITATIONS, REHAB EVAL
fall precautions/hearing precautions/Pt is [place]; NO ROM to left shoulder, ROM to left elbow and hand is allowed/obesity precautions

## 2020-09-29 NOTE — DISCHARGE NOTE PROVIDER - NSDCFUADDAPPT_GEN_ALL_CORE_FT
Follow up with your surgeon in two weeks. Call for appointment.  If you need more pain medication, call your surgeon's office. For medication refills or authorizations, please call [phone]  We recommend that you call and schedule a follow up appointment with your primary care physician for repeat blood work (CBC and BMP) for post hospital discharge follow-up care.  Call your surgeon if you have increased redness/pain/drainage or fever. Return to ER for shortness of breath/calf tenderness.

## 2020-09-29 NOTE — CONSULT NOTE ADULT - NUTRITIONAL ASSESSMENT
Rec:    -Continue Vanco  -Follow up OR cx  -BC x2    Thank you for the courtesy of this consultation.  Patient will be follow up closely with you. Rec:    -Continue Vanco for now  like dc antibiotics before discharge.  Patient is s/p prolong antibiotics therapy.  all cultures negative  -Follow up OR cx  -BC x2    Thank you for the courtesy of this consultation.  Patient will be follow up closely with you.

## 2020-09-29 NOTE — OCCUPATIONAL THERAPY INITIAL EVALUATION ADULT - PERTINENT HX OF CURRENT PROBLEM, REHAB EVAL
Pt is a 74 y/o female admitted for elective surgery for left  Reverse total shoulder arthroplasty and Arthrotomy of left shoulder on 9/28/2020 due to infection

## 2020-09-29 NOTE — DISCHARGE NOTE PROVIDER - HOSPITAL COURSE
75yFemale with history of Left shoulder OA presenting for L reverse TSA by Dr. Eaton on 9/28/2020. Risk and benefits of surgery were explained to the patient. The patient understood and agreed to proceed with surgery. Patient underwent the procedure with no intraoperative complications. Pt was brought in stable condition to the PACU. Once stable in PACU, pt was brought to the floor. During hospital stay pt was followed by Medicine, physical therapy, Home Care during this admission. Pt had an uneventful hospital course. Pt is stable for discharge to home on POD#1

## 2020-09-29 NOTE — CONSULT NOTE ADULT - SUBJECTIVE AND OBJECTIVE BOX
HPI:  75  year old female patient with medical history of s/p left shoulder arthroplasty ,  developed infection - required antibiotic spacer and IV antibiotic - nos scheduled for removal of left shoulder spacer , incision and drainage , revision of left shoulder arthroplasty   denies recent travels in the past 30 days. No fever, SOB, cough, flu like symptoms or body rash- covid screen      PAST MEDICAL & SURGICAL HISTORY:  Eczema    Obese    GERD (gastroesophageal reflux disease)    Hyperlipemia    Essential hypertension    S/P arthroscopy of shoulder  left shoulder    S/P knee replacement  2004 left knee and revision    S/P cholecystectomy    S/P knee replacement  Left ( 2007 )    S/P total knee arthroplasty, right    Status post total shoulder arthroplasty, right  and left        SOCHX:   tobacco,  -  alcohol    FMHX: FA/MO  - contributory       Recent Travel:    Immunizations:    Allergies    Bee Stings (Anaphylaxis)  ceftriaxone (Rash)  Cipro (Unknown)  Flagyl (Diarrhea; Rash)  hibiclens - skin swelling (Other)  meropenem-vaborbactam (Flushing)    Intolerances        MEDICATIONS  (STANDING):  aspirin 325 milliGRAM(s) Oral daily  atorvastatin 40 milliGRAM(s) Oral at bedtime  lactated ringers. 500 milliLiter(s) (1000 mL/Hr) IV Continuous <Continuous>  multivitamin 1 Tablet(s) Oral daily  sodium chloride 0.9%. 1000 milliLiter(s) (75 mL/Hr) IV Continuous <Continuous>  sucralfate 1 Gram(s) Oral four times a day    MEDICATIONS  (PRN):  HYDROmorphone   Tablet 2 milliGRAM(s) Oral every 3 hours PRN pain (0-5)  HYDROmorphone   Tablet 4 milliGRAM(s) Oral every 3 hours PRN pain (6-10)  ondansetron Injectable 4 milliGRAM(s) IV Push every 6 hours PRN Nausea      REVIEW OF SYSTEMS:  CONSTITUTIONAL: No fever, weight loss, or fatigue  EYES: No eye pain, visual disturbances, or discharge  ENMT:  No difficulty hearing, tinnitus, vertigo; No sinus or throat pain  NECK: No pain or stiffness  BREASTS: No pain, masses, or nipple discharge  RESPIRATORY: No cough, wheezing, chills or hemoptysis; No shortness of breath  CARDIOVASCULAR: No chest pain, palpitations, dizziness, or leg swelling  GASTROINTESTINAL: No abdominal or epigastric pain. No nausea, vomiting, or hematemesis; No diarrhea or constipation. No melena or hematochezia.  GENITOURINARY: No dysuria, frequency, hematuria, or incontinence  NEUROLOGICAL: No headaches, memory loss, loss of strength, numbness, or tremors  SKIN: No itching, burning, rashes, or lesions   LYMPH NODES: No enlarged glands  ENDOCRINE: No heat or cold intolerance; No hair loss  MUSCULOSKELETAL: No joint pain or swelling; No muscle, back, or extremity pain  PSYCHIATRIC: No depression, anxiety, mood swings, or difficulty sleeping  HEME/LYMPH: No easy bruising, or bleeding gums  ALLERGY AND IMMUNOLOGIC: No hives or eczema    VITAL SIGNS:    T(C): 36.6 (09-29-20 @ 08:47), Max: 36.9 (09-28-20 @ 14:05)  T(F): 97.8 (09-29-20 @ 08:47), Max: 98.4 (09-28-20 @ 14:05)  HR: 81 (09-29-20 @ 09:20) (70 - 89)  BP: 144/56 (09-29-20 @ 09:20) (89/67 - 144/56)  RR: 19 (09-29-20 @ 08:47) (12 - 20)  SpO2: 94% (09-29-20 @ 09:20) (92% - 100%)    PHYSICAL EXAM:    GENERAL: NAD, well-groomed, well-developed  HEAD:  Atraumatic, Normocephalic  EYES: EOMI, PERRLA, conjunctiva and sclera clear  ENMT: No tonsillar erythema, exudates, or enlargement; Moist mucous membranes, Good dentition, No lesions  NECK: Supple, No JVD, Normal thyroid  NERVOUS SYSTEM:  Alert & Oriented X3, Good concentration; Motor Strength 5/5 B/L upper and lower extremities; DTRs 2+ intact and symmetric  CHEST/LUNG: Clear bilaterally; No rales, rhonchi, wheezing bilaterally  HEART: Regular rate and rhythm; No murmurs, rubs, or gallops  ABDOMEN: Soft, Nontender, Nondistended; Bowel sounds present  EXTREMITIES:  2+ Peripheral Pulses, No clubbing, cyanosis, or edema  LYMPH: No lymphadenopathy noted  SKIN: No rashes or lesions  BACK: no pressor sore     LABS:                         11.4   17.11 )-----------( 367      ( 29 Sep 2020 06:26 )             35.0     09-29    134<L>  |  101  |  25<H>  ----------------------------<  119<H>  3.9   |  23  |  1.32<H>    Ca    8.5      29 Sep 2020 06:26    TPro  6.4  /  Alb  2.8<L>  /  TBili  0.5  /  DBili  x   /  AST  29  /  ALT  26  /  AlkPhos  57  09-29    LIVER FUNCTIONS - ( 29 Sep 2020 06:26 )  Alb: 2.8 g/dL / Pro: 6.4 gm/dL / ALK PHOS: 57 U/L / ALT: 26 U/L / AST: 29 U/L / GGT: x             Radiology:       HPI:  75  year old female patient with medical history of s/p left shoulder arthroplasty ,  developed infection - required antibiotic spacer and IV antibiotic - nos scheduled for removal of left shoulder spacer , incision and drainage , revision of left shoulder arthroplasty   denies recent travels in the past 30 days. No fever, SOB, cough, flu like symptoms or body rash- covid screen      PAST MEDICAL & SURGICAL HISTORY:  Eczema    Obese    GERD (gastroesophageal reflux disease)    Hyperlipemia    Essential hypertension    S/P arthroscopy of shoulder  left shoulder    S/P knee replacement  2004 left knee and revision    S/P cholecystectomy    S/P knee replacement  Left ( 2007 )    S/P total knee arthroplasty, right    Status post total shoulder arthroplasty, right  and left        SOCHX:   tobacco,  -  alcohol    FMHX: FA/MO  - contributory       Recent Travel:    Immunizations:    Allergies    Bee Stings (Anaphylaxis)  ceftriaxone (Rash)  Cipro (Unknown)  Flagyl (Diarrhea; Rash)  hibiclens - skin swelling (Other)  meropenem-vaborbactam (Fl[place])    Intolerances        MEDICATIONS  (STANDING):  aspirin 325 milliGRAM(s) Oral daily  atorvastatin 40 milliGRAM(s) Oral at bedtime  lactated ringers. 500 milliLiter(s) (1000 mL/Hr) IV Continuous <Continuous>  multivitamin 1 Tablet(s) Oral daily  sodium chloride 0.9%. 1000 milliLiter(s) (75 mL/Hr) IV Continuous <Continuous>  sucralfate 1 Gram(s) Oral four times a day    MEDICATIONS  (PRN):  HYDROmorphone   Tablet 2 milliGRAM(s) Oral every 3 hours PRN pain (0-5)  HYDROmorphone   Tablet 4 milliGRAM(s) Oral every 3 hours PRN pain (6-10)  ondansetron Injectable 4 milliGRAM(s) IV Push every 6 hours PRN Nausea      REVIEW OF SYSTEMS:  CONSTITUTIONAL: No fever, weight loss, or fatigue  EYES: No eye pain, visual disturbances, or discharge  ENMT:  No difficulty hearing, tinnitus, vertigo; No sinus or throat pain  NECK: No pain or stiffness  BREASTS: No pain, masses, or nipple discharge  RESPIRATORY: No cough, wheezing, chills or hemoptysis; No shortness of breath  CARDIOVASCULAR: No chest pain, palpitations, dizziness, or leg swelling  GASTROINTESTINAL: No abdominal or epigastric pain. No nausea, vomiting, or hematemesis; No diarrhea or constipation. No melena or hematochezia.  GENITOURINARY: No dysuria, frequency, hematuria, or incontinence  NEUROLOGICAL: No headaches, memory loss, loss of strength, numbness, or tremors  SKIN: No itching, burning, rashes, or lesions   LYMPH NODES: No enlarged glands  ENDOCRINE: No heat or cold intolerance; No hair loss  MUSCULOSKELETAL: No joint pain or swelling; No muscle, back, or extremity pain  PSYCHIATRIC: No depression, anxiety, mood swings, or difficulty sleeping  HEME/LYMPH: No easy bruising, or bleeding gums  ALLERGY AND IMMUNOLOGIC: No hives or eczema    VITAL SIGNS:    T(C): 36.6 (09-29-20 @ 08:47), Max: 36.9 (09-28-20 @ 14:05)  T(F): 97.8 (09-29-20 @ 08:47), Max: 98.4 (09-28-20 @ 14:05)  HR: 81 (09-29-20 @ 09:20) (70 - 89)  BP: 144/56 (09-29-20 @ 09:20) (89/67 - 144/56)  RR: 19 (09-29-20 @ 08:47) (12 - 20)  SpO2: 94% (09-29-20 @ 09:20) (92% - 100%)    PHYSICAL EXAM:    GENERAL: NAD, well-groomed, well-developed  HEAD:  Atraumatic, Normocephalic  EYES: EOMI, PERRLA, conjunctiva and sclera clear  ENMT: No tonsillar erythema, exudates, or enlargement; Moist mucous membranes, Good dentition, No lesions  NECK: Supple, No JVD, Normal thyroid  NERVOUS SYSTEM:  Alert & Oriented X3, Good concentration; Motor Strength 5/5 B/L upper and lower extremities; DTRs 2+ intact and symmetric  CHEST/LUNG: Clear bilaterally; No rales, rhonchi, wheezing bilaterally  HEART: Regular rate and rhythm; No murmurs, rubs, or gallops  ABDOMEN: Soft, Nontender, Nondistended; Bowel sounds present  EXTREMITIES:  Sling in place on the LUE  LYMPH: No lymphadenopathy noted  SKIN: No rashes or lesions  BACK: no pressor sore     LABS:                         11.4   17.11 )-----------( 367      ( 29 Sep 2020 06:26 )             35.0     09-29    134<L>  |  101  |  25<H>  ----------------------------<  119<H>  3.9   |  23  |  1.32<H>    Ca    8.5      29 Sep 2020 06:26    TPro  6.4  /  Alb  2.8<L>  /  TBili  0.5  /  DBili  x   /  AST  29  /  ALT  26  /  AlkPhos  57  09-29    LIVER FUNCTIONS - ( 29 Sep 2020 06:26 )  Alb: 2.8 g/dL / Pro: 6.4 gm/dL / ALK PHOS: 57 U/L / ALT: 26 U/L / AST: 29 U/L / GGT: x             Radiology:

## 2020-09-29 NOTE — PROGRESS NOTE ADULT - SUBJECTIVE AND OBJECTIVE BOX
75yFemale s/p DARIO/Abx spacer/left reverse TSA POD#1. Pt seen and examined in NAD. Pain controlled. Pt denies any new complaints. Pt denies CP/SOB/N/V/D/numbness/tingling/bowel or bladder dysfunction.     PE:   Neuro: AAOX3  LUE: Prineo dressing C/D/I. Sling in place. +ROM elbow/wrist/fingers. +ok/thumbsup/fingercross signs.  strength: 5/5.  RP2+ NVI.   RUE: Skin intact. +ROM shoulder/elbow/wrist/fingers. +ok/thumbsup/fingercross signs.  strength: 5/5.  RP2+ NVI.   B/L LE: Skin intact. +ROM hip/knee/ankle/toes. Ankle Dorsi/plantarflexion: 5/5. Calf: soft, compressible and nontender. DP/PT 2+ NVI.                             11.4   17.11 )-----------( 367      ( 29 Sep 2020 06:26 )             35.0       09-29    134<L>  |  101  |  25<H>  ----------------------------<  119<H>  3.9   |  23  |  1.32<H>    Ca    8.5      29 Sep 2020 06:26    TPro  6.4  /  Alb  2.8<L>  /  TBili  0.5  /  DBili  x   /  AST  29  /  ALT  26  /  AlkPhos  57  09-29        A/P: 75yFemale s/p DARIO/Abx spacer/left reverse TSA POD#1  F/U AM xray.   Pain controlled with oxy 15mg  PT/OT: NWB LUE. No ROM to left shoulder   DVT ppx: SCDs ASA 325mg daily   Wound care, Isometric exercises, incentive spirometry   Medical consult appreciated  ID follow up pending s/p DARIO/Abx spacer.   Discharge: planning home today  All the above discussed and understood by pt

## 2020-09-29 NOTE — OCCUPATIONAL THERAPY INITIAL EVALUATION ADULT - PLANNED THERAPY INTERVENTIONS, OT EVAL
parent/caregiver training.../transfer training/ADL retraining/bed mobility training/balance training/ROM/strengthening/IADL retraining

## 2020-09-29 NOTE — OCCUPATIONAL THERAPY INITIAL EVALUATION ADULT - PATIENT/FAMILY/SIGNIFICANT OTHER GOALS STATEMENT, OT EVAL
Pt would like to be restored to prior level of function and regain full motion and use of left shoulder .

## 2020-09-29 NOTE — OCCUPATIONAL THERAPY INITIAL EVALUATION ADULT - BALANCE TRAINING, PT EVAL
Pt will increased standing balance from good- to good in 30 days to prevent falls, optimize pt's ability for ADL management & safely navigate in all terrains

## 2020-09-29 NOTE — DISCHARGE NOTE PROVIDER - NSDCACTIVITY_GEN_ALL_CORE
Showering allowed/No heavy lifting/straining/Stairs allowed/Walking - Indoors allowed/Do not drive or operate machinery/Walking - Outdoors allowed

## 2020-09-29 NOTE — OCCUPATIONAL THERAPY INITIAL EVALUATION ADULT - LIVES WITH, PROFILE
her daughter in a private house  with 3 steps to enter will hand rail. Pt endorsed that she will stay on the first floor in a recliner. The bathroom has a tub/shower combination and standard toilet. her daughter in a private house with 3 steps to enter will hand rail. Pt endorsed that she will stay on the first floor in a recliner while she recuperates. The bathroom has a tub/shower combination and standard toilet.

## 2020-09-29 NOTE — DISCHARGE NOTE PROVIDER - NSDCMRMEDTOKEN_GEN_ALL_CORE_FT
acetaminophen 325 mg oral tablet: 2 tab(s) orally every 6 hours, As needed, Mild Pain (1 - 3), Moderate Pain (4 - 6), Severe Pain (7 - 10)  Adult Aspirin 325 mg oral tablet: 1 tab(s) orally once a day MDD:1   Benicar HCT 40 mg-12.5 mg oral tablet: 1 tab(s) orally once a day  biotin: 1 tab(s) orally once a day  Carafate: 1 tab(s) orally once a day  Colace 50 mg oral capsule: 1 cap(s) orally 2 times a day  HYDROmorphone 2 mg oral tablet: 1 tab(s) orally every 4 to 6 hours, As Needed -pain  MDD:8   Multiple Vitamins oral tablet: 1 tab(s) orally once a day  Vitamin D2: 1 tab(s) orally once a day  Zocor: 1 tab(s) orally once a day

## 2020-09-29 NOTE — OCCUPATIONAL THERAPY INITIAL EVALUATION ADULT - RANGE OF MOTION EXAMINATION, UPPER EXTREMITY
AROM in left elbow is 30 degrees , pt makes a composite fist with left hand and moves all fingers without difficulties/Right UE Passive ROM was WFL  (within functional limits)/Right UE Active ROM was WFL (within functional limits)

## 2020-09-29 NOTE — DISCHARGE NOTE NURSING/CASE MANAGEMENT/SOCIAL WORK - PATIENT PORTAL LINK FT
You can access the FollowMyHealth Patient Portal offered by [facility] by registering at the following website: [email or web address]. By joining [employer] [facility]’[facility] FollowMyHealth portal, you will also be able to view your health information using other applications (apps) compatible with our system.

## 2020-09-29 NOTE — OCCUPATIONAL THERAPY INITIAL EVALUATION ADULT - SOCIAL CONCERNS
Complex psychosocial needs/coping issues/Pt voiced concerns about her recovery at home. Pt endorsed that her daughter will be able to assist her after discharge home post-operatively.

## 2020-09-29 NOTE — OCCUPATIONAL THERAPY INITIAL EVALUATION ADULT - STRENGTHENING, PT EVAL
Pt will increased RUE/BLE muscle strength by 1 full grade to maximize independence with functional mobility and self care tasks within 30days

## 2020-09-29 NOTE — DISCHARGE NOTE PROVIDER - CARE PROVIDER_API CALL
Amarjit Eaton  ORTHOPAEDIC SURGERY  [address] [place] [address]  Phone: [phone]  Fax: [phone]  Follow Up Time:

## 2020-09-29 NOTE — OCCUPATIONAL THERAPY INITIAL EVALUATION ADULT - ADDITIONAL COMMENTS
Prior to admission, pt was functioning in her roles, self sufficient & ambulating independently without any assistive devices. Pt stated she utilized a SAC periodically due to back pain. Presently pt needs assistance with upper body self care tasks due to pain, weakness, stiffness and decreased ROM from left reversed TSA / surgical precaution. Pt is right hand dominant and wears glasses for reading.

## 2020-09-29 NOTE — OCCUPATIONAL THERAPY INITIAL EVALUATION ADULT - GENERAL OBSERVATIONS, REHAB EVAL
Pt was seen for initial OT consult, encountered in bed in NAD. Left shoulder gun sling donned. Post surgical precautions NWB, NO ROM were reviewed & maintained. Pt was AA&Ox4, cooperative & followed commands. Pt c/o left shoulder pain due to s/p reversed TSA. This limits pt's activity tolerance ,balance, ADL management and functional mobility.

## 2020-10-05 DIAGNOSIS — M19.012 PRIMARY OSTEOARTHRITIS, LEFT SHOULDER: ICD-10-CM

## 2020-10-05 DIAGNOSIS — Z88.8 ALLERGY STATUS TO OTHER DRUGS, MEDICAMENTS AND BIOLOGICAL SUBSTANCES: ICD-10-CM

## 2020-10-05 DIAGNOSIS — Z88.1 ALLERGY STATUS TO OTHER ANTIBIOTIC AGENTS STATUS: ICD-10-CM

## 2020-10-05 DIAGNOSIS — Z91.030 BEE ALLERGY STATUS: ICD-10-CM
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
## 2020-10-05 DIAGNOSIS — D72.829 ELEVATED WHITE BLOOD CELL COUNT, UNSPECIFIED: ICD-10-CM

## 2020-10-26 NOTE — PHYSICAL THERAPY INITIAL EVALUATION ADULT - SKIN WDL, MLM
NURSE NOTES:

Bed bath given to the patient. The patient tolerated well. Will closely monitor the patient. 
Will continue plan of care. WDL

## 2020-12-07 NOTE — CONSULT NOTE ADULT - CONSULT REQUESTED DATE/TIME
ANUJA LINDA JENNY RANGEL.    Patient Age: 65 year old   Refill request by: Phone.  Caller informed to check with the pharmacy later for their refill.  If problems arise, we will contact patient.  Refill to be: ePrescribed to [facility] on [facility] and [address], [place] pharmacy    Medication requested to be refilled:   Norco 5-325mg  Taking as needed  QTY 50  tizanidine (Zanaflex) 4 MG capsule QTY 50 taking 1 tab X3 per day     Will be out of medication by tomorrow    Per patient ok to leave a message on machine if any problems.        WEIGHT AND HEIGHT:   Wt Readings from Last 1 Encounters:   12/04/20 66.1 kg (145 lb 11.6 oz)     Ht Readings from Last 1 Encounters:   12/04/20 6' 2.8\" (1.9 m)     BMI Readings from Last 1 Encounters:   12/04/20 18.31 kg/m²       ALLERGIES:  Penicillin v and Demerol  Current Outpatient Medications   Medication   • cyclobenzaprine (FLEXERIL) 10 MG tablet   • dilTIAZem (TIAZAC) 180 MG 24 hr capsule   • AMIODarone (PACERONE) 200 MG tablet   • metoPROLOL succinate (TOPROL-XL) 100 MG 24 hr tablet   • tizanidine (Zanaflex) 4 MG capsule   • hydrALAZINE (APRESOLINE) 50 MG tablet   • atorvastatin (LIPITOR) 20 MG tablet   • omeprazole (PriLOSEC) 40 MG capsule     No current facility-administered medications for this visit.      PHARMACY to use: see below          Pharmacy preference(s) on file:   [id number] - [place], [place] - [address] [place] [address] AT [address] OF [address] [place] [address]  [address] [place] [address]  [place] [address]  Phone: [phone] Fax: [phone]      CALL BACK INFO: Ok to leave response (including medical information) with family member or on answering machine  ROUTING: Patient's physician/staff        PCP: Rosa Zacarias CNP         INS: Payor: MEDICARE / Plan: PARTA AND B / Product Type: MEDICARE   PATIENT ADDRESS:  [address] [place] [address]  [place] [address]  
Spoke with pt  norco 5/325 1 tabs/day  Pt has refill on tizanidine bottle so does not need refill on it right now  eRX Norco 5/325 #50 tab po q 8 hours PRN sent to [facility] [place]  
11-Mar-2019 19:14

## 2021-08-24 ENCOUNTER — TRANSCRIPTION ENCOUNTER (OUTPATIENT)
Age: 76
End: 2021-08-24

## 2021-09-08 NOTE — PROGRESS NOTE ADULT - SUBJECTIVE AND OBJECTIVE BOX
75yFemale s/p L DOROTA BISHOP, I&D, placement of antibiotic spacer. Pt seen and examined in NAD. Pain controlled. Pt states everyday she is feeling a little bit better. Pt denies CP/SOB/N/V/D/numbness/tingling/bowel or bladder dysfunction. (+)voids (+)BM (+)tolerating PO diet    PE:   RUE: PICC in place.  Skin intact.    LUE: Sling in place. dressing C/D/I. Skin intact. +ROM elbow/wrist/fingers. +edema. +ok/thumbsup/fingercross signs.  strength: 5/5.  RP2+ NVI.   B/L LE: Calf soft & NT                          9.7    9.17  )-----------( 286      ( 20 Jun 2020 06:50 )             29.5       06-20    141  |  111<H>  |  18  ----------------------------<  106<H>  3.7   |  24  |  0.90    Ca    8.2<L>      20 Jun 2020 06:50    A/P: 75yFemale s/p  L DOROTA BISHOP, I&D, placement of antibiotic spacer POD#5  Pain controlled  PT: SETH LULEEANNA  PICC placed  for IV access and long term ABX  DVT ppx: SCDs ASA 325mg BID  Wound care, Isometric exercises, incentive spirometry   F/U ID final plan  DC planning with home care once final abx plan in place.  All the above discussed and understood by pt decreased strength

## 2021-10-30 ENCOUNTER — TRANSCRIPTION ENCOUNTER (OUTPATIENT)
Age: 76
End: 2021-10-30

## 2021-12-27 NOTE — OCCUPATIONAL THERAPY INITIAL EVALUATION ADULT - PERTINENT HX OF CURRENT PROBLEM, REHAB EVAL
Pain to left UE. Pt is s/p left shoulder DARIO with antibiotic spacer. show Pain to left UE. Pt is s/p left shoulder I&D & DARIO with antibiotic spacer.

## 2022-03-01 PROBLEM — Z00.00 ENCOUNTER FOR PREVENTIVE HEALTH EXAMINATION: Status: ACTIVE | Noted: 2022-03-01

## 2022-03-30 NOTE — PHYSICAL THERAPY INITIAL EVALUATION ADULT - LEFT SHOULDER EXTENSION AROM, REHAB EVAL
Not tested. NO ROM allowed Complex Repair And O-T Advancement Flap Text: The defect edges were debeveled with a #15 scalpel blade.  The primary defect was closed partially with a complex linear closure.  Given the location of the remaining defect, shape of the defect and the proximity to free margins an O-T advancement flap was deemed most appropriate for complete closure of the defect.  Using a sterile surgical marker, an appropriate advancement flap was drawn incorporating the defect and placing the expected incisions within the relaxed skin tension lines where possible.    The area thus outlined was incised deep to adipose tissue with a #15 scalpel blade.  The skin margins were undermined to an appropriate distance in all directions utilizing iris scissors.

## 2022-04-05 ENCOUNTER — APPOINTMENT (OUTPATIENT)
Dept: ORTHOPEDIC SURGERY | Facility: CLINIC | Age: 77
End: 2022-04-05
Payer: OTHER MISCELLANEOUS

## 2022-04-05 ENCOUNTER — TRANSCRIPTION ENCOUNTER (OUTPATIENT)
Age: 77
End: 2022-04-05

## 2022-04-05 VITALS — HEIGHT: 64 IN | WEIGHT: 242 LBS | BODY MASS INDEX: 41.32 KG/M2

## 2022-04-05 DIAGNOSIS — I10 ESSENTIAL (PRIMARY) HYPERTENSION: ICD-10-CM

## 2022-04-05 PROCEDURE — 99214 OFFICE O/P EST MOD 30 MIN: CPT

## 2022-04-05 PROCEDURE — 99072 ADDL SUPL MATRL&STAF TM PHE: CPT
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
## 2022-04-05 NOTE — PHYSICAL EXAM
[4___] : left dorsiflexors 4[unfilled]/5 [5___] : right extensor hallicus longus 5[unfilled]/5 [] : lumbar paraspinal tenderness

## 2022-04-05 NOTE — ASSESSMENT
[FreeTextEntry1] : went over MRI ; there is degen scoli and the sites of meaningful stenosis are at 3-4-5 ;  at 4-5 ther is also a slip;  has essentially exhausted nonsurgical care; IF she opts for surgery then I natasha do a 2 level XLIF L3-4-5 as that would address the stenosis by an indirect technique (avoiding an open laminectomy) and would address the foraminal stenosis at 3-4 and stabilize the scoli and slip ;  alternative would be an open lami fusion;

## 2022-04-18 ENCOUNTER — APPOINTMENT (OUTPATIENT)
Dept: PAIN MANAGEMENT | Facility: CLINIC | Age: 77
End: 2022-04-18
Payer: OTHER MISCELLANEOUS

## 2022-04-18 VITALS — BODY MASS INDEX: 42.17 KG/M2 | HEIGHT: 64 IN | WEIGHT: 247 LBS

## 2022-04-18 DIAGNOSIS — Z86.79 PERSONAL HISTORY OF OTHER DISEASES OF THE CIRCULATORY SYSTEM: ICD-10-CM

## 2022-04-18 DIAGNOSIS — M54.50 LOW BACK PAIN, UNSPECIFIED: ICD-10-CM

## 2022-04-18 PROCEDURE — 99214 OFFICE O/P EST MOD 30 MIN: CPT

## 2022-04-18 PROCEDURE — 99072 ADDL SUPL MATRL&STAF TM PHE: CPT

## 2022-04-18 RX ORDER — AMOXICILLIN 875 MG/1
875 TABLET, FILM COATED ORAL
Refills: 0 | Status: ACTIVE | COMMUNITY

## 2022-04-18 RX ORDER — DOCUSATE SODIUM 100 MG/1
100 CAPSULE ORAL
Refills: 0 | Status: ACTIVE | COMMUNITY

## 2022-04-18 RX ORDER — OMEPRAZOLE 20 MG/1
20 CAPSULE, DELAYED RELEASE ORAL
Refills: 0 | Status: ACTIVE | COMMUNITY

## 2022-04-18 RX ORDER — TIZANIDINE HYDROCHLORIDE 2 MG/1
2 CAPSULE ORAL
Refills: 0 | Status: ACTIVE | COMMUNITY

## 2022-04-18 RX ORDER — SIMVASTATIN 20 MG/1
20 TABLET, FILM COATED ORAL
Refills: 0 | Status: ACTIVE | COMMUNITY

## 2022-04-18 RX ORDER — TRIAMCINOLONE ACETONIDE 1 MG/G
0.1 OINTMENT TOPICAL
Refills: 0 | Status: ACTIVE | COMMUNITY

## 2022-04-18 RX ORDER — SUCRALFATE 1 G/1
1 TABLET ORAL
Refills: 0 | Status: ACTIVE | COMMUNITY

## 2022-04-18 RX ORDER — OXYCODONE 5 MG/1
5 TABLET ORAL
Refills: 0 | Status: ACTIVE | COMMUNITY

## 2022-04-18 RX ORDER — PREGABALIN 100 MG/1
100 CAPSULE ORAL
Refills: 0 | Status: ACTIVE | COMMUNITY

## 2022-04-18 RX ORDER — GABAPENTIN 300 MG/1
300 CAPSULE ORAL
Refills: 0 | Status: ACTIVE | COMMUNITY

## 2022-04-18 RX ORDER — DICLOFENAC SODIUM 3 G/100G
3 GEL TOPICAL
Refills: 0 | Status: ACTIVE | COMMUNITY

## 2022-04-18 RX ORDER — RABEPRAZOLE SODIUM 20 MG/1
20 TABLET, DELAYED RELEASE ORAL
Refills: 0 | Status: ACTIVE | COMMUNITY

## 2022-04-18 RX ORDER — METHYLPREDNISOLONE 4 MG/1
4 TABLET ORAL EVERY 4 HOURS
Qty: 45 | Refills: 0 | Status: ACTIVE | COMMUNITY
Start: 2022-04-18 | End: 1900-01-01

## 2022-04-18 RX ORDER — OXYCODONE 10 MG/1
10 TABLET ORAL
Refills: 0 | Status: ACTIVE | COMMUNITY

## 2022-04-18 RX ORDER — FLUCONAZOLE 150 MG/1
150 TABLET ORAL
Refills: 0 | Status: ACTIVE | COMMUNITY

## 2022-04-18 RX ORDER — ONDANSETRON 4 MG/1
4 TABLET, ORALLY DISINTEGRATING ORAL
Refills: 0 | Status: ACTIVE | COMMUNITY

## 2022-04-18 RX ORDER — DICLOFENAC SODIUM 10 MG/G
1 GEL TOPICAL
Refills: 0 | Status: ACTIVE | COMMUNITY

## 2022-04-18 RX ORDER — LIDOCAINE 5 G/100G
5 OINTMENT TOPICAL
Refills: 0 | Status: ACTIVE | COMMUNITY

## 2022-04-18 RX ORDER — METHYLPREDNISOLONE 4 MG/1
4 TABLET ORAL
Refills: 0 | Status: ACTIVE | COMMUNITY

## 2022-04-18 NOTE — ASSESSMENT
[FreeTextEntry1] : After discussing various treatment options with the patient including but not limited to oral medications, physical therapy, exercise, modalities as well as interventional spinal injections, we have decided with the following plan\par \par 1) Surgical referral\par \par 2) She will meet with her infectious disease specialist prior. \par \par \par \par

## 2022-04-18 NOTE — CONSULT LETTER
[Dear  ___] : Dear  [unfilled], [Referral Letter:] : I am referring [unfilled] to you for further evaluation.  My most recent evaluation follows. [Please see my note below.] : Please see my note below. [Sincerely,] : Sincerely, [FreeTextEntry3] : Marie Jimenez

## 2022-04-18 NOTE — PHYSICAL EXAM
[3___] : left dorsiflexors 3[unfilled]/5 [4___] : right hip flexion 4[unfilled]/5 [FreeTextEntry8] : very TTP [] : no thoracic paraspinal spasm

## 2022-04-18 NOTE — HISTORY OF PRESENT ILLNESS
[Neck] : neck [Lower back] : lower back [10] : 10 [Sharp] : sharp [] : yes [Constant] : constant [Household chores] : household chores [Sitting] : sitting [Standing] : standing [Walking] : walking [Lying in bed] : lying in bed [FreeTextEntry1] : 4/18/22: follow up today. Since last visit had seen Dr. Chan. Dilaudid not effective for her pain. She has failed conservative therapies. failed gabapentin. Only relief she gets is when she lays on her right side. Pain in the lower back with radiation to the leg.  MRI c/s 1/28/22: Impression:\par 1. Nonspecific cystic areas in the region of the pituitary gland and posterior to the sella turcica and clivus which\par extends bilaterally. Clinical significance and etiology is uncertain. Recommend MRI of the brain with\par intravenous contrast material to further evaluate as clinically indicated.\par 2. Straightening of the cervical lordosis and multilevel degenerative disc disease with broad-based posterior disc\par herniation impinging the right exiting C7 nerve root at C6-C7 as well as impingement upon the cord\par asymmetric on the right at C4-C5 and multilevel foraminal narrowing without acute fracture or cord\par compression.\par \par 12/31/21: follow up today. Pt had seen Dr. Eaton yesterday with continued left shoulder pain despite numerous\par surgeries. also has some neck and trap pain. pain over the anterior and posterior shoulder. has been going to chiro forher neck for years.\par \par 11/22/21: follow up today after repeat left lumbar RFA on 11/14/21.\par \par 9/14/21: follow up today to B/L l3-4 TFESI. Patient had 50% relief. Still has pain in the left lower back. I would\par recommend repeat left lumbar RFA.\par \par 7/14/21- Patient had 70% relief from LESI. Still has some pain in left leg. Will repeat epidural.\par \par 3/31/21: follow up today. MRI Lspine (2/22/21) At L1-2: Disc bulge with posterior osseous ridging without significant spinal canal stenosis. There is mild bilateral neural foraminal narrowing.\par At L2-3: Disc bulge without significant spinal canal stenosis. There is mild\par left-sided neural foraminal narrowing. The right neural foramen is patent.\par At L3-4: Disc bulge mildly narrowing the spinal canal. There is thickening of\par the ligamentum flavum and moderate facet arthrosis, worse on the right. There\par are bilateral facet effusions. There is severe right-sided neural foraminal\par narrowing with impingement of the exiting L3 nerve root. There is mild\par left-sided neural foraminal narrowing.\par At L4-5: Circumferential disc bulge moderately narrowing the spinal canal. There\par is thickening of the ligamentum flavum and moderate facet arthrosis. There is\par moderate bilateral facet effusions. There is severe bilateral neural foraminal\par narrowing with impingement of the exiting L4 nerve roots.\par At L5-S1: Disc bulge without significant spinal canal stenosis. There is\par moderate bilateral facet arthrosis of bilateral facet effusions. There is\par moderate left and mild right-sided neural foraminal narrowing.\par Overall grossly stable examination compared to prior 7/3/2017.\par pain in the lower back with radiation to the anterior thigh.\par \par 2/15/21: follow up today. since last visit had surgery with Dr. Eaton, had infection that needed abx spacer and DARIO, and had reverse shoulder replacement, currently in PT for this. She reports her lower back pain has gotten worse. She had lumbar RFA's with relief, last was in 2019. She had >60% relief from the last RFA with improvement of her pain and overall function and QOL. Since last visit had a fall in November. She was getting into the car and her right leg collapsed. Had a stress fracture and soft tissue damage of the right foot.\par her pain is currently in the back with radiation to the right anterior thigh. her pain correlates with her MRI. I would\par recommend a right L3/4 and L4/5 TFESI. subjective and objective findings all correlate.\par MRI (6/16/19) Impression:\par 1. Progression of multilevel disc pathology with mild central stenosis and impingement upon bilateral exiting L3 nerve roots at L3-L4, moderate central stenosis with impingement upon the left greater than right exiting L4 nerve roots with severe left foraminal narrowing at L4-L5 and encroachment upon the left exiting L5 nerve root at L5-S1 with convexity of the lumbar spine towards the left, anterolisthesis at L4-L5 and multilevel degenerative disc disease without acute fracture.\par 2. Clinical correlation is advised as to whether the findings may be related to acute and/or traumatic injury [FreeTextEntry7] : left leg  [FreeTextEntry9] : Laying on right side, elevating left leg

## 2022-06-14 ENCOUNTER — FORM ENCOUNTER (OUTPATIENT)
Age: 77
End: 2022-06-14

## 2022-06-15 ENCOUNTER — FORM ENCOUNTER (OUTPATIENT)
Age: 77
End: 2022-06-15

## 2022-06-21 ENCOUNTER — OUTPATIENT (OUTPATIENT)
Dept: OUTPATIENT SERVICES | Facility: HOSPITAL | Age: 77
LOS: 1 days | Discharge: HOME | End: 2022-06-21

## 2022-06-21 VITALS
OXYGEN SATURATION: 97 % | WEIGHT: 273.81 LBS | DIASTOLIC BLOOD PRESSURE: 74 MMHG | TEMPERATURE: 98 F | HEART RATE: 88 BPM | SYSTOLIC BLOOD PRESSURE: 125 MMHG | RESPIRATION RATE: 17 BRPM

## 2022-06-21 DIAGNOSIS — Z96.651 PRESENCE OF RIGHT ARTIFICIAL KNEE JOINT: Chronic | ICD-10-CM

## 2022-06-21 DIAGNOSIS — Z01.818 ENCOUNTER FOR OTHER PREPROCEDURAL EXAMINATION: ICD-10-CM

## 2022-06-21 DIAGNOSIS — Z96.659 PRESENCE OF UNSPECIFIED ARTIFICIAL KNEE JOINT: Chronic | ICD-10-CM

## 2022-06-21 DIAGNOSIS — Z98.890 OTHER SPECIFIED POSTPROCEDURAL STATES: Chronic | ICD-10-CM

## 2022-06-21 DIAGNOSIS — M54.16 RADICULOPATHY, LUMBAR REGION: ICD-10-CM

## 2022-06-21 DIAGNOSIS — Z90.49 ACQUIRED ABSENCE OF OTHER SPECIFIED PARTS OF DIGESTIVE TRACT: Chronic | ICD-10-CM

## 2022-06-21 DIAGNOSIS — Z96.611 PRESENCE OF RIGHT ARTIFICIAL SHOULDER JOINT: Chronic | ICD-10-CM

## 2022-06-21 DIAGNOSIS — I10 ESSENTIAL (PRIMARY) HYPERTENSION: ICD-10-CM

## 2022-06-21 DIAGNOSIS — K21.9 GASTRO-ESOPHAGEAL REFLUX DISEASE WITHOUT ESOPHAGITIS: ICD-10-CM

## 2022-06-21 LAB
A1C WITH ESTIMATED AVERAGE GLUCOSE RESULT: 6.3 % — HIGH (ref 4–5.6)
ALBUMIN SERPL ELPH-MCNC: 4 G/DL — SIGNIFICANT CHANGE UP (ref 3.3–5)
ALP SERPL-CCNC: 68 U/L — SIGNIFICANT CHANGE UP (ref 40–120)
ALT FLD-CCNC: 29 U/L — SIGNIFICANT CHANGE UP (ref 12–78)
ANION GAP SERPL CALC-SCNC: 10 MMOL/L — SIGNIFICANT CHANGE UP (ref 5–17)
APTT BLD: 39.6 SEC — HIGH (ref 27.5–35.5)
AST SERPL-CCNC: 24 U/L — SIGNIFICANT CHANGE UP (ref 15–37)
BASOPHILS # BLD AUTO: 0.09 K/UL — SIGNIFICANT CHANGE UP (ref 0–0.2)
BASOPHILS NFR BLD AUTO: 0.8 % — SIGNIFICANT CHANGE UP (ref 0–2)
BILIRUB SERPL-MCNC: 0.4 MG/DL — SIGNIFICANT CHANGE UP (ref 0.2–1.2)
BLD GP AB SCN SERPL QL: SIGNIFICANT CHANGE UP
BUN SERPL-MCNC: 20 MG/DL — SIGNIFICANT CHANGE UP (ref 7–23)
CALCIUM SERPL-MCNC: 10.1 MG/DL — SIGNIFICANT CHANGE UP (ref 8.5–10.1)
CHLORIDE SERPL-SCNC: 104 MMOL/L — SIGNIFICANT CHANGE UP (ref 96–108)
CO2 SERPL-SCNC: 25 MMOL/L — SIGNIFICANT CHANGE UP (ref 22–31)
CREAT SERPL-MCNC: 1.23 MG/DL — SIGNIFICANT CHANGE UP (ref 0.5–1.3)
EGFR: 45 ML/MIN/1.73M2 — LOW
EOSINOPHIL # BLD AUTO: 0.2 K/UL — SIGNIFICANT CHANGE UP (ref 0–0.5)
EOSINOPHIL NFR BLD AUTO: 1.7 % — SIGNIFICANT CHANGE UP (ref 0–6)
ESTIMATED AVERAGE GLUCOSE: 134 MG/DL — HIGH (ref 68–114)
GLUCOSE SERPL-MCNC: 109 MG/DL — HIGH (ref 70–99)
HCT VFR BLD CALC: 48 % — HIGH (ref 34.5–45)
HGB BLD-MCNC: 15.5 G/DL — SIGNIFICANT CHANGE UP (ref 11.5–15.5)
IMM GRANULOCYTES NFR BLD AUTO: 0.8 % — SIGNIFICANT CHANGE UP (ref 0–1.5)
INR BLD: 0.98 RATIO — SIGNIFICANT CHANGE UP (ref 0.88–1.16)
LYMPHOCYTES # BLD AUTO: 17.3 % — SIGNIFICANT CHANGE UP (ref 13–44)
LYMPHOCYTES # BLD AUTO: 2.06 K/UL — SIGNIFICANT CHANGE UP (ref 1–3.3)
MCHC RBC-ENTMCNC: 30.5 PG — SIGNIFICANT CHANGE UP (ref 27–34)
MCHC RBC-ENTMCNC: 32.3 G/DL — SIGNIFICANT CHANGE UP (ref 32–36)
MCV RBC AUTO: 94.5 FL — SIGNIFICANT CHANGE UP (ref 80–100)
MONOCYTES # BLD AUTO: 0.68 K/UL — SIGNIFICANT CHANGE UP (ref 0–0.9)
MONOCYTES NFR BLD AUTO: 5.7 % — SIGNIFICANT CHANGE UP (ref 2–14)
MRSA PCR RESULT.: SIGNIFICANT CHANGE UP
NEUTROPHILS # BLD AUTO: 8.79 K/UL — HIGH (ref 1.8–7.4)
NEUTROPHILS NFR BLD AUTO: 73.7 % — SIGNIFICANT CHANGE UP (ref 43–77)
NRBC # BLD: 0 /100 WBCS — SIGNIFICANT CHANGE UP (ref 0–0)
PLATELET # BLD AUTO: 641 K/UL — HIGH (ref 150–400)
POTASSIUM SERPL-MCNC: 3.9 MMOL/L — SIGNIFICANT CHANGE UP (ref 3.5–5.3)
POTASSIUM SERPL-SCNC: 3.9 MMOL/L — SIGNIFICANT CHANGE UP (ref 3.5–5.3)
PROT SERPL-MCNC: 8.5 GM/DL — HIGH (ref 6–8.3)
PROTHROM AB SERPL-ACNC: 11.8 SEC — SIGNIFICANT CHANGE UP (ref 10.5–13.4)
RBC # BLD: 5.08 M/UL — SIGNIFICANT CHANGE UP (ref 3.8–5.2)
RBC # FLD: 15.2 % — HIGH (ref 10.3–14.5)
S AUREUS DNA NOSE QL NAA+PROBE: SIGNIFICANT CHANGE UP
SODIUM SERPL-SCNC: 139 MMOL/L — SIGNIFICANT CHANGE UP (ref 135–145)
WBC # BLD: 11.91 K/UL — HIGH (ref 3.8–10.5)
WBC # FLD AUTO: 11.91 K/UL — HIGH (ref 3.8–10.5)

## 2022-06-21 RX ORDER — DOCUSATE SODIUM 100 MG
1 CAPSULE ORAL
Qty: 0 | Refills: 0 | DISCHARGE

## 2022-06-21 NOTE — H&P PST ADULT - HISTORY OF PRESENT ILLNESS
76 y/o F PMH of HTN and obesity c/o lower back pain with radiation to b/l legs R>L. Pt scheduled for extreme lateral interbody fusion L3-5 posterior instrumentation L5 with image on 07/08/2022.     Pt denies fever, cough, SOB, recent travel, or sick contacts.

## 2022-06-21 NOTE — H&P PST ADULT - NSICDXPASTSURGICALHX_GEN_ALL_CORE_FT
PAST SURGICAL HISTORY:  S/P arthroscopy of shoulder left shoulder    S/P cholecystectomy     S/P knee replacement 2004 left knee and revision    S/P knee replacement Left ( 2007 )    S/P total knee arthroplasty, right     Status post total shoulder arthroplasty, right and left  multiple x5 with revisions

## 2022-06-21 NOTE — H&P PST ADULT - ASSESSMENT
78 y/o F PMH of HTN and obesity c/o lower back pain with radiation to b/l legs R>L. Pt scheduled for extreme lateral interbody fusion L3-5 posterior instrumentation L5 with image on 2022.     CAPRINI SCORE    AGE RELATED RISK FACTORS                                                       MOBILITY RELATED FACTORS  [ ] Age 41-60 years                                            (1 Point)                  [ ] Bed rest                                                        (1 Point)  [ ] Age: 61-74 years                                           (2 Points)                [ ] Plaster cast                                                   (2 Points)  [x ] Age= 75 years                                              (3 Points)                 [ ] Bed bound for more than 72 hours                   (2 Points)    DISEASE RELATED RISK FACTORS                                               GENDER SPECIFIC FACTORS  [ ] Edema in the lower extremities                       (1 Point)                  [ ] Pregnancy                                                     (1 Point)  [ ] Varicose veins                                               (1 Point)                  [ ] Post-partum < 6 weeks                                   (1 Point)             x[ ] BMI > 25 Kg/m2                                            (1 Point)                  [ ] Hormonal therapy  or oral contraception            (1 Point)                 [ ] Sepsis (in the previous month)                        (1 Point)                  [ ] History of pregnancy complications  [ ] Pneumonia or serious lung disease                                               [ ] Unexplained or recurrent                       (1 Point)           (in the previous month)                               (1 Point)  [ ] Abnormal pulmonary function test                     (1 Point)                 SURGERY RELATED RISK FACTORS  [ ] Acute myocardial infarction                              (1 Point)                 [ ]  Section                                            (1 Point)  [ ] Congestive heart failure (in the previous month)  (1 Point)                 [ ] Minor surgery                                                 (1 Point)   [ ] Inflammatory bowel disease                             (1 Point)                 [ ] Arthroscopic surgery                                        (2 Points)  [ ] Central venous access                                    (2 Points)                [x ] General surgery lasting more than 45 minutes   (2 Points)       [ ] Stroke (in the previous month)                          (5 Points)               [ ] Elective arthroplasty                                        (5 Points)                                                                                                                                               HEMATOLOGY RELATED FACTORS                                                 TRAUMA RELATED RISK FACTORS  [ ] Prior episodes of VTE                                     (3 Points)                 [ ] Fracture of the hip, pelvis, or leg                       (5 Points)  [ ] Positive family history for VTE                         (3 Points)                 [ ] Acute spinal cord injury (in the previous month)  (5 Points)  [ ] Prothrombin 07937 A                                      (3 Points)                 [ ] Paralysis  (less than 1 month)                          (5 Points)  [ ] Factor V Leiden                                             (3 Points)                 [ ] Multiple Trauma within 1 month                         (5 Points)  [ ] Lupus anticoagulants                                     (3 Points)                                                           [ ] Anticardiolipin antibodies                                (3 Points)                                                       [ ] High homocysteine in the blood                      (3 Points)                                             [ ] Other congenital or acquired thrombophilia       (3 Points)                                                [ ] Heparin induced thrombocytopenia                  (3 Points)                                          Total Score [       6 ]  Caprini Score 0-2: Low risk, No VTE Prophylaxis required for most patient's, encourage ambulation  Caprini Score 3-6: At Risk, Pharmacologic VTE prophylaxis is indicated for most patients ( in the absence of a contraindication)  Caprini Score Greater than or = 7: High Risk , pharmacologic VTE is indicated for most patients ( in the absence of a contraindication)    Caprini score indicates that the patient is high risk for VTE event ( score 6 or greater). Surgical patient's in this group will benefit from both pharmacologic prophylaxis and intermittent compression devices . Surgical team will determine the balance between VTE  risk and bleeding risk and other clinical considerations

## 2022-06-21 NOTE — H&P PST ADULT - PROBLEM SELECTOR PLAN 1
anesthesiologist to review PST labs, EKG, medical clearance and optimization for surgery  labs - cbc,pt/ptt,bmp,t&s,nose,ekg  Cardiac required  Hibiclens not given as pt is allergic .Instructed on if nose cx positive use mupirocin 5 days and checklist given  take routine meds DOS with sips of water. avoid NSAID and OTC supplements. Pt understanding COVI-19 PCR is required 3-5 days prior to surgery.  Verbalized understanding information on proper nutrition , increase protein and better food choices provided in packet

## 2022-06-22 LAB — VIT D25+D1,25 OH+D1,25 PNL SERPL-MCNC: 45.8 PG/ML — SIGNIFICANT CHANGE UP (ref 19.9–79.3)

## 2022-07-02 ENCOUNTER — NON-APPOINTMENT (OUTPATIENT)
Age: 77
End: 2022-07-02

## 2022-07-07 ENCOUNTER — TRANSCRIPTION ENCOUNTER (OUTPATIENT)
Age: 77
End: 2022-07-07

## 2022-07-08 ENCOUNTER — APPOINTMENT (OUTPATIENT)
Dept: ORTHOPEDIC SURGERY | Facility: HOSPITAL | Age: 77
End: 2022-07-08

## 2022-07-08 ENCOUNTER — INPATIENT (INPATIENT)
Facility: HOSPITAL | Age: 77
LOS: 2 days | Discharge: HOME | End: 2022-07-11
Attending: ORTHOPAEDIC SURGERY | Admitting: ORTHOPAEDIC SURGERY

## 2022-07-08 ENCOUNTER — TRANSCRIPTION ENCOUNTER (OUTPATIENT)
Age: 77
End: 2022-07-08

## 2022-07-08 VITALS
WEIGHT: 273.81 LBS | HEIGHT: 64 IN | TEMPERATURE: 98 F | HEART RATE: 74 BPM | RESPIRATION RATE: 12 BRPM | SYSTOLIC BLOOD PRESSURE: 134 MMHG | DIASTOLIC BLOOD PRESSURE: 69 MMHG | OXYGEN SATURATION: 97 %

## 2022-07-08 DIAGNOSIS — M48.061 SPINAL STENOSIS, LUMBAR REGION WITHOUT NEUROGENIC CLAUDICATION: ICD-10-CM

## 2022-07-08 DIAGNOSIS — Z88.1 ALLERGY STATUS TO OTHER ANTIBIOTIC AGENTS STATUS: ICD-10-CM

## 2022-07-08 DIAGNOSIS — Z87.891 PERSONAL HISTORY OF NICOTINE DEPENDENCE: ICD-10-CM

## 2022-07-08 DIAGNOSIS — Z96.611 PRESENCE OF RIGHT ARTIFICIAL SHOULDER JOINT: ICD-10-CM

## 2022-07-08 DIAGNOSIS — I10 ESSENTIAL (PRIMARY) HYPERTENSION: ICD-10-CM

## 2022-07-08 DIAGNOSIS — Z96.659 PRESENCE OF UNSPECIFIED ARTIFICIAL KNEE JOINT: Chronic | ICD-10-CM

## 2022-07-08 DIAGNOSIS — E66.9 OBESITY, UNSPECIFIED: ICD-10-CM

## 2022-07-08 DIAGNOSIS — Z96.651 PRESENCE OF RIGHT ARTIFICIAL KNEE JOINT: ICD-10-CM

## 2022-07-08 DIAGNOSIS — Z96.612 PRESENCE OF LEFT ARTIFICIAL SHOULDER JOINT: ICD-10-CM

## 2022-07-08 DIAGNOSIS — Z98.890 OTHER SPECIFIED POSTPROCEDURAL STATES: Chronic | ICD-10-CM

## 2022-07-08 DIAGNOSIS — Z90.49 ACQUIRED ABSENCE OF OTHER SPECIFIED PARTS OF DIGESTIVE TRACT: Chronic | ICD-10-CM

## 2022-07-08 DIAGNOSIS — Z96.651 PRESENCE OF RIGHT ARTIFICIAL KNEE JOINT: Chronic | ICD-10-CM

## 2022-07-08 DIAGNOSIS — E78.5 HYPERLIPIDEMIA, UNSPECIFIED: ICD-10-CM

## 2022-07-08 DIAGNOSIS — M41.86 OTHER FORMS OF SCOLIOSIS, LUMBAR REGION: ICD-10-CM

## 2022-07-08 DIAGNOSIS — L30.9 DERMATITIS, UNSPECIFIED: ICD-10-CM

## 2022-07-08 DIAGNOSIS — Z79.82 LONG TERM (CURRENT) USE OF ASPIRIN: ICD-10-CM

## 2022-07-08 DIAGNOSIS — K21.9 GASTRO-ESOPHAGEAL REFLUX DISEASE WITHOUT ESOPHAGITIS: ICD-10-CM

## 2022-07-08 DIAGNOSIS — Z96.611 PRESENCE OF RIGHT ARTIFICIAL SHOULDER JOINT: Chronic | ICD-10-CM

## 2022-07-08 DIAGNOSIS — M54.16 RADICULOPATHY, LUMBAR REGION: ICD-10-CM

## 2022-07-08 LAB
ANION GAP SERPL CALC-SCNC: 8 MMOL/L — SIGNIFICANT CHANGE UP (ref 5–17)
BASOPHILS # BLD AUTO: 0.09 K/UL — SIGNIFICANT CHANGE UP (ref 0–0.2)
BASOPHILS NFR BLD AUTO: 0.7 % — SIGNIFICANT CHANGE UP (ref 0–2)
BLD GP AB SCN SERPL QL: SIGNIFICANT CHANGE UP
BUN SERPL-MCNC: 19 MG/DL — SIGNIFICANT CHANGE UP (ref 7–23)
CALCIUM SERPL-MCNC: 8.8 MG/DL — SIGNIFICANT CHANGE UP (ref 8.5–10.1)
CHLORIDE SERPL-SCNC: 110 MMOL/L — HIGH (ref 96–108)
CO2 SERPL-SCNC: 24 MMOL/L — SIGNIFICANT CHANGE UP (ref 22–31)
CREAT SERPL-MCNC: 1.02 MG/DL — SIGNIFICANT CHANGE UP (ref 0.5–1.3)
EGFR: 56 ML/MIN/1.73M2 — LOW
EOSINOPHIL # BLD AUTO: 0.13 K/UL — SIGNIFICANT CHANGE UP (ref 0–0.5)
EOSINOPHIL NFR BLD AUTO: 1 % — SIGNIFICANT CHANGE UP (ref 0–6)
GLUCOSE SERPL-MCNC: 154 MG/DL — HIGH (ref 70–99)
HCT VFR BLD CALC: 40.3 % — SIGNIFICANT CHANGE UP (ref 34.5–45)
HCT VFR BLD CALC: 43.9 % — SIGNIFICANT CHANGE UP (ref 34.5–45)
HGB BLD-MCNC: 13 G/DL — SIGNIFICANT CHANGE UP (ref 11.5–15.5)
HGB BLD-MCNC: 14.4 G/DL — SIGNIFICANT CHANGE UP (ref 11.5–15.5)
IMM GRANULOCYTES NFR BLD AUTO: 1.6 % — HIGH (ref 0–1.5)
LYMPHOCYTES # BLD AUTO: 1.38 K/UL — SIGNIFICANT CHANGE UP (ref 1–3.3)
LYMPHOCYTES # BLD AUTO: 10.5 % — LOW (ref 13–44)
MCHC RBC-ENTMCNC: 30.2 PG — SIGNIFICANT CHANGE UP (ref 27–34)
MCHC RBC-ENTMCNC: 30.3 PG — SIGNIFICANT CHANGE UP (ref 27–34)
MCHC RBC-ENTMCNC: 32.3 G/DL — SIGNIFICANT CHANGE UP (ref 32–36)
MCHC RBC-ENTMCNC: 32.8 G/DL — SIGNIFICANT CHANGE UP (ref 32–36)
MCV RBC AUTO: 92.4 FL — SIGNIFICANT CHANGE UP (ref 80–100)
MCV RBC AUTO: 93.7 FL — SIGNIFICANT CHANGE UP (ref 80–100)
MONOCYTES # BLD AUTO: 0.4 K/UL — SIGNIFICANT CHANGE UP (ref 0–0.9)
MONOCYTES NFR BLD AUTO: 3.1 % — SIGNIFICANT CHANGE UP (ref 2–14)
NEUTROPHILS # BLD AUTO: 10.9 K/UL — HIGH (ref 1.8–7.4)
NEUTROPHILS NFR BLD AUTO: 83.1 % — HIGH (ref 43–77)
NRBC # BLD: 0 /100 WBCS — SIGNIFICANT CHANGE UP (ref 0–0)
NRBC # BLD: 0 /100 WBCS — SIGNIFICANT CHANGE UP (ref 0–0)
PLATELET # BLD AUTO: 439 K/UL — HIGH (ref 150–400)
PLATELET # BLD AUTO: 537 K/UL — HIGH (ref 150–400)
POTASSIUM SERPL-MCNC: 4.4 MMOL/L — SIGNIFICANT CHANGE UP (ref 3.5–5.3)
POTASSIUM SERPL-SCNC: 4.4 MMOL/L — SIGNIFICANT CHANGE UP (ref 3.5–5.3)
RBC # BLD: 4.3 M/UL — SIGNIFICANT CHANGE UP (ref 3.8–5.2)
RBC # BLD: 4.75 M/UL — SIGNIFICANT CHANGE UP (ref 3.8–5.2)
RBC # FLD: 14.7 % — HIGH (ref 10.3–14.5)
RBC # FLD: 14.8 % — HIGH (ref 10.3–14.5)
SODIUM SERPL-SCNC: 142 MMOL/L — SIGNIFICANT CHANGE UP (ref 135–145)
WBC # BLD: 12.64 K/UL — HIGH (ref 3.8–10.5)
WBC # BLD: 13.11 K/UL — HIGH (ref 3.8–10.5)
WBC # FLD AUTO: 12.64 K/UL — HIGH (ref 3.8–10.5)
WBC # FLD AUTO: 13.11 K/UL — HIGH (ref 3.8–10.5)

## 2022-07-08 PROCEDURE — 22853 INSJ BIOMECHANICAL DEVICE: CPT

## 2022-07-08 PROCEDURE — 20930 SP BONE ALGRFT MORSEL ADD-ON: CPT

## 2022-07-08 PROCEDURE — 22585 ARTHRD ANT NTRBD MIN DSC EA: CPT

## 2022-07-08 PROCEDURE — 22558 ARTHRD ANT NTRBD MIN DSC LUM: CPT

## 2022-07-08 PROCEDURE — 22558 ARTHRD ANT NTRBD MIN DSC LUM: CPT | Mod: AS

## 2022-07-08 PROCEDURE — 22585 ARTHRD ANT NTRBD MIN DSC EA: CPT | Mod: AS

## 2022-07-08 PROCEDURE — 22853 INSJ BIOMECHANICAL DEVICE: CPT | Mod: AS

## 2022-07-08 DEVICE — IMPLANTABLE DEVICE: Type: IMPLANTABLE DEVICE | Status: FUNCTIONAL

## 2022-07-08 DEVICE — SURGIFLO MATRIX WITH THROMBIN KIT: Type: IMPLANTABLE DEVICE | Status: FUNCTIONAL

## 2022-07-08 DEVICE — CAGE MODULE XLIF 3D 10 DEG 10X22X55MM: Type: IMPLANTABLE DEVICE | Status: FUNCTIONAL

## 2022-07-08 RX ORDER — ONDANSETRON 8 MG/1
4 TABLET, FILM COATED ORAL EVERY 6 HOURS
Refills: 0 | Status: DISCONTINUED | OUTPATIENT
Start: 2022-07-08 | End: 2022-07-11

## 2022-07-08 RX ORDER — ASPIRIN/CALCIUM CARB/MAGNESIUM 324 MG
81 TABLET ORAL DAILY
Refills: 0 | Status: DISCONTINUED | OUTPATIENT
Start: 2022-07-10 | End: 2022-07-11

## 2022-07-08 RX ORDER — SODIUM CHLORIDE 9 MG/ML
1000 INJECTION, SOLUTION INTRAVENOUS
Refills: 0 | Status: DISCONTINUED | OUTPATIENT
Start: 2022-07-08 | End: 2022-07-11

## 2022-07-08 RX ORDER — ACETAMINOPHEN 500 MG
975 TABLET ORAL EVERY 8 HOURS
Refills: 0 | Status: DISCONTINUED | OUTPATIENT
Start: 2022-07-08 | End: 2022-07-11

## 2022-07-08 RX ORDER — HYDROCHLOROTHIAZIDE 25 MG
12.5 TABLET ORAL DAILY
Refills: 0 | Status: DISCONTINUED | OUTPATIENT
Start: 2022-07-09 | End: 2022-07-11

## 2022-07-08 RX ORDER — SENNA PLUS 8.6 MG/1
2 TABLET ORAL AT BEDTIME
Refills: 0 | Status: DISCONTINUED | OUTPATIENT
Start: 2022-07-08 | End: 2022-07-11

## 2022-07-08 RX ORDER — ONDANSETRON 8 MG/1
4 TABLET, FILM COATED ORAL EVERY 6 HOURS
Refills: 0 | Status: DISCONTINUED | OUTPATIENT
Start: 2022-07-08 | End: 2022-07-08

## 2022-07-08 RX ORDER — ASCORBIC ACID 60 MG
500 TABLET,CHEWABLE ORAL
Refills: 0 | Status: DISCONTINUED | OUTPATIENT
Start: 2022-07-08 | End: 2022-07-11

## 2022-07-08 RX ORDER — HYDROMORPHONE HYDROCHLORIDE 2 MG/ML
2 INJECTION INTRAMUSCULAR; INTRAVENOUS; SUBCUTANEOUS EVERY 4 HOURS
Refills: 0 | Status: DISCONTINUED | OUTPATIENT
Start: 2022-07-08 | End: 2022-07-11

## 2022-07-08 RX ORDER — HYDROMORPHONE HYDROCHLORIDE 2 MG/ML
0.5 INJECTION INTRAMUSCULAR; INTRAVENOUS; SUBCUTANEOUS
Refills: 0 | Status: DISCONTINUED | OUTPATIENT
Start: 2022-07-08 | End: 2022-07-11

## 2022-07-08 RX ORDER — CYCLOBENZAPRINE HYDROCHLORIDE 10 MG/1
10 TABLET, FILM COATED ORAL EVERY 8 HOURS
Refills: 0 | Status: DISCONTINUED | OUTPATIENT
Start: 2022-07-08 | End: 2022-07-11

## 2022-07-08 RX ORDER — LOSARTAN POTASSIUM 100 MG/1
100 TABLET, FILM COATED ORAL DAILY
Refills: 0 | Status: DISCONTINUED | OUTPATIENT
Start: 2022-07-09 | End: 2022-07-11

## 2022-07-08 RX ORDER — FOLIC ACID 0.8 MG
1 TABLET ORAL DAILY
Refills: 0 | Status: DISCONTINUED | OUTPATIENT
Start: 2022-07-08 | End: 2022-07-11

## 2022-07-08 RX ORDER — VANCOMYCIN HCL 1 G
1750 VIAL (EA) INTRAVENOUS ONCE
Refills: 0 | Status: COMPLETED | OUTPATIENT
Start: 2022-07-08 | End: 2022-07-08

## 2022-07-08 RX ORDER — DIPHENHYDRAMINE HCL 50 MG
12.5 CAPSULE ORAL EVERY 4 HOURS
Refills: 0 | Status: DISCONTINUED | OUTPATIENT
Start: 2022-07-08 | End: 2022-07-11

## 2022-07-08 RX ORDER — PANTOPRAZOLE SODIUM 20 MG/1
40 TABLET, DELAYED RELEASE ORAL
Refills: 0 | Status: DISCONTINUED | OUTPATIENT
Start: 2022-07-08 | End: 2022-07-11

## 2022-07-08 RX ORDER — SODIUM CHLORIDE 9 MG/ML
3 INJECTION INTRAMUSCULAR; INTRAVENOUS; SUBCUTANEOUS EVERY 8 HOURS
Refills: 0 | Status: DISCONTINUED | OUTPATIENT
Start: 2022-07-08 | End: 2022-07-08

## 2022-07-08 RX ORDER — HYDROMORPHONE HYDROCHLORIDE 2 MG/ML
0.5 INJECTION INTRAMUSCULAR; INTRAVENOUS; SUBCUTANEOUS
Refills: 0 | Status: DISCONTINUED | OUTPATIENT
Start: 2022-07-08 | End: 2022-07-08

## 2022-07-08 RX ORDER — SODIUM CHLORIDE 9 MG/ML
1000 INJECTION, SOLUTION INTRAVENOUS
Refills: 0 | Status: DISCONTINUED | OUTPATIENT
Start: 2022-07-08 | End: 2022-07-08

## 2022-07-08 RX ORDER — HYDROMORPHONE HYDROCHLORIDE 2 MG/ML
4 INJECTION INTRAMUSCULAR; INTRAVENOUS; SUBCUTANEOUS EVERY 4 HOURS
Refills: 0 | Status: DISCONTINUED | OUTPATIENT
Start: 2022-07-08 | End: 2022-07-11

## 2022-07-08 RX ORDER — ATORVASTATIN CALCIUM 80 MG/1
40 TABLET, FILM COATED ORAL AT BEDTIME
Refills: 0 | Status: DISCONTINUED | OUTPATIENT
Start: 2022-07-08 | End: 2022-07-11

## 2022-07-08 RX ADMIN — HYDROMORPHONE HYDROCHLORIDE 0.5 MILLIGRAM(S): 2 INJECTION INTRAMUSCULAR; INTRAVENOUS; SUBCUTANEOUS at 13:22

## 2022-07-08 RX ADMIN — Medication 250 MILLIGRAM(S): at 21:30

## 2022-07-08 RX ADMIN — HYDROMORPHONE HYDROCHLORIDE 0.5 MILLIGRAM(S): 2 INJECTION INTRAMUSCULAR; INTRAVENOUS; SUBCUTANEOUS at 13:37

## 2022-07-08 RX ADMIN — HYDROMORPHONE HYDROCHLORIDE 4 MILLIGRAM(S): 2 INJECTION INTRAMUSCULAR; INTRAVENOUS; SUBCUTANEOUS at 15:36

## 2022-07-08 RX ADMIN — HYDROMORPHONE HYDROCHLORIDE 4 MILLIGRAM(S): 2 INJECTION INTRAMUSCULAR; INTRAVENOUS; SUBCUTANEOUS at 16:36

## 2022-07-08 RX ADMIN — ATORVASTATIN CALCIUM 40 MILLIGRAM(S): 80 TABLET, FILM COATED ORAL at 21:07

## 2022-07-08 RX ADMIN — SODIUM CHLORIDE 75 MILLILITER(S): 9 INJECTION, SOLUTION INTRAVENOUS at 15:30

## 2022-07-08 RX ADMIN — HYDROMORPHONE HYDROCHLORIDE 4 MILLIGRAM(S): 2 INJECTION INTRAMUSCULAR; INTRAVENOUS; SUBCUTANEOUS at 21:07

## 2022-07-08 RX ADMIN — Medication 500 MILLIGRAM(S): at 18:10

## 2022-07-08 RX ADMIN — HYDROMORPHONE HYDROCHLORIDE 0.5 MILLIGRAM(S): 2 INJECTION INTRAMUSCULAR; INTRAVENOUS; SUBCUTANEOUS at 13:47

## 2022-07-08 RX ADMIN — HYDROMORPHONE HYDROCHLORIDE 4 MILLIGRAM(S): 2 INJECTION INTRAMUSCULAR; INTRAVENOUS; SUBCUTANEOUS at 22:00

## 2022-07-08 RX ADMIN — Medication 975 MILLIGRAM(S): at 21:06

## 2022-07-08 RX ADMIN — CYCLOBENZAPRINE HYDROCHLORIDE 10 MILLIGRAM(S): 10 TABLET, FILM COATED ORAL at 18:09

## 2022-07-08 RX ADMIN — HYDROMORPHONE HYDROCHLORIDE 0.5 MILLIGRAM(S): 2 INJECTION INTRAMUSCULAR; INTRAVENOUS; SUBCUTANEOUS at 14:08

## 2022-07-08 RX ADMIN — Medication 975 MILLIGRAM(S): at 22:00

## 2022-07-08 RX ADMIN — SODIUM CHLORIDE 75 MILLILITER(S): 9 INJECTION, SOLUTION INTRAVENOUS at 13:23

## 2022-07-08 RX ADMIN — SENNA PLUS 2 TABLET(S): 8.6 TABLET ORAL at 21:06

## 2022-07-08 NOTE — DISCHARGE NOTE PROVIDER - NSDCFUSCHEDAPPT_GEN_ALL_CORE_FT
[facility]  [address]  Scheduled Appointment: 07/21/2022

## 2022-07-08 NOTE — PHYSICAL THERAPY INITIAL EVALUATION ADULT - GAIT DEVIATIONS NOTED, PT EVAL
decreased farida/decreased velocity of limb motion/decreased step length/decreased stride length/decreased weight-shifting ability

## 2022-07-08 NOTE — PHYSICAL THERAPY INITIAL EVALUATION ADULT - GENERAL OBSERVATIONS, REHAB EVAL
Chart reviewed and events noted to date. Patient received semisupine in bed, AxOx4, NAD. + Choi, surgical dressing on R flank. c/o 8/10 pain in location. Patient agreeable to PT session.

## 2022-07-08 NOTE — ASU PREOP CHECKLIST - ANTIBIOTIC
Subjective   REASON FOR FOLLOW UP:  1.  New diagnosis chronic lymphocytic leukemia with extensive lymphadenopathy and possible pleural involvement. Initiation of Treanda, Rituxan May 1, 2019.  2.  Hypogammaglobulinemia.  Status is post IVIG  3.  Significant response on scans June 27, 2019.  Treatment changed to Imbruvica 420 mg daily.     HISTORY OF PRESENT ILLNESS:    The patient is a  62 y.o. male with the above-mentioned history, who returns the office today for toxicity check.  He recently initiated Imbruvica 7/25/2019.  This was delayed due to delivery.  He previously was treated with 2 cycles of Treanda Rituxan with great response.  He reports today his rash has essentially resolved since the discontinuation of Treanda Rituxan.  He is no longer having pruritus.  He reports he has tolerated the initiation of Imbruvica well.  He is without nausea or diarrhea.  He denies skin changes.  He denies signs or symptoms of bleeding.  He does continue to be fatigued though states this is unchanged he is able to accomplish activities as he desires.   He denies any B symptoms including fatigue or night sweats.  He denies any new adenopathy.  He has recently required IVIG due to hypogammaglobulinemia, he currently denies fevers or chills, signs or symptoms of infection.  Additional labs today including immunoglobulins are pending.    Past Medical History, Past Surgical History, Social History, Family History have been reviewed and are without significant changes except as mentioned.    I have reviewed the patient's medical history in detail and updated the computerized patient record.    Review of Systems   Constitutional: Positive for fatigue. Negative for activity change, appetite change, chills and fever.   HENT: Negative for mouth sores and sore throat.    Eyes: Negative for visual disturbance.   Respiratory: Negative for cough, chest tightness, shortness of breath and wheezing.    Gastrointestinal: Negative for  "abdominal pain, constipation, diarrhea, nausea and vomiting.   Genitourinary: Negative for dysuria and frequency.   Skin: Negative for rash.        Rash resolved.    Neurological: Positive for weakness. Negative for dizziness.   Hematological: Does not bruise/bleed easily.   Psychiatric/Behavioral: The patient is not nervous/anxious.    All other systems reviewed and are negative.  Review of systems 7/31/2019 unchanged from previous office visit except as updated      Medications:  The current medication list was reviewed in the EMR    ALLERGIES:  No Known Allergies    Objective      Vitals:    07/31/19 0835   BP: 130/79   Pulse: 83   Resp: 16   Temp: 98.1 °F (36.7 °C)   SpO2: 97%   Weight: 76.6 kg (168 lb 12.8 oz)   Height: 180.3 cm (70.98\")   PainSc: 0-No pain     Current Status 7/31/2019   ECOG score 0       Physical Exam    GENERAL:  Well-developed, well-nourished in no acute distress.   SKIN:  Warm, dry without purpura or petechiae.  Only faint erythema remaining on anterior chest, no macular papular rash.  HEAD:  Normocephalic.  EYES:  Pupils equal, round.  EOMs intact.  Conjunctivae normal.  EARS:  Hearing intact.  NOSE:  Septum midline.  No excoriations or nasal discharge.  MOUTH:  Tongue is well-papillated; no stomatitis or ulcers.  Lips normal.  THROAT:  Oropharynx without lesions or exudates.  LYMPHATICS: Only shotty cervical and submandibular adenopathy, no significant axillary adenopathy.  No supraclavicular adenopathy.    CHEST:  Lungs clear to auscultation. Good airflow.  CARDIAC:  Regular rate and rhythm without murmurs. Normal S1,S2.  ABDOMEN:  Soft, nontender with no organomegaly or masses. Bowel sounds present  EXTREMITIES:  No clubbing, cyanosis or edema.  NEUROLOGICAL: No focal neurological deficits.  PSYCHIATRIC:  Normal affect and mood.    Physical exam 7/31/2019 unchanged from previous office visit except as updated    RECENT LABS:  Results from last 7 days   Lab Units 07/31/19  0757   WBC " 10*3/mm3 4.97   NEUTROS ABS 10*3/mm3 3.81   HEMOGLOBIN g/dL 14.9   HEMATOCRIT % 47.1   PLATELETS 10*3/mm3 167     Results from last 7 days   Lab Units 07/31/19  0757   SODIUM mmol/L 140   POTASSIUM mmol/L 4.5   CHLORIDE mmol/L 100   CO2 mmol/L 26.7   BUN mg/dL 17   CREATININE mg/dL 1.19   CALCIUM mg/dL 9.6   ALBUMIN g/dL 4.40   BILIRUBIN mg/dL 0.3   ALK PHOS U/L 85   ALT (SGPT) U/L 9   AST (SGOT) U/L 11   GLUCOSE mg/dL 144*         FISH prognostic panel-Positive for deletion of 13 q. 14.3    Assessment/Plan   1.  CLL presenting with significant lymphocytosis, lymphadenopathy and splenomegaly.   Positive for deletion of 13 q. 14.3.  Patient status post 2 cycles of Treanda Rituxan with excellent response.  Imaging 6/27/2019 with significant decrease in adenopathy.  Treanda Rituxan discontinued with Imbruvica 420 mg daily initiated 7/25/2019.  2.  Pulmonary nodules/infiltrates.  He is  status post bronchoscopy.  Is unclear at this time whether these findings are infectious or possibly related to his lymphoproliferative disorder.  This is seen to be improving on recent scans.  3.  Iron deficiency  4.  Hypogammaglobulinemia-status post IVIG with resolution of sinusitis.  Immunoglobulins pending today.   5.  Pruritus and skin rash.  Patient was seen by dermatology, .  Rash has now resolved with the discontinuation of Treanda Rituxan.  He is no longer requiring Atarax    Plan:  1. Continue Imbruvica 420 mg daily  2. Immunoglobulins pending today to determine further need for IVIG  3. Return in 2 weeks for CBC with nurse review  4. Return in 4 weeks for CBC, MD follow-up    Noa Zarate, APRN   7/31/2019            no (get order)

## 2022-07-08 NOTE — DISCHARGE NOTE PROVIDER - NSDCFUADDAPPT_GEN_ALL_CORE_FT
Follow up with your surgeon in two weeks. Call for appointment.    If you need more pain medications, call your surgeon's office. For medication refills or authorizations call [phone]    Call and schedule a follow up appointment with your primary care physician for repeat blood work (CBC and BMP) for post hospital discharge follow-up care.    Call your surgeon if you have increased redness/pain/drainage or fever. Return to ER for shortness of breath/calf tenderness.

## 2022-07-08 NOTE — PHYSICAL THERAPY INITIAL EVALUATION ADULT - TRANSFER TRAINING, PT EVAL
Patient will perform all aspects of transfers independently with RW in 2-3 days in order to change positions throughout the day and navigate the home environment.

## 2022-07-08 NOTE — PHYSICAL THERAPY INITIAL EVALUATION ADULT - BALANCE TRAINING, PT EVAL
Patient will improve standing static and dynamic balance to good in 4 weeks in order to safely perform ADLs and ambulation.

## 2022-07-08 NOTE — PHYSICAL THERAPY INITIAL EVALUATION ADULT - BED MOBILITY TRAINING, PT EVAL
Patient will perform all aspects of bed mobility independently in 2-3 days in order to change positions comfortably without increasing pain.

## 2022-07-08 NOTE — PROGRESS NOTE ADULT - SUBJECTIVE AND OBJECTIVE BOX
post op check  77yFemale s/p XLIF/PSF L3-5 under general anesthesia Pt tolerated procedure well without any intra-op complications Pt doing well at this time, states pain is better with medication and having new Right thigh pain. Pt seen and examined in NAD. Pain controlled. Pt denies any new complaints. Pt denies CP/SOB/N/V/D/numbness/tingling/bowel or bladder dysfunction. (+)nguyễn    PE:     Spine: Dressing c/d/i   B/L UE: Skin intact. +ROM shoulder/elbow/wrist/fingers. +ok/thumbsup/fingercross signs.  strength: 5/5.  RP2+ NVI.   B/L LE: Skin intact. +ROM hip/knee/ankle/toes. Ankle Dorsi/plantarflexion: 5/5. Calf: soft, compressible and nontender. DP/PT 2+ NVI.                               13.0   13.11 )-----------( 439      ( 08 Jul 2022 13:30 )             40.3       07-08    142  |  110<H>  |  19  ----------------------------<  154<H>  4.4   |  24  |  1.02    Ca    8.8      08 Jul 2022 13:30          A/P: 77yFemale s/p XLIF/PSF L3-5 POD#0  monitor Nguyễn output, will D/C tomorrow morning  Right thigh pain from XLIF appraoch, expected pain post-op  Pain control prn  PT: WBAT - spinal precautions   DVT ppx: SCDs and ambulation  Wound care, Isometric exercises, incentive spirometry   Discharge: planning Home once pain controlled and passes PT  All the above discussed and understood by pt

## 2022-07-08 NOTE — DISCHARGE NOTE PROVIDER - NSDCMRMEDTOKEN_GEN_ALL_CORE_FT
acetaminophen 325 mg oral tablet: 2 tab(s) orally every 6 hours, As needed, Mild Pain (1 - 3), Moderate Pain (4 - 6), Severe Pain (7 - 10)  Acidophilus oral tablet: 2 tab(s) orally 2 times a day  AcipHex 20 mg oral delayed release tablet: 1 tab(s) orally once a day  Aspir 81 oral delayed release tablet: 1 tab(s) orally once a day  Benicar HCT 40 mg-12.5 mg oral tablet: 1 tab(s) orally once a day  biotin: 1 tab(s) orally once a day  Carafate: 1 tab(s) orally once a day  HYDROmorphone 2 mg oral tablet: 1 tab(s) orally every 4 to 6 hours, As Needed -pain  MDD:8   Multiple Vitamins oral tablet: 1 tab(s) orally once a day  Vitamin D2: 1 tab(s) orally once a day  Zocor: 1 tab(s) orally once a day   acetaminophen 325 mg oral tablet: 2 tab(s) orally every 6 hours, As needed, Mild Pain (1 - 3), Moderate Pain (4 - 6), Severe Pain (7 - 10)  Acidophilus oral tablet: 2 tab(s) orally 2 times a day  AcipHex 20 mg oral delayed release tablet: 1 tab(s) orally once a day  ascorbic acid 500 mg oral tablet: 1 tab(s) orally 2 times a day  Aspir 81 oral delayed release tablet: 1 tab(s) orally once a day  Benicar HCT 40 mg-12.5 mg oral tablet: 1 tab(s) orally once a day  biotin: 1 tab(s) orally once a day  Carafate: 1 tab(s) orally once a day  HYDROmorphone 2 mg oral tablet: 1 tab(s) orally every 4 to 6 hours AS NEEDED for post-op pain MDD:8  Multiple Vitamins oral tablet: 1 tab(s) orally once a day  senna leaf extract oral tablet: 2 tab(s) orally once a day (at bedtime)  Vitamin D2: 1 tab(s) orally once a day  Zocor: 1 tab(s) orally once a day

## 2022-07-08 NOTE — PHYSICAL THERAPY INITIAL EVALUATION ADULT - ACTIVE RANGE OF MOTION EXAMINATION, REHAB EVAL
Left UE Active ROM was WFL (within functional limits)/Right UE Active ROM was WFL (within functional limits)/Left LE Active ROM was WFL (within functional limits)

## 2022-07-08 NOTE — DISCHARGE NOTE PROVIDER - NSDCFUADDINST_GEN_ALL_CORE_FT
May shower 5 days post op.  No direct water pressure over incision.  Change dressing daily as needed with a dry clean bandage.     No bending/lifting/twisting/pulling/pushing/carrying or driving. No blood thinners (not limited to but including) aspirin, motrin, alleve, naproxen, etc.

## 2022-07-08 NOTE — DISCHARGE NOTE PROVIDER - CARE PROVIDER_API CALL
Nish Chan)  Orthopaedic Surgery  [address]  [place] [address]  Phone: [phone]  Fax: [phone]  Follow Up Time:

## 2022-07-08 NOTE — PHYSICAL THERAPY INITIAL EVALUATION ADULT - ADDITIONAL COMMENTS
Pt lives with daughter in a private home with 3 steps to enter from outside into the home with bilateral far rails. Once inside the home, pt has 14 steps to negotiate to 2nd floor bedroom with R sided rail/ledge she can hold onto. Reports that with previous surgeries, she has slept in a recliner on the first floor for a few days to avoid negotiating stairs. In the bathroom, pt has a standard tub/shower combo (not a walk in shower), comfort height toilet seat. Daughter is available to provide limited assist post-op.

## 2022-07-08 NOTE — PATIENT PROFILE ADULT - FALL HARM RISK - HARM RISK INTERVENTIONS
Assistance with ambulation/Assistance OOB with selected safe patient handling equipment/Communicate Risk of Fall with Harm to all staff/Discuss with provider need for PT consult/Monitor gait and stability/Reinforce activity limits and safety measures with patient and family/Sit up slowly, dangle for a short time, stand at bedside before walking/Tailored Fall Risk Interventions/Use of alarms - bed, chair and/or voice tab/Visual Cue: Yellow wristband and red socks/Bed in lowest position, wheels locked, appropriate side rails in place/Call bell, personal items and telephone in reach/Instruct patient to call for assistance before getting out of bed or chair/Non-slip footwear when patient is out of bed/[place] to call system/Physically safe environment - no spills, clutter or unnecessary equipment/Purposeful Proactive Rounding/Room/bathroom lighting operational, light cord in reach

## 2022-07-08 NOTE — DISCHARGE NOTE PROVIDER - HOSPITAL COURSE
77yFemale with history of Lumbar Radiculopathy  presenting for XLIF/PSF L3-5 by Dr. Chan on 7/8/2022. Risk and benefits of surgery were explained to the patient. The patient understood and agreed to proceed with surgery. Patient underwent the procedure with no intraoperative complications. Pt was brought in stable condition to the PACU. Once stable in PACU, pt was brought to the floor. During hospital stay pt was followed by Medicine, physical therapy, Home Care during this admission. Pt had an uneventful hospital course. Pt is stable for discharge to home 77yFemale with history of Lumbar Radiculopathy  presenting for XLIF L3-5 by Dr. Chan on 7/8/2022. Risk and benefits of surgery were explained to the patient. The patient understood and agreed to proceed with surgery. Patient underwent the procedure with no intraoperative complications. Pt was brought in stable condition to the PACU. Once stable in PACU, pt was brought to the floor. During hospital stay pt was followed by Medicine, physical therapy, Home Care during this admission. Pt had an uneventful hospital course. Pt is stable for discharge to home

## 2022-07-09 LAB
ANION GAP SERPL CALC-SCNC: 7 MMOL/L — SIGNIFICANT CHANGE UP (ref 5–17)
BASOPHILS # BLD AUTO: 0.06 K/UL — SIGNIFICANT CHANGE UP (ref 0–0.2)
BASOPHILS NFR BLD AUTO: 0.3 % — SIGNIFICANT CHANGE UP (ref 0–2)
BUN SERPL-MCNC: 25 MG/DL — HIGH (ref 7–23)
CALCIUM SERPL-MCNC: 8.5 MG/DL — SIGNIFICANT CHANGE UP (ref 8.5–10.1)
CHLORIDE SERPL-SCNC: 104 MMOL/L — SIGNIFICANT CHANGE UP (ref 96–108)
CO2 SERPL-SCNC: 27 MMOL/L — SIGNIFICANT CHANGE UP (ref 22–31)
CREAT SERPL-MCNC: 1.22 MG/DL — SIGNIFICANT CHANGE UP (ref 0.5–1.3)
EGFR: 45 ML/MIN/1.73M2 — LOW
EOSINOPHIL # BLD AUTO: 0.03 K/UL — SIGNIFICANT CHANGE UP (ref 0–0.5)
EOSINOPHIL NFR BLD AUTO: 0.2 % — SIGNIFICANT CHANGE UP (ref 0–6)
GLUCOSE SERPL-MCNC: 139 MG/DL — HIGH (ref 70–99)
HCT VFR BLD CALC: 38.4 % — SIGNIFICANT CHANGE UP (ref 34.5–45)
HGB BLD-MCNC: 12.6 G/DL — SIGNIFICANT CHANGE UP (ref 11.5–15.5)
IMM GRANULOCYTES NFR BLD AUTO: 0.9 % — SIGNIFICANT CHANGE UP (ref 0–1.5)
LYMPHOCYTES # BLD AUTO: 1.16 K/UL — SIGNIFICANT CHANGE UP (ref 1–3.3)
LYMPHOCYTES # BLD AUTO: 6.4 % — LOW (ref 13–44)
MCHC RBC-ENTMCNC: 30.7 PG — SIGNIFICANT CHANGE UP (ref 27–34)
MCHC RBC-ENTMCNC: 32.8 G/DL — SIGNIFICANT CHANGE UP (ref 32–36)
MCV RBC AUTO: 93.4 FL — SIGNIFICANT CHANGE UP (ref 80–100)
MONOCYTES # BLD AUTO: 0.96 K/UL — HIGH (ref 0–0.9)
MONOCYTES NFR BLD AUTO: 5.3 % — SIGNIFICANT CHANGE UP (ref 2–14)
NEUTROPHILS # BLD AUTO: 15.63 K/UL — HIGH (ref 1.8–7.4)
NEUTROPHILS NFR BLD AUTO: 86.9 % — HIGH (ref 43–77)
NRBC # BLD: 0 /100 WBCS — SIGNIFICANT CHANGE UP (ref 0–0)
PLATELET # BLD AUTO: 474 K/UL — HIGH (ref 150–400)
POTASSIUM SERPL-MCNC: 4.3 MMOL/L — SIGNIFICANT CHANGE UP (ref 3.5–5.3)
POTASSIUM SERPL-SCNC: 4.3 MMOL/L — SIGNIFICANT CHANGE UP (ref 3.5–5.3)
RBC # BLD: 4.11 M/UL — SIGNIFICANT CHANGE UP (ref 3.8–5.2)
RBC # FLD: 14.8 % — HIGH (ref 10.3–14.5)
SODIUM SERPL-SCNC: 138 MMOL/L — SIGNIFICANT CHANGE UP (ref 135–145)
WBC # BLD: 18 K/UL — HIGH (ref 3.8–10.5)
WBC # FLD AUTO: 18 K/UL — HIGH (ref 3.8–10.5)

## 2022-07-09 RX ORDER — SUCRALFATE 1 G
1 TABLET ORAL ONCE
Refills: 0 | Status: COMPLETED | OUTPATIENT
Start: 2022-07-09 | End: 2022-07-09

## 2022-07-09 RX ORDER — LANOLIN ALCOHOL/MO/W.PET/CERES
3 CREAM (GRAM) TOPICAL AT BEDTIME
Refills: 0 | Status: DISCONTINUED | OUTPATIENT
Start: 2022-07-09 | End: 2022-07-11

## 2022-07-09 RX ADMIN — Medication 975 MILLIGRAM(S): at 14:18

## 2022-07-09 RX ADMIN — HYDROMORPHONE HYDROCHLORIDE 4 MILLIGRAM(S): 2 INJECTION INTRAMUSCULAR; INTRAVENOUS; SUBCUTANEOUS at 06:06

## 2022-07-09 RX ADMIN — Medication 975 MILLIGRAM(S): at 22:24

## 2022-07-09 RX ADMIN — Medication 975 MILLIGRAM(S): at 21:24

## 2022-07-09 RX ADMIN — Medication 500 MILLIGRAM(S): at 06:06

## 2022-07-09 RX ADMIN — HYDROMORPHONE HYDROCHLORIDE 4 MILLIGRAM(S): 2 INJECTION INTRAMUSCULAR; INTRAVENOUS; SUBCUTANEOUS at 23:50

## 2022-07-09 RX ADMIN — Medication 1 GRAM(S): at 21:22

## 2022-07-09 RX ADMIN — LOSARTAN POTASSIUM 100 MILLIGRAM(S): 100 TABLET, FILM COATED ORAL at 06:07

## 2022-07-09 RX ADMIN — HYDROMORPHONE HYDROCHLORIDE 4 MILLIGRAM(S): 2 INJECTION INTRAMUSCULAR; INTRAVENOUS; SUBCUTANEOUS at 22:54

## 2022-07-09 RX ADMIN — Medication 3 MILLIGRAM(S): at 21:24

## 2022-07-09 RX ADMIN — Medication 975 MILLIGRAM(S): at 07:00

## 2022-07-09 RX ADMIN — HYDROMORPHONE HYDROCHLORIDE 4 MILLIGRAM(S): 2 INJECTION INTRAMUSCULAR; INTRAVENOUS; SUBCUTANEOUS at 17:25

## 2022-07-09 RX ADMIN — HYDROMORPHONE HYDROCHLORIDE 4 MILLIGRAM(S): 2 INJECTION INTRAMUSCULAR; INTRAVENOUS; SUBCUTANEOUS at 07:00

## 2022-07-09 RX ADMIN — HYDROMORPHONE HYDROCHLORIDE 4 MILLIGRAM(S): 2 INJECTION INTRAMUSCULAR; INTRAVENOUS; SUBCUTANEOUS at 02:00

## 2022-07-09 RX ADMIN — ATORVASTATIN CALCIUM 40 MILLIGRAM(S): 80 TABLET, FILM COATED ORAL at 21:24

## 2022-07-09 RX ADMIN — Medication 1 TABLET(S): at 11:43

## 2022-07-09 RX ADMIN — Medication 500 MILLIGRAM(S): at 17:25

## 2022-07-09 RX ADMIN — HYDROMORPHONE HYDROCHLORIDE 4 MILLIGRAM(S): 2 INJECTION INTRAMUSCULAR; INTRAVENOUS; SUBCUTANEOUS at 18:25

## 2022-07-09 RX ADMIN — Medication 975 MILLIGRAM(S): at 15:00

## 2022-07-09 RX ADMIN — Medication 12.5 MILLIGRAM(S): at 06:06

## 2022-07-09 RX ADMIN — HYDROMORPHONE HYDROCHLORIDE 4 MILLIGRAM(S): 2 INJECTION INTRAMUSCULAR; INTRAVENOUS; SUBCUTANEOUS at 11:00

## 2022-07-09 RX ADMIN — Medication 975 MILLIGRAM(S): at 06:09

## 2022-07-09 RX ADMIN — PANTOPRAZOLE SODIUM 40 MILLIGRAM(S): 20 TABLET, DELAYED RELEASE ORAL at 06:08

## 2022-07-09 RX ADMIN — HYDROMORPHONE HYDROCHLORIDE 4 MILLIGRAM(S): 2 INJECTION INTRAMUSCULAR; INTRAVENOUS; SUBCUTANEOUS at 10:06

## 2022-07-09 RX ADMIN — HYDROMORPHONE HYDROCHLORIDE 4 MILLIGRAM(S): 2 INJECTION INTRAMUSCULAR; INTRAVENOUS; SUBCUTANEOUS at 01:05

## 2022-07-09 RX ADMIN — Medication 1 MILLIGRAM(S): at 11:43

## 2022-07-09 NOTE — PROGRESS NOTE ADULT - SUBJECTIVE AND OBJECTIVE BOX
Patient seen and examined at bedside. Pain well controlled with medication. Patient denies any numbness, tingling, weakness, or any other orthopaedic complaint. Denies N/V/CP/SOB.  (+)nguyễn      VITAL SIGNS:  T(C): 36.6 (07-09-22 @ 05:51), Max: 36.9 (07-08-22 @ 17:15)  HR: 62 (07-09-22 @ 05:51) (62 - 78)  BP: 135/73 (07-09-22 @ 05:51) (110/60 - 168/75)  RR: 17 (07-09-22 @ 05:51) (12 - 19)  SpO2: 98% (07-09-22 @ 05:51) (93% - 98%)      LABS:                        12.6   18.00 )-----------( 474      ( 09 Jul 2022 06:30 )             38.4     07-09    138  |  104  |  25<H>  ----------------------------<  139<H>  4.3   |  27  |  1.22    Ca    8.5      09 Jul 2022 06:30          PE:     Spine: Dressing c/d/i   B/L UE: Skin intact. +ROM shoulder/elbow/wrist/fingers. +ok/thumbsup/fingercross signs.  strength: 5/5.  RP2+ NVI.   B/L LE: Skin intact. +ROM hip/knee/ankle/toes. Ankle Dorsi/plantarflexion: 5/5. Calf: soft, compressible and nontender. DP/PT 2+ NVI.               A/P: 77yFemale s/p XLIF/PSF L3-5 POD#1, stable  monitor Nguyễn output, will D/C TODAY  Right thigh pain from XLIF appraoch, expected pain post-op  Pain control prn  PT: WBAT - spinal precautions   DVT ppx: SCDs and ambulation  Wound care, Isometric exercises, incentive spirometry   Discharge: planning Home on Monday once pain controlled and passes PT  All the above discussed and understood by pt

## 2022-07-10 LAB
ANION GAP SERPL CALC-SCNC: 6 MMOL/L — SIGNIFICANT CHANGE UP (ref 5–17)
BASOPHILS # BLD AUTO: 0.11 K/UL — SIGNIFICANT CHANGE UP (ref 0–0.2)
BASOPHILS NFR BLD AUTO: 0.8 % — SIGNIFICANT CHANGE UP (ref 0–2)
BUN SERPL-MCNC: 32 MG/DL — HIGH (ref 7–23)
CALCIUM SERPL-MCNC: 8.8 MG/DL — SIGNIFICANT CHANGE UP (ref 8.5–10.1)
CHLORIDE SERPL-SCNC: 101 MMOL/L — SIGNIFICANT CHANGE UP (ref 96–108)
CO2 SERPL-SCNC: 28 MMOL/L — SIGNIFICANT CHANGE UP (ref 22–31)
CREAT SERPL-MCNC: 1.45 MG/DL — HIGH (ref 0.5–1.3)
EGFR: 37 ML/MIN/1.73M2 — LOW
EOSINOPHIL # BLD AUTO: 0.31 K/UL — SIGNIFICANT CHANGE UP (ref 0–0.5)
EOSINOPHIL NFR BLD AUTO: 2.2 % — SIGNIFICANT CHANGE UP (ref 0–6)
GLUCOSE SERPL-MCNC: 123 MG/DL — HIGH (ref 70–99)
HCT VFR BLD CALC: 37.8 % — SIGNIFICANT CHANGE UP (ref 34.5–45)
HGB BLD-MCNC: 12.2 G/DL — SIGNIFICANT CHANGE UP (ref 11.5–15.5)
IMM GRANULOCYTES NFR BLD AUTO: 1.1 % — SIGNIFICANT CHANGE UP (ref 0–1.5)
LYMPHOCYTES # BLD AUTO: 16.5 % — SIGNIFICANT CHANGE UP (ref 13–44)
LYMPHOCYTES # BLD AUTO: 2.35 K/UL — SIGNIFICANT CHANGE UP (ref 1–3.3)
MCHC RBC-ENTMCNC: 29.8 PG — SIGNIFICANT CHANGE UP (ref 27–34)
MCHC RBC-ENTMCNC: 32.3 G/DL — SIGNIFICANT CHANGE UP (ref 32–36)
MCV RBC AUTO: 92.2 FL — SIGNIFICANT CHANGE UP (ref 80–100)
MONOCYTES # BLD AUTO: 0.83 K/UL — SIGNIFICANT CHANGE UP (ref 0–0.9)
MONOCYTES NFR BLD AUTO: 5.8 % — SIGNIFICANT CHANGE UP (ref 2–14)
NEUTROPHILS # BLD AUTO: 10.51 K/UL — HIGH (ref 1.8–7.4)
NEUTROPHILS NFR BLD AUTO: 73.6 % — SIGNIFICANT CHANGE UP (ref 43–77)
NRBC # BLD: 0 /100 WBCS — SIGNIFICANT CHANGE UP (ref 0–0)
PLATELET # BLD AUTO: 529 K/UL — HIGH (ref 150–400)
POTASSIUM SERPL-MCNC: 4.2 MMOL/L — SIGNIFICANT CHANGE UP (ref 3.5–5.3)
POTASSIUM SERPL-SCNC: 4.2 MMOL/L — SIGNIFICANT CHANGE UP (ref 3.5–5.3)
RBC # BLD: 4.1 M/UL — SIGNIFICANT CHANGE UP (ref 3.8–5.2)
RBC # FLD: 15.3 % — HIGH (ref 10.3–14.5)
SODIUM SERPL-SCNC: 135 MMOL/L — SIGNIFICANT CHANGE UP (ref 135–145)
WBC # BLD: 14.27 K/UL — HIGH (ref 3.8–10.5)
WBC # FLD AUTO: 14.27 K/UL — HIGH (ref 3.8–10.5)

## 2022-07-10 RX ADMIN — LOSARTAN POTASSIUM 100 MILLIGRAM(S): 100 TABLET, FILM COATED ORAL at 06:50

## 2022-07-10 RX ADMIN — Medication 975 MILLIGRAM(S): at 06:40

## 2022-07-10 RX ADMIN — HYDROMORPHONE HYDROCHLORIDE 4 MILLIGRAM(S): 2 INJECTION INTRAMUSCULAR; INTRAVENOUS; SUBCUTANEOUS at 20:13

## 2022-07-10 RX ADMIN — HYDROMORPHONE HYDROCHLORIDE 4 MILLIGRAM(S): 2 INJECTION INTRAMUSCULAR; INTRAVENOUS; SUBCUTANEOUS at 10:30

## 2022-07-10 RX ADMIN — Medication 1 MILLIGRAM(S): at 11:54

## 2022-07-10 RX ADMIN — Medication 500 MILLIGRAM(S): at 05:46

## 2022-07-10 RX ADMIN — Medication 81 MILLIGRAM(S): at 11:54

## 2022-07-10 RX ADMIN — HYDROMORPHONE HYDROCHLORIDE 4 MILLIGRAM(S): 2 INJECTION INTRAMUSCULAR; INTRAVENOUS; SUBCUTANEOUS at 21:13

## 2022-07-10 RX ADMIN — Medication 500 MILLIGRAM(S): at 17:06

## 2022-07-10 RX ADMIN — Medication 1 TABLET(S): at 11:54

## 2022-07-10 RX ADMIN — Medication 975 MILLIGRAM(S): at 23:30

## 2022-07-10 RX ADMIN — Medication 975 MILLIGRAM(S): at 22:30

## 2022-07-10 RX ADMIN — ATORVASTATIN CALCIUM 40 MILLIGRAM(S): 80 TABLET, FILM COATED ORAL at 22:30

## 2022-07-10 RX ADMIN — PANTOPRAZOLE SODIUM 40 MILLIGRAM(S): 20 TABLET, DELAYED RELEASE ORAL at 05:46

## 2022-07-10 RX ADMIN — Medication 3 MILLIGRAM(S): at 22:30

## 2022-07-10 RX ADMIN — Medication 12.5 MILLIGRAM(S): at 05:46

## 2022-07-10 RX ADMIN — Medication 975 MILLIGRAM(S): at 05:46

## 2022-07-10 RX ADMIN — HYDROMORPHONE HYDROCHLORIDE 4 MILLIGRAM(S): 2 INJECTION INTRAMUSCULAR; INTRAVENOUS; SUBCUTANEOUS at 09:22

## 2022-07-10 NOTE — PROGRESS NOTE ADULT - SUBJECTIVE AND OBJECTIVE BOX
POD2 s/p L3-L5 XLIF/PSF    Patient seen and examined at bedside. Pain well controlled with medication. Patient denies any numbness, tingling, weakness, or any other orthopaedic complaint. Denies N/V/CP/SOB. Choi was removed yesterday with successful TOV      Vital Signs Last 24 Hrs  T(C): 36.9 (10 Jul 2022 05:45), Max: 36.9 (10 Jul 2022 05:45)  T(F): 98.5 (10 Jul 2022 05:45), Max: 98.5 (10 Jul 2022 05:45)  HR: 66 (10 Jul 2022 05:45) (66 - 70)  BP: 122/53 (10 Jul 2022 05:45) (101/55 - 131/68)  BP(mean): --  RR: 18 (10 Jul 2022 05:45) (16 - 18)  SpO2: 92% (10 Jul 2022 05:45) (92% - 95%)    Parameters below as of 10 Jul 2022 05:45  Patient On (Oxygen Delivery Method): room air                          12.2   14.27 )-----------( 529      ( 10 Jul 2022 05:57 )             37.8         PE:     Spine: Dressing c/d/i   B/L UE: Skin intact. +ROM shoulder/elbow/wrist/fingers. +ok/thumbsup/fingercross signs.  strength: 5/5.  RP2+ NVI.   B/L LE: Skin intact. +ROM hip/knee/ankle/toes. Ankle Dorsi/plantarflexion: 5/5. Calf: soft, compressible and nontender. DP/PT 2+ NVI.       A/P: 77yFemale s/p XLIF/PSF L3-5 POD#2,    Medical comanagement appreciated  Choi removed yesterday with successful TOV  FU AM Labs  Pain control prn  PT: WBAT - spinal precautions   DVT ppx: SCDs and ambulation  Wound care, Isometric exercises, incentive spirometry   Discharge: planning Home tomorrow per PT recs  All the above discussed and understood by pt   Will discuss with attending Dr. Chan and advise if any changes to plan

## 2022-07-11 ENCOUNTER — FORM ENCOUNTER (OUTPATIENT)
Age: 77
End: 2022-07-11

## 2022-07-11 ENCOUNTER — TRANSCRIPTION ENCOUNTER (OUTPATIENT)
Age: 77
End: 2022-07-11

## 2022-07-11 VITALS — TEMPERATURE: 98 F

## 2022-07-11 LAB
ANION GAP SERPL CALC-SCNC: 13 MMOL/L — SIGNIFICANT CHANGE UP (ref 5–17)
BASOPHILS # BLD AUTO: 0.06 K/UL — SIGNIFICANT CHANGE UP (ref 0–0.2)
BASOPHILS NFR BLD AUTO: 0.5 % — SIGNIFICANT CHANGE UP (ref 0–2)
BUN SERPL-MCNC: 32 MG/DL — HIGH (ref 7–23)
CALCIUM SERPL-MCNC: 8.8 MG/DL — SIGNIFICANT CHANGE UP (ref 8.5–10.1)
CHLORIDE SERPL-SCNC: 106 MMOL/L — SIGNIFICANT CHANGE UP (ref 96–108)
CO2 SERPL-SCNC: 22 MMOL/L — SIGNIFICANT CHANGE UP (ref 22–31)
CREAT SERPL-MCNC: 1.36 MG/DL — HIGH (ref 0.5–1.3)
EGFR: 40 ML/MIN/1.73M2 — LOW
EOSINOPHIL # BLD AUTO: 0.28 K/UL — SIGNIFICANT CHANGE UP (ref 0–0.5)
EOSINOPHIL NFR BLD AUTO: 2.2 % — SIGNIFICANT CHANGE UP (ref 0–6)
GLUCOSE SERPL-MCNC: 167 MG/DL — HIGH (ref 70–99)
HCT VFR BLD CALC: 36.7 % — SIGNIFICANT CHANGE UP (ref 34.5–45)
HGB BLD-MCNC: 12 G/DL — SIGNIFICANT CHANGE UP (ref 11.5–15.5)
IMM GRANULOCYTES NFR BLD AUTO: 1 % — SIGNIFICANT CHANGE UP (ref 0–1.5)
LYMPHOCYTES # BLD AUTO: 1.97 K/UL — SIGNIFICANT CHANGE UP (ref 1–3.3)
LYMPHOCYTES # BLD AUTO: 15.5 % — SIGNIFICANT CHANGE UP (ref 13–44)
MCHC RBC-ENTMCNC: 30.3 PG — SIGNIFICANT CHANGE UP (ref 27–34)
MCHC RBC-ENTMCNC: 32.7 G/DL — SIGNIFICANT CHANGE UP (ref 32–36)
MCV RBC AUTO: 92.7 FL — SIGNIFICANT CHANGE UP (ref 80–100)
MONOCYTES # BLD AUTO: 0.78 K/UL — SIGNIFICANT CHANGE UP (ref 0–0.9)
MONOCYTES NFR BLD AUTO: 6.1 % — SIGNIFICANT CHANGE UP (ref 2–14)
NEUTROPHILS # BLD AUTO: 9.49 K/UL — HIGH (ref 1.8–7.4)
NEUTROPHILS NFR BLD AUTO: 74.7 % — SIGNIFICANT CHANGE UP (ref 43–77)
NRBC # BLD: 0 /100 WBCS — SIGNIFICANT CHANGE UP (ref 0–0)
PLATELET # BLD AUTO: 507 K/UL — HIGH (ref 150–400)
POTASSIUM SERPL-MCNC: 3.7 MMOL/L — SIGNIFICANT CHANGE UP (ref 3.5–5.3)
POTASSIUM SERPL-SCNC: 3.7 MMOL/L — SIGNIFICANT CHANGE UP (ref 3.5–5.3)
RBC # BLD: 3.96 M/UL — SIGNIFICANT CHANGE UP (ref 3.8–5.2)
RBC # FLD: 15.2 % — HIGH (ref 10.3–14.5)
SODIUM SERPL-SCNC: 141 MMOL/L — SIGNIFICANT CHANGE UP (ref 135–145)
WBC # BLD: 12.71 K/UL — HIGH (ref 3.8–10.5)
WBC # FLD AUTO: 12.71 K/UL — HIGH (ref 3.8–10.5)

## 2022-07-11 RX ORDER — HYDROMORPHONE HYDROCHLORIDE 2 MG/ML
1 INJECTION INTRAMUSCULAR; INTRAVENOUS; SUBCUTANEOUS
Qty: 42 | Refills: 0
Start: 2022-07-11 | End: 2022-07-17

## 2022-07-11 RX ORDER — SODIUM CHLORIDE 9 MG/ML
1000 INJECTION INTRAMUSCULAR; INTRAVENOUS; SUBCUTANEOUS ONCE
Refills: 0 | Status: DISCONTINUED | OUTPATIENT
Start: 2022-07-11 | End: 2022-07-11

## 2022-07-11 RX ORDER — ASCORBIC ACID 60 MG
1 TABLET,CHEWABLE ORAL
Qty: 0 | Refills: 0 | DISCHARGE
Start: 2022-07-11

## 2022-07-11 RX ORDER — SENNA PLUS 8.6 MG/1
2 TABLET ORAL
Qty: 0 | Refills: 0 | DISCHARGE
Start: 2022-07-11

## 2022-07-11 RX ADMIN — Medication 975 MILLIGRAM(S): at 14:30

## 2022-07-11 RX ADMIN — Medication 975 MILLIGRAM(S): at 13:28

## 2022-07-11 RX ADMIN — HYDROMORPHONE HYDROCHLORIDE 4 MILLIGRAM(S): 2 INJECTION INTRAMUSCULAR; INTRAVENOUS; SUBCUTANEOUS at 00:27

## 2022-07-11 RX ADMIN — Medication 12.5 MILLIGRAM(S): at 06:59

## 2022-07-11 RX ADMIN — Medication 81 MILLIGRAM(S): at 11:41

## 2022-07-11 RX ADMIN — Medication 975 MILLIGRAM(S): at 05:39

## 2022-07-11 RX ADMIN — HYDROMORPHONE HYDROCHLORIDE 4 MILLIGRAM(S): 2 INJECTION INTRAMUSCULAR; INTRAVENOUS; SUBCUTANEOUS at 00:42

## 2022-07-11 RX ADMIN — HYDROMORPHONE HYDROCHLORIDE 4 MILLIGRAM(S): 2 INJECTION INTRAMUSCULAR; INTRAVENOUS; SUBCUTANEOUS at 04:30

## 2022-07-11 RX ADMIN — LOSARTAN POTASSIUM 100 MILLIGRAM(S): 100 TABLET, FILM COATED ORAL at 09:01

## 2022-07-11 RX ADMIN — PANTOPRAZOLE SODIUM 40 MILLIGRAM(S): 20 TABLET, DELAYED RELEASE ORAL at 05:40

## 2022-07-11 RX ADMIN — Medication 500 MILLIGRAM(S): at 05:40

## 2022-07-11 RX ADMIN — Medication 1 MILLIGRAM(S): at 11:40

## 2022-07-11 RX ADMIN — Medication 975 MILLIGRAM(S): at 06:30

## 2022-07-11 RX ADMIN — Medication 1 TABLET(S): at 11:40

## 2022-07-11 RX ADMIN — HYDROMORPHONE HYDROCHLORIDE 4 MILLIGRAM(S): 2 INJECTION INTRAMUSCULAR; INTRAVENOUS; SUBCUTANEOUS at 05:30

## 2022-07-11 NOTE — DISCHARGE NOTE NURSING/CASE MANAGEMENT/SOCIAL WORK - NSDCFUADDAPPT_GEN_ALL_CORE_FT
Follow up with your surgeon in two weeks. Call for appointment.    If you need more pain medications, call your surgeon's office. For medication refills or authorizations call [phone]    Call and schedule a follow up appointment with your primary care physician for repeat blood work (CBC and BMP) for post hospital discharge follow-up care.    Call your surgeon if you have increased redness/pain/drainage or fever. Return to ER for shortness of breath/calf tenderness.

## 2022-07-11 NOTE — DISCHARGE NOTE NURSING/CASE MANAGEMENT/SOCIAL WORK - NSDCPEFALRISK_GEN_ALL_CORE
For information on Fall & Injury Prevention, visit: [email or web address] OR  [email or web address] OR  https://www.cdc.gov/steadi/patient.html

## 2022-07-11 NOTE — DIETITIAN INITIAL EVALUATION ADULT - OTHER CALCULATIONS
Ht (cm): 162.6cm  Wt (kg): 124.2kg (07/08 dosing weight)  BMI: 47.0    IBW:  54.4kg +/- 10%  %IBW: 228%    UBW: 124.2kg  %UBW: 100%

## 2022-07-11 NOTE — DIETITIAN INITIAL EVALUATION ADULT - OTHER INFO
Pt reports good appetite and PO intake PTA. States no diet restrictions at home.    Reports good appetite and PO intake during LOS. Denies difficulty chewing/swallowing. Pt denies nausea, vomiting, diarrhea, or constipation. States no recent weight changes.    Noted HbA1c elevated (6.3%) in presurgical testing results 06/21. Pt states she is aware and will follow-up with outpt MD. Pt with no questions/preferences regarding nutrition at this time. Made aware RD remains available.

## 2022-07-11 NOTE — OCCUPATIONAL THERAPY INITIAL EVALUATION ADULT - BALANCE TRAINING, PT EVAL
Pt. will increase static/dynamic sitting balance by 1/2 grade in order to assist with LB dressing sitting at EOB.

## 2022-07-11 NOTE — OCCUPATIONAL THERAPY INITIAL EVALUATION ADULT - BED MOBILITY TRAINING, PT EVAL
Patient will be able to perform bed mobility tasks independently, using least restrictive device, within 1-2 weeks.

## 2022-07-11 NOTE — DIETITIAN INITIAL EVALUATION ADULT - PERTINENT LABORATORY DATA
07-11    141  |  106  |  32<H>  ----------------------------<  167<H>  3.7   |  22  |  1.36<H>    Ca    8.8      11 Jul 2022 05:56    A1C with Estimated Average Glucose Result: 6.3 % (06-21-22 @ 14:40)

## 2022-07-11 NOTE — OCCUPATIONAL THERAPY INITIAL EVALUATION ADULT - STRENGTHENING, PT EVAL
Pt will increase bilateral upper extremity strength to 5/5 to improve functional strength needed to engage in functional tasks by 4 weeks

## 2022-07-11 NOTE — DIETITIAN INITIAL EVALUATION ADULT - PERTINENT MEDS FT
MEDICATIONS  (STANDING):  acetaminophen     Tablet .. 975 milliGRAM(s) Oral every 8 hours  ascorbic acid 500 milliGRAM(s) Oral two times a day  aspirin enteric coated 81 milliGRAM(s) Oral daily  atorvastatin 40 milliGRAM(s) Oral at bedtime  folic acid 1 milliGRAM(s) Oral daily  hydrochlorothiazide 12.5 milliGRAM(s) Oral daily  lactated ringers. 1000 milliLiter(s) (75 mL/Hr) IV Continuous <Continuous>  losartan 100 milliGRAM(s) Oral daily  melatonin 3 milliGRAM(s) Oral at bedtime  multivitamin 1 Tablet(s) Oral daily  pantoprazole    Tablet 40 milliGRAM(s) Oral before breakfast  senna 2 Tablet(s) Oral at bedtime    MEDICATIONS  (PRN):  cyclobenzaprine 10 milliGRAM(s) Oral every 8 hours PRN Muscle Spasm  diphenhydrAMINE Injectable 12.5 milliGRAM(s) IV Push every 4 hours PRN Itching  HYDROmorphone   Tablet 2 milliGRAM(s) Oral every 4 hours PRN Moderate Pain (4 - 6)  HYDROmorphone   Tablet 4 milliGRAM(s) Oral every 4 hours PRN Severe Pain (7 - 10)  HYDROmorphone  Injectable 0.5 milliGRAM(s) IV Push every 3 hours PRN breakthrough pain  ondansetron Injectable 4 milliGRAM(s) IV Push every 6 hours PRN Nausea and/or Vomiting

## 2022-07-11 NOTE — DISCHARGE NOTE NURSING/CASE MANAGEMENT/SOCIAL WORK - PATIENT PORTAL LINK FT
You can access the FollowMyHealth Patient Portal offered by [facility] by registering at the following website: [email or web address]. By joining [employer]’s FollowMyHealth portal, you will also be able to view your health information using other applications (apps) compatible with our system.

## 2022-07-11 NOTE — CHART NOTE - NSCHARTNOTEFT_GEN_A_CORE
There was an issue with pt's Hydromorphone prescription since the original prescription did not indicate to take the tablets "As needed for PAIN." The script modified to reflect the PRN nature  and displayed as cancelled in the EMR.  The modification to Rx indicates: 1 tablet q 4-6 hours as needed for post op pain. MDD 8 tabs    The pharmacy did not receive the original cancellation and held the script. Contacted the patient.     Called the [id number] ([address] [place] [address]), spoke with pharmacist, and confirmed cancellation of original Rx. Only the PRN Rx remains and was confirmed via pharmacist. The modified Rx will be filled.     Spoke with Pharmacist Manager: Trae  [id number]

## 2022-07-11 NOTE — PROGRESS NOTE ADULT - SUBJECTIVE AND OBJECTIVE BOX
77yFemale s/p XLIF L3-5 POD #3  Pt seen and examined in NAD.   Pain controlled.  Pt denies any new complaints. Pt still reports the right quad pain and weakness which is expected in the post-operative period.   Pt passed TOV. Pt toletaring diet without issues.  Pt denies CP/SOB/N/V/D/numbness/tingling/bowel or bladder dysfunction.     Vital Signs Last 24 Hrs  T(C): 36.6 (11 Jul 2022 05:15), Max: 36.8 (10 Jul 2022 17:07)  T(F): 97.9 (11 Jul 2022 05:15), Max: 98.3 (10 Jul 2022 17:07)  HR: 82 (11 Jul 2022 10:42) (77 - 86)  BP: 133/64 (11 Jul 2022 10:42) (111/72 - 136/76)  BP(mean): --  RR: 17 (11 Jul 2022 10:42) (16 - 17)  SpO2: 95% (11 Jul 2022 10:42) (93% - 98%)    Parameters below as of 11 Jul 2022 10:42  Patient On (Oxygen Delivery Method): room air        PE:   General: A&Ox3, NAD, resting in Chair  Spine: Dressing c/d/i, dressing changed and incisions inspected. Nylon sutures in place, skin well approximated, no erythema/drainage noted.   B/L UE: Skin intact. , +ROM shoulder/elbow/wrist/fingers. +ok/thumbsup/fingercross signs.  strength: 5/5.  RP2+ NVI. Minor bruising noted from past IV placement in LUE  B/L LE: Skin intact. +ROM hip/knee/ankle/toes. Decreased strength on right side with hip flexion within normal limits for post-operative parameters. Ankle Dorsi/plantarflexion: 5/5. Calf: soft, compressible and nontender. DP/PT 2+ NVI.                             12.0   12.71 )-----------( 507      ( 11 Jul 2022 05:56 )             36.7       07-11    141  |  106  |  32<H>  ----------------------------<  167<H>  3.7   |  22  |  1.36<H>    Ca    8.8      11 Jul 2022 05:56          A/P: 77yFemale s/p XLIF L3-5 POD #3  Pt passed TOV and no issues reported.   Wound care  Right sided dressing changed with Tegaderm and gauze. Sutures intact to be removed at outpatient f/u appt  Pain controlled  PT: WBAT: Spinal precautions  Isometric exercises  DVT ppx: SCDs and ambulation. Pt encouraged to use walker until Right side weakness resolves. PT discussed with patient as well.   Right sided minor weakness and R sided quad pain- normal post-operatively  with XLIF and may take weeks to resolve. WNL  Creatinine labs are trending down: today is 1.36, down from 1.45 on 7/10/22 (pre-op 1.23). Continue with oral rehydration and f/u labs with outpatient provider/GP  Incentive spirometry counseled     Discharge: planning home with home care today when established.   All the above discussed and understood by pt    77yFemale s/p XLIF L3-5 POD #3  Pt seen and examined in NAD.   Pain controlled.  Pt denies any new complaints. Pt still reports the right quad pain and weakness which is expected in the post-operative period.   Pt passed TOV. Pt toletaring diet without issues.  Pt denies CP/SOB/N/V/D/numbness/tingling/bowel or bladder dysfunction.     Vital Signs Last 24 Hrs  T(C): 36.6 (11 Jul 2022 05:15), Max: 36.8 (10 Jul 2022 17:07)  T(F): 97.9 (11 Jul 2022 05:15), Max: 98.3 (10 Jul 2022 17:07)  HR: 82 (11 Jul 2022 10:42) (77 - 86)  BP: 133/64 (11 Jul 2022 10:42) (111/72 - 136/76)  BP(mean): --  RR: 17 (11 Jul 2022 10:42) (16 - 17)  SpO2: 95% (11 Jul 2022 10:42) (93% - 98%)    Parameters below as of 11 Jul 2022 10:42  Patient On (Oxygen Delivery Method): room air        PE:   General: A&Ox3, NAD, resting in Chair  Spine: Dressing c/d/i, dressing changed and incisions inspected. Nylon sutures in place, skin well approximated, no erythema/drainage noted.   B/L UE: Skin intact. , +ROM shoulder/elbow/wrist/fingers. +ok/thumbsup/fingercross signs.  strength: 5/5.  RP2+ NVI. Minor bruising noted from past IV placement in LUE  B/L LE: Skin intact. +ROM hip/knee/ankle/toes. Decreased strength on right side with hip flexion within normal limits for post-operative parameters. Ankle Dorsi/plantarflexion: 5/5. Calf: soft, compressible and nontender. DP/PT 2+ NVI.                             12.0   12.71 )-----------( 507      ( 11 Jul 2022 05:56 )             36.7       07-11    141  |  106  |  32<H>  ----------------------------<  167<H>  3.7   |  22  |  1.36<H>    Ca    8.8      11 Jul 2022 05:56          A/P: 77yFemale s/p XLIF L3-5 POD #3  Pt passed TOV and no issues reported.   Wound care  Right sided dressing changed with Tegaderm and gauze. Sutures intact to be removed at outpatient f/u appt  Pain controlled  PT: WBAT: Spinal precautions  Isometric exercises  DVT ppx: SCDs and ambulation. Pt encouraged to use walker until Right side weakness resolves. PT discussed with patient as well.   Right sided minor weakness and R sided quad pain- normal post-operatively  with XLIF and may take weeks to resolve. WNL  Creatinine labs are trending down: today is 1.36, down from 1.45 on 7/10/22 (pre-op 1.23). Continue with oral rehydration and f/u labs with outpatient provider/GP  Incentive spirometry counseled     Discharge: planning home with outpatient PT when cleared by OT.   All the above discussed and understood by pt    77yFemale s/p XLIF L3-5 POD #3  Pt seen and examined in NAD.   Pain controlled.  Pt denies any new complaints. Pt still reports the right quad pain and weakness which is expected in the post-operative period.   Pt passed TOV. Pt toletaring diet without issues.  Pt denies CP/SOB/N/V/D/numbness/tingling/bowel or bladder dysfunction.     Vital Signs Last 24 Hrs  T(C): 36.6 (11 Jul 2022 05:15), Max: 36.8 (10 Jul 2022 17:07)  T(F): 97.9 (11 Jul 2022 05:15), Max: 98.3 (10 Jul 2022 17:07)  HR: 82 (11 Jul 2022 10:42) (77 - 86)  BP: 133/64 (11 Jul 2022 10:42) (111/72 - 136/76)  BP(mean): --  RR: 17 (11 Jul 2022 10:42) (16 - 17)  SpO2: 95% (11 Jul 2022 10:42) (93% - 98%)    Parameters below as of 11 Jul 2022 10:42  Patient On (Oxygen Delivery Method): room air        PE:   General: A&Ox3, NAD, resting in Chair  Spine: Dressing c/d/i, dressing changed and incisions inspected. Nylon sutures in place, skin well approximated, no erythema/drainage noted.   B/L UE: Skin intact. , +ROM shoulder/elbow/wrist/fingers. +ok/thumbsup/fingercross signs.  strength: 5/5.  RP2+ NVI. Minor bruising noted from past IV placement in LUE  B/L LE: Skin intact. +ROM hip/knee/ankle/toes. Decreased strength on right side with hip flexion within normal limits for post-operative parameters. Ankle Dorsi/plantarflexion: 5/5. Calf: soft, compressible and nontender. DP/PT 2+ NVI.                             12.0   12.71 )-----------( 507      ( 11 Jul 2022 05:56 )             36.7       07-11    141  |  106  |  32<H>  ----------------------------<  167<H>  3.7   |  22  |  1.36<H>    Ca    8.8      11 Jul 2022 05:56          A/P: 77yFemale s/p XLIF L3-5 POD #3  Pt passed TOV and no issues reported.   Wound care  Right sided dressing changed with Tegaderm and gauze. Sutures intact to be removed at outpatient f/u appt  Pain controlled  PT: WBAT: Spinal precautions, Outpatient PT when cleared by Dr. Chan at f/u appt  Isometric exercises  DVT ppx: SCDs and ambulation. Pt encouraged to use walker until Right side weakness resolves. PT discussed with patient as well.   Right sided minor weakness and R sided quad pain- normal post-operatively  with XLIF and may take weeks to resolve. WNL  Creatinine labs are trending down: today is 1.36, down from 1.45 on 7/10/22 (pre-op 1.23). Continue with oral rehydration and f/u labs with outpatient provider/GP  Incentive spirometry counseled     Discharge: planning home with outpatient PT when cleared by OT.   All the above discussed and understood by pt

## 2022-07-11 NOTE — OCCUPATIONAL THERAPY INITIAL EVALUATION ADULT - ADDITIONAL COMMENTS
Pt lives with daughter in a private home with 3 steps to enter from outside into the home with bilateral far rails. Once inside the home, pt has 14 steps to negotiate to 2nd floor bedroom with R sided rail/ledge she can hold onto. Reports that with previous surgeries, she has slept in a recliner on the first floor for a few days to avoid negotiating stairs. In the bathroom, pt has a standard tub/shower combo, comfort height toilet seat. Daughter is available to provide limited assist post-op.

## 2022-07-21 ENCOUNTER — APPOINTMENT (OUTPATIENT)
Dept: ORTHOPEDIC SURGERY | Facility: CLINIC | Age: 77
End: 2022-07-21

## 2022-07-25 ENCOUNTER — RESULT CHARGE (OUTPATIENT)
Age: 77
End: 2022-07-25

## 2022-07-25 ENCOUNTER — APPOINTMENT (OUTPATIENT)
Dept: ORTHOPEDIC SURGERY | Facility: CLINIC | Age: 77
End: 2022-07-25

## 2022-07-25 PROCEDURE — 72100 X-RAY EXAM L-S SPINE 2/3 VWS: CPT

## 2022-07-25 PROCEDURE — 99072 ADDL SUPL MATRL&STAF TM PHE: CPT

## 2022-07-25 PROCEDURE — 99213 OFFICE O/P EST LOW 20 MIN: CPT

## 2022-07-25 NOTE — PHYSICAL EXAM
[] : no sign of infection [FreeTextEntry1] : compression fracture L4 and L5 when compared to intraop XR. interbody, sideplate and screws intact.

## 2022-07-25 NOTE — ASSESSMENT
[FreeTextEntry1] : 77 yoF Dr Chan PO s/p XLIF L3-5. compression fracture noted at L4 and L5 when compared to intraop XR. Indicated for a STAT CT scan to evaluate hardware and fracture. Pending results we discussed observation/conservative mgmt vs. return to OR for spine marce/posterior pedicle screw fixation. Patient neurologically intact, no weakness and no radicular complaints, discussed to call us right away if this changes. Sutures removed and steris applied. The anterior R thigh dysthesias is most likely approach related. continue dilaudid prn. Case discussed with Dr. Chan and will call patient with results to discuss treatment plan.

## 2022-07-25 NOTE — HISTORY OF PRESENT ILLNESS
[Lower back] : lower back [Gradual] : gradual [10] : 10 [9] : 9 [Localized] : localized [Radiating] : radiating [Sharp] : sharp [Constant] : constant [Household chores] : household chores [Nothing helps with pain getting better] : Nothing helps with pain getting better [Standing] : standing [Walking] : walking [Stairs] : stairs [] : yes [de-identified] : pt presents here today for first visit s/p XLIF L3-5  07/08/2022 by Dr Chan - since surgery c/o R thigh pain which is improving, now localized to the low back. taking dilaudid 2mg 1-3 tabs q 4-6 hours. awaiting home health aid and at home PT. ambulating in wheelchair. oow.  [FreeTextEntry7] : right leg  [de-identified] : sx

## 2022-07-26 ENCOUNTER — FORM ENCOUNTER (OUTPATIENT)
Age: 77
End: 2022-07-26

## 2022-07-27 ENCOUNTER — APPOINTMENT (OUTPATIENT)
Dept: CT IMAGING | Facility: CLINIC | Age: 77
End: 2022-07-27

## 2022-07-27 PROCEDURE — 99072 ADDL SUPL MATRL&STAF TM PHE: CPT

## 2022-07-27 PROCEDURE — 72131 CT LUMBAR SPINE W/O DYE: CPT

## 2022-08-01 ENCOUNTER — FORM ENCOUNTER (OUTPATIENT)
Age: 77
End: 2022-08-01

## 2022-08-02 ENCOUNTER — OUTPATIENT (OUTPATIENT)
Dept: OUTPATIENT SERVICES | Facility: HOSPITAL | Age: 77
LOS: 1 days | Discharge: HOME | End: 2022-08-02

## 2022-08-02 VITALS
RESPIRATION RATE: 18 BRPM | HEIGHT: 65 IN | WEIGHT: 270.95 LBS | TEMPERATURE: 98 F | SYSTOLIC BLOOD PRESSURE: 132 MMHG | DIASTOLIC BLOOD PRESSURE: 64 MMHG | HEART RATE: 56 BPM | OXYGEN SATURATION: 95 %

## 2022-08-02 DIAGNOSIS — Z98.890 OTHER SPECIFIED POSTPROCEDURAL STATES: Chronic | ICD-10-CM

## 2022-08-02 DIAGNOSIS — Z01.818 ENCOUNTER FOR OTHER PREPROCEDURAL EXAMINATION: ICD-10-CM

## 2022-08-02 DIAGNOSIS — M54.16 RADICULOPATHY, LUMBAR REGION: ICD-10-CM

## 2022-08-02 DIAGNOSIS — I10 ESSENTIAL (PRIMARY) HYPERTENSION: ICD-10-CM

## 2022-08-02 DIAGNOSIS — Z96.611 PRESENCE OF RIGHT ARTIFICIAL SHOULDER JOINT: Chronic | ICD-10-CM

## 2022-08-02 DIAGNOSIS — E66.9 OBESITY, UNSPECIFIED: ICD-10-CM

## 2022-08-02 DIAGNOSIS — Z96.651 PRESENCE OF RIGHT ARTIFICIAL KNEE JOINT: Chronic | ICD-10-CM

## 2022-08-02 DIAGNOSIS — Z96.659 PRESENCE OF UNSPECIFIED ARTIFICIAL KNEE JOINT: Chronic | ICD-10-CM

## 2022-08-02 DIAGNOSIS — Z90.49 ACQUIRED ABSENCE OF OTHER SPECIFIED PARTS OF DIGESTIVE TRACT: Chronic | ICD-10-CM

## 2022-08-02 LAB
A1C WITH ESTIMATED AVERAGE GLUCOSE RESULT: 6.2 % — HIGH (ref 4–5.6)
ALBUMIN SERPL ELPH-MCNC: 3.3 G/DL — SIGNIFICANT CHANGE UP (ref 3.3–5)
ALP SERPL-CCNC: 125 U/L — HIGH (ref 40–120)
ALT FLD-CCNC: 17 U/L — SIGNIFICANT CHANGE UP (ref 12–78)
ANION GAP SERPL CALC-SCNC: 7 MMOL/L — SIGNIFICANT CHANGE UP (ref 5–17)
APTT BLD: 32.1 SEC — SIGNIFICANT CHANGE UP (ref 27.5–35.5)
AST SERPL-CCNC: 18 U/L — SIGNIFICANT CHANGE UP (ref 15–37)
BASOPHILS # BLD AUTO: 0.09 K/UL — SIGNIFICANT CHANGE UP (ref 0–0.2)
BASOPHILS NFR BLD AUTO: 0.7 % — SIGNIFICANT CHANGE UP (ref 0–2)
BILIRUB SERPL-MCNC: 0.3 MG/DL — SIGNIFICANT CHANGE UP (ref 0.2–1.2)
BLD GP AB SCN SERPL QL: SIGNIFICANT CHANGE UP
BUN SERPL-MCNC: 28 MG/DL — HIGH (ref 7–23)
CALCIUM SERPL-MCNC: 9.2 MG/DL — SIGNIFICANT CHANGE UP (ref 8.5–10.1)
CHLORIDE SERPL-SCNC: 105 MMOL/L — SIGNIFICANT CHANGE UP (ref 96–108)
CO2 SERPL-SCNC: 25 MMOL/L — SIGNIFICANT CHANGE UP (ref 22–31)
CREAT SERPL-MCNC: 1.11 MG/DL — SIGNIFICANT CHANGE UP (ref 0.5–1.3)
EGFR: 51 ML/MIN/1.73M2 — LOW
EOSINOPHIL # BLD AUTO: 0.27 K/UL — SIGNIFICANT CHANGE UP (ref 0–0.5)
EOSINOPHIL NFR BLD AUTO: 2 % — SIGNIFICANT CHANGE UP (ref 0–6)
ESTIMATED AVERAGE GLUCOSE: 131 MG/DL — HIGH (ref 68–114)
FLUAV AG NPH QL: SIGNIFICANT CHANGE UP
FLUBV AG NPH QL: SIGNIFICANT CHANGE UP
GLUCOSE SERPL-MCNC: 131 MG/DL — HIGH (ref 70–99)
HCT VFR BLD CALC: 42.5 % — SIGNIFICANT CHANGE UP (ref 34.5–45)
HGB BLD-MCNC: 13.5 G/DL — SIGNIFICANT CHANGE UP (ref 11.5–15.5)
IMM GRANULOCYTES NFR BLD AUTO: 0.5 % — SIGNIFICANT CHANGE UP (ref 0–1.5)
INR BLD: 1.04 RATIO — SIGNIFICANT CHANGE UP (ref 0.88–1.16)
LYMPHOCYTES # BLD AUTO: 1.95 K/UL — SIGNIFICANT CHANGE UP (ref 1–3.3)
LYMPHOCYTES # BLD AUTO: 14.4 % — SIGNIFICANT CHANGE UP (ref 13–44)
MCHC RBC-ENTMCNC: 29.5 PG — SIGNIFICANT CHANGE UP (ref 27–34)
MCHC RBC-ENTMCNC: 31.8 G/DL — LOW (ref 32–36)
MCV RBC AUTO: 93 FL — SIGNIFICANT CHANGE UP (ref 80–100)
MONOCYTES # BLD AUTO: 0.8 K/UL — SIGNIFICANT CHANGE UP (ref 0–0.9)
MONOCYTES NFR BLD AUTO: 5.9 % — SIGNIFICANT CHANGE UP (ref 2–14)
MRSA PCR RESULT.: SIGNIFICANT CHANGE UP
NEUTROPHILS # BLD AUTO: 10.37 K/UL — HIGH (ref 1.8–7.4)
NEUTROPHILS NFR BLD AUTO: 76.5 % — SIGNIFICANT CHANGE UP (ref 43–77)
NRBC # BLD: 0 /100 WBCS — SIGNIFICANT CHANGE UP (ref 0–0)
PLATELET # BLD AUTO: 505 K/UL — HIGH (ref 150–400)
POTASSIUM SERPL-MCNC: 4.3 MMOL/L — SIGNIFICANT CHANGE UP (ref 3.5–5.3)
POTASSIUM SERPL-SCNC: 4.3 MMOL/L — SIGNIFICANT CHANGE UP (ref 3.5–5.3)
PROT SERPL-MCNC: 7.1 GM/DL — SIGNIFICANT CHANGE UP (ref 6–8.3)
PROTHROM AB SERPL-ACNC: 12.4 SEC — SIGNIFICANT CHANGE UP (ref 10.5–13.4)
RBC # BLD: 4.57 M/UL — SIGNIFICANT CHANGE UP (ref 3.8–5.2)
RBC # FLD: 15.5 % — HIGH (ref 10.3–14.5)
S AUREUS DNA NOSE QL NAA+PROBE: SIGNIFICANT CHANGE UP
SARS-COV-2 RNA SPEC QL NAA+PROBE: SIGNIFICANT CHANGE UP
SODIUM SERPL-SCNC: 137 MMOL/L — SIGNIFICANT CHANGE UP (ref 135–145)
VIT D25+D1,25 OH+D1,25 PNL SERPL-MCNC: 53.5 PG/ML — SIGNIFICANT CHANGE UP (ref 19.9–79.3)
WBC # BLD: 13.55 K/UL — HIGH (ref 3.8–10.5)
WBC # FLD AUTO: 13.55 K/UL — HIGH (ref 3.8–10.5)

## 2022-08-02 PROCEDURE — 93010 ELECTROCARDIOGRAM REPORT: CPT | Mod: 76

## 2022-08-02 RX ORDER — MUPIROCIN 20 MG/G
1 OINTMENT TOPICAL
Qty: 22 | Refills: 0
Start: 2022-08-02 | End: 2022-08-06

## 2022-08-02 NOTE — H&P PST ADULT - ASSESSMENT
76 y/o F PMH of HTN, hld, gerd and obesity c/o lower back pain with radiation to right leg s/p extreme lateral interbody fusion L3-5 posterior instrumentation L5 with image on 2022 still with back pain 2/2 radiculopathy here for PST for scheduled Posterior instrumentation L3-L4-L5  CAPRINI SCORE [CLOT]    AGE RELATED RISK FACTORS                                                       MOBILITY RELATED FACTORS  [ ] Age 41-60 years                                            (1 Point)                  [ ] Bed rest                                                        (1 Point)  [ ] Age: 61-74 years                                           (2 Points)                 [ ] Plaster cast                                                   (2 Points)  x[ ] Age= 75 years                                              (3 Points)                 [ ] Bed bound for more than 72 hours                 (2 Points)    DISEASE RELATED RISK FACTORS                                               GENDER SPECIFIC FACTORS  [x ] Edema in the lower extremities                       (1 Point)                  [ ] Pregnancy                                                     (1 Point)  [ ] Varicose veins                                               (1 Point)                  [ ] Post-partum < 6 weeks                                   (1 Point)             [x ] BMI > 25 Kg/m2                                            (1 Point)                  [ ] Hormonal therapy  or oral contraception          (1 Point)                 [ ] Sepsis (in the previous month)                        (1 Point)                  [ ] History of pregnancy complications                 (1 point)  [ ] Pneumonia or serious lung disease                                               [ ] Unexplained or recurrent                     (1 Point)           (in the previous month)                               (1 Point)  [ ] Abnormal pulmonary function test                     (1 Point)                 SURGERY RELATED RISK FACTORS  [ ] Acute myocardial infarction                              (1 Point)                 [ ]  Section                                             (1 Point)  [ ] Congestive heart failure (in the previous month)  (1 Point)               [ ] Minor surgery                                                  (1 Point)   [ ] Inflammatory bowel disease                             (1 Point)                 [ ] Arthroscopic surgery                                        (2 Points)  [ ] Central venous access                                      (2 Points)                [ x] General surgery lasting more than 45 minutes   (2 Points)       [ ] Stroke (in the previous month)                          (5 Points)               [ ] Elective arthroplasty                                         (5 Points)                                                                                                                                               HEMATOLOGY RELATED FACTORS                                                 TRAUMA RELATED RISK FACTORS  [ ] Prior episodes of VTE                                     (3 Points)                [ ] Fracture of the hip, pelvis, or leg                       (5 Points)  [ ] Positive family history for VTE                         (3 Points)                 [ ] Acute spinal cord injury (in the previous month)  (5 Points)  [ ] Prothrombin 69888 A                                     (3 Points)                 [ ] Paralysis  (less than 1 month)                             (5 Points)  [ ] Factor V Leiden                                             (3 Points)                  [ ] Multiple Trauma within 1 month                        (5 Points)  [ ] Lupus anticoagulants                                     (3 Points)                                                           [ ] Anticardiolipin antibodies                               (3 Points)                                                       [ ] High homocysteine in the blood                      (3 Points)                                             [ ] Other congenital or acquired thrombophilia      (3 Points)                                                [ ] Heparin induced thrombocytopenia                  (3 Points)                                          Total Score [     7     ]    Caprini Score 0 - 2:  Low Risk, No VTE Prophylaxis required for most patients, encourage ambulation  Caprini Score 3 - 6:  At Risk, pharmacologic VTE prophylaxis is indicated for most patients (in the absence of a contraindication)  Caprini Score Greater than or = 7:  High Risk, pharmacologic VTE prophylaxis is indicated for most patients (in the absence of a contraindication)

## 2022-08-02 NOTE — H&P PST ADULT - PROBLEM SELECTOR PLAN 1
Posterior insturmentation  labs - cbc,pt/ptt,bmp,t&s,nose cx,ekg  M/C required  cardiac clearance  Hibiclens held 2/2 allergy instructed to use antibacterial soap on August 3, 4 and 5  instruction reviewed and given   PPx mupuricin prescribed instructed to start today 8/2  and checklist given  take routine meds DOS with sips of water. avoid NSAID and OTC supplements. verbalized understanding  information on proper nutrition , increase protein and better food choices provided in packet

## 2022-08-02 NOTE — H&P PST ADULT - HISTORY OF PRESENT ILLNESS
............76 y/o F PMH of HTN and obesity c/o lower back pain with radiation to b/l legs R>L. Pt scheduled for extreme lateral interbody fusion L3-5 posterior instrumentation L5 with image on 07/08/2022.     Pt denies fever, cough, SOB, recent travel, or sick contacts.      76 y/o F PMH of HTN, hld, gerd and obesity c/o lower back pain with radiation to right leg s/p extreme lateral interbody fusion L3-5 posterior instrumentation L5 with image on 07/08/2022 still with back pain 2/2 radiculopathy here for PST for scheduled Posterior instrumentation L3-L4-L5    Pt denies fever, cough, SOB, recent travel, or sick contacts.

## 2022-08-04 ENCOUNTER — TRANSCRIPTION ENCOUNTER (OUTPATIENT)
Age: 77
End: 2022-08-04

## 2022-08-05 ENCOUNTER — INPATIENT (INPATIENT)
Facility: HOSPITAL | Age: 77
LOS: 4 days | Discharge: HOME HEALTH | End: 2022-08-10
Attending: ORTHOPAEDIC SURGERY | Admitting: ORTHOPAEDIC SURGERY

## 2022-08-05 ENCOUNTER — TRANSCRIPTION ENCOUNTER (OUTPATIENT)
Age: 77
End: 2022-08-05

## 2022-08-05 ENCOUNTER — APPOINTMENT (OUTPATIENT)
Dept: ORTHOPEDIC SURGERY | Facility: HOSPITAL | Age: 77
End: 2022-08-05

## 2022-08-05 VITALS
DIASTOLIC BLOOD PRESSURE: 71 MMHG | HEIGHT: 64 IN | OXYGEN SATURATION: 96 % | HEART RATE: 66 BPM | SYSTOLIC BLOOD PRESSURE: 137 MMHG | RESPIRATION RATE: 18 BRPM | TEMPERATURE: 98 F | WEIGHT: 270.95 LBS
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
## 2022-08-05 DIAGNOSIS — Z96.659 PRESENCE OF UNSPECIFIED ARTIFICIAL KNEE JOINT: Chronic | ICD-10-CM

## 2022-08-05 DIAGNOSIS — I10 ESSENTIAL (PRIMARY) HYPERTENSION: ICD-10-CM

## 2022-08-05 DIAGNOSIS — Z90.49 ACQUIRED ABSENCE OF OTHER SPECIFIED PARTS OF DIGESTIVE TRACT: Chronic | ICD-10-CM

## 2022-08-05 DIAGNOSIS — Z98.1 ARTHRODESIS STATUS: ICD-10-CM

## 2022-08-05 DIAGNOSIS — Z87.891 PERSONAL HISTORY OF NICOTINE DEPENDENCE: ICD-10-CM

## 2022-08-05 DIAGNOSIS — K21.9 GASTRO-ESOPHAGEAL REFLUX DISEASE WITHOUT ESOPHAGITIS: ICD-10-CM

## 2022-08-05 DIAGNOSIS — Z79.82 LONG TERM (CURRENT) USE OF ASPIRIN: ICD-10-CM

## 2022-08-05 DIAGNOSIS — M48.56XA COLLAPSED VERTEBRA, NOT ELSEWHERE CLASSIFIED, LUMBAR REGION, INITIAL ENCOUNTER FOR FRACTURE: ICD-10-CM

## 2022-08-05 DIAGNOSIS — E78.5 HYPERLIPIDEMIA, UNSPECIFIED: ICD-10-CM

## 2022-08-05 DIAGNOSIS — Z96.651 PRESENCE OF RIGHT ARTIFICIAL KNEE JOINT: Chronic | ICD-10-CM

## 2022-08-05 DIAGNOSIS — Z96.611 PRESENCE OF RIGHT ARTIFICIAL SHOULDER JOINT: Chronic | ICD-10-CM

## 2022-08-05 DIAGNOSIS — M54.16 RADICULOPATHY, LUMBAR REGION: ICD-10-CM

## 2022-08-05 DIAGNOSIS — Z98.890 OTHER SPECIFIED POSTPROCEDURAL STATES: Chronic | ICD-10-CM

## 2022-08-05 DIAGNOSIS — Z20.822 CONTACT WITH AND (SUSPECTED) EXPOSURE TO COVID-19: ICD-10-CM

## 2022-08-05 DIAGNOSIS — Z88.1 ALLERGY STATUS TO OTHER ANTIBIOTIC AGENTS STATUS: ICD-10-CM

## 2022-08-05 LAB
ANION GAP SERPL CALC-SCNC: 9 MMOL/L — SIGNIFICANT CHANGE UP (ref 5–17)
BASOPHILS # BLD AUTO: 0.05 K/UL — SIGNIFICANT CHANGE UP (ref 0–0.2)
BASOPHILS NFR BLD AUTO: 0.4 % — SIGNIFICANT CHANGE UP (ref 0–2)
BLD GP AB SCN SERPL QL: SIGNIFICANT CHANGE UP
BUN SERPL-MCNC: 25 MG/DL — HIGH (ref 7–23)
CALCIUM SERPL-MCNC: 8.6 MG/DL — SIGNIFICANT CHANGE UP (ref 8.5–10.1)
CHLORIDE SERPL-SCNC: 107 MMOL/L — SIGNIFICANT CHANGE UP (ref 96–108)
CO2 SERPL-SCNC: 23 MMOL/L — SIGNIFICANT CHANGE UP (ref 22–31)
CREAT SERPL-MCNC: 1.15 MG/DL — SIGNIFICANT CHANGE UP (ref 0.5–1.3)
EGFR: 48 ML/MIN/1.73M2 — LOW
EOSINOPHIL # BLD AUTO: 0.09 K/UL — SIGNIFICANT CHANGE UP (ref 0–0.5)
EOSINOPHIL NFR BLD AUTO: 0.7 % — SIGNIFICANT CHANGE UP (ref 0–6)
GLUCOSE SERPL-MCNC: 170 MG/DL — HIGH (ref 70–99)
HCT VFR BLD CALC: 36.8 % — SIGNIFICANT CHANGE UP (ref 34.5–45)
HGB BLD-MCNC: 11.9 G/DL — SIGNIFICANT CHANGE UP (ref 11.5–15.5)
IMM GRANULOCYTES NFR BLD AUTO: 1 % — SIGNIFICANT CHANGE UP (ref 0–1.5)
LYMPHOCYTES # BLD AUTO: 0.98 K/UL — LOW (ref 1–3.3)
LYMPHOCYTES # BLD AUTO: 8.1 % — LOW (ref 13–44)
MCHC RBC-ENTMCNC: 30.1 PG — SIGNIFICANT CHANGE UP (ref 27–34)
MCHC RBC-ENTMCNC: 32.3 G/DL — SIGNIFICANT CHANGE UP (ref 32–36)
MCV RBC AUTO: 93.2 FL — SIGNIFICANT CHANGE UP (ref 80–100)
MONOCYTES # BLD AUTO: 0.37 K/UL — SIGNIFICANT CHANGE UP (ref 0–0.9)
MONOCYTES NFR BLD AUTO: 3.1 % — SIGNIFICANT CHANGE UP (ref 2–14)
NEUTROPHILS # BLD AUTO: 10.42 K/UL — HIGH (ref 1.8–7.4)
NEUTROPHILS NFR BLD AUTO: 86.7 % — HIGH (ref 43–77)
NRBC # BLD: 0 /100 WBCS — SIGNIFICANT CHANGE UP (ref 0–0)
PLATELET # BLD AUTO: 493 K/UL — HIGH (ref 150–400)
POTASSIUM SERPL-MCNC: 4.8 MMOL/L — SIGNIFICANT CHANGE UP (ref 3.5–5.3)
POTASSIUM SERPL-SCNC: 4.8 MMOL/L — SIGNIFICANT CHANGE UP (ref 3.5–5.3)
RBC # BLD: 3.95 M/UL — SIGNIFICANT CHANGE UP (ref 3.8–5.2)
RBC # FLD: 14.9 % — HIGH (ref 10.3–14.5)
SODIUM SERPL-SCNC: 139 MMOL/L — SIGNIFICANT CHANGE UP (ref 135–145)
WBC # BLD: 12.03 K/UL — HIGH (ref 3.8–10.5)
WBC # FLD AUTO: 12.03 K/UL — HIGH (ref 3.8–10.5)

## 2022-08-05 PROCEDURE — 22842 INSERT SPINE FIXATION DEVICE: CPT | Mod: 79

## 2022-08-05 PROCEDURE — 22842 INSERT SPINE FIXATION DEVICE: CPT | Mod: AS,79

## 2022-08-05 DEVICE — SCREW MAS POLYAXIAL 2C RELINE 6.5X40MM: Type: IMPLANTABLE DEVICE | Status: FUNCTIONAL

## 2022-08-05 DEVICE — SCREW MAS POLYAXIAL 2C RELINE 6.5X45MM: Type: IMPLANTABLE DEVICE | Status: FUNCTIONAL

## 2022-08-05 DEVICE — BONE WAX 2.5G NON ABSORBABLE: Type: IMPLANTABLE DEVICE | Status: FUNCTIONAL

## 2022-08-05 DEVICE — IMPLANTABLE DEVICE: Type: IMPLANTABLE DEVICE | Status: FUNCTIONAL

## 2022-08-05 DEVICE — SCREW LOCKG OPEN TULIP RELINE 5.5MM: Type: IMPLANTABLE DEVICE | Status: FUNCTIONAL

## 2022-08-05 DEVICE — PIN PULSE HIP AND REGISTRATION FIDUCIAL 15CM: Type: IMPLANTABLE DEVICE | Status: FUNCTIONAL

## 2022-08-05 DEVICE — SURGIFLO MATRIX WITH THROMBIN KIT: Type: IMPLANTABLE DEVICE | Status: FUNCTIONAL

## 2022-08-05 RX ORDER — HYDROCHLOROTHIAZIDE 25 MG
12.5 TABLET ORAL DAILY
Refills: 0 | Status: DISCONTINUED | OUTPATIENT
Start: 2022-08-06 | End: 2022-08-10

## 2022-08-05 RX ORDER — SODIUM CHLORIDE 9 MG/ML
1000 INJECTION, SOLUTION INTRAVENOUS
Refills: 0 | Status: DISCONTINUED | OUTPATIENT
Start: 2022-08-05 | End: 2022-08-10

## 2022-08-05 RX ORDER — ACETAMINOPHEN 500 MG
1000 TABLET ORAL ONCE
Refills: 0 | Status: COMPLETED | OUTPATIENT
Start: 2022-08-05 | End: 2022-08-05

## 2022-08-05 RX ORDER — HYDROMORPHONE HYDROCHLORIDE 2 MG/ML
4 INJECTION INTRAMUSCULAR; INTRAVENOUS; SUBCUTANEOUS EVERY 4 HOURS
Refills: 0 | Status: DISCONTINUED | OUTPATIENT
Start: 2022-08-05 | End: 2022-08-05

## 2022-08-05 RX ORDER — CYCLOBENZAPRINE HYDROCHLORIDE 10 MG/1
10 TABLET, FILM COATED ORAL EVERY 8 HOURS
Refills: 0 | Status: DISCONTINUED | OUTPATIENT
Start: 2022-08-05 | End: 2022-08-10

## 2022-08-05 RX ORDER — HYDROMORPHONE HYDROCHLORIDE 2 MG/ML
1 INJECTION INTRAMUSCULAR; INTRAVENOUS; SUBCUTANEOUS
Refills: 0 | Status: DISCONTINUED | OUTPATIENT
Start: 2022-08-05 | End: 2022-08-05

## 2022-08-05 RX ORDER — SENNA PLUS 8.6 MG/1
2 TABLET ORAL AT BEDTIME
Refills: 0 | Status: DISCONTINUED | OUTPATIENT
Start: 2022-08-05 | End: 2022-08-10

## 2022-08-05 RX ORDER — ATORVASTATIN CALCIUM 80 MG/1
40 TABLET, FILM COATED ORAL AT BEDTIME
Refills: 0 | Status: DISCONTINUED | OUTPATIENT
Start: 2022-08-05 | End: 2022-08-10

## 2022-08-05 RX ORDER — SUCRALFATE 1 G
1 TABLET ORAL ONCE
Refills: 0 | Status: COMPLETED | OUTPATIENT
Start: 2022-08-06 | End: 2022-08-06

## 2022-08-05 RX ORDER — VANCOMYCIN HCL 1 G
1750 VIAL (EA) INTRAVENOUS ONCE
Refills: 0 | Status: COMPLETED | OUTPATIENT
Start: 2022-08-05 | End: 2022-08-05

## 2022-08-05 RX ORDER — ONDANSETRON 8 MG/1
4 TABLET, FILM COATED ORAL EVERY 6 HOURS
Refills: 0 | Status: DISCONTINUED | OUTPATIENT
Start: 2022-08-05 | End: 2022-08-10

## 2022-08-05 RX ORDER — DIPHENHYDRAMINE HCL 50 MG
12.5 CAPSULE ORAL EVERY 4 HOURS
Refills: 0 | Status: DISCONTINUED | OUTPATIENT
Start: 2022-08-05 | End: 2022-08-10

## 2022-08-05 RX ORDER — HYDROMORPHONE HYDROCHLORIDE 2 MG/ML
4 INJECTION INTRAMUSCULAR; INTRAVENOUS; SUBCUTANEOUS EVERY 4 HOURS
Refills: 0 | Status: DISCONTINUED | OUTPATIENT
Start: 2022-08-05 | End: 2022-08-10

## 2022-08-05 RX ORDER — SODIUM CHLORIDE 9 MG/ML
1000 INJECTION, SOLUTION INTRAVENOUS
Refills: 0 | Status: DISCONTINUED | OUTPATIENT
Start: 2022-08-05 | End: 2022-08-05

## 2022-08-05 RX ORDER — SODIUM CHLORIDE 9 MG/ML
3 INJECTION INTRAMUSCULAR; INTRAVENOUS; SUBCUTANEOUS EVERY 8 HOURS
Refills: 0 | Status: DISCONTINUED | OUTPATIENT
Start: 2022-08-05 | End: 2022-08-05

## 2022-08-05 RX ORDER — HYDROMORPHONE HYDROCHLORIDE 2 MG/ML
0.5 INJECTION INTRAMUSCULAR; INTRAVENOUS; SUBCUTANEOUS
Refills: 0 | Status: DISCONTINUED | OUTPATIENT
Start: 2022-08-05 | End: 2022-08-10

## 2022-08-05 RX ORDER — HYDROMORPHONE HYDROCHLORIDE 2 MG/ML
2 INJECTION INTRAMUSCULAR; INTRAVENOUS; SUBCUTANEOUS EVERY 4 HOURS
Refills: 0 | Status: DISCONTINUED | OUTPATIENT
Start: 2022-08-05 | End: 2022-08-05

## 2022-08-05 RX ORDER — POLYETHYLENE GLYCOL 3350 17 G/17G
17 POWDER, FOR SOLUTION ORAL
Refills: 0 | Status: DISCONTINUED | OUTPATIENT
Start: 2022-08-05 | End: 2022-08-10

## 2022-08-05 RX ORDER — HYDROMORPHONE HYDROCHLORIDE 2 MG/ML
6 INJECTION INTRAMUSCULAR; INTRAVENOUS; SUBCUTANEOUS EVERY 4 HOURS
Refills: 0 | Status: DISCONTINUED | OUTPATIENT
Start: 2022-08-05 | End: 2022-08-10

## 2022-08-05 RX ORDER — ACETAMINOPHEN 500 MG
975 TABLET ORAL EVERY 8 HOURS
Refills: 0 | Status: DISCONTINUED | OUTPATIENT
Start: 2022-08-05 | End: 2022-08-10

## 2022-08-05 RX ORDER — ASPIRIN/CALCIUM CARB/MAGNESIUM 324 MG
81 TABLET ORAL DAILY
Refills: 0 | Status: DISCONTINUED | OUTPATIENT
Start: 2022-08-07 | End: 2022-08-10

## 2022-08-05 RX ORDER — LOSARTAN POTASSIUM 100 MG/1
100 TABLET, FILM COATED ORAL DAILY
Refills: 0 | Status: DISCONTINUED | OUTPATIENT
Start: 2022-08-06 | End: 2022-08-10

## 2022-08-05 RX ADMIN — HYDROMORPHONE HYDROCHLORIDE 1 MILLIGRAM(S): 2 INJECTION INTRAMUSCULAR; INTRAVENOUS; SUBCUTANEOUS at 13:16

## 2022-08-05 RX ADMIN — HYDROMORPHONE HYDROCHLORIDE 0.5 MILLIGRAM(S): 2 INJECTION INTRAMUSCULAR; INTRAVENOUS; SUBCUTANEOUS at 17:56

## 2022-08-05 RX ADMIN — SODIUM CHLORIDE 75 MILLILITER(S): 9 INJECTION, SOLUTION INTRAVENOUS at 12:42

## 2022-08-05 RX ADMIN — Medication 400 MILLIGRAM(S): at 13:07

## 2022-08-05 RX ADMIN — HYDROMORPHONE HYDROCHLORIDE 1 MILLIGRAM(S): 2 INJECTION INTRAMUSCULAR; INTRAVENOUS; SUBCUTANEOUS at 12:42

## 2022-08-05 RX ADMIN — HYDROMORPHONE HYDROCHLORIDE 1 MILLIGRAM(S): 2 INJECTION INTRAMUSCULAR; INTRAVENOUS; SUBCUTANEOUS at 13:31

## 2022-08-05 RX ADMIN — Medication 975 MILLIGRAM(S): at 22:07

## 2022-08-05 RX ADMIN — POLYETHYLENE GLYCOL 3350 17 GRAM(S): 17 POWDER, FOR SOLUTION ORAL at 17:41

## 2022-08-05 RX ADMIN — HYDROMORPHONE HYDROCHLORIDE 4 MILLIGRAM(S): 2 INJECTION INTRAMUSCULAR; INTRAVENOUS; SUBCUTANEOUS at 15:48

## 2022-08-05 RX ADMIN — HYDROMORPHONE HYDROCHLORIDE 6 MILLIGRAM(S): 2 INJECTION INTRAMUSCULAR; INTRAVENOUS; SUBCUTANEOUS at 23:00

## 2022-08-05 RX ADMIN — SODIUM CHLORIDE 75 MILLILITER(S): 9 INJECTION, SOLUTION INTRAVENOUS at 20:07

## 2022-08-05 RX ADMIN — HYDROMORPHONE HYDROCHLORIDE 0.5 MILLIGRAM(S): 2 INJECTION INTRAMUSCULAR; INTRAVENOUS; SUBCUTANEOUS at 17:41

## 2022-08-05 RX ADMIN — ATORVASTATIN CALCIUM 40 MILLIGRAM(S): 80 TABLET, FILM COATED ORAL at 22:08

## 2022-08-05 RX ADMIN — HYDROMORPHONE HYDROCHLORIDE 4 MILLIGRAM(S): 2 INJECTION INTRAMUSCULAR; INTRAVENOUS; SUBCUTANEOUS at 14:48

## 2022-08-05 RX ADMIN — CYCLOBENZAPRINE HYDROCHLORIDE 10 MILLIGRAM(S): 10 TABLET, FILM COATED ORAL at 14:48

## 2022-08-05 RX ADMIN — Medication 250 MILLIGRAM(S): at 20:06

## 2022-08-05 RX ADMIN — HYDROMORPHONE HYDROCHLORIDE 1 MILLIGRAM(S): 2 INJECTION INTRAMUSCULAR; INTRAVENOUS; SUBCUTANEOUS at 12:57

## 2022-08-05 RX ADMIN — HYDROMORPHONE HYDROCHLORIDE 6 MILLIGRAM(S): 2 INJECTION INTRAMUSCULAR; INTRAVENOUS; SUBCUTANEOUS at 22:08

## 2022-08-05 RX ADMIN — Medication 975 MILLIGRAM(S): at 23:00

## 2022-08-05 NOTE — DISCHARGE NOTE PROVIDER - NSDCFUADDAPPT_GEN_ALL_CORE_FT
Follow up with your surgeon in two weeks. Call for appointment.  If you need more pain medication, call your surgeon's office. For medication refills or authorizations, please call [phone] xt [phone]  We recommend that you call and schedule a follow up appointment within 2-4 weeks with your primary care physician for repeat blood work (CBC and BMP) for post hospital discharge follow-up care.  Call your surgeon if you have increased redness/pain/drainage or fever. Return to ER for shortness of breath/calf tenderness. Follow up with your surgeon in two weeks. Call for appointment.  Pt was prescribed 160 tabs of Dilaudid 2mg on 7/20/22 (confirmed via [place] ) and will not be prescribed additional pain medication upon discharge. Pt is aware and will follow the pain protocol outlined by prior prescriber.   If you need more pain medication, call your surgeon's office. For medication refills or authorizations, please call [phone] xt [phone]  We recommend that you call and schedule a follow up appointment within 2-4 weeks with your primary care physician for repeat blood work (CBC and BMP) for post hospital discharge follow-up care.  Call your surgeon if you have increased redness/pain/drainage or fever. Return to ER for shortness of breath/calf tenderness.

## 2022-08-05 NOTE — OCCUPATIONAL THERAPY INITIAL EVALUATION ADULT - GENERAL OBSERVATIONS, REHAB EVAL
Chart reviewed and event noted to date. Patient encountered sitting at EOB with PT Debbie, PIEDAD, A&Ox4, +nguyễn cath, +O2 2.5L via NC, +IV heplock. [facility]. Patient  c/o pain in lumbar spine 7/10 at rest and 10/10 w/movement s/p posterior instrumentation/fusion of L3-L4.

## 2022-08-05 NOTE — PHYSICAL THERAPY INITIAL EVALUATION ADULT - ADDITIONAL COMMENTS
Preop info confirmed from previous PT Eval. Pt lives with daughter in a private home with 3 steps to enter from outside into the home with bilateral far rails. Once inside the home, pt has 14 steps to negotiate to 2nd floor bedroom with R sided rail/ledge she can hold onto. Reports that with previous surgeries, she has slept in a recliner on the first floor for a few days to avoid negotiating stairs. In the bathroom, pt has a standard tub/shower combo (not a walk in shower), comfort height toilet seat. Daughter is available to provide limited assist post-op. Pt states she has a rolling walker, 3:1 commode, comfort height toilet seat and W/C. Preop info confirmed from previous PT Eval. Pt lives with daughter in a private home with 3 steps to enter from outside into the home with bilateral far rails. Once inside the home, pt has 14 steps to negotiate to 2nd floor bedroom with R sided rail/ledge she can hold onto. Reports that with previous surgeries, she has slept in a recliner on the first floor for a few days to avoid negotiating stairs. In the bathroom, pt has a standard tub/shower combo (not a walk in shower), comfort height toilet seat. Daughter is available to provide limited assist post-op. Pt states when pain is severe from recent spine surgery, she uses a W/C for mobility. Pt states she has a rolling walker, 3:1 commode, comfort height toilet seat and W/C.

## 2022-08-05 NOTE — PHYSICAL THERAPY INITIAL EVALUATION ADULT - GENERAL OBSERVATIONS, REHAB EVAL
Pt found semi supine in bed in NAD, +hep lock, +SCDs, spinal dressing C/D/I, +2LO2 via NC, agreeable to PT Dorothy and DELMAR Wu aware. Pt found semi supine in bed in NAD, +hep lock, +SCDs, nguyễn cath, spinal dressing C/D/I, +3LO2 via NC, agreeable to PT Dorothy and DELMAR Wu aware.

## 2022-08-05 NOTE — PHYSICAL THERAPY INITIAL EVALUATION ADULT - STRENGTHENING, PT EVAL
Patient will improve strength by 1 grade where limited o improve overall functional mobility including gait, transfers, bed mobility and decrease risk of falls.

## 2022-08-05 NOTE — DISCHARGE NOTE PROVIDER - NSDCMRMEDTOKEN_GEN_ALL_CORE_FT
acetaminophen 325 mg oral tablet: 2 tab(s) orally every 6 hours, As needed, Mild Pain (1 - 3), Moderate Pain (4 - 6), Severe Pain (7 - 10)  Acidophilus oral tablet: 2 tab(s) orally 2 times a day  AcipHex 20 mg oral delayed release tablet: 1 tab(s) orally once a day  Aspir 81 oral delayed release tablet: 1 tab(s) orally once a day  Benicar HCT 40 mg-12.5 mg oral tablet: 1 tab(s) orally once a day  biotin: 1 tab(s) orally once a day  Carafate: 1 tab(s) orally once a day  HYDROmorphone 2 mg oral tablet: 1 tab(s) orally every 4 to 6 hours AS NEEDED for post-op pain MDD:8  Multiple Vitamins oral tablet: 1 tab(s) orally once a day  mupirocin 2% topical ointment: Apply topically to affected area 2 times a day  INTRANASALY   PLEASE PERFORM COVID PCR:   Vitamin D2: 1 tab(s) orally once a day  Zocor: 1 tab(s) orally once a day   Acidophilus oral tablet: 2 tab(s) orally 2 times a day  AcipHex 20 mg oral delayed release tablet: 1 tab(s) orally once a day  aspirin 81 mg oral delayed release tablet: 1 tab(s) orally once a day  Benicar HCT 40 mg-12.5 mg oral tablet: 1 tab(s) orally once a day  biotin: 1 tab(s) orally once a day  Carafate: 1 tab(s) orally once a day  Colace 100 mg oral capsule: 1 cap(s) orally 3 times a day, As Needed MDD:3  cyclobenzaprine 10 mg oral tablet: 1 tab(s) orally every 8 hours, As Needed -for muscle spasm MDD:3  Multiple Vitamins oral tablet: 1 tab(s) orally once a day  ondansetron 4 mg oral tablet: 1 tab(s) orally every 6 hours MDD:4  oxyCODONE 5 mg oral tablet: 1 tab(s) orally every 6 hours MDD:4  Vitamin D2: 1 tab(s) orally once a day  Zocor: 1 tab(s) orally once a day   Acidophilus oral tablet: 2 tab(s) orally 2 times a day  AcipHex 20 mg oral delayed release tablet: 1 tab(s) orally once a day  aspirin 81 mg oral delayed release tablet: 1 tab(s) orally once a day  Benicar HCT 40 mg-12.5 mg oral tablet: 1 tab(s) orally once a day  biotin: 1 tab(s) orally once a day  Carafate: 1 tab(s) orally once a day  Colace 100 mg oral capsule: 1 cap(s) orally 3 times a day, As Needed MDD:3  cyclobenzaprine 10 mg oral tablet: 1 tab(s) orally every 8 hours, As Needed -for muscle spasm MDD:3  HYDROmorphone 2 mg oral tablet: 2-3 tab(s) orally every 4 hours, As needed, Moderate to Severe Pain (5 - 10)  Multiple Vitamins oral tablet: 1 tab(s) orally once a day  ondansetron 4 mg oral tablet: 1 tab(s) orally every 6 hours MDD:4  Vitamin D2: 1 tab(s) orally once a day  Zocor: 1 tab(s) orally once a day   acetaminophen 325 mg oral tablet: 3 tab(s) orally every 8 hours  Acidophilus oral tablet: 2 tab(s) orally 2 times a day  AcipHex 20 mg oral delayed release tablet: 1 tab(s) orally once a day  aspirin 81 mg oral delayed release tablet: 1 tab(s) orally once a day  Benicar HCT 40 mg-12.5 mg oral tablet: 1 tab(s) orally once a day  biotin: 1 tab(s) orally once a day  Carafate: 1 tab(s) orally once a day  Colace 100 mg oral capsule: 1 cap(s) orally 3 times a day, As Needed MDD:3  cyclobenzaprine 10 mg oral tablet: 1 tab(s) orally every 8 hours, As Needed -for muscle spasm MDD:3  HYDROmorphone 2 mg oral tablet: 2-3 tab(s) orally every 4 hours, As needed, Moderate to Severe Pain (5 - 10)  Multiple Vitamins oral tablet: 1 tab(s) orally once a day  ondansetron 4 mg oral tablet: 1 tab(s) orally every 6 hours MDD:4  Vitamin D2: 1 tab(s) orally once a day  Zocor: 1 tab(s) orally once a day   acetaminophen 325 mg oral tablet: 3 tab(s) orally every 8 hours  Acidophilus oral tablet: 2 tab(s) orally 2 times a day  AcipHex 20 mg oral delayed release tablet: 1 tab(s) orally once a day  aspirin 81 mg oral delayed release tablet: 1 tab(s) orally once a day  Benicar HCT 40 mg-12.5 mg oral tablet: 1 tab(s) orally once a day  biotin: 1 tab(s) orally once a day  Carafate: 1 tab(s) orally once a day  Colace 100 mg oral capsule: 1 cap(s) orally 3 times a day, As Needed MDD:3  cyclobenzaprine 10 mg oral tablet: 1 tab(s) orally every 8 hours, As Needed -for muscle spasm MDD:3  HYDROmorphone 2 mg oral tablet: 2-3 tab(s) orally every 4 hours, As needed, Moderate to Severe Pain (5 - 10)  Multiple Vitamins oral tablet: 1 tab(s) orally once a day  ondansetron 4 mg oral tablet: 1 tab(s) orally every 6 hours MDD:4  simvastatin 20 mg oral tablet: 1 tab(s) orally once a day (in the evening)  Vitamin D2: 1 tab(s) orally once a day

## 2022-08-05 NOTE — PATIENT PROFILE ADULT - FALL HARM RISK - HARM RISK INTERVENTIONS
Assistance with ambulation/Assistance OOB with selected safe patient handling equipment/Communicate Risk of Fall with Harm to all staff/Discuss with provider need for PT consult/Monitor gait and stability/Reinforce activity limits and safety measures with patient and family/Sit up slowly, dangle for a short time, stand at bedside before walking/Tailored Fall Risk Interventions/Use of alarms - bed, chair and/or voice tab/Visual Cue: Yellow wristband and red socks/Bed in lowest position, wheels locked, appropriate side rails in place/Call bell, personal items and telephone in reach/Instruct patient to call for assistance before getting out of bed or chair/Non-slip footwear when patient is out of bed/[place] to call system/Physically safe environment - no spills, clutter or unnecessary equipment/Purposeful Proactive Rounding/Room/bathroom lighting operational, light cord in reach

## 2022-08-05 NOTE — PATIENT PROFILE ADULT - NSPROIMPLANTSMEDDEV_GEN_A_NUR
Paul. Knee, Paul. shoulder./Artificial joint Paul. Knee, Paul. shoulder./Artificial joint/Brain/Spinal implant

## 2022-08-05 NOTE — DISCHARGE NOTE PROVIDER - NSDCFUADDINST_GEN_ALL_CORE_FT
No bending/lifting/twisting/pulling/pushing/carrying or driving. No blood thinners (not limited to but including) aspirin, motrin, alleve, naproxen, etc.    Keep Dressing Clean, Dry and Intact. May shower on POD#5 with Dressing on. Dressing may be removed on POD#7. Please do not scrub, soak, peel or pick at the dressing. No creams, lotions, or oils over dressing. May shower on POD#5 and let water run over dressing, no baths. Pat dry once out of shower.     If dressing is saturated from border to border. Remove dressing and cover with clean dry dressing.  No bending/lifting/twisting/pulling/pushing/carrying or driving. No additional blood thinners (not limited to but including), motrin, alleve, naproxen, etc.    Patient may take home dose of Aspirin 81 mg q daily as noted in med rec.     Keep Dressing Clean, Dry and Intact. Top dressing with Tegaderm and Gauze Dressing may be removed on POD#5. Please do not scrub, soak, peel or pick at the dressings. No creams, lotions, or oils over dressing. May shower on POD#5 and let water run over dressings/incisions under the gauze/tegaderm, no baths. No direct water pressure. Pat dry once out of shower.     If dressing is saturated from border to border prior to POD#5. Remove dressing and cover with clean dry dressing.     Top Incisions have Prineo Dressings: Keep Prineo Dressing Clean, Dry and Intact. May shower with Prineo Dressing. Please do not scrub, soak, peel or pick at the prineo dressing. No creams, lotions, or oils over dressing. May shower and let water run over incision, no baths. Pat dry once out of shower. Dressing to be removed in office at follow up visit in 2 weeks. There are no staples or stitches that need to be removed in top 2 incisions.     The bottom 2 incisions have Nylon Stitches: May shower after POD#5 with nylon stitches but do not submerge and no direct water pressure over incisions. Let water and soap run over incision. Pat dry once out of shower. No creams, lotions, or oils around incision/over dressing. Do not pick at stitches.  Nylon stitches will be removed at 2 week follow-up with surgeon in outpatient office.     If you notice any redness, irritation, or itching around the prineo dressing or the nylon sutures, call the surgeon's office

## 2022-08-05 NOTE — OCCUPATIONAL THERAPY INITIAL EVALUATION ADULT - ADDITIONAL COMMENTS
Pt lives with daughter in a private home with 3 steps to enter from outside into the home with bilateral far rails. Once inside the home, pt has 14 steps to negotiate to 2nd floor bedroom with R sided rail/ledge she can hold onto. Reports that with previous surgeries, she has slept in a recliner on the first floor for a few days to avoid negotiating stairs. In the bathroom, pt has a standard tub/shower combo, comfort height toilet seat. Daughter is available to provide assistance post-op.

## 2022-08-05 NOTE — DISCHARGE NOTE PROVIDER - HOSPITAL COURSE
77yFemale with history of Lumbar radiculopathy presenting for PSF L3-5 by Dr. Chan on 8/5/22. Risk and benefits of surgery were explained to the patient. The patient understood and agreed to proceed with surgery. Patient underwent the procedure with no intraoperative complications. Pt was brought in stable condition to the PACU. Once stable in PACU, pt was brought to the floor. During hospital stay pt was followed by  physical therapy, Home Care during this admission. Pt had an uneventful hospital course. Pt is stable for discharge to home 77yFemale with history of Lumbar radiculopathy presenting for PSF L3-5 by Dr. Chan on 8/5/22. Risk and benefits of surgery were explained to the patient. The patient understood and agreed to proceed with surgery. Patient underwent the procedure with no intraoperative complications. Pt was brought in stable condition to the PACU. Once stable in PACU, pt was brought to the floor. During hospital stay pt was followed by  physical therapy, Home Care during this admission. Pt had an uneventful hospital course, but her discharge was delayed solely due to  not approving her case in a timely fashion. Pt is stable for discharge to home

## 2022-08-05 NOTE — DISCHARGE NOTE PROVIDER - CARE PROVIDER_API CALL
Nish Chan)  Orthopaedic Surgery  [address]  [place] [address]  Phone: [phone]  Fax: [phone]  Follow Up Time:

## 2022-08-05 NOTE — DISCHARGE NOTE PROVIDER - NSDCCPTREATMENT_GEN_ALL_CORE_FT
PRINCIPAL PROCEDURE  Procedure: Fusion, posterior spinal column, lumbar, percutaneous  Findings and Treatment:

## 2022-08-05 NOTE — BRIEF OPERATIVE NOTE - NSICDXBRIEFPROCEDURE_GEN_ALL_CORE_FT
PROCEDURES:  Fusion, posterior spinal column, lumbar, percutaneous 05-Aug-2022 11:54:08  Lori Estrada

## 2022-08-05 NOTE — OCCUPATIONAL THERAPY INITIAL EVALUATION ADULT - BALANCE TRAINING, PT EVAL
Patient will increase static/dynamic standing balance to good in order to perform toilet transfer independently within 3-4weeks;

## 2022-08-06 LAB
ANION GAP SERPL CALC-SCNC: 6 MMOL/L — SIGNIFICANT CHANGE UP (ref 5–17)
BASOPHILS # BLD AUTO: 0.06 K/UL — SIGNIFICANT CHANGE UP (ref 0–0.2)
BASOPHILS NFR BLD AUTO: 0.4 % — SIGNIFICANT CHANGE UP (ref 0–2)
BUN SERPL-MCNC: 20 MG/DL — SIGNIFICANT CHANGE UP (ref 7–23)
CALCIUM SERPL-MCNC: 8.8 MG/DL — SIGNIFICANT CHANGE UP (ref 8.5–10.1)
CHLORIDE SERPL-SCNC: 104 MMOL/L — SIGNIFICANT CHANGE UP (ref 96–108)
CO2 SERPL-SCNC: 24 MMOL/L — SIGNIFICANT CHANGE UP (ref 22–31)
CREAT SERPL-MCNC: 1.01 MG/DL — SIGNIFICANT CHANGE UP (ref 0.5–1.3)
EGFR: 57 ML/MIN/1.73M2 — LOW
EOSINOPHIL # BLD AUTO: 0.02 K/UL — SIGNIFICANT CHANGE UP (ref 0–0.5)
EOSINOPHIL NFR BLD AUTO: 0.1 % — SIGNIFICANT CHANGE UP (ref 0–6)
GLUCOSE SERPL-MCNC: 102 MG/DL — HIGH (ref 70–99)
HCT VFR BLD CALC: 38.3 % — SIGNIFICANT CHANGE UP (ref 34.5–45)
HGB BLD-MCNC: 12.5 G/DL — SIGNIFICANT CHANGE UP (ref 11.5–15.5)
IMM GRANULOCYTES NFR BLD AUTO: 0.9 % — SIGNIFICANT CHANGE UP (ref 0–1.5)
LYMPHOCYTES # BLD AUTO: 1.15 K/UL — SIGNIFICANT CHANGE UP (ref 1–3.3)
LYMPHOCYTES # BLD AUTO: 7.4 % — LOW (ref 13–44)
MCHC RBC-ENTMCNC: 29.4 PG — SIGNIFICANT CHANGE UP (ref 27–34)
MCHC RBC-ENTMCNC: 32.6 G/DL — SIGNIFICANT CHANGE UP (ref 32–36)
MCV RBC AUTO: 90.1 FL — SIGNIFICANT CHANGE UP (ref 80–100)
MONOCYTES # BLD AUTO: 1 K/UL — HIGH (ref 0–0.9)
MONOCYTES NFR BLD AUTO: 6.5 % — SIGNIFICANT CHANGE UP (ref 2–14)
NEUTROPHILS # BLD AUTO: 13.09 K/UL — HIGH (ref 1.8–7.4)
NEUTROPHILS NFR BLD AUTO: 84.7 % — HIGH (ref 43–77)
NRBC # BLD: 0 /100 WBCS — SIGNIFICANT CHANGE UP (ref 0–0)
PLATELET # BLD AUTO: SIGNIFICANT CHANGE UP K/UL (ref 150–400)
POTASSIUM SERPL-MCNC: 5.2 MMOL/L — SIGNIFICANT CHANGE UP (ref 3.5–5.3)
POTASSIUM SERPL-SCNC: 5.2 MMOL/L — SIGNIFICANT CHANGE UP (ref 3.5–5.3)
RBC # BLD: 4.25 M/UL — SIGNIFICANT CHANGE UP (ref 3.8–5.2)
RBC # FLD: 14.6 % — HIGH (ref 10.3–14.5)
SODIUM SERPL-SCNC: 134 MMOL/L — LOW (ref 135–145)
WBC # BLD: 15.46 K/UL — HIGH (ref 3.8–10.5)
WBC # FLD AUTO: 15.46 K/UL — HIGH (ref 3.8–10.5)

## 2022-08-06 RX ORDER — SUCRALFATE 1 G
1 TABLET ORAL
Refills: 0 | Status: DISCONTINUED | OUTPATIENT
Start: 2022-08-06 | End: 2022-08-10

## 2022-08-06 RX ORDER — LACTOBACILLUS ACIDOPHILUS 100MM CELL
1 CAPSULE ORAL DAILY
Refills: 0 | Status: DISCONTINUED | OUTPATIENT
Start: 2022-08-06 | End: 2022-08-10

## 2022-08-06 RX ORDER — SUCRALFATE 1 G
1 TABLET ORAL
Refills: 0 | Status: DISCONTINUED | OUTPATIENT
Start: 2022-08-06 | End: 2022-08-06

## 2022-08-06 RX ADMIN — Medication 975 MILLIGRAM(S): at 15:10

## 2022-08-06 RX ADMIN — Medication 12.5 MILLIGRAM(S): at 06:12

## 2022-08-06 RX ADMIN — Medication 975 MILLIGRAM(S): at 21:40

## 2022-08-06 RX ADMIN — HYDROMORPHONE HYDROCHLORIDE 6 MILLIGRAM(S): 2 INJECTION INTRAMUSCULAR; INTRAVENOUS; SUBCUTANEOUS at 02:09

## 2022-08-06 RX ADMIN — LOSARTAN POTASSIUM 100 MILLIGRAM(S): 100 TABLET, FILM COATED ORAL at 06:13

## 2022-08-06 RX ADMIN — HYDROMORPHONE HYDROCHLORIDE 6 MILLIGRAM(S): 2 INJECTION INTRAMUSCULAR; INTRAVENOUS; SUBCUTANEOUS at 19:00

## 2022-08-06 RX ADMIN — HYDROMORPHONE HYDROCHLORIDE 6 MILLIGRAM(S): 2 INJECTION INTRAMUSCULAR; INTRAVENOUS; SUBCUTANEOUS at 15:10

## 2022-08-06 RX ADMIN — HYDROMORPHONE HYDROCHLORIDE 6 MILLIGRAM(S): 2 INJECTION INTRAMUSCULAR; INTRAVENOUS; SUBCUTANEOUS at 06:13

## 2022-08-06 RX ADMIN — Medication 1 TABLET(S): at 14:10

## 2022-08-06 RX ADMIN — HYDROMORPHONE HYDROCHLORIDE 6 MILLIGRAM(S): 2 INJECTION INTRAMUSCULAR; INTRAVENOUS; SUBCUTANEOUS at 18:15

## 2022-08-06 RX ADMIN — Medication 975 MILLIGRAM(S): at 22:40

## 2022-08-06 RX ADMIN — SODIUM CHLORIDE 75 MILLILITER(S): 9 INJECTION, SOLUTION INTRAVENOUS at 21:41

## 2022-08-06 RX ADMIN — CYCLOBENZAPRINE HYDROCHLORIDE 10 MILLIGRAM(S): 10 TABLET, FILM COATED ORAL at 21:40

## 2022-08-06 RX ADMIN — HYDROMORPHONE HYDROCHLORIDE 6 MILLIGRAM(S): 2 INJECTION INTRAMUSCULAR; INTRAVENOUS; SUBCUTANEOUS at 14:10

## 2022-08-06 RX ADMIN — HYDROMORPHONE HYDROCHLORIDE 6 MILLIGRAM(S): 2 INJECTION INTRAMUSCULAR; INTRAVENOUS; SUBCUTANEOUS at 22:12

## 2022-08-06 RX ADMIN — HYDROMORPHONE HYDROCHLORIDE 6 MILLIGRAM(S): 2 INJECTION INTRAMUSCULAR; INTRAVENOUS; SUBCUTANEOUS at 07:10

## 2022-08-06 RX ADMIN — HYDROMORPHONE HYDROCHLORIDE 6 MILLIGRAM(S): 2 INJECTION INTRAMUSCULAR; INTRAVENOUS; SUBCUTANEOUS at 03:00

## 2022-08-06 RX ADMIN — Medication 975 MILLIGRAM(S): at 06:12

## 2022-08-06 RX ADMIN — Medication 975 MILLIGRAM(S): at 07:10

## 2022-08-06 RX ADMIN — HYDROMORPHONE HYDROCHLORIDE 6 MILLIGRAM(S): 2 INJECTION INTRAMUSCULAR; INTRAVENOUS; SUBCUTANEOUS at 11:07

## 2022-08-06 RX ADMIN — Medication 975 MILLIGRAM(S): at 14:10

## 2022-08-06 RX ADMIN — HYDROMORPHONE HYDROCHLORIDE 6 MILLIGRAM(S): 2 INJECTION INTRAMUSCULAR; INTRAVENOUS; SUBCUTANEOUS at 10:07

## 2022-08-06 RX ADMIN — Medication 1 TABLET(S): at 11:52

## 2022-08-06 RX ADMIN — ATORVASTATIN CALCIUM 40 MILLIGRAM(S): 80 TABLET, FILM COATED ORAL at 21:41

## 2022-08-06 RX ADMIN — HYDROMORPHONE HYDROCHLORIDE 6 MILLIGRAM(S): 2 INJECTION INTRAMUSCULAR; INTRAVENOUS; SUBCUTANEOUS at 23:00

## 2022-08-07 LAB
ANION GAP SERPL CALC-SCNC: 8 MMOL/L — SIGNIFICANT CHANGE UP (ref 5–17)
BASOPHILS # BLD AUTO: 0.1 K/UL — SIGNIFICANT CHANGE UP (ref 0–0.2)
BASOPHILS NFR BLD AUTO: 0.8 % — SIGNIFICANT CHANGE UP (ref 0–2)
BUN SERPL-MCNC: 32 MG/DL — HIGH (ref 7–23)
CALCIUM SERPL-MCNC: 8.5 MG/DL — SIGNIFICANT CHANGE UP (ref 8.5–10.1)
CHLORIDE SERPL-SCNC: 105 MMOL/L — SIGNIFICANT CHANGE UP (ref 96–108)
CO2 SERPL-SCNC: 24 MMOL/L — SIGNIFICANT CHANGE UP (ref 22–31)
CREAT SERPL-MCNC: 1.25 MG/DL — SIGNIFICANT CHANGE UP (ref 0.5–1.3)
EGFR: 44 ML/MIN/1.73M2 — LOW
EOSINOPHIL # BLD AUTO: 0.28 K/UL — SIGNIFICANT CHANGE UP (ref 0–0.5)
EOSINOPHIL NFR BLD AUTO: 2.3 % — SIGNIFICANT CHANGE UP (ref 0–6)
GLUCOSE SERPL-MCNC: 148 MG/DL — HIGH (ref 70–99)
HCT VFR BLD CALC: 34.7 % — SIGNIFICANT CHANGE UP (ref 34.5–45)
HGB BLD-MCNC: 11.2 G/DL — LOW (ref 11.5–15.5)
IMM GRANULOCYTES NFR BLD AUTO: 0.8 % — SIGNIFICANT CHANGE UP (ref 0–1.5)
LYMPHOCYTES # BLD AUTO: 1.81 K/UL — SIGNIFICANT CHANGE UP (ref 1–3.3)
LYMPHOCYTES # BLD AUTO: 14.9 % — SIGNIFICANT CHANGE UP (ref 13–44)
MCHC RBC-ENTMCNC: 29.6 PG — SIGNIFICANT CHANGE UP (ref 27–34)
MCHC RBC-ENTMCNC: 32.3 G/DL — SIGNIFICANT CHANGE UP (ref 32–36)
MCV RBC AUTO: 91.6 FL — SIGNIFICANT CHANGE UP (ref 80–100)
MONOCYTES # BLD AUTO: 0.92 K/UL — HIGH (ref 0–0.9)
MONOCYTES NFR BLD AUTO: 7.6 % — SIGNIFICANT CHANGE UP (ref 2–14)
NEUTROPHILS # BLD AUTO: 8.95 K/UL — HIGH (ref 1.8–7.4)
NEUTROPHILS NFR BLD AUTO: 73.6 % — SIGNIFICANT CHANGE UP (ref 43–77)
NRBC # BLD: 0 /100 WBCS — SIGNIFICANT CHANGE UP (ref 0–0)
PLATELET # BLD AUTO: 500 K/UL — HIGH (ref 150–400)
POTASSIUM SERPL-MCNC: 4 MMOL/L — SIGNIFICANT CHANGE UP (ref 3.5–5.3)
POTASSIUM SERPL-SCNC: 4 MMOL/L — SIGNIFICANT CHANGE UP (ref 3.5–5.3)
RBC # BLD: 3.79 M/UL — LOW (ref 3.8–5.2)
RBC # FLD: 15.3 % — HIGH (ref 10.3–14.5)
SODIUM SERPL-SCNC: 137 MMOL/L — SIGNIFICANT CHANGE UP (ref 135–145)
WBC # BLD: 12.16 K/UL — HIGH (ref 3.8–10.5)
WBC # FLD AUTO: 12.16 K/UL — HIGH (ref 3.8–10.5)

## 2022-08-07 RX ORDER — DOCUSATE SODIUM 100 MG
1 CAPSULE ORAL
Qty: 60 | Refills: 0
Start: 2022-08-07 | End: 2022-08-13

## 2022-08-07 RX ORDER — ONDANSETRON 8 MG/1
1 TABLET, FILM COATED ORAL
Qty: 15 | Refills: 0
Start: 2022-08-07

## 2022-08-07 RX ORDER — OXYCODONE HYDROCHLORIDE 5 MG/1
1 TABLET ORAL
Qty: 20 | Refills: 0
Start: 2022-08-07 | End: 2022-08-11

## 2022-08-07 RX ORDER — ASPIRIN/CALCIUM CARB/MAGNESIUM 324 MG
1 TABLET ORAL
Qty: 0 | Refills: 0 | DISCHARGE

## 2022-08-07 RX ORDER — CYCLOBENZAPRINE HYDROCHLORIDE 10 MG/1
1 TABLET, FILM COATED ORAL
Qty: 21 | Refills: 0
Start: 2022-08-07 | End: 2022-08-13

## 2022-08-07 RX ORDER — ASPIRIN/CALCIUM CARB/MAGNESIUM 324 MG
1 TABLET ORAL
Qty: 0 | Refills: 0 | DISCHARGE
Start: 2022-08-07

## 2022-08-07 RX ADMIN — Medication 975 MILLIGRAM(S): at 15:34

## 2022-08-07 RX ADMIN — Medication 975 MILLIGRAM(S): at 07:00

## 2022-08-07 RX ADMIN — HYDROMORPHONE HYDROCHLORIDE 6 MILLIGRAM(S): 2 INJECTION INTRAMUSCULAR; INTRAVENOUS; SUBCUTANEOUS at 22:23

## 2022-08-07 RX ADMIN — CYCLOBENZAPRINE HYDROCHLORIDE 10 MILLIGRAM(S): 10 TABLET, FILM COATED ORAL at 15:48

## 2022-08-07 RX ADMIN — Medication 12.5 MILLIGRAM(S): at 06:15

## 2022-08-07 RX ADMIN — LOSARTAN POTASSIUM 100 MILLIGRAM(S): 100 TABLET, FILM COATED ORAL at 07:05

## 2022-08-07 RX ADMIN — ATORVASTATIN CALCIUM 40 MILLIGRAM(S): 80 TABLET, FILM COATED ORAL at 21:23

## 2022-08-07 RX ADMIN — Medication 81 MILLIGRAM(S): at 11:47

## 2022-08-07 RX ADMIN — Medication 1 TABLET(S): at 11:46

## 2022-08-07 RX ADMIN — SODIUM CHLORIDE 75 MILLILITER(S): 9 INJECTION, SOLUTION INTRAVENOUS at 17:16

## 2022-08-07 RX ADMIN — Medication 975 MILLIGRAM(S): at 14:34

## 2022-08-07 RX ADMIN — HYDROMORPHONE HYDROCHLORIDE 6 MILLIGRAM(S): 2 INJECTION INTRAMUSCULAR; INTRAVENOUS; SUBCUTANEOUS at 13:17

## 2022-08-07 RX ADMIN — HYDROMORPHONE HYDROCHLORIDE 6 MILLIGRAM(S): 2 INJECTION INTRAMUSCULAR; INTRAVENOUS; SUBCUTANEOUS at 02:33

## 2022-08-07 RX ADMIN — Medication 1 TABLET(S): at 11:47

## 2022-08-07 RX ADMIN — Medication 975 MILLIGRAM(S): at 06:08

## 2022-08-07 RX ADMIN — HYDROMORPHONE HYDROCHLORIDE 6 MILLIGRAM(S): 2 INJECTION INTRAMUSCULAR; INTRAVENOUS; SUBCUTANEOUS at 18:17

## 2022-08-07 RX ADMIN — HYDROMORPHONE HYDROCHLORIDE 6 MILLIGRAM(S): 2 INJECTION INTRAMUSCULAR; INTRAVENOUS; SUBCUTANEOUS at 21:23

## 2022-08-07 RX ADMIN — Medication 975 MILLIGRAM(S): at 22:23

## 2022-08-07 RX ADMIN — HYDROMORPHONE HYDROCHLORIDE 6 MILLIGRAM(S): 2 INJECTION INTRAMUSCULAR; INTRAVENOUS; SUBCUTANEOUS at 03:30

## 2022-08-07 RX ADMIN — HYDROMORPHONE HYDROCHLORIDE 6 MILLIGRAM(S): 2 INJECTION INTRAMUSCULAR; INTRAVENOUS; SUBCUTANEOUS at 08:42

## 2022-08-07 RX ADMIN — Medication 975 MILLIGRAM(S): at 21:23

## 2022-08-07 RX ADMIN — HYDROMORPHONE HYDROCHLORIDE 6 MILLIGRAM(S): 2 INJECTION INTRAMUSCULAR; INTRAVENOUS; SUBCUTANEOUS at 14:17

## 2022-08-07 RX ADMIN — HYDROMORPHONE HYDROCHLORIDE 6 MILLIGRAM(S): 2 INJECTION INTRAMUSCULAR; INTRAVENOUS; SUBCUTANEOUS at 17:17

## 2022-08-07 RX ADMIN — HYDROMORPHONE HYDROCHLORIDE 6 MILLIGRAM(S): 2 INJECTION INTRAMUSCULAR; INTRAVENOUS; SUBCUTANEOUS at 07:42

## 2022-08-07 NOTE — PATIENT PROFILE ADULT - DOMESTIC TRAVEL HIGH RISK QUESTION
No Anaphylaxis in Children    WHAT YOU NEED TO KNOW:    Anaphylaxis is a life-threatening allergic reaction that must be treated immediately. Your child's risk for anaphylaxis increases if he or she has asthma that is severe or not controlled. Medical conditions such as heart disease can also increase your child's risk. It is important to be prepared if your child is at risk for anaphylaxis. Symptoms can be worse each time he or she is exposed to the trigger.     DISCHARGE INSTRUCTIONS:    Steps to take for signs or symptoms of anaphylaxis:   •Immediately give 1 shot of epinephrineonly into the outer thigh muscle. Even if your child's allergic reaction seems mild, it can quickly become anaphylaxis. This may happen even if your child had a mild reaction to the allergen in the past. Each exposure can cause a different reaction. Watch for signs and symptoms of anaphylaxis every time your child is exposed to a trigger. Be ready to give a shot of epinephrine. It is okay to inject epinephrine through clothing. Just be careful to avoid seams, zippers, or other parts that can prevent the needle from entering the skin.  Give a Shot of Epinephrine Child            •Leave the shot in place as directed. Your child's healthcare provider may recommend you leave it in place for up to 10 seconds before you remove it. This helps make sure all of the epinephrine is delivered.       •Call 911 and go to the emergency department, even if the shot improved symptoms. Tell your adolescent never to drive himself or herself. Bring the used epinephrine shot to the emergency department.       Call 911 for any of the following:   •Your child has a skin rash, hives, swelling, or itching.       •Your child has trouble breathing, shortness of breath, wheezing, or coughing.      •Your child's throat tightens or his or her lips or tongue swell.      •Your child has trouble swallowing or speaking.      •Your child is dizzy, lightheaded, confused, or feels like he or she is going to faint.      •Your child has nausea, diarrhea, or abdominal cramps, or he or she is vomiting.      Return to the emergency department if:   •Signs or symptoms of anaphylaxis return.           Contact your child's healthcare provider if:   •You have questions or concerns about your child's condition or care.          Medicines:   •Epinephrine is used to treat severe allergic reactions such as anaphylaxis. It is given as a shot into the outer thigh muscle.      •Medicines such as antihistamines, steroids, and bronchodilators decrease inflammation, open airways, and make breathing easier.      •Give your child's medicine as directed. Contact your child's healthcare provider if you think the medicine is not working as expected. Tell him or her if your child is allergic to any medicine. Keep a current list of the medicines, vitamins, and herbs your child takes. Include the amounts, and when, how, and why they are taken. Bring the list or the medicines in their containers to follow-up visits. Carry your child's medicine list with you in case of an emergency.      Follow up with your child's healthcare provider as directed: Allergy testing may find allergies that can trigger anaphylaxis. Write down your questions so you remember to ask them during your visits.     Safety precautions:   •Keep 2 shots of epinephrine with you at all times. You may need a second shot, because epinephrine only works for about 20 minutes and symptoms may return. Your healthcare provider can show you and family members how to give the shot. Check the expiration date every month and replace it before it expires.      •Create an action plan. Your healthcare provider can help you create a written plan that explains the allergy and an emergency plan to treat a reaction. The plan explains when to give a second epinephrine shot if symptoms return or do not improve after the first. Give copies of the action plan and emergency instructions to family members, work and school staff, and  providers. Show them how to give a shot of epinephrine.      •Be careful when you exercise. If you have had exercise-induced anaphylaxis, do not exercise right after you eat. Stop exercising right away if you start to develop any signs or symptoms of anaphylaxis. You may first feel tired, warm, or have itchy skin. Hives, swelling, and severe breathing problems may develop if you continue to exercise.      •Carry medical alert identification. Wear medical alert jewelry or carry a card that explains the allergy. Ask your healthcare provider where to get these items.       •Identify and avoid known triggers. Read food labels for ingredients. Look for triggers in your environment.      •Ask about treatments to prevent anaphylaxis. You may need allergy shots or other medicines to treat allergies.          © Copyright [employer] 2022           back to top                          © Copyright [employer] 2022

## 2022-08-08 LAB
ANION GAP SERPL CALC-SCNC: 8 MMOL/L — SIGNIFICANT CHANGE UP (ref 5–17)
BASOPHILS # BLD AUTO: 0.1 K/UL — SIGNIFICANT CHANGE UP (ref 0–0.2)
BASOPHILS NFR BLD AUTO: 0.9 % — SIGNIFICANT CHANGE UP (ref 0–2)
BUN SERPL-MCNC: 30 MG/DL — HIGH (ref 7–23)
CALCIUM SERPL-MCNC: 8.9 MG/DL — SIGNIFICANT CHANGE UP (ref 8.5–10.1)
CHLORIDE SERPL-SCNC: 104 MMOL/L — SIGNIFICANT CHANGE UP (ref 96–108)
CO2 SERPL-SCNC: 26 MMOL/L — SIGNIFICANT CHANGE UP (ref 22–31)
CREAT SERPL-MCNC: 1.07 MG/DL — SIGNIFICANT CHANGE UP (ref 0.5–1.3)
EGFR: 53 ML/MIN/1.73M2 — LOW
EOSINOPHIL # BLD AUTO: 0.41 K/UL — SIGNIFICANT CHANGE UP (ref 0–0.5)
EOSINOPHIL NFR BLD AUTO: 3.5 % — SIGNIFICANT CHANGE UP (ref 0–6)
GLUCOSE SERPL-MCNC: 106 MG/DL — HIGH (ref 70–99)
HCT VFR BLD CALC: 34.6 % — SIGNIFICANT CHANGE UP (ref 34.5–45)
HGB BLD-MCNC: 11.4 G/DL — LOW (ref 11.5–15.5)
IMM GRANULOCYTES NFR BLD AUTO: 1.3 % — SIGNIFICANT CHANGE UP (ref 0–1.5)
LYMPHOCYTES # BLD AUTO: 1.77 K/UL — SIGNIFICANT CHANGE UP (ref 1–3.3)
LYMPHOCYTES # BLD AUTO: 15.2 % — SIGNIFICANT CHANGE UP (ref 13–44)
MCHC RBC-ENTMCNC: 29.9 PG — SIGNIFICANT CHANGE UP (ref 27–34)
MCHC RBC-ENTMCNC: 32.9 G/DL — SIGNIFICANT CHANGE UP (ref 32–36)
MCV RBC AUTO: 90.8 FL — SIGNIFICANT CHANGE UP (ref 80–100)
MONOCYTES # BLD AUTO: 0.98 K/UL — HIGH (ref 0–0.9)
MONOCYTES NFR BLD AUTO: 8.4 % — SIGNIFICANT CHANGE UP (ref 2–14)
NEUTROPHILS # BLD AUTO: 8.22 K/UL — HIGH (ref 1.8–7.4)
NEUTROPHILS NFR BLD AUTO: 70.7 % — SIGNIFICANT CHANGE UP (ref 43–77)
NRBC # BLD: 0 /100 WBCS — SIGNIFICANT CHANGE UP (ref 0–0)
PLATELET # BLD AUTO: 487 K/UL — HIGH (ref 150–400)
POTASSIUM SERPL-MCNC: 4.1 MMOL/L — SIGNIFICANT CHANGE UP (ref 3.5–5.3)
POTASSIUM SERPL-SCNC: 4.1 MMOL/L — SIGNIFICANT CHANGE UP (ref 3.5–5.3)
RBC # BLD: 3.81 M/UL — SIGNIFICANT CHANGE UP (ref 3.8–5.2)
RBC # FLD: 15 % — HIGH (ref 10.3–14.5)
SODIUM SERPL-SCNC: 138 MMOL/L — SIGNIFICANT CHANGE UP (ref 135–145)
WBC # BLD: 11.63 K/UL — HIGH (ref 3.8–10.5)
WBC # FLD AUTO: 11.63 K/UL — HIGH (ref 3.8–10.5)

## 2022-08-08 RX ORDER — SIMVASTATIN 20 MG/1
1 TABLET, FILM COATED ORAL
Qty: 0 | Refills: 0 | DISCHARGE

## 2022-08-08 RX ORDER — ACETAMINOPHEN 500 MG
3 TABLET ORAL
Qty: 0 | Refills: 0 | DISCHARGE
Start: 2022-08-08

## 2022-08-08 RX ORDER — HYDROMORPHONE HYDROCHLORIDE 2 MG/ML
2 INJECTION INTRAMUSCULAR; INTRAVENOUS; SUBCUTANEOUS
Qty: 0 | Refills: 0 | DISCHARGE
Start: 2022-08-08

## 2022-08-08 RX ADMIN — HYDROMORPHONE HYDROCHLORIDE 6 MILLIGRAM(S): 2 INJECTION INTRAMUSCULAR; INTRAVENOUS; SUBCUTANEOUS at 22:07

## 2022-08-08 RX ADMIN — HYDROMORPHONE HYDROCHLORIDE 6 MILLIGRAM(S): 2 INJECTION INTRAMUSCULAR; INTRAVENOUS; SUBCUTANEOUS at 02:32

## 2022-08-08 RX ADMIN — Medication 975 MILLIGRAM(S): at 22:07

## 2022-08-08 RX ADMIN — Medication 975 MILLIGRAM(S): at 21:37

## 2022-08-08 RX ADMIN — HYDROMORPHONE HYDROCHLORIDE 6 MILLIGRAM(S): 2 INJECTION INTRAMUSCULAR; INTRAVENOUS; SUBCUTANEOUS at 12:40

## 2022-08-08 RX ADMIN — Medication 975 MILLIGRAM(S): at 06:07

## 2022-08-08 RX ADMIN — Medication 975 MILLIGRAM(S): at 14:23

## 2022-08-08 RX ADMIN — ATORVASTATIN CALCIUM 40 MILLIGRAM(S): 80 TABLET, FILM COATED ORAL at 21:38

## 2022-08-08 RX ADMIN — Medication 1 TABLET(S): at 11:41

## 2022-08-08 RX ADMIN — HYDROMORPHONE HYDROCHLORIDE 6 MILLIGRAM(S): 2 INJECTION INTRAMUSCULAR; INTRAVENOUS; SUBCUTANEOUS at 17:55

## 2022-08-08 RX ADMIN — HYDROMORPHONE HYDROCHLORIDE 6 MILLIGRAM(S): 2 INJECTION INTRAMUSCULAR; INTRAVENOUS; SUBCUTANEOUS at 18:49

## 2022-08-08 RX ADMIN — HYDROMORPHONE HYDROCHLORIDE 6 MILLIGRAM(S): 2 INJECTION INTRAMUSCULAR; INTRAVENOUS; SUBCUTANEOUS at 11:40

## 2022-08-08 RX ADMIN — Medication 975 MILLIGRAM(S): at 15:00

## 2022-08-08 RX ADMIN — Medication 975 MILLIGRAM(S): at 07:07

## 2022-08-08 RX ADMIN — HYDROMORPHONE HYDROCHLORIDE 6 MILLIGRAM(S): 2 INJECTION INTRAMUSCULAR; INTRAVENOUS; SUBCUTANEOUS at 21:35

## 2022-08-08 RX ADMIN — Medication 81 MILLIGRAM(S): at 11:41

## 2022-08-08 RX ADMIN — HYDROMORPHONE HYDROCHLORIDE 6 MILLIGRAM(S): 2 INJECTION INTRAMUSCULAR; INTRAVENOUS; SUBCUTANEOUS at 01:32

## 2022-08-09 RX ADMIN — ATORVASTATIN CALCIUM 40 MILLIGRAM(S): 80 TABLET, FILM COATED ORAL at 21:02

## 2022-08-09 RX ADMIN — HYDROMORPHONE HYDROCHLORIDE 6 MILLIGRAM(S): 2 INJECTION INTRAMUSCULAR; INTRAVENOUS; SUBCUTANEOUS at 04:00

## 2022-08-09 RX ADMIN — HYDROMORPHONE HYDROCHLORIDE 6 MILLIGRAM(S): 2 INJECTION INTRAMUSCULAR; INTRAVENOUS; SUBCUTANEOUS at 03:02

## 2022-08-09 RX ADMIN — HYDROMORPHONE HYDROCHLORIDE 4 MILLIGRAM(S): 2 INJECTION INTRAMUSCULAR; INTRAVENOUS; SUBCUTANEOUS at 16:17

## 2022-08-09 RX ADMIN — Medication 975 MILLIGRAM(S): at 16:10

## 2022-08-09 RX ADMIN — Medication 975 MILLIGRAM(S): at 07:03

## 2022-08-09 RX ADMIN — CYCLOBENZAPRINE HYDROCHLORIDE 10 MILLIGRAM(S): 10 TABLET, FILM COATED ORAL at 12:35

## 2022-08-09 RX ADMIN — HYDROMORPHONE HYDROCHLORIDE 4 MILLIGRAM(S): 2 INJECTION INTRAMUSCULAR; INTRAVENOUS; SUBCUTANEOUS at 17:04

## 2022-08-09 RX ADMIN — Medication 81 MILLIGRAM(S): at 12:00

## 2022-08-09 RX ADMIN — Medication 975 MILLIGRAM(S): at 21:45

## 2022-08-09 RX ADMIN — HYDROMORPHONE HYDROCHLORIDE 6 MILLIGRAM(S): 2 INJECTION INTRAMUSCULAR; INTRAVENOUS; SUBCUTANEOUS at 07:03

## 2022-08-09 RX ADMIN — HYDROMORPHONE HYDROCHLORIDE 6 MILLIGRAM(S): 2 INJECTION INTRAMUSCULAR; INTRAVENOUS; SUBCUTANEOUS at 07:34

## 2022-08-09 RX ADMIN — Medication 975 MILLIGRAM(S): at 07:34

## 2022-08-09 RX ADMIN — Medication 975 MILLIGRAM(S): at 17:03

## 2022-08-09 RX ADMIN — HYDROMORPHONE HYDROCHLORIDE 6 MILLIGRAM(S): 2 INJECTION INTRAMUSCULAR; INTRAVENOUS; SUBCUTANEOUS at 13:15

## 2022-08-09 RX ADMIN — Medication 975 MILLIGRAM(S): at 21:01

## 2022-08-09 RX ADMIN — Medication 1 TABLET(S): at 12:00

## 2022-08-09 RX ADMIN — HYDROMORPHONE HYDROCHLORIDE 4 MILLIGRAM(S): 2 INJECTION INTRAMUSCULAR; INTRAVENOUS; SUBCUTANEOUS at 21:01

## 2022-08-09 RX ADMIN — HYDROMORPHONE HYDROCHLORIDE 6 MILLIGRAM(S): 2 INJECTION INTRAMUSCULAR; INTRAVENOUS; SUBCUTANEOUS at 12:36

## 2022-08-09 RX ADMIN — HYDROMORPHONE HYDROCHLORIDE 4 MILLIGRAM(S): 2 INJECTION INTRAMUSCULAR; INTRAVENOUS; SUBCUTANEOUS at 21:25

## 2022-08-09 RX ADMIN — Medication 1 TABLET(S): at 12:50

## 2022-08-10 ENCOUNTER — TRANSCRIPTION ENCOUNTER (OUTPATIENT)
Age: 77
End: 2022-08-10

## 2022-08-10 VITALS
DIASTOLIC BLOOD PRESSURE: 53 MMHG | TEMPERATURE: 98 F | HEART RATE: 68 BPM | SYSTOLIC BLOOD PRESSURE: 116 MMHG | OXYGEN SATURATION: 93 % | RESPIRATION RATE: 18 BRPM

## 2022-08-10 RX ADMIN — Medication 1 TABLET(S): at 11:20

## 2022-08-10 RX ADMIN — HYDROMORPHONE HYDROCHLORIDE 4 MILLIGRAM(S): 2 INJECTION INTRAMUSCULAR; INTRAVENOUS; SUBCUTANEOUS at 04:15

## 2022-08-10 RX ADMIN — HYDROMORPHONE HYDROCHLORIDE 4 MILLIGRAM(S): 2 INJECTION INTRAMUSCULAR; INTRAVENOUS; SUBCUTANEOUS at 15:21

## 2022-08-10 RX ADMIN — HYDROMORPHONE HYDROCHLORIDE 4 MILLIGRAM(S): 2 INJECTION INTRAMUSCULAR; INTRAVENOUS; SUBCUTANEOUS at 11:20

## 2022-08-10 RX ADMIN — Medication 975 MILLIGRAM(S): at 15:21

## 2022-08-10 RX ADMIN — HYDROMORPHONE HYDROCHLORIDE 4 MILLIGRAM(S): 2 INJECTION INTRAMUSCULAR; INTRAVENOUS; SUBCUTANEOUS at 16:05

## 2022-08-10 RX ADMIN — Medication 975 MILLIGRAM(S): at 16:05

## 2022-08-10 RX ADMIN — Medication 1 TABLET(S): at 11:19

## 2022-08-10 RX ADMIN — Medication 975 MILLIGRAM(S): at 05:54

## 2022-08-10 RX ADMIN — HYDROMORPHONE HYDROCHLORIDE 4 MILLIGRAM(S): 2 INJECTION INTRAMUSCULAR; INTRAVENOUS; SUBCUTANEOUS at 12:15

## 2022-08-10 RX ADMIN — HYDROMORPHONE HYDROCHLORIDE 4 MILLIGRAM(S): 2 INJECTION INTRAMUSCULAR; INTRAVENOUS; SUBCUTANEOUS at 03:52

## 2022-08-10 RX ADMIN — Medication 975 MILLIGRAM(S): at 06:00

## 2022-08-10 RX ADMIN — Medication 81 MILLIGRAM(S): at 11:20

## 2022-08-10 NOTE — DISCHARGE NOTE NURSING/CASE MANAGEMENT/SOCIAL WORK - NSDCFUADDAPPT_GEN_ALL_CORE_FT
Follow up with your surgeon in two weeks. Call for appointment.  Pt was prescribed 160 tabs of Dilaudid 2mg on 7/20/22 ([place] via [place] ) and will not be prescribed additional pain medication upon discharge. Pt is aware and will follow the pain protocol outlined by prior prescriber.   If you need more pain medication, call your surgeon's office. For medication refills or authorizations, please call [phone]  We recommend that you call and schedule a follow up appointment within 2-4 weeks with your primary care physician for repeat blood work (CBC and BMP) for post hospital discharge follow-up care.  Call your surgeon if you have increased redness/pain/drainage or fever. Return to ER for shortness of breath/calf tenderness.

## 2022-08-10 NOTE — DISCHARGE NOTE NURSING/CASE MANAGEMENT/SOCIAL WORK - PATIENT PORTAL LINK FT
You can access the FollowMyHealth Patient Portal offered by [facility] by registering at the following website: [email or web address]. By joining [facility]’s FollowMyHealth portal, you will also be able to view your health information using other applications (apps) compatible with our system.

## 2022-08-10 NOTE — PROGRESS NOTE ADULT - SUBJECTIVE AND OBJECTIVE BOX
77yFemale s/p PSF L3-5 . POD #4  Pt seen and examined in NAD.   Pain controlled. Tolerating diet and participating in PT.  Pt denies any new complaints.   Pt denies CP/SOB/N/V/D/numbness/tingling/bowel or bladder dysfunction.     Vital Signs Last 24 Hrs  T(C): 36.5 (09 Aug 2022 05:12), Max: 36.9 (08 Aug 2022 11:00)  T(F): 97.7 (09 Aug 2022 05:12), Max: 98.5 (08 Aug 2022 11:00)  HR: 70 (09 Aug 2022 07:02) (68 - 87)  BP: 128/70 (09 Aug 2022 07:02) (104/48 - 130/72)  BP(mean): --  RR: 19 (09 Aug 2022 05:12) (17 - 19)  SpO2: 94% (09 Aug 2022 05:12) (92% - 95%)    Parameters below as of 09 Aug 2022 05:12  Patient On (Oxygen Delivery Method): room air        PE:   General: A&Ox3, NAD, resting in chair.   Spine: Dressing c/d/i, Bottom of tegaderm rolled but new tegaderm applied and seal established. no discharge or erythema noted.   B/L UE: Skin intact. +ROM shoulder/elbow/wrist/fingers. +ok/thumbsup/fingercross signs.  strength: 5/5.  RP2+ NVI.   B/L LE: Skin intact. +ROM hip/knee/ankle/toes. Ankle Dorsi/plantarflexion: 5/5. Calf: soft, compressible and nontender. DP 2+ NVI.                             11.4   11.63 )-----------( 487      ( 08 Aug 2022 06:27 )             34.6       08-08    138  |  104  |  30<H>  ----------------------------<  106<H>  4.1   |  26  |  1.07    Ca    8.9      08 Aug 2022 06:27          A/P: 77yFemale s/p PSF L3-5 POD #4  Wound care  Patient may remove Tegaderm and gauze dressing once home. Further instructions in D/C summary. Leave incisions with Prineo/Nylon alone.  Nylon sutures to be removed at post-op F/u in 2 weeks as outpt, prineo dressing will be removed then as well.  Pt was prescribed 160 tabs of Dilaudid 2 mg on 7/20/22 [place] , as noted in prior documentation, and will not be receiving any additional pain medication upon discharge.  Pain controlled  Bowel Regimen  PT: WBAT: Spinal precautions  Isometric exercises  DVT ppx: SCDs and ambulation as tolerated. May restart home ASA 81 q daily.  Incentive spirometry counseled   Discharge: planning home with home PT once approved by WC and authorization established.   All the above discussed and understood by pt   
77yFemale s/p PSF L3-5. POD #5  Pt seen and examined in NAD.   Pain controlled.  Pt denies any new complaints.   Pt denies CP/SOB/N/V/D/numbness/tingling/bowel or bladder dysfunction.     Vital Signs Last 24 Hrs  T(C): 36.6 (10 Aug 2022 05:56), Max: 36.6 (10 Aug 2022 00:45)  T(F): 97.8 (10 Aug 2022 05:56), Max: 97.9 (10 Aug 2022 00:45)  HR: 67 (10 Aug 2022 05:56) (67 - 75)  BP: 103/54 (10 Aug 2022 05:56) (102/55 - 165/69)  BP(mean): --  RR: 16 (10 Aug 2022 05:56) (16 - 18)  SpO2: 94% (10 Aug 2022 05:56) (92% - 95%)    Parameters below as of 10 Aug 2022 05:56  Patient On (Oxygen Delivery Method): room air        PE:   General: A&Ox3, NAD resting in bed  Spine: Dressing c/d/i with tegaderm and gauze in place  B/L UE: Skin intact. +ROM shoulder/elbow/wrist/fingers. +ok/thumbsup/fingercross signs.  strength: 5/5.  RP2+ NVI.   B/L LE: Skin intact. +ROM hip/knee/ankle/toes. Ankle Dorsi/plantarflexion: 5/5. Calf: soft, compressible and nontender. DP 2+ NVI.                     A/P: 77yFemale s/p PSF L3-5 POD #5  Patient is orthopedically stable and is awaiting authorization from  to be discharged\  Wound care  Patient may remove Tegaderm and gauze dressing once home. Further instructions in D/C summary. Leave incisions with Prineo/Nylon alone.  Nylon sutures and Prineo Dressing will be removed at post-op F/u in 2 weeks as outpt,   Pt was prescribed 160 tabs of Dilaudid 2 mg on 7[place]20/[place] , as noted in prior documentation, and will not be receiving any additional pain medication upon discharge.  Pain controlled  Bowel Regimen  PT: WBAT: Spinal precautions  Isometric exercises  DVT ppx: SCDs and ambulation as tolerated. Patient has restarted home ASA 81 q daily.  Incentive spirometry counseled   Discharge: planning home with home PT once approved/authorized by WC  All the above discussed and understood by pt       
Patient tolerated the procedure well. Patient seen and examined at bedside. No acute complaints at this time. Pain well controlled. Denies weakness, numbness or tingling. Denies chest pain, shortness of breath, nausea or vomiting.     PE:  Vital Signs Last 24 Hrs  T(C): 36.5 (08-06-22 @ 05:15), Max: 36.6 (08-05-22 @ 17:27)  T(F): 97.7 (08-06-22 @ 05:15), Max: 97.9 (08-05-22 @ 21:31)  HR: 60 (08-06-22 @ 05:15) (60 - 75)  BP: 138/50 (08-06-22 @ 05:15) (104/54 - 138/74)  BP(mean): --  RR: 17 (08-06-22 @ 05:15) (10 - 22)  SpO2: 97% (08-06-22 @ 05:15) (94% - 98%)    General: NAD, resting comforatbly in bed  Dressing C/D/I  Drains present  2+ radial pulses  2+ DP Pulses    Motor:                     L2                  L3             L4              L5            S1  R            5/5                5/5             5/5            5/5          5/5  L             5/5                5/5            5/5            5/5          5/5    Sensory:               L2          L3         L4      L5       S1         (0=absent, 1=impaired, 2=normal, NT=not testable)  R         2            2            2        2        2  L          2            2           2        2         2                              12.5   15.46 )-----------( CLUMPED    ( 06 Aug 2022 05:32 )             38.3     06 Aug 2022 05:32    134    |  104    |  20     ----------------------------<  102    5.2     |  24     |  1.01     Ca    8.8        06 Aug 2022 05:32          A/P:  77y f s/p PSF L3-5  -PT/OT   -WBAT  -Pain Control  -DVT ppx w/SCD  -Pending PT eval  -Continue perioperative abx x 24 hours  -FU AM Labs  -Rest, ice, compress and elevate the extremity as we needed  -Incentive Spirometry  -Medical management appreciated
77yFemale s/p PSF L3-5 POD#0  Pt seen and examined in NAD.   Pain controlled.  Pt denies any new complaints.   Pt denies CP/SOB/N/V/D/numbness/tingling/bowel or bladder dysfunction.   +nguyễn    Vital Signs Last 24 Hrs  T(C): 36.3 (05 Aug 2022 15:27), Max: 36.8 (05 Aug 2022 06:33)  T(F): 97.4 (05 Aug 2022 15:27), Max: 98.3 (05 Aug 2022 06:33)  HR: 72 (05 Aug 2022 15:27) (64 - 74)  BP: 130/65 (05 Aug 2022 15:27) (104/54 - 137/71)  BP(mean): --  RR: 16 (05 Aug 2022 15:27) (10 - 22)  SpO2: 96% (05 Aug 2022 15:27) (94% - 98%)    Parameters below as of 05 Aug 2022 15:27  Patient On (Oxygen Delivery Method): nasal cannula  O2 Flow (L/min): 3      PE:   General: A&Ox3, NAD  Spine: Dressing c/d/i   B/L UE: Skin intact. +ROM shoulder/elbow/wrist/fingers. +ok/thumbsup/fingercross signs.  strength: 5/5.  RP2+ NVI.   B/L LE: Skin intact. +ROM hip/knee/ankle/toes. Ankle Dorsi/plantarflexion: 5/5. Calf: soft, compressible and nontender. DP/PT 2+ NVI.                             11.9   12.03 )-----------( 493      ( 05 Aug 2022 12:10 )             36.8       08-05    139  |  107  |  25<H>  ----------------------------<  170<H>  4.8   |  23  |  1.15    Ca    8.6      05 Aug 2022 12:10          A/P: 77yFemale s/p PSF L3-5 POD#0  -Patient states she is taking Dilaudid 4mg at home since last surgery  -[facility]  shows patient received 160 quantity of Dilaudid 2mg on 7/20/22  DC nguyễn when OOB and ambulating  Wound care  Pain controlled  PT: WBAT: Spinal precautions  Isometric exercises  DVT ppx: SCDs  Incentive spirometry counseled   Discharge: planning p pt eval  All the above discussed and understood by pt   
77yFemale s/p PSF L3-5. POD #3  Pt seen and examined in NAD.   Pain controlled.  Pt denies any new complaints. Pt tolerating diet and participating in PT/OT.  Pt denies CP/SOB/N/V/D/numbness/tingling/bowel or bladder dysfunction.     Vital Signs Last 24 Hrs  T(C): 36.6 (08 Aug 2022 06:09), Max: 36.9 (07 Aug 2022 17:11)  T(F): 97.8 (08 Aug 2022 06:09), Max: 98.4 (07 Aug 2022 17:11)  HR: 87 (08 Aug 2022 10:02) (70 - 87)  BP: 119/72 (08 Aug 2022 10:02) (109/69 - 123/73)  BP(mean): --  RR: 17 (08 Aug 2022 06:09) (16 - 17)  SpO2: 95% (08 Aug 2022 10:02) (93% - 95%)    Parameters below as of 08 Aug 2022 10:02  Patient On (Oxygen Delivery Method): room air        PE:   General: A&Ox3, NAD, resting in chair  Spine: Dressing showing mild serosanguinous drainage at the bottom. Dressing changed. Top 2 incisions have Prineo Dressing C/D/I b/l, bottom 2 incisions; nylon sutures in place b/l no active drainage or erythema noted. New Dressings applied over incisions with Gauze and Tegaderm.   B/L UE: Skin intact. +ROM shoulder/elbow/wrist/fingers. +ok/thumbsup/fingercross signs.  strength: 5/5.  RP2+ NVI.   B/L LE: Skin intact. +ROM hip/knee/ankle/toes. Ankle Dorsi/plantarflexion: 5/5. Calf: soft, compressible and nontender. DP 2+ NVI.                             11.4   11.63 )-----------( 487      ( 08 Aug 2022 06:27 )             34.6       08-08    138  |  104  |  30<H>  ----------------------------<  106<H>  4.1   |  26  |  1.07    Ca    8.9      08 Aug 2022 06:27          A/P: 77yFemale s/p PSF L3-5 POD #3  Wound care  Nylon sutures to be removed at post-op F/u in 2 weeks as outpt, prineo dressing will be removed then as well.  Pt was prescribed 160 tabs of Dilaudid 2 mg [place]  as noted in prior documentation, and will not be receiving any additional pain medication upon discharge. Narcotics sent yesterday were confirmed cancelled this morning by patient's pharmacy.   Pain controlled  Bowel Regimen  PT: WBAT: Spinal precautions  Isometric exercises  DVT ppx: SCDs and ambulation as tolerated. May restart home ASA 81 q daily.  Incentive spirometry counseled   Discharge: planning home with home PT once approved by WC and authorization established.   All the above discussed and understood by pt   
Patient seen and examined at bedside. No acute complaints at this time. Pain well controlled. Denies weakness, numbness or tingling. Denies chest pain, shortness of breath, nausea or vomiting. Patient requesting to go home today.     Vital Signs Last 24 Hrs  T(C): 36.7 (07 Aug 2022 06:11), Max: 36.8 (06 Aug 2022 17:46)  T(F): 98 (07 Aug 2022 06:11), Max: 98.3 (06 Aug 2022 17:46)  HR: 73 (07 Aug 2022 07:03) (62 - 75)  BP: 111/61 (07 Aug 2022 07:03) (98/64 - 149/73)  BP(mean): --  RR: 16 (07 Aug 2022 06:11) (16 - 18)  SpO2: 94% (07 Aug 2022 06:11) (92% - 96%)    Parameters below as of 07 Aug 2022 06:11  Patient On (Oxygen Delivery Method): room air                          12.5   15.46 )-----------( CLUMPED    ( 06 Aug 2022 05:32 )             38.3       PE:  General: NAD, resting comfortably in bed  Dressing intact, mild saturation  2+ radial pulses  2+ DP Pulses  No calf TTP bilaterally    Motor:                     L2                  L3             L4              L5            S1  R            5/5                5/5             5/5            5/5          5/5  L             5/5                5/5            5/5            5/5          5/5    Sensory:               L2          L3         L4      L5       S1         (0=absent, 1=impaired, 2=normal, NT=not testable)  R         2            2            2        2        2  L          2            2           2        2         2          A/P:  77y f s/p PSF L3-5 POD2    -Medical management appreciated  -PT/OT   -WBAT  -Pain Control PRN  -DVT ppx w/SCD  -FU AM Labs  -Incentive Spirometry  -Dispo: Home per PT recs  -Will discuss with attending Dr. Chan and advise if any changes to plan

## 2022-08-10 NOTE — DISCHARGE NOTE NURSING/CASE MANAGEMENT/SOCIAL WORK - NSDCPEFALRISK_GEN_ALL_CORE
For information on Fall & Injury Prevention, visit: [email or web address] OR  [email or web address] OR  https://www.cdc.gov/steadi/patient.html

## 2022-08-18 ENCOUNTER — APPOINTMENT (OUTPATIENT)
Dept: ORTHOPEDIC SURGERY | Facility: CLINIC | Age: 77
End: 2022-08-18

## 2022-08-18 VITALS — BODY MASS INDEX: 42.17 KG/M2 | HEIGHT: 64 IN | WEIGHT: 247 LBS

## 2022-08-18 PROCEDURE — 99024 POSTOP FOLLOW-UP VISIT: CPT

## 2022-08-18 PROCEDURE — 72100 X-RAY EXAM L-S SPINE 2/3 VWS: CPT

## 2022-08-18 NOTE — ASSESSMENT
[FreeTextEntry1] : had a setback;   with the second surgery to manage that setback, I instrumented with screws and rods across the fractured level ;  gotten a little better;  slow with PT;  no resistive exercises;  isometrics only and ADLs for now;  in one month we'll increase the workload ;

## 2022-08-18 NOTE — WORK
[Total] : total [Cannot return to work because ________] : cannot return to work because [unfilled] [I provided the services listed above] :  I provided the services listed above.

## 2022-08-18 NOTE — HISTORY OF PRESENT ILLNESS
[Lower back] : lower back [Gradual] : gradual [10] : 10 [Sharp] : sharp [Constant] : constant [Meds] : meds [Sitting] : sitting [Standing] : standing [Walking] : walking [Retired] : Work status: retired [de-identified] : surgery date 08/05/2022, surgery type XLIF L3-L4-L5: POSTERIOR INTRUMENTATION at a later date [] : no [FreeTextEntry5] : surgery date 08/05/2022, surgery type XLIF L3-L4-L5: POSTERIOR INTRUMENTATION [FreeTextEntry7] : right leg

## 2022-08-18 NOTE — DATA REVIEWED
[Lumbar Spine] : lumbar spine [FreeTextEntry1] : construct stable versus intraop from second surgery

## 2022-09-15 ENCOUNTER — APPOINTMENT (OUTPATIENT)
Dept: ORTHOPEDIC SURGERY | Facility: CLINIC | Age: 77
End: 2022-09-15

## 2022-09-15 VITALS — WEIGHT: 247 LBS | BODY MASS INDEX: 42.17 KG/M2 | HEIGHT: 64 IN

## 2022-09-15 PROCEDURE — 99024 POSTOP FOLLOW-UP VISIT: CPT

## 2022-09-16 NOTE — ASSESSMENT
[FreeTextEntry1] : persisting fibrous discharge but no overt evidence of an infection that needs to be addressed with repeat surgery;  keep follow up visit for later this month \par \par had a setback;   with the second surgery to manage that setback, I instrumented with screws and rods across the fractured level ;  gotten a little better;  slow with PT;  no resistive exercises;  isometrics only and ADLs for now;  in one month we'll increase the workload ;

## 2022-09-16 NOTE — HISTORY OF PRESENT ILLNESS
[8] : 8 [Dull/Aching] : dull/aching [Sharp] : sharp [Constant] : constant [Nothing helps with pain getting better] : Nothing helps with pain getting better [Standing] : standing [Walking] : walking [de-identified] : surgery date 08/05/2022, surgery type XLIF L3-L4-L5: POSTERIOR INTRUMENTATION at a later date [] : no [FreeTextEntry7] : right oot

## 2022-09-26 ENCOUNTER — APPOINTMENT (OUTPATIENT)
Dept: ORTHOPEDIC SURGERY | Facility: CLINIC | Age: 77
End: 2022-09-26

## 2022-09-26 VITALS — BODY MASS INDEX: 42.17 KG/M2 | WEIGHT: 247 LBS | HEIGHT: 64 IN

## 2022-09-26 PROCEDURE — 99024 POSTOP FOLLOW-UP VISIT: CPT

## 2022-09-26 NOTE — ASSESSMENT
[FreeTextEntry1] : Incision site is improving, healing without continued discharge or sign of infection. Her pain continued to come and go with good days and bad.    Pt can continue with her home exercises and encourage that when she is ready to start outpatient physical therapy.  \par Will renew pain medication. . f/up in 1 month for re-eval \par \par the right leg and foot pain is at least intermittent ;  if it had been constant then I'd consider returning to the OR to formally decompress the exiting root;  but it is my hope that as time passes and the fracture stabilizes the pain will begin to go away full time, if the opposite happens then as much as I hate to say it we may need to go back;  will certainly try to exhaust all other  measures \par

## 2022-09-26 NOTE — HISTORY OF PRESENT ILLNESS
[Lower back] : lower back [Gradual] : gradual [Dull/Aching] : dull/aching [Retired] : Work status: retired [8] : 8 [Sharp] : sharp [Constant] : constant [Nothing helps with pain getting better] : Nothing helps with pain getting better [Standing] : standing [Walking] : walking [de-identified] : surgery date 08/05/2022, surgery type XLIF L3-L4-L5: POSTERIOR INTRUMENTATION at a later date\par Seeing gradual improvement, does home exercises and has been gradually increasing her activity including walking. Her Home PT has been stopped.  Does have mild lower back pain and mild, occasional RLE symptoms. [] : no [FreeTextEntry7] : right oot

## 2022-10-24 ENCOUNTER — APPOINTMENT (OUTPATIENT)
Dept: ORTHOPEDIC SURGERY | Facility: CLINIC | Age: 77
End: 2022-10-24

## 2022-10-24 VITALS — HEIGHT: 64 IN | WEIGHT: 247 LBS | BODY MASS INDEX: 42.17 KG/M2

## 2022-10-24 PROCEDURE — 99072 ADDL SUPL MATRL&STAF TM PHE: CPT

## 2022-10-24 PROCEDURE — 99212 OFFICE O/P EST SF 10 MIN: CPT

## 2022-10-24 RX ORDER — HYDROMORPHONE HYDROCHLORIDE 2 MG/1
2 TABLET ORAL TWICE DAILY
Qty: 60 | Refills: 0 | Status: ACTIVE | COMMUNITY
Start: 2022-07-19 | End: 1900-01-01

## 2022-10-24 NOTE — HISTORY OF PRESENT ILLNESS
[Lower back] : lower back [Gradual] : gradual [Dull/Aching] : dull/aching [Sharp] : sharp [Constant] : constant [Nothing helps with pain getting better] : Nothing helps with pain getting better [Standing] : standing [Walking] : walking [Retired] : Work status: retired [6] : 6 [3] : 3 [] : no [FreeTextEntry7] : right foot

## 2022-11-15 NOTE — DIETITIAN INITIAL EVALUATION ADULT. - NUTRITION DIAGNOSITC TERMINOLOGY #1
Sedation provided by anesthesia.  see anesthesia record for vitals and medication administration Overweight/Obesity

## 2022-11-16 ENCOUNTER — NON-APPOINTMENT (OUTPATIENT)
Age: 77
End: 2022-11-16

## 2022-11-21 ENCOUNTER — APPOINTMENT (OUTPATIENT)
Dept: ORTHOPEDIC SURGERY | Facility: CLINIC | Age: 77
End: 2022-11-21

## 2022-12-07 NOTE — ASU PREOP CHECKLIST - PATIENT SENT TO
Detail Level: Zone Continue Regimen: soolantra QAM, finacea QHS Action 2: Continue Plan: discussed IPL- advised not something we do here operating room

## 2022-12-09 NOTE — PHYSICAL THERAPY INITIAL EVALUATION ADULT - LEFT SHOULDER EXTENSION PROM, REHAB EVAL
Not tested. NO ROM allowed Cyclosporine Counseling:  I discussed with the patient the risks of cyclosporine including but not limited to hypertension, gingival hyperplasia,myelosuppression, immunosuppression, liver damage, kidney damage, neurotoxicity, lymphoma, and serious infections. The patient understands that monitoring is required including baseline blood pressure, CBC, CMP, lipid panel and uric acid, and then 1-2 times monthly CMP and blood pressure.

## 2022-12-12 ENCOUNTER — APPOINTMENT (OUTPATIENT)
Dept: ORTHOPEDIC SURGERY | Facility: CLINIC | Age: 77
End: 2022-12-12

## 2022-12-12 VITALS — WEIGHT: 247 LBS | BODY MASS INDEX: 42.17 KG/M2 | HEIGHT: 64 IN

## 2022-12-12 PROCEDURE — 99214 OFFICE O/P EST MOD 30 MIN: CPT

## 2022-12-12 PROCEDURE — 99072 ADDL SUPL MATRL&STAF TM PHE: CPT

## 2022-12-12 NOTE — WORK
[Total] : total [Cannot return to work because ________] : cannot return to work because [unfilled] [Patient] : patient [Rx may affect patient's ability to return to work, make patient drowsy, or other issue] : Rx may affect patient's ability to return to work, make patient drowsy, or other issue. [I provided the services listed above] :  I provided the services listed above.

## 2022-12-12 NOTE — HISTORY OF PRESENT ILLNESS
[Lower back] : lower back [Gradual] : gradual [10] : 10 [0] : 0 [Dull/Aching] : dull/aching [Sharp] : sharp [Constant] : constant [Nothing helps with pain getting better] : Nothing helps with pain getting better [Standing] : standing [Walking] : walking [Not working due to injury] : Work status: not working due to injury [de-identified] : back and leg pain;  less severe leg pain;  had three weeks of convalescence from recent covid exposure (despite 5 vaccine boosters);  has had only one real month of genuine PT post op; [] : no [FreeTextEntry5] : EVERETT WAY is a 77 year old female presenting for follow up of lower back. Pt states she cannot stand for more than 5 minutes at a time and is in extreme pain when moving. [FreeTextEntry7] : right leg

## 2022-12-12 NOTE — ASSESSMENT
[FreeTextEntry1] : post op PT has been complicated due to a return to OR, pain, and recent COVID illness;  ordinarily given her age group and level of pre op deconditioning, and the type of surgeries she needed, obligates about three months of PT;  however Rosie's case is going to require at least 4 months before a HEP would be reasonable

## 2022-12-12 NOTE — IMAGING
[de-identified] : wheelchair preferred,  gait with walker\par motor no focal weaknss\par L45 dermatome of pain \par

## 2023-01-23 ENCOUNTER — APPOINTMENT (OUTPATIENT)
Dept: ORTHOPEDIC SURGERY | Facility: CLINIC | Age: 78
End: 2023-01-23
Payer: OTHER MISCELLANEOUS

## 2023-01-23 VITALS — BODY MASS INDEX: 42.17 KG/M2 | HEIGHT: 64 IN | WEIGHT: 247 LBS

## 2023-01-23 PROCEDURE — 99072 ADDL SUPL MATRL&STAF TM PHE: CPT

## 2023-01-23 PROCEDURE — 99214 OFFICE O/P EST MOD 30 MIN: CPT

## 2023-01-30 NOTE — HISTORY OF PRESENT ILLNESS
[Lower back] : lower back [Gradual] : gradual [10] : 10 [0] : 0 [Dull/Aching] : dull/aching [Sharp] : sharp [Nothing helps with pain getting better] : Nothing helps with pain getting better [Standing] : standing [Walking] : walking [Not working due to injury] : Work status: not working due to injury [Shooting] : shooting [Frequent] : frequent [de-identified] : slightly improved;  having less reliance on assistive devices;  less need for analgesics;  PT working out well [] : no [FreeTextEntry5] : EVERETT WAY is a 77 year old female presenting for follow up of lower back. Pt states she cannot stand for more than 5 minutes at a time and is in extreme pain when moving. [FreeTextEntry7] : right leg

## 2023-01-30 NOTE — IMAGING
[de-identified] : LE:  diffusely weak;  4/5   no focal deficit\par sensation : diminished to LT L451 , no deficit\par - SLR\par lumbar spasm\par incision well healed\par nontender

## 2023-02-02 ENCOUNTER — FORM ENCOUNTER (OUTPATIENT)
Age: 78
End: 2023-02-02

## 2023-02-16 NOTE — ASU PREOP CHECKLIST - ORDERS/MEDICATION ADMINISTRATION RECORD ON CHART
done

## 2023-02-21 ENCOUNTER — APPOINTMENT (OUTPATIENT)
Dept: ORTHOPEDIC SURGERY | Facility: CLINIC | Age: 78
End: 2023-02-21
Payer: OTHER MISCELLANEOUS

## 2023-02-21 VITALS — BODY MASS INDEX: 42.17 KG/M2 | HEIGHT: 64 IN | WEIGHT: 247 LBS

## 2023-02-21 PROCEDURE — 99214 OFFICE O/P EST MOD 30 MIN: CPT

## 2023-02-21 PROCEDURE — 99072 ADDL SUPL MATRL&STAF TM PHE: CPT

## 2023-02-21 PROCEDURE — 72100 X-RAY EXAM L-S SPINE 2/3 VWS: CPT

## 2023-02-21 NOTE — IMAGING
[de-identified] : 5/5 bilateral LE\par sensation : diminished to LT L451 , no deficit\par - SLR\par lumbar spasm\par incision well healed\par nontender\par mildly antaglic gait, ambulating with cane

## 2023-02-21 NOTE — PROCEDURE
[FreeTextEntry3] : Progress note completed by Lori Estrada PA-C under the supervision of Nish Chan MD\par

## 2023-02-21 NOTE — ASSESSMENT
[FreeTextEntry1] : doing well 6 months s/p XLIF/PSF L3-5; progressively improving overall; slowly albeit but less pain and better function; walking further; still has some work to do with PT; construct stable on XR; f/u 6 weeks

## 2023-02-21 NOTE — HISTORY OF PRESENT ILLNESS
[Lower back] : lower back [Gradual] : gradual [10] : 10 [0] : 0 [Dull/Aching] : dull/aching [Sharp] : sharp [Shooting] : shooting [Frequent] : frequent [Nothing helps with pain getting better] : Nothing helps with pain getting better [Standing] : standing [Walking] : walking [Not working due to injury] : Work status: not working due to injury [de-identified] : small improvements; was able to walk around at a grocery store for the first time in 2 years; using cane; stairs are better; has been doing PT; pain mostly with prolonged standing in position; has done 9 session  [] : no [FreeTextEntry5] : EVERETT WAY is a 77 year old female presenting for follow up of lower back. Pt states she cannot stand for more than 5 minutes at a time and is in extreme pain when moving. [FreeTextEntry7] : right leg

## 2023-02-27 ENCOUNTER — NON-APPOINTMENT (OUTPATIENT)
Age: 78
End: 2023-02-27

## 2023-03-01 NOTE — OCCUPATIONAL THERAPY INITIAL EVALUATION ADULT - GROOMING, PREVIOUS LEVEL OF FUNCTION, OT EVAL
MEDICARE WELLNESS VISIT + NOTE    CHIEF COMPLAINT:  Blaine León presents for his Subsequent Annual Medicare Wellness Visit.   His additional complaints or concerns are addressed below.      Patient Care Team:  Orlando Diego MD as PCP - General (Family Practice)  Jarrett Conrad MD as Gastroenterologist (Gastroenterology)  Chevy Hope MD as Cardiologist (Cardiology)  Benoit Infante MD (Urology)        Patient Active Problem List   Diagnosis   • Medicare annual wellness visit, subsequent   • Class 1 obesity due to excess calories with serious comorbidity and body mass index (BMI) of 33.0 to 33.9 in adult   • PVC (premature ventricular contraction)   • Prediabetes   • NSVT (nonsustained ventricular tachycardia)   • Mixed hyperlipidemia   • Essential hypertension, benign   • CAD in native artery   • Asymptomatic microscopic hematuria   • Vitamin D insufficiency   • Chronic right shoulder pain   • Chronic superficial gastritis without bleeding   • Benign prostatic hyperplasia with urinary frequency   • Exertional shortness of breath   • Primary osteoarthritis of both shoulders   • Esophageal dysphagia   • Fecal soiling         Past Medical History:   Diagnosis Date   • Achilles tendinitis of right lower extremity 02/23/2021   • Atherosclerotic heart disease of native coronary artery without angina pectoris    • Benign prostatic hyperplasia with lower urinary tract symptoms    • Chronic peptic ulcer, site unspecified, without hemorrhage or perforation    • Elevated creatine kinase 08/26/2021   • Exertional shortness of breath 09/03/2021   • GERD (gastroesophageal reflux disease)    • Hypercholesterolemia    • Hypertension    • Penile abrasion, initial encounter 03/08/2021         Past Surgical History:   Procedure Laterality Date   • Colonoscopy     • Prostate surgery           Social History     Tobacco Use   • Smoking status: Never   • Smokeless tobacco: Never   Vaping Use   • Vaping Use: never used    Substance Use Topics   • Alcohol use: Never   • Drug use: Never     Family History   Problem Relation Age of Onset   • Cancer, Colon Neg Hx    • Cancer, Esophageal Neg Hx    • Cancer, Rectal Neg Hx    • Cancer, Stomach Neg Hx          Current Outpatient Medications   Medication Sig Dispense Refill   • tamsulosin (FLOMAX) 0.4 MG Cap TAKE 1 CAPSULE BY MOUTH ONCE DAILY AFTER A MEAL 90 capsule 0   • finasteride (PROSCAR) 5 MG tablet TAKE 1 TABLET BY MOUTH ONCE DAILY AS DIRECTED     • pantoprazole (PROTONIX) 40 MG tablet TAKE 1 TABLET BY MOUTH ONCE DAILY BEFORE BREAKFAST 90 tablet 1   • amLODIPine (NORVASC) 5 MG tablet Take 1 tablet by mouth once daily 90 tablet 1   • metoPROLOL succinate (TOPROL-XL) 25 MG 24 hr tablet Take 1/2 (one-half) tablet by mouth once daily 45 tablet 1   • atorvastatin (LIPITOR) 40 MG tablet TAKE 1 TABLET BY MOUTH AT BEDTIME 90 tablet 1   • solifenacin (VESICARE) 10 MG tablet Take 1 tablet by mouth once daily 90 tablet 1   • meclizine (ANTIVERT) 25 MG tablet Take 1 tablet by mouth 3 times daily as needed for Dizziness. 90 tablet 3   • Blood Pressure Monitor Device 1 Device as directed. 1 each 0   • aspirin 81 MG chewable tablet Chew 1 tablet by mouth daily. 90 tablet 1   • Cholecalciferol (Vitamin D) 50 mcg (2,000 units) tablet Take 1 tablet by mouth daily. (Patient taking differently: Take 50 mcg by mouth daily. Patient is taking 1000 iu daily.) 90 tablet 1     No current facility-administered medications for this visit.        The following items on the Medicare Health Risk Assessment were found to be positive  1.) Do you have an Advance directive, living will, or power of  for health care document that contains your wishes for end of life care?: No     6 b.) How many servings of High Fiber / Whole Grain Foods to you have each day ( 1 serving = 1 cup cold cereal, 1/2 cup cooked cereal, 1 slice bread): 1 per day         Vision and Hearing screens: Both screenings were performed and  reviewed    Advance care planning documents on file - no    Cognitive/Functional Status: no evidence of cognitive dysfunction by direct observation    Opioid Review: Blaine is not taking opioid medications.    Recent PHQ 2/9 Score:    PHQ 2:  Date Adult PHQ 2 Score Adult PHQ 2 Interpretation   3/1/2023 0 No further screening needed       PHQ 9:       DEPRESSION ASSESSMENT/PLAN:  Depression screening is negative no further plan needed.     Body mass index is 33.15 kg/m².    BMI ASSESSMENT/PLAN:  Patient is obese.    Caloric restriction        See Patient Instructions section.   Return in about 1 year (around 3/1/2024) for Medicare Wellness Visit.      OUTPATIENT PROGRESS NOTE    Subjective   Chief Complaint Medicare Wellness Visit (Medicare wellness, fasting)  A 31-year-old female presents for an annual medicare wellness examination.  Patient has given consent to record this visit for documentation in their clinical record.    HPI   Historian: Self.    Medicare annual wellness visit, subsequent:  Screening labs: Due.    Immunization: Up-to-date with immunization.     Mixed hyperlipidemia:  Has history of hyperlipidemia.     CAD in native artery:  Has history of CAD in native artery.     Essential hypertension, benign:  Blood pressure measured by MA is within normal range.     Class 1 obesity due to excess calories with serious comorbidity and body mass index (BMI) of 33.0 to 33.9 in adult:  Has BMI of 33.15 kg/m2     Prediabetes:  Has history of prediabetes.     Vitamin D insufficiency:  Has history of Vitamin D insufficiency     Asymptomatic microscopic hematuria:  Has history of asymptomatic microscopic hematuria     Benign prostatic hyperplasia with urinary frequency:  Has history of BPH.     Medications  Medications were reviewed and updated today.    Histories  I have personally reviewed and updated the patient's past medical, past surgical, family and social histories during today's visit.    Review of  Systems  Constitutional: Negative.  Negative for chills, diaphoresis, fever and malaise/fatigue. Obese.  HENT: Negative for congestion, ear discharge, ear pain, hearing loss, nosebleeds, sinus pain and sore throat.    Eyes: Negative for blurred vision, pain, discharge and redness.  Respiratory: Negative for cough, shortness of breath and wheezing.    Cardiovascular: Negative for chest pain and palpitations.  Gastrointestinal: Negative for constipation, diarrhea, nausea and vomiting.  Genitourinary: Negative for dysuria.  Musculoskeletal: Negative for myalgias.  Skin: Negative for itching and rash.  Neurological: Negative for dizziness and headaches.  Psychiatric/Behavioral: Negative.       All other systems reviewed and are negative.       Objective   Visit Vitals  /83   Pulse (!) 59   Temp 97.7 °F (36.5 °C)   Resp 16   Ht 5' (1.524 m)   Wt 77 kg (169 lb 12.1 oz)   BMI 33.15 kg/m²     Physical Exam  Constitutional: Oriented to person, place, and time. Appears well-developed and well-nourished. No distress.   HENT:   Head: Normocephalic and atraumatic.   Right Ear: External ear normal.   Left Ear: External ear normal.   Nose: Nose normal.   Mouth/Throat: Oropharynx is clear and moist. No oropharyngeal exudate.   Eyes: Pupils are equal, round, and reactive to light. Conjunctivae and EOM are normal. Right eye exhibits no discharge. Left eye exhibits no discharge. No scleral icterus.   Neck: Normal range of motion. Neck supple. No JVD present. No tracheal deviation present. No thyromegaly present.   Cardiovascular: Normal rate, regular rhythm and normal heart sounds. Exam reveals no gallop and no friction rub.   No murmur heard.  Pulmonary/Chest: Effort normal and breath sounds normal. No stridor. No respiratory distress. No wheezes. No rales. Exhibits no tenderness.   Abdominal: Soft. Bowel sounds are normal. Exhibits no distension and no mass. There is no tenderness. There is no rebound and no guarding.  Musculoskeletal: Normal range of motion.   Lymphadenopathy: No cervical adenopathy.   Neurological: Alert and oriented to person, place, and time.   Skin: Skin is warm. No rash noted. Is not diaphoretic. No erythema. No pallor.   Psychiatric: Normal mood and affect. Behavior is normal. Judgment and thought content normal.    Laboratory  I have reviewed the pertinent laboratory tests. These are the pertinent findings:  · Stable    Imaging  I have reviewed the pertinent imaging study reports. These are the pertinent findings:  · Stable     Assessment & Plan   Diagnoses and associated orders for this visit:  1. Medicare annual wellness visit, subsequent  -     ANNUAL WELLNESS VISIT SUBSEQUENT VISIT W PPS  2. Mixed hyperlipidemia  -     CBC with Automated Differential  -     Comprehensive Metabolic Panel  -     Folate  -     Glycohemoglobin  -     Lipid Panel With Reflex  -     Microalbumin Urine Random  -     Thyroid Stimulating Hormone Reflex  -     Urinalysis & Reflex Microscopy With Culture If Indicated  -     Vitamin B12  -     Vitamin D -25 Hydroxy  -     ANNUAL WELLNESS VISIT SUBSEQUENT VISIT W PPS  3. CAD in native artery  -     CBC with Automated Differential  -     Comprehensive Metabolic Panel  -     Folate  -     Glycohemoglobin  -     Lipid Panel With Reflex  -     Microalbumin Urine Random  -     Thyroid Stimulating Hormone Reflex  -     Urinalysis & Reflex Microscopy With Culture If Indicated  -     Vitamin B12  -     Vitamin D -25 Hydroxy  -     ANNUAL WELLNESS VISIT SUBSEQUENT VISIT W PPS  4. Essential hypertension, benign  -     CBC with Automated Differential  -     Comprehensive Metabolic Panel  -     Folate  -     Glycohemoglobin  -     Lipid Panel With Reflex  -     Microalbumin Urine Random  -     Thyroid Stimulating Hormone Reflex  -     Urinalysis & Reflex Microscopy With Culture If Indicated  -     Vitamin B12  -     Vitamin D -25 Hydroxy  -     ANNUAL WELLNESS VISIT SUBSEQUENT VISIT W PPS  5.  Class 1 obesity due to excess calories with serious comorbidity and body mass index (BMI) of 33.0 to 33.9 in adult  -     CBC with Automated Differential  -     Comprehensive Metabolic Panel  -     Folate  -     Glycohemoglobin  -     Lipid Panel With Reflex  -     Microalbumin Urine Random  -     Thyroid Stimulating Hormone Reflex  -     Urinalysis & Reflex Microscopy With Culture If Indicated  -     Vitamin B12  -     Vitamin D -25 Hydroxy  -     ANNUAL WELLNESS VISIT SUBSEQUENT VISIT W PPS  6. Prediabetes  -     CBC with Automated Differential  -     Comprehensive Metabolic Panel  -     Folate  -     Glycohemoglobin  -     Lipid Panel With Reflex  -     Microalbumin Urine Random  -     Thyroid Stimulating Hormone Reflex  -     Urinalysis & Reflex Microscopy With Culture If Indicated  -     Vitamin B12  -     Vitamin D -25 Hydroxy  -     ANNUAL WELLNESS VISIT SUBSEQUENT VISIT W PPS  7. Vitamin D insufficiency  -     CBC with Automated Differential  -     Comprehensive Metabolic Panel  -     Folate  -     Glycohemoglobin  -     Lipid Panel With Reflex  -     Microalbumin Urine Random  -     Thyroid Stimulating Hormone Reflex  -     Urinalysis & Reflex Microscopy With Culture If Indicated  -     Vitamin B12  -     Vitamin D -25 Hydroxy  -     ANNUAL WELLNESS VISIT SUBSEQUENT VISIT W PPS  8. Asymptomatic microscopic hematuria  -     CBC with Automated Differential  -     Comprehensive Metabolic Panel  -     Folate  -     Glycohemoglobin  -     Lipid Panel With Reflex  -     Microalbumin Urine Random  -     Thyroid Stimulating Hormone Reflex  -     Urinalysis & Reflex Microscopy With Culture If Indicated  -     Vitamin B12  -     Vitamin D -25 Hydroxy  -     ANNUAL WELLNESS VISIT SUBSEQUENT VISIT W PPS  9. Benign prostatic hyperplasia with urinary frequency  -     CBC with Automated Differential  -     Comprehensive Metabolic Panel  -     Folate  -     Glycohemoglobin  -     Lipid Panel With Reflex  -     Microalbumin  Urine Random  -     Thyroid Stimulating Hormone Reflex  -     Urinalysis & Reflex Microscopy With Culture If Indicated  -     Vitamin B12  -     Vitamin D -25 Hydroxy  -     ANNUAL WELLNESS VISIT SUBSEQUENT VISIT W PPS    PLAN:   Medicare annual wellness visit, subsequent:  Reviewed and reconciled medication list.  Reviewed and updated vaccination status.  Reviewed and discussed previous lab reports.  ANNUAL WELLNESS VISIT SUBSEQUENT VISIT W PPS     Mixed hyperlipidemia:  Stable.  Ordered labs; refer to orders. Significance explained.   Continue to monitor.  ANNUAL WELLNESS VISIT SUBSEQUENT VISIT W PPS     CAD in native artery:  Stable.  Ordered labs; refer to orders. Significance explained.   Continue to monitor.  ANNUAL WELLNESS VISIT SUBSEQUENT VISIT W PPS     Essential hypertension, benign:  Well controlled.  Ordered labs; refer to orders. Significance explained.   Continue to monitor.  ANNUAL WELLNESS VISIT SUBSEQUENT VISIT W PPS    Class 1 obesity due to excess calories with serious comorbidity and body mass index (BMI) of 33.0 to 33.9 in adult:  Ordered labs; refer to orders. Significance explained.   Recommended following a healthy diet and routine exercise.  ANNUAL WELLNESS VISIT SUBSEQUENT VISIT W PPS    Prediabetes:  Stable.  Ordered labs; refer to orders. Significance explained.   Continue to monitor.  ANNUAL WELLNESS VISIT SUBSEQUENT VISIT W PPS     Vitamin D insufficiency:  Ordered labs; refer to orders. Significance explained.   Continue to monitor.  ANNUAL WELLNESS VISIT SUBSEQUENT VISIT W PPS     Asymptomatic microscopic hematuria:  Stable.  Ordered labs; refer to orders. Significance explained.   Continue to monitor.  ANNUAL WELLNESS VISIT SUBSEQUENT VISIT W PPS     Benign prostatic hyperplasia with urinary frequency:  Stable.  Ordered labs; refer to orders. Significance explained.   Continue to monitor.  ANNUAL WELLNESS VISIT SUBSEQUENT VISIT W PPS    Follow up after one year or sooner if  needed.    Refer to orders.  Medical compliance with plan discussed and risks of non-compliance reviewed.  Patient education completed on disease process, etiology & prognosis.  Proper usage and side effects of medications reviewed & discussed.  Patient understands and agrees with the plan.  Return to clinic as clinically indicated as discussed with patient who verbalized understanding of the plan and is in agreement with the plan.    I,  Dr. Tanvi Keller, have created a visit summary document based on the audio recording between Dr. Orlando Diego MD and this patient for the physician to review, edit as needed, and authenticate.  Creation Date: 3/2/2023     I have reviewed and edited the visit summary above and attest that it is accurate.       independent

## 2023-03-16 ENCOUNTER — APPOINTMENT (OUTPATIENT)
Dept: ORTHOPEDIC SURGERY | Facility: CLINIC | Age: 78
End: 2023-03-16
Payer: OTHER MISCELLANEOUS

## 2023-03-16 VITALS — BODY MASS INDEX: 42.17 KG/M2 | HEIGHT: 64 IN | WEIGHT: 247 LBS

## 2023-03-16 DIAGNOSIS — Z98.890 OTHER SPECIFIED POSTPROCEDURAL STATES: ICD-10-CM

## 2023-03-16 PROCEDURE — 73010 X-RAY EXAM OF SHOULDER BLADE: CPT | Mod: LT

## 2023-03-16 PROCEDURE — 73030 X-RAY EXAM OF SHOULDER: CPT | Mod: LT

## 2023-03-16 PROCEDURE — 99214 OFFICE O/P EST MOD 30 MIN: CPT

## 2023-03-16 PROCEDURE — 99072 ADDL SUPL MATRL&STAF TM PHE: CPT

## 2023-03-16 NOTE — PHYSICAL EXAM
[Left] : left shoulder [] : healed incision [5___] : abduction 5[unfilled]/5 [FreeTextEntry9] : FE: 80A, 100P\par ER: 40A

## 2023-03-16 NOTE — HISTORY OF PRESENT ILLNESS
[Work related] : work related [5] : 5 [4] : 4 [Dull/Aching] : dull/aching [de-identified] : WC 5/12/14 R shoulder \par DOS: 3/11/19: Left TSA and biceps tenodesis, 3/2/20 SA, SAD, debridement, synovial biopsy\par DOS 6/15/20 L shoulder DARIO,abx spacer\par DOS 9/28/20 removal of abx spacer, placement of RSA\par \par 3/16/23: Here for follow up.  She is recovering from two spine surgeries.  She has had some pain with walking with the walker.  She has difficulty with ROM.  \par \par 12/30/21: She is starting to feel pain anterior and deep in the shoulder. No fevers chills.\par \par 9/7/21: Here for follow up, she is near a year from surgery. She has pain with certain activities.\par \par 6/29/21: Follow up L shoulder. She has been going to PT but stopped due to increased back pain and upcoming procedure. She has some stiffness reaching overhead and behind her back. Soreness with lifting.\par \par 5/12/21: Follow up L shoulder, going to PT, doing well.\par \par 3/30/21: She is attending PT program, her shoulder is doing well. Her Pt auth was delayed. She has only been back in PT for one week.\par \par 2/16/21: Here for follow up. She was in PT up until last week. She still has difficulty with overhead.\par \par 12/29/20: Here for follow up. Now about 3 months. She is in Pt.\par \par 11/11/20: Follow up right shoulder. She is going to PT and improving.\par \par 10/13/20: Here for follow up. She feels improved, a different type of pain.\par \par 8/11/20: She was switched to vanco and another abx, she stopped the abx last week and she feels improvement in her pain. She was apparently cleared by Dr. Paredes (ID)\par \par 6/30/20: She is here for first follow up. She is on vanco and meropenem, but she has stopped as she had a skin rash. \par \par 4/1/20: Telephone visit today. Patients cultures have returned final from surgery. She still reports that it is more painful than from before the surgery. She thinks its an infection. She feels an aching pain. Her pain is improved at rest.\par Arthroscopic Cx: 1/5 culture positive for Coag neg Sam in broth only\par \par 3/13/20: Here for follow up. She continues to have pain in the shoulder, she feels her pain is worse.\par \par 2/14/20: Follow up left shoulder. She is scheduled for surgery 3/2/20. She states symptoms are the same.\par \par 1/3/20: Follow up left shoulder. She continues to have pain. Surgery was denied per [facility].\par \par 11/15/19: No f/c. She continues to have pain and difficulty moving, awaiting auth for surgery.\par CRP: 1.5 H\par ESR: 46 H\par \par 10/30/19: Follow up L shoulder. Reports continued pain. She took MDP with no relief.\par \par 10/18/19: Here for CT scan review. She continues to have pain, she feels it is getting worse. \par \par CT arthrogram: Left total shoulder arthroplasty without periprosthetic fracture, bulky osteolysis or loosening. Changes and a small focus of heterotopic bone formation is seen within the distal subscapularis tendon. There is no discrete rotator cuff tendon tear. There is no muscle atrophy.\par \par 9/6/19: Follow-up left shoulder. Pain in the anterior shoulder has been worse, especially with PT/increased activity.\par \par 7/26/19: Follow up left shoulder. She is going to PT but still stiffness. She has more anterior shoulder pain today. Den fevers, chills, paresthesias.\par \par 6/14/19: Here for follow up. She is in PT. She feels stiff but improving.\par \par 5/3/19: Follow-up today. Symptoms continue to slowly improvement. Still notes considerable amount of pain with ROM Has been going to PT. Has been trying to decrease use of pain medication (trying to use after PT and at night only).\par \par 3/22/19: Here for follow up. She has been taking the pain meds, she is in pain.\par \par 1/4/19: Here for MRI review\par \par MRI L shoulder:\par 1. Postoperative changes with progression of glenohumeral joint arthrosis, which now appears severe with mild partial tearing of the supraspinatus tendon insertion, biceps tenotomy or disruption and retraction, infraspinatus tendinopath subscapularis tendinopathy and subacromial decompression with distal clavicular resection, moderate effusion and multiple glenohumeral joint loose bodies without acute fracture or disproportionate muscle atrophy.\par 2. Clinical correlation regarding prior surgical history is recommended.\par \par 12/7/18: 72 y/o female with h/o prior R TSA. She was at work when she injured the right shoulder while falling into a wall. She had 4 knee replacement surgeries, complicated by loosening and infection, and a R TSA by Dr. Matson. Since that time she has been favoring the left shoulder, this has bene getting worse since she has been compensating on th left and using the walker and the cane. She has pain anteriorly and deep. She has pain with overhead motion. No radiation, no paresthesias. She had prior cortisone injections in the shoulder, years ago, with Dr. Carrillo. She cant take NSAIDs as it affected her kidney function. [FreeTextEntry1] : L shoulder [de-identified] : cane

## 2023-03-16 NOTE — ASSESSMENT
[FreeTextEntry1] : Prior L TSA, s/p I/D with removal of hardware and placement of abx spacer.\par Now s/p L shoulder I/D, removal of abx spacer, placement of RSA.\par WBAT. \par RTO 1-2 years with xrays. \par

## 2023-03-24 ENCOUNTER — FORM ENCOUNTER (OUTPATIENT)
Age: 78
End: 2023-03-24

## 2023-03-27 ENCOUNTER — APPOINTMENT (OUTPATIENT)
Dept: ORTHOPEDIC SURGERY | Facility: CLINIC | Age: 78
End: 2023-03-27
Payer: OTHER MISCELLANEOUS

## 2023-03-27 VITALS — BODY MASS INDEX: 42.17 KG/M2 | HEIGHT: 64 IN | WEIGHT: 247 LBS

## 2023-03-27 DIAGNOSIS — S32.040S WEDGE COMPRESSION FRACTURE OF FOURTH LUMBAR VERTEBRA, SEQUELA: ICD-10-CM

## 2023-03-27 DIAGNOSIS — Z78.9 OTHER SPECIFIED HEALTH STATUS: ICD-10-CM

## 2023-03-27 DIAGNOSIS — S32.030S WEDGE COMPRESSION FRACTURE OF THIRD LUMBAR VERTEBRA, SEQUELA: ICD-10-CM

## 2023-03-27 PROCEDURE — 72100 X-RAY EXAM L-S SPINE 2/3 VWS: CPT

## 2023-03-27 PROCEDURE — 99215 OFFICE O/P EST HI 40 MIN: CPT

## 2023-03-27 NOTE — WORK
[Cannot return to work because ________] : cannot return to work because [unfilled] [Patient] : patient [Rx may affect patient's ability to return to work, make patient drowsy, or other issue] : Rx may affect patient's ability to return to work, make patient drowsy, or other issue. [I provided the services listed above] :  I provided the services listed above.

## 2023-03-27 NOTE — HISTORY OF PRESENT ILLNESS
[Lower back] : lower back [Gradual] : gradual [10] : 10 [0] : 0 [Dull/Aching] : dull/aching [Sharp] : sharp [Shooting] : shooting [Frequent] : frequent [Nothing helps with pain getting better] : Nothing helps with pain getting better [Standing] : standing [Walking] : walking [Not working due to injury] : Work status: not working due to injury [de-identified] : PT held up due to lack of authorization;  '[facility] office did not submit request  in a timely fashion'\par cannot stand for any significant length of time without the need to sit down and rest with back support\par surgery was beneficial in part ;  [] : no [FreeTextEntry5] : EVERETT WAY is a 77 year old female presenting for follow up of lower back. Pt states she cannot stand for more than 5 minutes at a time and is in extreme pain when moving. Unable to continue PT due to [facility] dept not submitting information. [FreeTextEntry7] : right leg

## 2023-03-27 NOTE — ASSESSMENT
[FreeTextEntry1] : has been 6+ months;  and she cannot stand for any length of time due to left sided back pain;  there is a lump that increases in size at time;  on exam today i could not identify a left sided cystic structure;  instead there seemed like there was a lipoma\par \par we need both CT and MRI scans to see if there is loss of fixation or hardware migration to account for the left sided back pain (CT scan)  OR adjacent level disc pathology (MRI)  there is no left sided radicular pain ;

## 2023-03-27 NOTE — IMAGING
[de-identified] : lumbar tender\par spasm +\par motor no focal weakness, diffusely weak 4/5\par no dermatome pain , diffuse lumbar pain\par

## 2023-04-24 ENCOUNTER — APPOINTMENT (OUTPATIENT)
Dept: ORTHOPEDIC SURGERY | Facility: CLINIC | Age: 78
End: 2023-04-24
Payer: OTHER MISCELLANEOUS

## 2023-04-24 ENCOUNTER — APPOINTMENT (OUTPATIENT)
Dept: ORTHOPEDIC SURGERY | Facility: CLINIC | Age: 78
End: 2023-04-24

## 2023-04-24 VITALS — HEIGHT: 64 IN | BODY MASS INDEX: 42.17 KG/M2 | WEIGHT: 247 LBS

## 2023-04-24 PROCEDURE — 99215 OFFICE O/P EST HI 40 MIN: CPT

## 2023-04-24 NOTE — PHYSICAL EXAM
[Normal Mood and Affect] : normal mood and affect [Orientated] : orientated [Able to Communicate] : able to communicate [No obvious lymphadenopathy in areas examined] : no obvious lymphadenopathy in areas examined [Peripheral vascular exam is grossly normal] : peripheral vascular exam is grossly normal [No Respiratory Distress] : no respiratory distress [Flexion] : flexion [Extension] : extension [Bending to left] : bending to left [Bending to right] : bending to right [Left lower extremity below knee] : left lower extremity below knee [] : negative contralateral straight leg raise [de-identified] : left ankle DF 4/5;

## 2023-04-24 NOTE — ASSESSMENT
[FreeTextEntry1] : MRI does not show junctional stenosis , BUT the left L45 foramen is narrowed;\par CT scan : no implant migration, no additional IV device subsidence;  left L45 foramen shows severe stenosis\par \par back pain , left sided >> right sided , AND left leg pain in an L4 distribution;  the levels above and below the surgery are free of any pathology;  there has been no loss of construct stability;  the presumed pain generator is the left L45 foraminal stenosis;  i recommended decomprssion left L345;  the interbody surgery provided an indirect decompression which was compromised by the implant subsidnce;  so the indirect decompression was lost;  I recommended a direct decompression of L345;  45 mostly but will address both levels that were indirectly decompressed AND the L4 symptoms may due to cumulative effect of 3-4 lateral recess stenosis and 4-5 foraminal stenosis;

## 2023-04-24 NOTE — WORK
[Cannot return to work because ________] : cannot return to work because [unfilled] [Patient] : patient [Rx may affect patient's ability to return to work, make patient drowsy, or other issue] : Rx may affect patient's ability to return to work, make patient drowsy, or other issue. [I provided the services listed above] :  I provided the services listed above. [Total (100%)] : total (100%)

## 2023-04-24 NOTE — HISTORY OF PRESENT ILLNESS
[Lower back] : lower back [Gradual] : gradual [10] : 10 [0] : 0 [Dull/Aching] : dull/aching [Sharp] : sharp [Shooting] : shooting [Frequent] : frequent [Nothing helps with pain getting better] : Nothing helps with pain getting better [Standing] : standing [Walking] : walking [Not working due to injury] : Work status: not working due to injury [] : no [FreeTextEntry5] : Pt here for MRI/CT review- L Spine- completed @ [facility].  [FreeTextEntry7] : right leg [de-identified] : MRI/CT L SPINE @ [facility]

## 2023-05-09 ENCOUNTER — FORM ENCOUNTER (OUTPATIENT)
Age: 78
End: 2023-05-09

## 2023-05-14 ENCOUNTER — FORM ENCOUNTER (OUTPATIENT)
Age: 78
End: 2023-05-14

## 2023-05-29 ENCOUNTER — FORM ENCOUNTER (OUTPATIENT)
Age: 78
End: 2023-05-29

## 2023-06-26 ENCOUNTER — OUTPATIENT (OUTPATIENT)
Dept: OUTPATIENT SERVICES | Facility: HOSPITAL | Age: 78
LOS: 1 days | Discharge: HOME | End: 2023-06-26
Payer: OTHER MISCELLANEOUS

## 2023-06-26 VITALS
OXYGEN SATURATION: 94 % | RESPIRATION RATE: 16 BRPM | HEIGHT: 64 IN | TEMPERATURE: 99 F | DIASTOLIC BLOOD PRESSURE: 64 MMHG | WEIGHT: 264.78 LBS | HEART RATE: 68 BPM | SYSTOLIC BLOOD PRESSURE: 148 MMHG

## 2023-06-26 DIAGNOSIS — Z98.890 OTHER SPECIFIED POSTPROCEDURAL STATES: Chronic | ICD-10-CM

## 2023-06-26 DIAGNOSIS — Z96.659 PRESENCE OF UNSPECIFIED ARTIFICIAL KNEE JOINT: Chronic | ICD-10-CM

## 2023-06-26 DIAGNOSIS — M54.16 RADICULOPATHY, LUMBAR REGION: ICD-10-CM

## 2023-06-26 DIAGNOSIS — Z90.49 ACQUIRED ABSENCE OF OTHER SPECIFIED PARTS OF DIGESTIVE TRACT: Chronic | ICD-10-CM

## 2023-06-26 DIAGNOSIS — Z96.651 PRESENCE OF RIGHT ARTIFICIAL KNEE JOINT: Chronic | ICD-10-CM

## 2023-06-26 DIAGNOSIS — Z96.611 PRESENCE OF RIGHT ARTIFICIAL SHOULDER JOINT: Chronic | ICD-10-CM

## 2023-06-26 DIAGNOSIS — Z01.818 ENCOUNTER FOR OTHER PREPROCEDURAL EXAMINATION: ICD-10-CM

## 2023-06-26 LAB
A1C WITH ESTIMATED AVERAGE GLUCOSE RESULT: 6.1 % — HIGH (ref 4–5.6)
ALBUMIN SERPL ELPH-MCNC: 3.9 G/DL — SIGNIFICANT CHANGE UP (ref 3.3–5)
ALP SERPL-CCNC: 65 U/L — SIGNIFICANT CHANGE UP (ref 40–120)
ALT FLD-CCNC: 25 U/L — SIGNIFICANT CHANGE UP (ref 12–78)
ANION GAP SERPL CALC-SCNC: 5 MMOL/L — SIGNIFICANT CHANGE UP (ref 5–17)
APTT BLD: 32.5 SEC — SIGNIFICANT CHANGE UP (ref 27.5–35.5)
AST SERPL-CCNC: 17 U/L — SIGNIFICANT CHANGE UP (ref 15–37)
BASOPHILS # BLD AUTO: 0.14 K/UL — SIGNIFICANT CHANGE UP (ref 0–0.2)
BASOPHILS NFR BLD AUTO: 0.9 % — SIGNIFICANT CHANGE UP (ref 0–2)
BILIRUB SERPL-MCNC: 0.2 MG/DL — SIGNIFICANT CHANGE UP (ref 0.2–1.2)
BUN SERPL-MCNC: 29 MG/DL — HIGH (ref 7–23)
CALCIUM SERPL-MCNC: 9.8 MG/DL — SIGNIFICANT CHANGE UP (ref 8.5–10.1)
CHLORIDE SERPL-SCNC: 110 MMOL/L — HIGH (ref 96–108)
CO2 SERPL-SCNC: 26 MMOL/L — SIGNIFICANT CHANGE UP (ref 22–31)
CREAT SERPL-MCNC: 1.1 MG/DL — SIGNIFICANT CHANGE UP (ref 0.5–1.3)
EGFR: 51 ML/MIN/1.73M2 — LOW
EOSINOPHIL # BLD AUTO: 0.24 K/UL — SIGNIFICANT CHANGE UP (ref 0–0.5)
EOSINOPHIL NFR BLD AUTO: 1.5 % — SIGNIFICANT CHANGE UP (ref 0–6)
ESTIMATED AVERAGE GLUCOSE: 128 MG/DL — HIGH (ref 68–114)
GLUCOSE SERPL-MCNC: 88 MG/DL — SIGNIFICANT CHANGE UP (ref 70–99)
HCT VFR BLD CALC: 44.4 % — SIGNIFICANT CHANGE UP (ref 34.5–45)
HGB BLD-MCNC: 14.3 G/DL — SIGNIFICANT CHANGE UP (ref 11.5–15.5)
IMM GRANULOCYTES NFR BLD AUTO: 1.1 % — HIGH (ref 0–0.9)
INR BLD: 1.03 RATIO — SIGNIFICANT CHANGE UP (ref 0.88–1.16)
LYMPHOCYTES # BLD AUTO: 14.5 % — SIGNIFICANT CHANGE UP (ref 13–44)
LYMPHOCYTES # BLD AUTO: 2.26 K/UL — SIGNIFICANT CHANGE UP (ref 1–3.3)
MCHC RBC-ENTMCNC: 29.4 PG — SIGNIFICANT CHANGE UP (ref 27–34)
MCHC RBC-ENTMCNC: 32.2 G/DL — SIGNIFICANT CHANGE UP (ref 32–36)
MCV RBC AUTO: 91.4 FL — SIGNIFICANT CHANGE UP (ref 80–100)
MONOCYTES # BLD AUTO: 0.82 K/UL — SIGNIFICANT CHANGE UP (ref 0–0.9)
MONOCYTES NFR BLD AUTO: 5.3 % — SIGNIFICANT CHANGE UP (ref 2–14)
MRSA PCR RESULT.: SIGNIFICANT CHANGE UP
NEUTROPHILS # BLD AUTO: 11.98 K/UL — HIGH (ref 1.8–7.4)
NEUTROPHILS NFR BLD AUTO: 76.7 % — SIGNIFICANT CHANGE UP (ref 43–77)
NRBC # BLD: 0 /100 WBCS — SIGNIFICANT CHANGE UP (ref 0–0)
PLATELET # BLD AUTO: 687 K/UL — HIGH (ref 150–400)
POTASSIUM SERPL-MCNC: 4.8 MMOL/L — SIGNIFICANT CHANGE UP (ref 3.5–5.3)
POTASSIUM SERPL-SCNC: 4.8 MMOL/L — SIGNIFICANT CHANGE UP (ref 3.5–5.3)
PROT SERPL-MCNC: 7.9 GM/DL — SIGNIFICANT CHANGE UP (ref 6–8.3)
PROTHROM AB SERPL-ACNC: 12.3 SEC — SIGNIFICANT CHANGE UP (ref 10.5–13.4)
RBC # BLD: 4.86 M/UL — SIGNIFICANT CHANGE UP (ref 3.8–5.2)
RBC # FLD: 15.4 % — HIGH (ref 10.3–14.5)
S AUREUS DNA NOSE QL NAA+PROBE: SIGNIFICANT CHANGE UP
SODIUM SERPL-SCNC: 141 MMOL/L — SIGNIFICANT CHANGE UP (ref 135–145)
VIT D25+D1,25 OH+D1,25 PNL SERPL-MCNC: 59.1 PG/ML — SIGNIFICANT CHANGE UP (ref 19.9–79.3)
WBC # BLD: 15.61 K/UL — HIGH (ref 3.8–10.5)
WBC # FLD AUTO: 15.61 K/UL — HIGH (ref 3.8–10.5)

## 2023-06-26 PROCEDURE — 93010 ELECTROCARDIOGRAM REPORT: CPT

## 2023-06-26 NOTE — H&P PST ADULT - NS MD HP INPLANTS MED DEV
both shoulders and both knees replacement and screws in the low back both shoulders and both knees replacement and screws in the low back/Artificial joint

## 2023-06-26 NOTE — H&P PST ADULT - ASSESSMENT
79 y/o female with lumbar radiculopathy  CAPRINI SCORE [CLOT]    AGE RELATED RISK FACTORS                                                       MOBILITY RELATED FACTORS  [ ] Age 41-60 years                                            (1 Point)                  [ ] Bed rest                                                        (1 Point)  [ ] Age: 61-74 years                                           (2 Points)                 [ ] Plaster cast                                                   (2 Points)  [x ] Age= 75 years                                              (3 Points)                 [ ] Bed bound for more than 72 hours                 (2 Points)    DISEASE RELATED RISK FACTORS                                               GENDER SPECIFIC FACTORS  [ ] Edema in the lower extremities                       (1 Point)                  [ ] Pregnancy                                                     (1 Point)  [ ] Varicose veins                                               (1 Point)                  [ ] Post-partum < 6 weeks                                   (1 Point)             [x ] BMI > 25 Kg/m2                                            (1 Point)                  [ ] Hormonal therapy  or oral contraception          (1 Point)                 [ ] Sepsis (in the previous month)                        (1 Point)                  [ ] History of pregnancy complications                 (1 point)  [ ] Pneumonia or serious lung disease                                               [ ] Unexplained or recurrent                     (1 Point)           (in the previous month)                               (1 Point)  [ ] Abnormal pulmonary function test                     (1 Point)                 SURGERY RELATED RISK FACTORS  [ ] Acute myocardial infarction                              (1 Point)                 [ ]  Section                                             (1 Point)  [ ] Congestive heart failure (in the previous month)  (1 Point)               [ ] Minor surgery                                                  (1 Point)   [ ] Inflammatory bowel disease                             (1 Point)                 [ ] Arthroscopic surgery                                        (2 Points)  [ ] Central venous access                                      (2 Points)                [ ] General surgery lasting more than 45 minutes   (2 Points)       [ ] Stroke (in the previous month)                          (5 Points)               [x ] Elective arthroplasty                                         (5 Points)                                                                                                                                               HEMATOLOGY RELATED FACTORS                                                 TRAUMA RELATED RISK FACTORS  [ ] Prior episodes of VTE                                     (3 Points)                [ ] Fracture of the hip, pelvis, or leg                       (5 Points)  [ ] Positive family history for VTE                         (3 Points)                 [ ] Acute spinal cord injury (in the previous month)  (5 Points)  [ ] Prothrombin 19948 A                                     (3 Points)                 [ ] Paralysis  (less than 1 month)                             (5 Points)  [ ] Factor V Leiden                                             (3 Points)                  [ ] Multiple Trauma within 1 month                        (5 Points)  [ ] Lupus anticoagulants                                     (3 Points)                                                           [ ] Anticardiolipin antibodies                               (3 Points)                                                       [ ] High homocysteine in the blood                      (3 Points)                                             [ ] Other congenital or acquired thrombophilia      (3 Points)                                                [ ] Heparin induced thrombocytopenia                  (3 Points)                                          Total Score [     9     ]    Caprini Score 0 - 2:  Low Risk, No VTE Prophylaxis required for most patients, encourage ambulation  Caprini Score 3 - 6:  At Risk, pharmacologic VTE prophylaxis is indicated for most patients (in the absence of a contraindication)  Caprini Score Greater than or = 7:  High Risk, pharmacologic VTE prophylaxis is indicated for most patients (in the absence of a contraindication)

## 2023-06-26 NOTE — H&P PST ADULT - NSICDXPASTSURGICALHX_GEN_ALL_CORE_FT
PAST SURGICAL HISTORY:  S/P arthroscopy of shoulder left shoulder    S/P cholecystectomy     S/P knee replacement Left ( 2007 )    S/P knee replacement 2004 left knee and revision    S/P laminectomy     S/P total knee arthroplasty, right     Status post total shoulder arthroplasty, right and left  multiple x5 with revisions

## 2023-06-26 NOTE — H&P PST ADULT - HISTORY OF PRESENT ILLNESS
77 y/o female  PMH of HTN, hld, gerd and obesity c/o lower back pain with radiation to left leg s/p extreme lateral interbody fusion L3-4-5  and a 2nd surgery last year (8/2022) still with back pain 6/10 with diagnosis of lumbar radiculopathy here for PST for scheduled for decompression laminectomy L3,4 and 5 on 7/7/23.   Had multiple joint surgeries Bilateral shoulders and bilateral knees replaced in the past.  Pt denies fever, cough, SOB, recent travel, or sick contacts.   Denies recent travel outside of the country, no covid exposure  Covid vaccinated, Pfizer  Goal: to be able to walk pain free

## 2023-06-26 NOTE — H&P PST ADULT - PROBLEM SELECTOR PLAN 1
decompression laminectomy L3-4 and 5 on 7/7/23.    Labs - CBC, BMP, PT/PTT, vitamin d, T&S, nose culture and EKG done   Medical eval advised  Preop 3 day antibacterial wash  instruction reviewed. Allergic to CHG, and soaps, advised to use dial soap. , MRSA and MSSA screening done today.  Avoid NSAID and OTC supplements for 7 days  Continue all prescribed meds as instructed  NPO after 11pm the day before surgery. Take medicines up until the day before surgery  Vaccinated for covid.   verbalized understanding of all instructions.

## 2023-06-26 NOTE — H&P PST ADULT - MUSCULOSKELETAL
decreased ROM due to pain/normal gait/strength 5/5 bilateral upper extremities/strength 5/5 bilateral lower extremities/back exam/extremities exam details…

## 2023-07-05 ENCOUNTER — TRANSCRIPTION ENCOUNTER (OUTPATIENT)
Age: 78
End: 2023-07-05

## 2023-07-05 ENCOUNTER — OUTPATIENT (OUTPATIENT)
Dept: OUTPATIENT SERVICES | Facility: HOSPITAL | Age: 78
LOS: 1 days | Discharge: HOME | End: 2023-07-05

## 2023-07-05 ENCOUNTER — APPOINTMENT (OUTPATIENT)
Dept: ORTHOPEDIC SURGERY | Facility: HOSPITAL | Age: 78
End: 2023-07-05

## 2023-07-05 VITALS
RESPIRATION RATE: 14 BRPM | DIASTOLIC BLOOD PRESSURE: 58 MMHG | SYSTOLIC BLOOD PRESSURE: 107 MMHG | OXYGEN SATURATION: 96 % | HEART RATE: 70 BPM

## 2023-07-05 VITALS
WEIGHT: 246.92 LBS | HEART RATE: 65 BPM | OXYGEN SATURATION: 96 % | RESPIRATION RATE: 18 BRPM | HEIGHT: 64 IN | DIASTOLIC BLOOD PRESSURE: 74 MMHG | TEMPERATURE: 98 F | SYSTOLIC BLOOD PRESSURE: 105 MMHG

## 2023-07-05 DIAGNOSIS — Z96.651 PRESENCE OF RIGHT ARTIFICIAL KNEE JOINT: Chronic | ICD-10-CM

## 2023-07-05 DIAGNOSIS — Z96.611 PRESENCE OF RIGHT ARTIFICIAL SHOULDER JOINT: Chronic | ICD-10-CM

## 2023-07-05 DIAGNOSIS — Z90.49 ACQUIRED ABSENCE OF OTHER SPECIFIED PARTS OF DIGESTIVE TRACT: Chronic | ICD-10-CM

## 2023-07-05 DIAGNOSIS — Z96.659 PRESENCE OF UNSPECIFIED ARTIFICIAL KNEE JOINT: Chronic | ICD-10-CM

## 2023-07-05 DIAGNOSIS — Z98.890 OTHER SPECIFIED POSTPROCEDURAL STATES: Chronic | ICD-10-CM

## 2023-07-05 LAB
HCT VFR BLD CALC: 39.8 % — SIGNIFICANT CHANGE UP (ref 34.5–45)
HGB BLD-MCNC: 12.7 G/DL — SIGNIFICANT CHANGE UP (ref 11.5–15.5)
MCHC RBC-ENTMCNC: 29.7 PG — SIGNIFICANT CHANGE UP (ref 27–34)
MCHC RBC-ENTMCNC: 31.9 G/DL — LOW (ref 32–36)
MCV RBC AUTO: 93.2 FL — SIGNIFICANT CHANGE UP (ref 80–100)
NRBC # BLD: 0 /100 WBCS — SIGNIFICANT CHANGE UP (ref 0–0)
PLATELET # BLD AUTO: 472 K/UL — HIGH (ref 150–400)
RBC # BLD: 4.27 M/UL — SIGNIFICANT CHANGE UP (ref 3.8–5.2)
RBC # FLD: 15.9 % — HIGH (ref 10.3–14.5)
WBC # BLD: 11.52 K/UL — HIGH (ref 3.8–10.5)
WBC # FLD AUTO: 11.52 K/UL — HIGH (ref 3.8–10.5)

## 2023-07-05 DEVICE — SURGIFLO MATRIX WITH THROMBIN KIT: Type: IMPLANTABLE DEVICE | Status: FUNCTIONAL

## 2023-07-05 RX ORDER — SODIUM CHLORIDE 9 MG/ML
1000 INJECTION, SOLUTION INTRAVENOUS
Refills: 0 | Status: DISCONTINUED | OUTPATIENT
Start: 2023-07-05 | End: 2023-07-05

## 2023-07-05 RX ORDER — HYDROMORPHONE HYDROCHLORIDE 2 MG/ML
0.5 INJECTION INTRAMUSCULAR; INTRAVENOUS; SUBCUTANEOUS
Refills: 0 | Status: DISCONTINUED | OUTPATIENT
Start: 2023-07-05 | End: 2023-07-05

## 2023-07-05 RX ORDER — SODIUM CHLORIDE 9 MG/ML
3 INJECTION INTRAMUSCULAR; INTRAVENOUS; SUBCUTANEOUS EVERY 8 HOURS
Refills: 0 | Status: DISCONTINUED | OUTPATIENT
Start: 2023-07-05 | End: 2023-07-05

## 2023-07-05 RX ADMIN — HYDROMORPHONE HYDROCHLORIDE 0.5 MILLIGRAM(S): 2 INJECTION INTRAMUSCULAR; INTRAVENOUS; SUBCUTANEOUS at 08:21

## 2023-07-05 RX ADMIN — SODIUM CHLORIDE 75 MILLILITER(S): 9 INJECTION, SOLUTION INTRAVENOUS at 08:17

## 2023-07-05 NOTE — PRE-OP CHECKLIST - TYPE OF SOLUTION
Tumor Depth: Less than 6mm from granular layer and no invasion beyond the subcutaneous fat 0.9 NS flush

## 2023-07-05 NOTE — PATIENT PROFILE ADULT - FALL HARM RISK - HARM RISK INTERVENTIONS

## 2023-07-05 NOTE — ASU DISCHARGE PLAN (ADULT/PEDIATRIC) - NURSING INSTRUCTIONS
Patient to rest today, Follow up with Dr. Chan as planned, Frequent hand washing advised to prevent infection.

## 2023-07-06 ENCOUNTER — NON-APPOINTMENT (OUTPATIENT)
Age: 78
End: 2023-07-06

## 2023-07-13 ENCOUNTER — NON-APPOINTMENT (OUTPATIENT)
Age: 78
End: 2023-07-13

## 2023-07-25 ENCOUNTER — OUTPATIENT (OUTPATIENT)
Dept: OUTPATIENT SERVICES | Facility: HOSPITAL | Age: 78
LOS: 1 days | Discharge: HOME | End: 2023-07-25

## 2023-07-25 VITALS
OXYGEN SATURATION: 97 % | SYSTOLIC BLOOD PRESSURE: 167 MMHG | RESPIRATION RATE: 18 BRPM | DIASTOLIC BLOOD PRESSURE: 86 MMHG | TEMPERATURE: 98 F | HEART RATE: 68 BPM | HEIGHT: 64 IN | WEIGHT: 274.92 LBS

## 2023-07-25 DIAGNOSIS — M54.16 RADICULOPATHY, LUMBAR REGION: ICD-10-CM

## 2023-07-25 DIAGNOSIS — Z98.890 OTHER SPECIFIED POSTPROCEDURAL STATES: Chronic | ICD-10-CM

## 2023-07-25 DIAGNOSIS — Z96.651 PRESENCE OF RIGHT ARTIFICIAL KNEE JOINT: Chronic | ICD-10-CM

## 2023-07-25 DIAGNOSIS — Z96.659 PRESENCE OF UNSPECIFIED ARTIFICIAL KNEE JOINT: Chronic | ICD-10-CM

## 2023-07-25 DIAGNOSIS — Z96.611 PRESENCE OF RIGHT ARTIFICIAL SHOULDER JOINT: Chronic | ICD-10-CM

## 2023-07-25 DIAGNOSIS — Z90.49 ACQUIRED ABSENCE OF OTHER SPECIFIED PARTS OF DIGESTIVE TRACT: Chronic | ICD-10-CM

## 2023-07-25 DIAGNOSIS — Z01.818 ENCOUNTER FOR OTHER PREPROCEDURAL EXAMINATION: ICD-10-CM

## 2023-07-25 LAB
A1C WITH ESTIMATED AVERAGE GLUCOSE RESULT: 6.1 % — HIGH (ref 4–5.6)
ALBUMIN SERPL ELPH-MCNC: 3.5 G/DL — SIGNIFICANT CHANGE UP (ref 3.3–5)
ALP SERPL-CCNC: 59 U/L — SIGNIFICANT CHANGE UP (ref 40–120)
ALT FLD-CCNC: 27 U/L — SIGNIFICANT CHANGE UP (ref 12–78)
ANION GAP SERPL CALC-SCNC: 7 MMOL/L — SIGNIFICANT CHANGE UP (ref 5–17)
APTT BLD: 31.6 SEC — SIGNIFICANT CHANGE UP (ref 27.5–35.5)
AST SERPL-CCNC: 23 U/L — SIGNIFICANT CHANGE UP (ref 15–37)
BASOPHILS # BLD AUTO: 0.11 K/UL — SIGNIFICANT CHANGE UP (ref 0–0.2)
BASOPHILS NFR BLD AUTO: 0.9 % — SIGNIFICANT CHANGE UP (ref 0–2)
BILIRUB SERPL-MCNC: 0.4 MG/DL — SIGNIFICANT CHANGE UP (ref 0.2–1.2)
BUN SERPL-MCNC: 22 MG/DL — SIGNIFICANT CHANGE UP (ref 7–23)
CALCIUM SERPL-MCNC: 9.2 MG/DL — SIGNIFICANT CHANGE UP (ref 8.5–10.1)
CHLORIDE SERPL-SCNC: 105 MMOL/L — SIGNIFICANT CHANGE UP (ref 96–108)
CO2 SERPL-SCNC: 30 MMOL/L — SIGNIFICANT CHANGE UP (ref 22–31)
CREAT SERPL-MCNC: 1.09 MG/DL — SIGNIFICANT CHANGE UP (ref 0.5–1.3)
EGFR: 51 ML/MIN/1.73M2 — LOW
EOSINOPHIL # BLD AUTO: 0.25 K/UL — SIGNIFICANT CHANGE UP (ref 0–0.5)
EOSINOPHIL NFR BLD AUTO: 2.1 % — SIGNIFICANT CHANGE UP (ref 0–6)
ESTIMATED AVERAGE GLUCOSE: 128 MG/DL — HIGH (ref 68–114)
GLUCOSE SERPL-MCNC: 126 MG/DL — HIGH (ref 70–99)
HCT VFR BLD CALC: 43.4 % — SIGNIFICANT CHANGE UP (ref 34.5–45)
HGB BLD-MCNC: 13.7 G/DL — SIGNIFICANT CHANGE UP (ref 11.5–15.5)
IMM GRANULOCYTES NFR BLD AUTO: 0.8 % — SIGNIFICANT CHANGE UP (ref 0–0.9)
INR BLD: 0.98 RATIO — SIGNIFICANT CHANGE UP (ref 0.88–1.16)
LYMPHOCYTES # BLD AUTO: 17 % — SIGNIFICANT CHANGE UP (ref 13–44)
LYMPHOCYTES # BLD AUTO: 2.05 K/UL — SIGNIFICANT CHANGE UP (ref 1–3.3)
MCHC RBC-ENTMCNC: 29.6 PG — SIGNIFICANT CHANGE UP (ref 27–34)
MCHC RBC-ENTMCNC: 31.6 G/DL — LOW (ref 32–36)
MCV RBC AUTO: 93.7 FL — SIGNIFICANT CHANGE UP (ref 80–100)
MONOCYTES # BLD AUTO: 0.73 K/UL — SIGNIFICANT CHANGE UP (ref 0–0.9)
MONOCYTES NFR BLD AUTO: 6 % — SIGNIFICANT CHANGE UP (ref 2–14)
MRSA PCR RESULT.: SIGNIFICANT CHANGE UP
NEUTROPHILS # BLD AUTO: 8.83 K/UL — HIGH (ref 1.8–7.4)
NEUTROPHILS NFR BLD AUTO: 73.2 % — SIGNIFICANT CHANGE UP (ref 43–77)
NRBC # BLD: 0 /100 WBCS — SIGNIFICANT CHANGE UP (ref 0–0)
PLATELET # BLD AUTO: 502 K/UL — HIGH (ref 150–400)
POTASSIUM SERPL-MCNC: 4.6 MMOL/L — SIGNIFICANT CHANGE UP (ref 3.5–5.3)
POTASSIUM SERPL-SCNC: 4.6 MMOL/L — SIGNIFICANT CHANGE UP (ref 3.5–5.3)
PROT SERPL-MCNC: 7.1 GM/DL — SIGNIFICANT CHANGE UP (ref 6–8.3)
PROTHROM AB SERPL-ACNC: 11.7 SEC — SIGNIFICANT CHANGE UP (ref 10.5–13.4)
RBC # BLD: 4.63 M/UL — SIGNIFICANT CHANGE UP (ref 3.8–5.2)
RBC # FLD: 15.7 % — HIGH (ref 10.3–14.5)
S AUREUS DNA NOSE QL NAA+PROBE: SIGNIFICANT CHANGE UP
SODIUM SERPL-SCNC: 142 MMOL/L — SIGNIFICANT CHANGE UP (ref 135–145)
VIT D25+D1,25 OH+D1,25 PNL SERPL-MCNC: 54.6 PG/ML — SIGNIFICANT CHANGE UP (ref 19.9–79.3)
WBC # BLD: 12.07 K/UL — HIGH (ref 3.8–10.5)
WBC # FLD AUTO: 12.07 K/UL — HIGH (ref 3.8–10.5)

## 2023-07-25 NOTE — H&P PST ADULT - MUSCULOSKELETAL COMMENTS
lower back pain with radiation to left lower extremity, see HPI walks with cane, has difficulty walking walks with cane, has difficulty walking, Had 2 surgeries in the lumbar area in the past

## 2023-07-25 NOTE — H&P PST ADULT - ASSESSMENT
79 y/o female with lumbar radiculopathy  CAPRINI SCORE [CLOT]    AGE RELATED RISK FACTORS                                                       MOBILITY RELATED FACTORS  [ ] Age 41-60 years                                            (1 Point)                  [ ] Bed rest                                                        (1 Point)  [ ] Age: 61-74 years                                           (2 Points)                 [ ] Plaster cast                                                   (2 Points)  [x ] Age= 75 years                                              (3 Points)                 [ ] Bed bound for more than 72 hours                 (2 Points)    DISEASE RELATED RISK FACTORS                                               GENDER SPECIFIC FACTORS  [ ] Edema in the lower extremities                       (1 Point)                  [ ] Pregnancy                                                     (1 Point)  [ ] Varicose veins                                               (1 Point)                  [ ] Post-partum < 6 weeks                                   (1 Point)             [x ] BMI > 25 Kg/m2                                            (1 Point)                  [ ] Hormonal therapy  or oral contraception          (1 Point)                 [ ] Sepsis (in the previous month)                        (1 Point)                  [ ] History of pregnancy complications                 (1 point)  [ ] Pneumonia or serious lung disease                                               [ ] Unexplained or recurrent                     (1 Point)           (in the previous month)                               (1 Point)  [ ] Abnormal pulmonary function test                     (1 Point)                 SURGERY RELATED RISK FACTORS  [ ] Acute myocardial infarction                              (1 Point)                 [ ]  Section                                             (1 Point)  [ ] Congestive heart failure (in the previous month)  (1 Point)               [ ] Minor surgery                                                  (1 Point)   [ ] Inflammatory bowel disease                             (1 Point)                 [ ] Arthroscopic surgery                                        (2 Points)  [ ] Central venous access                                      (2 Points)                [ ] General surgery lasting more than 45 minutes   (2 Points)       [ ] Stroke (in the previous month)                          (5 Points)               [x ] Elective arthroplasty                                         (5 Points)                                                                                                                                               HEMATOLOGY RELATED FACTORS                                                 TRAUMA RELATED RISK FACTORS  [ ] Prior episodes of VTE                                     (3 Points)                [ ] Fracture of the hip, pelvis, or leg                       (5 Points)  [ ] Positive family history for VTE                         (3 Points)                 [ ] Acute spinal cord injury (in the previous month)  (5 Points)  [ ] Prothrombin 44733 A                                     (3 Points)                 [ ] Paralysis  (less than 1 month)                             (5 Points)  [ ] Factor V Leiden                                             (3 Points)                  [ ] Multiple Trauma within 1 month                        (5 Points)  [ ] Lupus anticoagulants                                     (3 Points)                                                           [ ] Anticardiolipin antibodies                               (3 Points)                                                       [ ] High homocysteine in the blood                      (3 Points)                                             [ ] Other congenital or acquired thrombophilia      (3 Points)                                                [ ] Heparin induced thrombocytopenia                  (3 Points)                                          Total Score [     9     ]    Caprini Score 0 - 2:  Low Risk, No VTE Prophylaxis required for most patients, encourage ambulation  Caprini Score 3 - 6:  At Risk, pharmacologic VTE prophylaxis is indicated for most patients (in the absence of a contraindication)  Caprini Score Greater than or = 7:  High Risk, pharmacologic VTE prophylaxis is indicated for most patients (in the absence of a contraindication) 79 y/o female with lumbar radiculopathy  CAPRINI SCORE [CLOT]    AGE RELATED RISK FACTORS                                                       MOBILITY RELATED FACTORS  [ ] Age 41-60 years                                            (1 Point)                  [ ] Bed rest                                                        (1 Point)  [ ] Age: 61-74 years                                           (2 Points)                 [ ] Plaster cast                                                   (2 Points)  [x ] Age= 75 years                                              (3 Points)                 [ ] Bed bound for more than 72 hours                 (2 Points)    DISEASE RELATED RISK FACTORS                                               GENDER SPECIFIC FACTORS  [x ] Edema in the lower extremities                       (1 Point)                  [ ] Pregnancy                                                     (1 Point)  [ ] Varicose veins                                               (1 Point)                  [ ] Post-partum < 6 weeks                                   (1 Point)             [x ] BMI > 25 Kg/m2                                            (1 Point)                  [ ] Hormonal therapy  or oral contraception          (1 Point)                 [ ] Sepsis (in the previous month)                        (1 Point)                  [ ] History of pregnancy complications                 (1 point)  [ ] Pneumonia or serious lung disease                                               [ ] Unexplained or recurrent                     (1 Point)           (in the previous month)                               (1 Point)  [ ] Abnormal pulmonary function test                     (1 Point)                 SURGERY RELATED RISK FACTORS  [ ] Acute myocardial infarction                              (1 Point)                 [ ]  Section                                             (1 Point)  [ ] Congestive heart failure (in the previous month)  (1 Point)               [ ] Minor surgery                                                  (1 Point)   [ ] Inflammatory bowel disease                             (1 Point)                 [ ] Arthroscopic surgery                                        (2 Points)  [ ] Central venous access                                      (2 Points)                [ ] General surgery lasting more than 45 minutes   (2 Points)       [ ] Stroke (in the previous month)                          (5 Points)               [x ] Elective arthroplasty                                         (5 Points)                                                                                                                                               HEMATOLOGY RELATED FACTORS                                                 TRAUMA RELATED RISK FACTORS  [ ] Prior episodes of VTE                                     (3 Points)                [ ] Fracture of the hip, pelvis, or leg                       (5 Points)  [ ] Positive family history for VTE                         (3 Points)                 [ ] Acute spinal cord injury (in the previous month)  (5 Points)  [ ] Prothrombin 63664 A                                     (3 Points)                 [ ] Paralysis  (less than 1 month)                             (5 Points)  [ ] Factor V Leiden                                             (3 Points)                  [ ] Multiple Trauma within 1 month                        (5 Points)  [ ] Lupus anticoagulants                                     (3 Points)                                                           [ ] Anticardiolipin antibodies                               (3 Points)                                                       [ ] High homocysteine in the blood                      (3 Points)                                             [ ] Other congenital or acquired thrombophilia      (3 Points)                                                [ ] Heparin induced thrombocytopenia                  (3 Points)                                          Total Score [   10   ]    Caprini Score 0 - 2:  Low Risk, No VTE Prophylaxis required for most patients, encourage ambulation  Caprini Score 3 - 6:  At Risk, pharmacologic VTE prophylaxis is indicated for most patients (in the absence of a contraindication)  Caprini Score Greater than or = 7:  High Risk, pharmacologic VTE prophylaxis is indicated for most patients (in the absence of a contraindication)

## 2023-07-25 NOTE — H&P PST ADULT - HISTORY OF PRESENT ILLNESS
77 y/o female  PMH of HTN, hld, gerd and obesity c/o lower back pain with radiation to left leg s/p extreme lateral interbody fusion L3-4-5  and a 2nd surgery last year (8/2022) still with back pain 6/10 with diagnosis of lumbar radiculopathy here for PST for scheduled for decompression laminectomy L3,4 and 5 on 7/7/23.   Had multiple joint surgeries Bilateral shoulders and bilateral knees replaced in the past.  Pt denies fever, cough, SOB, recent travel, or sick contacts.   Denies recent travel outside of the country, no covid exposure  Covid vaccinated, Pfizer  Goal: to be able to walk pain free     Copy Prior H/O: 79 y/o female  PMH of HTN, hld, gerd and obesity c/o lower back pain with radiation to left leg s/p extreme lateral interbody fusion L3-4-5  and a 2nd surgery last year (8/2022) still with back pain 6/10 with diagnosis of lumbar radiculopathy here for PST for scheduled for decompression laminectomy L3,4 and 5  Had multiple joint surgeries Bilateral shoulders and bilateral knees replaced in the past.  Pt denies fever, cough, SOB, recent travel, or sick contacts.   Denies recent travel outside of the country, no covid exposure  Covid vaccinated, Pfizer  Goal: to be able to walk pain free.  Addendum:  Was in [facility] for the same procedure on 7/7/23, however had skin irritation and redness in the vaginal area , notice while attempting to do nguyễn cath, pt was cancelled and rescheduled  pending GYN consult. Pt states she was seen , and treated, no c/o now. Rescheduled for the same procedure on 8/11/23     Copy Prior H/O: 79 y/o female  PMH of HTN, hld, gerd and obesity c/o lower back pain with radiation to left leg s/p extreme lateral interbody fusion L3-4-5  and a 2nd surgery last year (8/2022) still with back pain 6/10 with diagnosis of lumbar radiculopathy,  here for PST for scheduled for decompression laminectomy L3,4 and 5  Had multiple joint surgeries Bilateral shoulders and bilateral knees replaced  and 2 surgeries to the lumbar area  in the past.  Pt denies fever, cough, SOB, recent travel, or sick contacts.   Denies recent travel outside of the country, no covid exposure  Covid vaccinated, Pfizer  Goal: to be able to walk pain free.  Addendum:  Was in [facility] for the same procedure on 7/7/23, however had skin irritation and redness in the vaginal area , notice while attempting to do nguyễn cath, pt was cancelled and rescheduled  pending GYN consult. Pt states she was seen , and treated, no c/o now. Rescheduled for left laminoforaminotomy L3-4-L5 on 8/11/23

## 2023-07-25 NOTE — H&P PST ADULT - PROBLEM SELECTOR PLAN 1
decompression laminectomy L3-4 and 5 on 7/7/23.  Labs - CBC, BMP, PT/PTT, vitamin d, T&S, nose culture and EKG done   Medical eval advised  Preop 3 day antibacterial wash  instruction reviewed. Allergic to CHG, and soaps, advised to use dial soap. , MRSA and MSSA screening done today.  Avoid NSAID and OTC supplements for 7 days  Continue all prescribed meds as instructed  NPO after 11pm the day before surgery. Take medicines up until the day before surgery  Vaccinated for covid.   verbalized understanding of all instructions. decompression laminectomy L3-4 and 5 on 7/7/23.  Labs - CBC, BMP, PT/PTT, vitamin d, T&S, nose culture and EKG done   Medical and GYN eval advised  Preop 3 day antibacterial wash  instruction reviewed. Allergic to CHG, and soaps, advised to use dial soap. , MRSA and MSSA screening done today.  Avoid NSAID and OTC supplements for 7 days  Continue all prescribed meds as instructed  NPO after 11pm the day before surgery. Take medicines up until the day before surgery  Vaccinated for covid.   verbalized understanding of all instructions. left laminoforaminotomy L3-4-L5 on 8/11/23  Labs - CBC, BMP, PT/PTT, vitamin d, T&S, nose culture   Medical and GYN eval advised  Preop 3 day antibacterial wash  instruction reviewed. Allergic to CHG, and soaps, advised to use dial soap. , MRSA and MSSA screening done today.  Avoid NSAID and OTC supplements for 7 days  Continue all prescribed meds as instructed  NPO after 11pm the day before surgery. Take medicines up until the day before surgery  Vaccinated for covid.   verbalized understanding of all instructions.

## 2023-07-25 NOTE — H&P PST ADULT - CARDIOVASCULAR
PAST SURGICAL HISTORY:  Basal cell carcinoma (BCC) of lower leg s/p resection    H/O bilateral cataract extraction 3-4 years ago    History of tonsillectomy at age 26    Lipoma of neck s/p resection at age 26    S/P brain surgery 8/2018    S/P colonoscopic polypectomy 3 years benign polyps    S/P prostatectomy 1995    S/P tonsillectomy age 26 details… normal/regular rate and rhythm/S1 S2 present/no gallops/no rub/no murmur

## 2023-07-26 RX ORDER — SODIUM CHLORIDE 9 MG/ML
3 INJECTION INTRAMUSCULAR; INTRAVENOUS; SUBCUTANEOUS EVERY 8 HOURS
Refills: 0 | Status: DISCONTINUED | OUTPATIENT
Start: 2023-08-25 | End: 2023-08-31

## 2023-08-02 DIAGNOSIS — E78.00 PURE HYPERCHOLESTEROLEMIA, UNSPECIFIED: ICD-10-CM

## 2023-08-02 DIAGNOSIS — M54.16 RADICULOPATHY, LUMBAR REGION: ICD-10-CM

## 2023-08-02 DIAGNOSIS — E66.9 OBESITY, UNSPECIFIED: ICD-10-CM

## 2023-08-02 DIAGNOSIS — K21.9 GASTRO-ESOPHAGEAL REFLUX DISEASE WITHOUT ESOPHAGITIS: ICD-10-CM

## 2023-08-02 DIAGNOSIS — Z88.0 ALLERGY STATUS TO PENICILLIN: ICD-10-CM

## 2023-08-02 DIAGNOSIS — Z53.8 PROCEDURE AND TREATMENT NOT CARRIED OUT FOR OTHER REASONS: ICD-10-CM

## 2023-08-02 DIAGNOSIS — I10 ESSENTIAL (PRIMARY) HYPERTENSION: ICD-10-CM

## 2023-08-02 DIAGNOSIS — Z88.1 ALLERGY STATUS TO OTHER ANTIBIOTIC AGENTS STATUS: ICD-10-CM

## 2023-08-24 ENCOUNTER — TRANSCRIPTION ENCOUNTER (OUTPATIENT)
Age: 78
End: 2023-08-24

## 2023-08-25 ENCOUNTER — APPOINTMENT (OUTPATIENT)
Dept: ORTHOPEDIC SURGERY | Facility: HOSPITAL | Age: 78
End: 2023-08-25
Payer: OTHER MISCELLANEOUS

## 2023-08-25 ENCOUNTER — INPATIENT (INPATIENT)
Facility: HOSPITAL | Age: 78
LOS: 5 days | Discharge: HOME | End: 2023-08-31
Attending: ORTHOPAEDIC SURGERY | Admitting: ORTHOPAEDIC SURGERY
Payer: OTHER MISCELLANEOUS

## 2023-08-25 VITALS
DIASTOLIC BLOOD PRESSURE: 77 MMHG | HEART RATE: 67 BPM | RESPIRATION RATE: 16 BRPM | SYSTOLIC BLOOD PRESSURE: 169 MMHG | HEIGHT: 64 IN | OXYGEN SATURATION: 97 % | WEIGHT: 274.92 LBS | TEMPERATURE: 98 F

## 2023-08-25 DIAGNOSIS — Z96.659 PRESENCE OF UNSPECIFIED ARTIFICIAL KNEE JOINT: Chronic | ICD-10-CM

## 2023-08-25 DIAGNOSIS — Z96.611 PRESENCE OF RIGHT ARTIFICIAL SHOULDER JOINT: Chronic | ICD-10-CM

## 2023-08-25 DIAGNOSIS — Z96.651 PRESENCE OF RIGHT ARTIFICIAL KNEE JOINT: Chronic | ICD-10-CM

## 2023-08-25 DIAGNOSIS — Z90.49 ACQUIRED ABSENCE OF OTHER SPECIFIED PARTS OF DIGESTIVE TRACT: Chronic | ICD-10-CM

## 2023-08-25 DIAGNOSIS — Z98.890 OTHER SPECIFIED POSTPROCEDURAL STATES: Chronic | ICD-10-CM

## 2023-08-25 LAB
ANION GAP SERPL CALC-SCNC: 5 MMOL/L — SIGNIFICANT CHANGE UP (ref 5–17)
BASOPHILS # BLD AUTO: 0.14 K/UL — SIGNIFICANT CHANGE UP (ref 0–0.2)
BASOPHILS NFR BLD AUTO: 1.2 % — SIGNIFICANT CHANGE UP (ref 0–2)
BUN SERPL-MCNC: 23 MG/DL — SIGNIFICANT CHANGE UP (ref 7–23)
CALCIUM SERPL-MCNC: 9.1 MG/DL — SIGNIFICANT CHANGE UP (ref 8.5–10.1)
CHLORIDE SERPL-SCNC: 108 MMOL/L — SIGNIFICANT CHANGE UP (ref 96–108)
CO2 SERPL-SCNC: 24 MMOL/L — SIGNIFICANT CHANGE UP (ref 22–31)
CREAT SERPL-MCNC: 1.34 MG/DL — HIGH (ref 0.5–1.3)
EGFR: 40 ML/MIN/1.73M2 — LOW
EOSINOPHIL # BLD AUTO: 0.2 K/UL — SIGNIFICANT CHANGE UP (ref 0–0.5)
EOSINOPHIL NFR BLD AUTO: 1.7 % — SIGNIFICANT CHANGE UP (ref 0–6)
GLUCOSE SERPL-MCNC: 127 MG/DL — HIGH (ref 70–99)
HCT VFR BLD CALC: 43.3 % — SIGNIFICANT CHANGE UP (ref 34.5–45)
HGB BLD-MCNC: 14.3 G/DL — SIGNIFICANT CHANGE UP (ref 11.5–15.5)
IMM GRANULOCYTES NFR BLD AUTO: 1 % — HIGH (ref 0–0.9)
LYMPHOCYTES # BLD AUTO: 2.37 K/UL — SIGNIFICANT CHANGE UP (ref 1–3.3)
LYMPHOCYTES # BLD AUTO: 20.7 % — SIGNIFICANT CHANGE UP (ref 13–44)
MCHC RBC-ENTMCNC: 30.6 PG — SIGNIFICANT CHANGE UP (ref 27–34)
MCHC RBC-ENTMCNC: 33 G/DL — SIGNIFICANT CHANGE UP (ref 32–36)
MCV RBC AUTO: 92.7 FL — SIGNIFICANT CHANGE UP (ref 80–100)
MONOCYTES # BLD AUTO: 0.63 K/UL — SIGNIFICANT CHANGE UP (ref 0–0.9)
MONOCYTES NFR BLD AUTO: 5.5 % — SIGNIFICANT CHANGE UP (ref 2–14)
NEUTROPHILS # BLD AUTO: 8 K/UL — HIGH (ref 1.8–7.4)
NEUTROPHILS NFR BLD AUTO: 69.9 % — SIGNIFICANT CHANGE UP (ref 43–77)
NRBC # BLD: 0 /100 WBCS — SIGNIFICANT CHANGE UP (ref 0–0)
PLATELET # BLD AUTO: 430 K/UL — HIGH (ref 150–400)
POTASSIUM SERPL-MCNC: 4.4 MMOL/L — SIGNIFICANT CHANGE UP (ref 3.5–5.3)
POTASSIUM SERPL-SCNC: 4.4 MMOL/L — SIGNIFICANT CHANGE UP (ref 3.5–5.3)
RBC # BLD: 4.67 M/UL — SIGNIFICANT CHANGE UP (ref 3.8–5.2)
RBC # FLD: 15.5 % — HIGH (ref 10.3–14.5)
SODIUM SERPL-SCNC: 137 MMOL/L — SIGNIFICANT CHANGE UP (ref 135–145)
WBC # BLD: 11.45 K/UL — HIGH (ref 3.8–10.5)
WBC # FLD AUTO: 11.45 K/UL — HIGH (ref 3.8–10.5)

## 2023-08-25 PROCEDURE — 63047 LAM FACETEC & FORAMOT LUMBAR: CPT

## 2023-08-25 PROCEDURE — 63048 LAM FACETEC &FORAMOT EA ADDL: CPT | Mod: AS

## 2023-08-25 PROCEDURE — 63047 LAM FACETEC & FORAMOT LUMBAR: CPT | Mod: AS

## 2023-08-25 PROCEDURE — 63048 LAM FACETEC &FORAMOT EA ADDL: CPT

## 2023-08-25 DEVICE — SURGIFLO MATRIX WITH THROMBIN KIT: Type: IMPLANTABLE DEVICE | Site: LEFT | Status: FUNCTIONAL

## 2023-08-25 DEVICE — BONE WAX 2.5G NON ABSORBABLE: Type: IMPLANTABLE DEVICE | Site: LEFT | Status: FUNCTIONAL

## 2023-08-25 RX ORDER — OXYCODONE HYDROCHLORIDE 5 MG/1
10 TABLET ORAL EVERY 4 HOURS
Refills: 0 | Status: DISCONTINUED | OUTPATIENT
Start: 2023-08-25 | End: 2023-08-29

## 2023-08-25 RX ORDER — POLYETHYLENE GLYCOL 3350 17 G/17G
17 POWDER, FOR SOLUTION ORAL
Refills: 0 | Status: DISCONTINUED | OUTPATIENT
Start: 2023-08-25 | End: 2023-08-31

## 2023-08-25 RX ORDER — HYDROMORPHONE HYDROCHLORIDE 2 MG/ML
0.5 INJECTION INTRAMUSCULAR; INTRAVENOUS; SUBCUTANEOUS EVERY 4 HOURS
Refills: 0 | Status: DISCONTINUED | OUTPATIENT
Start: 2023-08-25 | End: 2023-08-26

## 2023-08-25 RX ORDER — PANTOPRAZOLE SODIUM 20 MG/1
40 TABLET, DELAYED RELEASE ORAL
Refills: 0 | Status: DISCONTINUED | OUTPATIENT
Start: 2023-08-25 | End: 2023-08-31

## 2023-08-25 RX ORDER — ATORVASTATIN CALCIUM 80 MG/1
40 TABLET, FILM COATED ORAL AT BEDTIME
Refills: 0 | Status: DISCONTINUED | OUTPATIENT
Start: 2023-08-25 | End: 2023-08-31

## 2023-08-25 RX ORDER — ACETAMINOPHEN 500 MG
975 TABLET ORAL EVERY 8 HOURS
Refills: 0 | Status: DISCONTINUED | OUTPATIENT
Start: 2023-08-25 | End: 2023-08-31

## 2023-08-25 RX ORDER — ONDANSETRON 8 MG/1
4 TABLET, FILM COATED ORAL ONCE
Refills: 0 | Status: DISCONTINUED | OUTPATIENT
Start: 2023-08-25 | End: 2023-08-25

## 2023-08-25 RX ORDER — SUCRALFATE 1 G
1 TABLET ORAL ONCE
Refills: 0 | Status: DISCONTINUED | OUTPATIENT
Start: 2023-08-26 | End: 2023-08-31

## 2023-08-25 RX ORDER — OXYCODONE HYDROCHLORIDE 5 MG/1
15 TABLET ORAL EVERY 4 HOURS
Refills: 0 | Status: DISCONTINUED | OUTPATIENT
Start: 2023-08-25 | End: 2023-08-29

## 2023-08-25 RX ORDER — ASPIRIN/CALCIUM CARB/MAGNESIUM 324 MG
81 TABLET ORAL DAILY
Refills: 0 | Status: DISCONTINUED | OUTPATIENT
Start: 2023-08-27 | End: 2023-08-31

## 2023-08-25 RX ORDER — SODIUM CHLORIDE 9 MG/ML
1000 INJECTION, SOLUTION INTRAVENOUS
Refills: 0 | Status: DISCONTINUED | OUTPATIENT
Start: 2023-08-25 | End: 2023-08-31

## 2023-08-25 RX ORDER — LANOLIN ALCOHOL/MO/W.PET/CERES
3 CREAM (GRAM) TOPICAL AT BEDTIME
Refills: 0 | Status: DISCONTINUED | OUTPATIENT
Start: 2023-08-25 | End: 2023-08-31

## 2023-08-25 RX ORDER — LACTOBACILLUS ACIDOPHILUS 100MM CELL
1 CAPSULE ORAL
Refills: 0 | Status: DISCONTINUED | OUTPATIENT
Start: 2023-08-25 | End: 2023-08-31

## 2023-08-25 RX ORDER — ONDANSETRON 8 MG/1
4 TABLET, FILM COATED ORAL EVERY 6 HOURS
Refills: 0 | Status: DISCONTINUED | OUTPATIENT
Start: 2023-08-25 | End: 2023-08-31

## 2023-08-25 RX ORDER — SENNA PLUS 8.6 MG/1
2 TABLET ORAL AT BEDTIME
Refills: 0 | Status: DISCONTINUED | OUTPATIENT
Start: 2023-08-25 | End: 2023-08-31

## 2023-08-25 RX ORDER — CYCLOBENZAPRINE HYDROCHLORIDE 10 MG/1
10 TABLET, FILM COATED ORAL EVERY 8 HOURS
Refills: 0 | Status: DISCONTINUED | OUTPATIENT
Start: 2023-08-25 | End: 2023-08-31

## 2023-08-25 RX ORDER — SODIUM CHLORIDE 9 MG/ML
1000 INJECTION, SOLUTION INTRAVENOUS
Refills: 0 | Status: DISCONTINUED | OUTPATIENT
Start: 2023-08-25 | End: 2023-08-25

## 2023-08-25 RX ORDER — FLUCONAZOLE 150 MG/1
100 TABLET ORAL ONCE
Refills: 0 | Status: COMPLETED | OUTPATIENT
Start: 2023-08-25 | End: 2023-08-25

## 2023-08-25 RX ORDER — LOSARTAN POTASSIUM 100 MG/1
100 TABLET, FILM COATED ORAL DAILY
Refills: 0 | Status: DISCONTINUED | OUTPATIENT
Start: 2023-08-25 | End: 2023-08-31

## 2023-08-25 RX ORDER — FENTANYL CITRATE 50 UG/ML
50 INJECTION INTRAVENOUS
Refills: 0 | Status: DISCONTINUED | OUTPATIENT
Start: 2023-08-25 | End: 2023-08-25

## 2023-08-25 RX ORDER — CHOLESTYRAMINE 4 G/9G
4 POWDER, FOR SUSPENSION ORAL DAILY
Refills: 0 | Status: DISCONTINUED | OUTPATIENT
Start: 2023-08-25 | End: 2023-08-31

## 2023-08-25 RX ADMIN — SODIUM CHLORIDE 3 MILLILITER(S): 9 INJECTION INTRAMUSCULAR; INTRAVENOUS; SUBCUTANEOUS at 23:43

## 2023-08-25 RX ADMIN — Medication 975 MILLIGRAM(S): at 22:41

## 2023-08-25 RX ADMIN — HYDROMORPHONE HYDROCHLORIDE 0.5 MILLIGRAM(S): 2 INJECTION INTRAMUSCULAR; INTRAVENOUS; SUBCUTANEOUS at 21:55

## 2023-08-25 RX ADMIN — SENNA PLUS 2 TABLET(S): 8.6 TABLET ORAL at 22:41

## 2023-08-25 RX ADMIN — SODIUM CHLORIDE 3 MILLILITER(S): 9 INJECTION INTRAMUSCULAR; INTRAVENOUS; SUBCUTANEOUS at 18:53

## 2023-08-25 RX ADMIN — FENTANYL CITRATE 50 MICROGRAM(S): 50 INJECTION INTRAVENOUS at 16:38

## 2023-08-25 RX ADMIN — FENTANYL CITRATE 50 MICROGRAM(S): 50 INJECTION INTRAVENOUS at 17:53

## 2023-08-25 RX ADMIN — FLUCONAZOLE 50 MILLIGRAM(S): 150 TABLET ORAL at 15:00

## 2023-08-25 RX ADMIN — Medication 975 MILLIGRAM(S): at 23:41

## 2023-08-25 RX ADMIN — FENTANYL CITRATE 50 MICROGRAM(S): 50 INJECTION INTRAVENOUS at 19:05

## 2023-08-25 RX ADMIN — FENTANYL CITRATE 50 MICROGRAM(S): 50 INJECTION INTRAVENOUS at 19:19

## 2023-08-25 RX ADMIN — SODIUM CHLORIDE 75 MILLILITER(S): 9 INJECTION, SOLUTION INTRAVENOUS at 21:23

## 2023-08-25 RX ADMIN — SODIUM CHLORIDE 150 MILLILITER(S): 9 INJECTION, SOLUTION INTRAVENOUS at 16:40

## 2023-08-25 RX ADMIN — FENTANYL CITRATE 50 MICROGRAM(S): 50 INJECTION INTRAVENOUS at 18:12

## 2023-08-25 RX ADMIN — SODIUM CHLORIDE 150 MILLILITER(S): 9 INJECTION, SOLUTION INTRAVENOUS at 16:32

## 2023-08-25 RX ADMIN — HYDROMORPHONE HYDROCHLORIDE 0.5 MILLIGRAM(S): 2 INJECTION INTRAMUSCULAR; INTRAVENOUS; SUBCUTANEOUS at 21:25

## 2023-08-25 RX ADMIN — ATORVASTATIN CALCIUM 40 MILLIGRAM(S): 80 TABLET, FILM COATED ORAL at 22:41

## 2023-08-25 NOTE — PROGRESS NOTE ADULT - SUBJECTIVE AND OBJECTIVE BOX
Postop Check    Patient tolerated the procedure well. Patient seen and examined at bedside. Endorses katie-incisional pain with movement. Pain well controlled while at rest. Denies new weakness, numbness or tingling. Denies chest pain, shortness of breath, nausea or vomiting.       LABS:                        14.3   11.45 )-----------( 430      ( 25 Aug 2023 16:52 )             43.3     08-25    137  |  108  |  23  ----------------------------<  127<H>  4.4   |  24  |  1.34<H>    Ca    9.1      25 Aug 2023 16:52        Urinalysis Basic - ( 25 Aug 2023 16:52 )    Color: x / Appearance: x / SG: x / pH: x  Gluc: 127 mg/dL / Ketone: x  / Bili: x / Urobili: x   Blood: x / Protein: x / Nitrite: x   Leuk Esterase: x / RBC: x / WBC x   Sq Epi: x / Non Sq Epi: x / Bacteria: x        VITAL SIGNS:  T(C): 36.7 (08-25-23 @ 22:15), Max: 36.7 (08-25-23 @ 20:00)  HR: 71 (08-25-23 @ 22:15) (60 - 72)  BP: 137/76 (08-25-23 @ 22:15) (92/73 - 169/77)  RR: 18 (08-25-23 @ 22:15) (12 - 18)  SpO2: 98% (08-25-23 @ 22:15) (96% - 100%)      PE:    General: NAD, resting comforatbly in bed  Dressing C/D/I  2+ DP Pulses  +babinski    Motor:                     L2                  L3             L4              L5            S1  R            2/5*                5/5             5/5            5/5          5/5  L             2/5*                5/5            5/5            5/5          5/5    *due to acute surgical pain in low back    Sensory:                 L2          L3         L4      L5       S1         (0=absent, 1=impaired, 2=normal, NT=not testable)  R         2            2            2        2        2  L          2            2           2        2         2                A/P:  78y F s/p L3-5 lami POD 0  -PT/OT   -WBAT  -nguyễn out once ambulating on POD 1  -Pain Control  -DVT ppx SCDs, ASA to start 8/27  -Continue perioperative abx x 24 hours  -FU AM Labs  -Rest, ice, compress and elevate the extremity as needed  -Incentive Spirometry  -Medical comanagement appreciated  -dispo: pending PT corinaal

## 2023-08-25 NOTE — PATIENT PROFILE ADULT - FALL HARM RISK - HARM RISK INTERVENTIONS

## 2023-08-25 NOTE — ASU PATIENT PROFILE, ADULT - PAIN, FACTORS THAT RELIEVE, PROFILE
Chief complaint:   Chief Complaint   Patient presents with   • Throat Problem       Vitals:  Visit Vitals  BP (!) 169/94   Pulse 75   Temp 97.9 °F (36.6 °C) (Oral)   Resp 18   Ht 5' 9\" (1.753 m)   Wt (!) 162.4 kg   SpO2 99%   BMI 52.87 kg/m²       HISTORY OF PRESENT ILLNESS     42 yo male with sore throat and swollen tonsills for 1 day.       Other significant problems:  Patient Active Problem List    Diagnosis Date Noted   • ARF (acute renal failure) (CMS/HCC) 05/04/2019     Priority: High     Class: Acute   • S/P laparoscopic sleeve gastrectomy 05/01/2019     Priority: Low   • Chronic kidney disease, stage III (moderate) creat 2.35, 2018 (CMS/HCC) 06/11/2018     Priority: Low   • Secondary hyperparathyroidism, renal (CMS/HCC) 06/11/2018     Priority: Low   • Sleep apnea,  , min sat 75%, CPAP at 11 to 15, 2016 12/09/2016     Priority: Low   • Hyperlipidemia 03/21/2016     Priority: Low   • Benign essential hypertension 03/18/2016     Priority: Low   • Morbid obesity, BMI 60.6, 69\", 186 kg, 2018 (CMS/[facility]) 03/18/2016     Priority: Low   • Vitamin D deficiency 07/28/2015     Priority: Low   • Type 2 diabetes mellitus with diabetic nephropathy, with long-term current use of insulin (CMS/HCC) 06/09/2015     Priority: Low       PAST MEDICAL, FAMILY AND SOCIAL HISTORY     Medications:  Current Outpatient Medications   Medication   • carvedilol (COREG) 25 MG tablet   • amLODIPine (NORVASC) 10 MG tablet   • Vitamin D, Ergocalciferol, 63115 units capsule   • atorvastatin (LIPITOR) 40 MG tablet   • insulin regular, human, (HUMULIN R, NOVOLIN R) 100 UNIT/ML injectable solution   • insulin regular 70-30 (HUMULIN 70/30 KWIKPEN) (70-30) 100 UNIT/ML pen-injector   • famotidine (PEPCID) 20 MG tablet   • calcitRIOL (ROCALTROL) 0.25 MCG capsule   • sildenafil (REVATIO) 20 MG tablet   • cloNIDine (CATAPRES) 0.2 MG tablet   • Blood Pressure Monitoring Kit   • blood glucose (ONE TOUCH ULTRA TEST) test strip   • clotrimazole  (LOTRIMIN) 1 % cream     No current facility-administered medications for this visit.        Allergies:  ALLERGIES:   Allergen Reactions   • Hydralazine Other (See Comments)     Shortness of breath   • Latex RASH   • Penicillins GI UPSET and CONSTIPATION       Past Medical  History/Surgeries:  Past Medical History:   Diagnosis Date   • Chronic kidney disease, stage III (moderate) creat 2.35, 2018 (CMS/HCC) 2018   • Diabetes (CMS/HCC)    • Hypertension    • Morbid obesity, BMI 60.6, 69\", 186 kg, 2018 (CMS/HCC) 3/18/2016   • Obesity    • Secondary hyperparathyroidism, renal (CMS/HCC) 2018   • Sleep apnea,  , min sat 75%, CPAP at 11 to 152016   • Type 2 diabetes mellitus with diabetic nephropathy, with long-term current use of insulin (CMS/HCC) 2015       Past Surgical History:   Procedure Laterality Date   • Hip surgery Left ,     left hip pinning, removal of pin a year later       Family History:  Family History   Problem Relation Age of Onset   • Diabetes Mother         borderline   • Cancer Father         ? site, in remission   • Hypertension Sister    • Kidney disease Sister    • Diabetes Sister         underwent kidney transplant FH neg for YAZAN       Social History:  Social History     Tobacco Use   • Smoking status: Former Smoker     Packs/day: 0.25     Types: Cigarettes     Last attempt to quit: 2000     Years since quittin.5   • Smokeless tobacco: Never Used   Substance Use Topics   • Alcohol use: Yes     Alcohol/week: 0.0 oz     Comment: socially       REVIEW OF SYSTEMS     Review of Systems   Constitutional: Positive for diaphoresis and fever.   HENT: Positive for sore throat. Negative for congestion.    Respiratory: Negative for cough.    Neurological: Negative for headaches.   Hematological: Positive for adenopathy.       PHYSICAL EXAM     Physical Exam   Constitutional: He is oriented to person, place, and time. He appears well-developed and well-nourished.    HENT:   Head: Normocephalic and atraumatic.   Right Ear: Tympanic membrane, external ear and ear canal normal.   Left Ear: Tympanic membrane, external ear and ear canal normal.   Nose: Nose normal.   Mouth/Throat: Uvula is midline and mucous membranes are normal. Oropharyngeal exudate, posterior oropharyngeal edema and posterior oropharyngeal erythema present. No tonsillar abscesses.   Neck: Normal range of motion.   Cardiovascular: Normal rate, regular rhythm and normal heart sounds. Exam reveals no gallop.   No murmur heard.  Pulmonary/Chest: Effort normal and breath sounds normal. No respiratory distress.   Lymphadenopathy:     He has cervical adenopathy.   Neurological: He is alert and oriented to person, place, and time.   Skin: Skin is warm and dry.   Psychiatric: He has a normal mood and affect. His behavior is normal.   Nursing note and vitals reviewed.      ASSESSMENT/PLAN     ASSESSMENT: (J03.90) Acute tonsillitis, unspecified etiology  (primary encounter diagnosis)    Plan: cefUROXime (CEFTIN) 500 MG tablet, predniSONE         (DELTASONE) 20 MG tablet     Follow up with Primary Doctor as needed in 5 days.   The supervising physician for services performed today is Lino Ayers M.D.           medications/repositioning

## 2023-08-25 NOTE — ASU PATIENT PROFILE, ADULT - FALL HARM RISK - HARM RISK INTERVENTIONS

## 2023-08-25 NOTE — PRE-OP CHECKLIST - SPO2 (%)
Chief complaint:   Chief Complaint   Patient presents with   • Thumb Injury     Rm 5, doi 1/17 - right thumb injury while playing basketball, treated by applying ice       Vitals:  Visit Vitals  /80   Pulse 81   Temp 97.9 °F (36.6 °C) (Tympanic)   Resp 18   Ht 1.778 m (5' 10\")   Wt (!) 90 kg (198 lb 6.4 oz)   SpO2 97%   BMI 28.47 kg/m²       HISTORY OF PRESENT ILLNESS     This is a 12-year-old male accompanied by his mom with a chief complaint of right thumb and right wrist pain that occurred while he was playing basketball earlier today.  He was apparently shooting the ball and another person contested his shot and hit his thumb and wrist with another player's forearm.  He immediately had pain on the wrist and thumb area.  A few minutes after he started noticing swelling of the right thumb.  He had ice the area immediately it has not taken any medication for his symptoms.  He now complains of pain on palpation and movement of the right thumb and wrist area.  He denies any previous episodes of this.      Other significant problems:  There are no problems to display for this patient.      PAST MEDICAL, FAMILY AND SOCIAL HISTORY     Medications:  Current Outpatient Medications   Medication Sig Dispense Refill   • Ibuprofen-Acetaminophen 125-250 MG Tab Take 2 tablets by mouth every 8 hours as needed (as needed for pain).     • fluticasone (FLONASE) 50 MCG/ACT nasal spray Spray 2 sprays in each nostril daily. 16 g 11   • cetirizine (ZYRTEC) 10 MG tablet Take 10 mg by mouth daily.     • loratadine (CLARITIN) 10 MG tablet Take 10 mg by mouth daily.     • azelastine (OPTIVAR) 0.05 % ophthalmic solution Place 1 drop into both eyes 2 times daily as needed (allergy symptoms). 6 mL 0   • Melatonin 3 MG Cap      • olopatadine (PATADAY) 0.2 % ophthalmic solution Place 1 drop into both eyes daily. 2.5 mL 1     No current facility-administered medications for this visit.       Allergies:  ALLERGIES:   Allergen Reactions   •  Seasonal Other (See Comments)     Facial swelling       Past Medical  History/Surgeries:  Past Medical History:   Diagnosis Date   • Abscess     to buttocks.   • Reflux        Past Surgical History:   Procedure Laterality Date   • Abscess drainage      x 2   • Circumcision, primary         Family History:  Family History   Problem Relation Age of Onset   • Thyroid Mother    • * Paternal Grandfather         sleep apnea   • Diabetes Paternal Grandfather         type 2       Social History:  Social History     Tobacco Use   • Smoking status: Never   • Smokeless tobacco: Never   Substance Use Topics   • Alcohol use: Not on file       REVIEW OF SYSTEMS     Review of Systems   Constitutional: Negative for chills and fever.   Musculoskeletal: Positive for joint swelling.   Skin: Negative for color change and wound.       PHYSICAL EXAM     Physical Exam  Vitals and nursing note reviewed.   Constitutional:       General: He is awake.      Appearance: He is not ill-appearing, toxic-appearing or diaphoretic.   Musculoskeletal:      Right elbow: Normal.      Right forearm: Normal.      Right wrist: Tenderness present. No swelling, deformity, effusion or bony tenderness. Normal range of motion.      Right hand: Swelling, tenderness and bony tenderness present.        Arms:    Neurological:      Mental Status: He is alert.   Psychiatric:         Behavior: Behavior is cooperative.         ASSESSMENT/PLAN     Jack was seen today for thumb injury.    Diagnoses and all orders for this visit:    Pain of right thumb  -     XR FINGER MIN 2 VW RIGHT; Future  -     ibuprofen (MOTRIN) tablet 600 mg    Right wrist pain  -     XR WRIST 3+ VW RIGHT; Future  -     ibuprofen (MOTRIN) tablet 600 mg  -     FORM FIT THUMB SPICA      I discussed with the patient and the mother regarding the final reading of the radiologist that showed    FINDINGS:      There is no acute fracture, dislocation or other acute bony abnormality.  Joint spaces are  well-maintained.        IMPRESSION:     No acute bony abnormality.    FINDINGS:      There is no acute fracture, dislocation or other acute bony abnormality.  Joint spaces are well-maintained.        IMPRESSION:     No acute bony abnormality.    Thumb spica for support and comfort    NSAIDs     Symptomatic treatment    Close monitoring and follow-up    Final recheck on pt at discharge was reassuring and patient was appropriately stable at time of discharge from urgent care clinic. All questions answered and patient appeared to understand and agree with treatment and discharge plan, including return precautions and follow up plan.     The provisional diagnosis that the patient is discharged with today was based on the history taken, presenting symptoms, physical exam, and/or any ancillary testing. Patient states understanding that often times the diagnosis can change. If new or worsening symptoms occur, patient was instructed to seek immediate medical attention for re-evaluation. See the After Visit Summary for additional instructions, follow-up plans and/or emergency room precautions discussed with the patient.    The following PPE was worn by provider during all interactions and exam with this patient:  [x] gloves, goggles, level 1 procedural mask  [] Level 2 procedural mask  [] gloves, faceshield, N95 mask, gown        97

## 2023-08-26 LAB
ANION GAP SERPL CALC-SCNC: 5 MMOL/L — SIGNIFICANT CHANGE UP (ref 5–17)
BASOPHILS # BLD AUTO: 0.11 K/UL — SIGNIFICANT CHANGE UP (ref 0–0.2)
BASOPHILS NFR BLD AUTO: 0.5 % — SIGNIFICANT CHANGE UP (ref 0–2)
BUN SERPL-MCNC: 23 MG/DL — SIGNIFICANT CHANGE UP (ref 7–23)
CALCIUM SERPL-MCNC: 8.4 MG/DL — LOW (ref 8.5–10.1)
CHLORIDE SERPL-SCNC: 107 MMOL/L — SIGNIFICANT CHANGE UP (ref 96–108)
CO2 SERPL-SCNC: 25 MMOL/L — SIGNIFICANT CHANGE UP (ref 22–31)
CREAT SERPL-MCNC: 1.21 MG/DL — SIGNIFICANT CHANGE UP (ref 0.5–1.3)
EGFR: 45 ML/MIN/1.73M2 — LOW
EOSINOPHIL # BLD AUTO: 0.1 K/UL — SIGNIFICANT CHANGE UP (ref 0–0.5)
EOSINOPHIL NFR BLD AUTO: 0.5 % — SIGNIFICANT CHANGE UP (ref 0–6)
GLUCOSE SERPL-MCNC: 143 MG/DL — HIGH (ref 70–99)
HCT VFR BLD CALC: 41.3 % — SIGNIFICANT CHANGE UP (ref 34.5–45)
HGB BLD-MCNC: 12.9 G/DL — SIGNIFICANT CHANGE UP (ref 11.5–15.5)
IMM GRANULOCYTES NFR BLD AUTO: 0.7 % — SIGNIFICANT CHANGE UP (ref 0–0.9)
LYMPHOCYTES # BLD AUTO: 0.89 K/UL — LOW (ref 1–3.3)
LYMPHOCYTES # BLD AUTO: 4.4 % — LOW (ref 13–44)
MCHC RBC-ENTMCNC: 29.7 PG — SIGNIFICANT CHANGE UP (ref 27–34)
MCHC RBC-ENTMCNC: 31.2 G/DL — LOW (ref 32–36)
MCV RBC AUTO: 94.9 FL — SIGNIFICANT CHANGE UP (ref 80–100)
MONOCYTES # BLD AUTO: 0.97 K/UL — HIGH (ref 0–0.9)
MONOCYTES NFR BLD AUTO: 4.8 % — SIGNIFICANT CHANGE UP (ref 2–14)
NEUTROPHILS # BLD AUTO: 17.88 K/UL — HIGH (ref 1.8–7.4)
NEUTROPHILS NFR BLD AUTO: 89.1 % — HIGH (ref 43–77)
NRBC # BLD: 0 /100 WBCS — SIGNIFICANT CHANGE UP (ref 0–0)
PLATELET # BLD AUTO: 524 K/UL — HIGH (ref 150–400)
POTASSIUM SERPL-MCNC: 4.4 MMOL/L — SIGNIFICANT CHANGE UP (ref 3.5–5.3)
POTASSIUM SERPL-SCNC: 4.4 MMOL/L — SIGNIFICANT CHANGE UP (ref 3.5–5.3)
RBC # BLD: 4.35 M/UL — SIGNIFICANT CHANGE UP (ref 3.8–5.2)
RBC # FLD: 15.6 % — HIGH (ref 10.3–14.5)
SODIUM SERPL-SCNC: 137 MMOL/L — SIGNIFICANT CHANGE UP (ref 135–145)
WBC # BLD: 20.09 K/UL — HIGH (ref 3.8–10.5)
WBC # FLD AUTO: 20.09 K/UL — HIGH (ref 3.8–10.5)

## 2023-08-26 RX ADMIN — OXYCODONE HYDROCHLORIDE 15 MILLIGRAM(S): 5 TABLET ORAL at 10:11

## 2023-08-26 RX ADMIN — SODIUM CHLORIDE 3 MILLILITER(S): 9 INJECTION INTRAMUSCULAR; INTRAVENOUS; SUBCUTANEOUS at 06:02

## 2023-08-26 RX ADMIN — CHOLESTYRAMINE 4 GRAM(S): 4 POWDER, FOR SUSPENSION ORAL at 18:51

## 2023-08-26 RX ADMIN — ATORVASTATIN CALCIUM 40 MILLIGRAM(S): 80 TABLET, FILM COATED ORAL at 21:39

## 2023-08-26 RX ADMIN — HYDROMORPHONE HYDROCHLORIDE 0.5 MILLIGRAM(S): 2 INJECTION INTRAMUSCULAR; INTRAVENOUS; SUBCUTANEOUS at 11:45

## 2023-08-26 RX ADMIN — OXYCODONE HYDROCHLORIDE 15 MILLIGRAM(S): 5 TABLET ORAL at 04:30

## 2023-08-26 RX ADMIN — Medication 975 MILLIGRAM(S): at 22:38

## 2023-08-26 RX ADMIN — Medication 975 MILLIGRAM(S): at 21:38

## 2023-08-26 RX ADMIN — OXYCODONE HYDROCHLORIDE 15 MILLIGRAM(S): 5 TABLET ORAL at 22:38

## 2023-08-26 RX ADMIN — Medication 975 MILLIGRAM(S): at 06:00

## 2023-08-26 RX ADMIN — OXYCODONE HYDROCHLORIDE 10 MILLIGRAM(S): 5 TABLET ORAL at 01:29

## 2023-08-26 RX ADMIN — SODIUM CHLORIDE 3 MILLILITER(S): 9 INJECTION INTRAMUSCULAR; INTRAVENOUS; SUBCUTANEOUS at 21:40

## 2023-08-26 RX ADMIN — OXYCODONE HYDROCHLORIDE 10 MILLIGRAM(S): 5 TABLET ORAL at 00:29

## 2023-08-26 RX ADMIN — ONDANSETRON 4 MILLIGRAM(S): 8 TABLET, FILM COATED ORAL at 03:17

## 2023-08-26 RX ADMIN — Medication 3 MILLIGRAM(S): at 21:38

## 2023-08-26 RX ADMIN — SODIUM CHLORIDE 3 MILLILITER(S): 9 INJECTION INTRAMUSCULAR; INTRAVENOUS; SUBCUTANEOUS at 18:51

## 2023-08-26 RX ADMIN — Medication 3 MILLIGRAM(S): at 00:38

## 2023-08-26 RX ADMIN — OXYCODONE HYDROCHLORIDE 15 MILLIGRAM(S): 5 TABLET ORAL at 21:38

## 2023-08-26 RX ADMIN — LOSARTAN POTASSIUM 100 MILLIGRAM(S): 100 TABLET, FILM COATED ORAL at 06:00

## 2023-08-26 RX ADMIN — SENNA PLUS 2 TABLET(S): 8.6 TABLET ORAL at 21:38

## 2023-08-26 RX ADMIN — OXYCODONE HYDROCHLORIDE 15 MILLIGRAM(S): 5 TABLET ORAL at 03:16

## 2023-08-26 RX ADMIN — OXYCODONE HYDROCHLORIDE 15 MILLIGRAM(S): 5 TABLET ORAL at 14:20

## 2023-08-26 NOTE — PROGRESS NOTE ADULT - SUBJECTIVE AND OBJECTIVE BOX
POD#1    Patient tolerated the procedure well. Patient seen and examined at bedside. Endorses katie-incisional pain with movement. Pain well controlled while at rest. Denies new weakness, numbness or tingling. Denies chest pain, shortness of breath, nausea or vomiting.     Vital Signs (24 Hrs):  T(C): 36.4 (08-26-23 @ 05:26), Max: 36.7 (08-25-23 @ 20:00)  HR: 86 (08-26-23 @ 05:26) (60 - 86)  BP: 130/67 (08-26-23 @ 05:26) (92/73 - 169/77)  RR: 18 (08-26-23 @ 05:26) (12 - 18)  SpO2: 93% (08-26-23 @ 05:26) (93% - 100%)  Wt(kg): --    LABS:                          14.3   11.45 )-----------( 430      ( 25 Aug 2023 16:52 )             43.3     08-25    137  |  108  |  23  ----------------------------<  127<H>  4.4   |  24  |  1.34<H>    Ca    9.1      25 Aug 2023 16:52      PE:    General: NAD, resting comfortably in bed  Dressing C/D/I  2+ DP Pulses  +babinski    Motor:                     L2                  L3             L4              L5            S1  R            2/5*                5/5             5/5            5/5          5/5  L             2/5*                5/5            5/5            5/5          5/5    *due to acute surgical pain in low back    Sensory:                 L2          L3         L4      L5       S1         (0=absent, 1=impaired, 2=normal, NT=not testable)  R         2            2            2        2        2  L          2            2           2        2         2        A/P:  78y F s/p L3-5 lami POD 1  -PT/OT   -WBAT  -nguyễn out once ambulating on POD 1  -Pain Control  -DVT ppx SCDs, ASA to start 8/27  -Continue perioperative abx x 24 hours  -FU AM Labs  -Rest, ice, compress and elevate the extremity as needed  -Incentive Spirometry  -Medical comanagement appreciated  -dispo: pending PT eval POD#1    Patient tolerated the procedure well. Patient seen and examined at bedside. Endorses katie-incisional pain with movement. Pain well controlled while at rest. Denies new weakness, numbness or tingling. Denies chest pain, shortness of breath, nausea or vomiting.     Vital Signs (24 Hrs):  T(C): 36.4 (08-26-23 @ 05:26), Max: 36.7 (08-25-23 @ 20:00)  HR: 86 (08-26-23 @ 05:26) (60 - 86)  BP: 130/67 (08-26-23 @ 05:26) (92/73 - 169/77)  RR: 18 (08-26-23 @ 05:26) (12 - 18)  SpO2: 93% (08-26-23 @ 05:26) (93% - 100%)  Wt(kg): --    LABS:                          14.3   11.45 )-----------( 430      ( 25 Aug 2023 16:52 )             43.3     08-25    137  |  108  |  23  ----------------------------<  127<H>  4.4   |  24  |  1.34<H>    Ca    9.1      25 Aug 2023 16:52      PE:    General: NAD, resting comfortably in bed  Dressing C/D/I  2+ DP Pulses  +babinski    Motor:                     L2                  L3             L4              L5            S1  R            2/5*                5/5             5/5            5/5          5/5  L             2/5*                5/5            5/5            5/5          5/5    *due to acute surgical pain in low back    Sensory:                 L2          L3         L4      L5       S1         (0=absent, 1=impaired, 2=normal, NT=not testable)  R         2            2            2        2        2  L          2            2           2        2         2        A/P:  78y F s/p L3-5 lami POD 1  -PT/OT   -WBAT  -nguyễn out once ambulating on POD 1  -Pain Control  -DVT ppx SCDs, ASA to start 8/27  -Continue perioperative abx x 24 hours  -FU AM Labs  -Incentive Spirometry  -Medical comanagement appreciated  -dispo: pending PT eval

## 2023-08-27 LAB
ANION GAP SERPL CALC-SCNC: 8 MMOL/L — SIGNIFICANT CHANGE UP (ref 5–17)
BASOPHILS # BLD AUTO: 0.12 K/UL — SIGNIFICANT CHANGE UP (ref 0–0.2)
BASOPHILS NFR BLD AUTO: 0.6 % — SIGNIFICANT CHANGE UP (ref 0–2)
BUN SERPL-MCNC: 34 MG/DL — HIGH (ref 7–23)
CALCIUM SERPL-MCNC: 8.3 MG/DL — LOW (ref 8.5–10.1)
CHLORIDE SERPL-SCNC: 105 MMOL/L — SIGNIFICANT CHANGE UP (ref 96–108)
CO2 SERPL-SCNC: 25 MMOL/L — SIGNIFICANT CHANGE UP (ref 22–31)
CREAT SERPL-MCNC: 1.83 MG/DL — HIGH (ref 0.5–1.3)
EGFR: 28 ML/MIN/1.73M2 — LOW
EOSINOPHIL # BLD AUTO: 0.12 K/UL — SIGNIFICANT CHANGE UP (ref 0–0.5)
EOSINOPHIL NFR BLD AUTO: 0.6 % — SIGNIFICANT CHANGE UP (ref 0–6)
GLUCOSE SERPL-MCNC: 135 MG/DL — HIGH (ref 70–99)
HCT VFR BLD CALC: 37.6 % — SIGNIFICANT CHANGE UP (ref 34.5–45)
HGB BLD-MCNC: 12.1 G/DL — SIGNIFICANT CHANGE UP (ref 11.5–15.5)
IMM GRANULOCYTES NFR BLD AUTO: 0.8 % — SIGNIFICANT CHANGE UP (ref 0–0.9)
LYMPHOCYTES # BLD AUTO: 1.29 K/UL — SIGNIFICANT CHANGE UP (ref 1–3.3)
LYMPHOCYTES # BLD AUTO: 6.2 % — LOW (ref 13–44)
MCHC RBC-ENTMCNC: 30.3 PG — SIGNIFICANT CHANGE UP (ref 27–34)
MCHC RBC-ENTMCNC: 32.2 G/DL — SIGNIFICANT CHANGE UP (ref 32–36)
MCV RBC AUTO: 94.2 FL — SIGNIFICANT CHANGE UP (ref 80–100)
MONOCYTES # BLD AUTO: 1.51 K/UL — HIGH (ref 0–0.9)
MONOCYTES NFR BLD AUTO: 7.2 % — SIGNIFICANT CHANGE UP (ref 2–14)
NEUTROPHILS # BLD AUTO: 17.64 K/UL — HIGH (ref 1.8–7.4)
NEUTROPHILS NFR BLD AUTO: 84.6 % — HIGH (ref 43–77)
NRBC # BLD: 0 /100 WBCS — SIGNIFICANT CHANGE UP (ref 0–0)
PLATELET # BLD AUTO: 473 K/UL — HIGH (ref 150–400)
POTASSIUM SERPL-MCNC: 4.5 MMOL/L — SIGNIFICANT CHANGE UP (ref 3.5–5.3)
POTASSIUM SERPL-SCNC: 4.5 MMOL/L — SIGNIFICANT CHANGE UP (ref 3.5–5.3)
RBC # BLD: 3.99 M/UL — SIGNIFICANT CHANGE UP (ref 3.8–5.2)
RBC # FLD: 16 % — HIGH (ref 10.3–14.5)
SODIUM SERPL-SCNC: 138 MMOL/L — SIGNIFICANT CHANGE UP (ref 135–145)
WBC # BLD: 20.85 K/UL — HIGH (ref 3.8–10.5)
WBC # FLD AUTO: 20.85 K/UL — HIGH (ref 3.8–10.5)

## 2023-08-27 RX ADMIN — OXYCODONE HYDROCHLORIDE 10 MILLIGRAM(S): 5 TABLET ORAL at 00:45

## 2023-08-27 RX ADMIN — OXYCODONE HYDROCHLORIDE 15 MILLIGRAM(S): 5 TABLET ORAL at 10:42

## 2023-08-27 RX ADMIN — OXYCODONE HYDROCHLORIDE 15 MILLIGRAM(S): 5 TABLET ORAL at 05:54

## 2023-08-27 RX ADMIN — Medication 975 MILLIGRAM(S): at 23:57

## 2023-08-27 RX ADMIN — SODIUM CHLORIDE 3 MILLILITER(S): 9 INJECTION INTRAMUSCULAR; INTRAVENOUS; SUBCUTANEOUS at 22:29

## 2023-08-27 RX ADMIN — Medication 81 MILLIGRAM(S): at 15:23

## 2023-08-27 RX ADMIN — OXYCODONE HYDROCHLORIDE 15 MILLIGRAM(S): 5 TABLET ORAL at 06:24

## 2023-08-27 RX ADMIN — Medication 975 MILLIGRAM(S): at 15:24

## 2023-08-27 RX ADMIN — Medication 975 MILLIGRAM(S): at 06:22

## 2023-08-27 RX ADMIN — Medication 975 MILLIGRAM(S): at 22:57

## 2023-08-27 RX ADMIN — PANTOPRAZOLE SODIUM 40 MILLIGRAM(S): 20 TABLET, DELAYED RELEASE ORAL at 05:53

## 2023-08-27 RX ADMIN — SODIUM CHLORIDE 3 MILLILITER(S): 9 INJECTION INTRAMUSCULAR; INTRAVENOUS; SUBCUTANEOUS at 05:46

## 2023-08-27 RX ADMIN — LOSARTAN POTASSIUM 100 MILLIGRAM(S): 100 TABLET, FILM COATED ORAL at 05:53

## 2023-08-27 RX ADMIN — CHOLESTYRAMINE 4 GRAM(S): 4 POWDER, FOR SUSPENSION ORAL at 15:25

## 2023-08-27 RX ADMIN — OXYCODONE HYDROCHLORIDE 10 MILLIGRAM(S): 5 TABLET ORAL at 01:45

## 2023-08-27 RX ADMIN — Medication 975 MILLIGRAM(S): at 05:52

## 2023-08-27 NOTE — CHART NOTE - NSCHARTNOTEFT_GEN_A_CORE
RN paged for sluggish responses.     Pt seen and examined at bedside. AAOx3, however slow to respond to questions and requires multiple prompts before answering question. When asked if she feels off she nods her head yes.    Discussed with Dr. Lang overnight hospitalist who recommended the following:  CT head to r/o CVA  UA/UCx to r/o UTI given wbc elevation and AMS  CXR    If any of these are abnormal will ask for formal medical consult.

## 2023-08-27 NOTE — PROGRESS NOTE ADULT - SUBJECTIVE AND OBJECTIVE BOX
POD#2    Patient tolerated the procedure well. Patient seen and examined at bedside. Endorses katie-incisional pain with movement. Pain well controlled while at rest. Denies new weakness, numbness or tingling. Denies chest pain, shortness of breath, nausea or vomiting.     Vital Signs (24 Hrs):  T(C): 36.4 (08-26-23 @ 05:26), Max: 36.7 (08-25-23 @ 20:00)  HR: 86 (08-26-23 @ 05:26) (60 - 86)  BP: 130/67 (08-26-23 @ 05:26) (92/73 - 169/77)  RR: 18 (08-26-23 @ 05:26) (12 - 18)  SpO2: 93% (08-26-23 @ 05:26) (93% - 100%)  Wt(kg): --    LABS:                          14.3   11.45 )-----------( 430      ( 25 Aug 2023 16:52 )             43.3     08-25    137  |  108  |  23  ----------------------------<  127<H>  4.4   |  24  |  1.34<H>    Ca    9.1      25 Aug 2023 16:52      PE:    General: NAD, resting comfortably in bed  Dressing C/D/I  2+ DP Pulses  +babinski    Motor:                     L2                  L3             L4              L5            S1  R            2/5*                5/5             5/5            5/5          5/5  L             2/5*                5/5            5/5            5/5          5/5    *due to acute surgical pain in low back    Sensory:                 L2          L3         L4      L5       S1         (0=absent, 1=impaired, 2=normal, NT=not testable)  R         2            2            2        2        2  L          2            2           2        2         2        A/P:  78y F s/p L3-5 lami POD 2  -PT/OT   -WBAT  -nguyễn out once ambulating on POD 1  -Pain Control  -DVT ppx SCDs, ASA to start Today  -Continue perioperative abx x 24 hours  -FU AM Labs  -Incentive Spirometry  -Medical comanagement appreciated  -dispo: pending PT eval, likely LAURA

## 2023-08-28 LAB
ANION GAP SERPL CALC-SCNC: 6 MMOL/L — SIGNIFICANT CHANGE UP (ref 5–17)
APPEARANCE UR: CLEAR — SIGNIFICANT CHANGE UP
BACTERIA # UR AUTO: ABNORMAL
BASOPHILS # BLD AUTO: 0.08 K/UL — SIGNIFICANT CHANGE UP (ref 0–0.2)
BASOPHILS NFR BLD AUTO: 0.4 % — SIGNIFICANT CHANGE UP (ref 0–2)
BILIRUB UR-MCNC: NEGATIVE — SIGNIFICANT CHANGE UP
BUN SERPL-MCNC: 43 MG/DL — HIGH (ref 7–23)
CALCIUM SERPL-MCNC: 8.3 MG/DL — LOW (ref 8.5–10.1)
CHLORIDE SERPL-SCNC: 105 MMOL/L — SIGNIFICANT CHANGE UP (ref 96–108)
CO2 SERPL-SCNC: 27 MMOL/L — SIGNIFICANT CHANGE UP (ref 22–31)
COLOR SPEC: YELLOW — SIGNIFICANT CHANGE UP
CREAT SERPL-MCNC: 1.64 MG/DL — HIGH (ref 0.5–1.3)
DIFF PNL FLD: ABNORMAL
EGFR: 31 ML/MIN/1.73M2 — LOW
EOSINOPHIL # BLD AUTO: 0.17 K/UL — SIGNIFICANT CHANGE UP (ref 0–0.5)
EOSINOPHIL NFR BLD AUTO: 0.9 % — SIGNIFICANT CHANGE UP (ref 0–6)
EPI CELLS # UR: SIGNIFICANT CHANGE UP
GLUCOSE SERPL-MCNC: 129 MG/DL — HIGH (ref 70–99)
GLUCOSE UR QL: NEGATIVE MG/DL — SIGNIFICANT CHANGE UP
HCT VFR BLD CALC: 37.3 % — SIGNIFICANT CHANGE UP (ref 34.5–45)
HGB BLD-MCNC: 11.9 G/DL — SIGNIFICANT CHANGE UP (ref 11.5–15.5)
IMM GRANULOCYTES NFR BLD AUTO: 0.9 % — SIGNIFICANT CHANGE UP (ref 0–0.9)
KETONES UR-MCNC: ABNORMAL
LEUKOCYTE ESTERASE UR-ACNC: ABNORMAL
LYMPHOCYTES # BLD AUTO: 1.06 K/UL — SIGNIFICANT CHANGE UP (ref 1–3.3)
LYMPHOCYTES # BLD AUTO: 5.5 % — LOW (ref 13–44)
MCHC RBC-ENTMCNC: 30.4 PG — SIGNIFICANT CHANGE UP (ref 27–34)
MCHC RBC-ENTMCNC: 31.9 G/DL — LOW (ref 32–36)
MCV RBC AUTO: 95.2 FL — SIGNIFICANT CHANGE UP (ref 80–100)
MONOCYTES # BLD AUTO: 1.36 K/UL — HIGH (ref 0–0.9)
MONOCYTES NFR BLD AUTO: 7.1 % — SIGNIFICANT CHANGE UP (ref 2–14)
NEUTROPHILS # BLD AUTO: 16.39 K/UL — HIGH (ref 1.8–7.4)
NEUTROPHILS NFR BLD AUTO: 85.2 % — HIGH (ref 43–77)
NITRITE UR-MCNC: NEGATIVE — SIGNIFICANT CHANGE UP
NRBC # BLD: 0 /100 WBCS — SIGNIFICANT CHANGE UP (ref 0–0)
PH UR: 5 — SIGNIFICANT CHANGE UP (ref 5–8)
PLATELET # BLD AUTO: 455 K/UL — HIGH (ref 150–400)
POTASSIUM SERPL-MCNC: 4.4 MMOL/L — SIGNIFICANT CHANGE UP (ref 3.5–5.3)
POTASSIUM SERPL-SCNC: 4.4 MMOL/L — SIGNIFICANT CHANGE UP (ref 3.5–5.3)
PROT UR-MCNC: 30 MG/DL
RBC # BLD: 3.92 M/UL — SIGNIFICANT CHANGE UP (ref 3.8–5.2)
RBC # FLD: 15.9 % — HIGH (ref 10.3–14.5)
RBC CASTS # UR COMP ASSIST: ABNORMAL /HPF (ref 0–4)
SODIUM SERPL-SCNC: 138 MMOL/L — SIGNIFICANT CHANGE UP (ref 135–145)
SP GR SPEC: 1.02 — SIGNIFICANT CHANGE UP (ref 1.01–1.02)
UROBILINOGEN FLD QL: NEGATIVE MG/DL — SIGNIFICANT CHANGE UP
WBC # BLD: 19.24 K/UL — HIGH (ref 3.8–10.5)
WBC # FLD AUTO: 19.24 K/UL — HIGH (ref 3.8–10.5)
WBC UR QL: ABNORMAL

## 2023-08-28 PROCEDURE — 71045 X-RAY EXAM CHEST 1 VIEW: CPT | Mod: 26

## 2023-08-28 PROCEDURE — 99231 SBSQ HOSP IP/OBS SF/LOW 25: CPT

## 2023-08-28 PROCEDURE — 70450 CT HEAD/BRAIN W/O DYE: CPT | Mod: 26

## 2023-08-28 RX ORDER — NITROFURANTOIN MACROCRYSTAL 50 MG
100 CAPSULE ORAL
Refills: 0 | Status: DISCONTINUED | OUTPATIENT
Start: 2023-08-28 | End: 2023-08-30

## 2023-08-28 RX ORDER — SODIUM CHLORIDE 9 MG/ML
1000 INJECTION INTRAMUSCULAR; INTRAVENOUS; SUBCUTANEOUS ONCE
Refills: 0 | Status: COMPLETED | OUTPATIENT
Start: 2023-08-28 | End: 2023-08-28

## 2023-08-28 RX ADMIN — Medication 81 MILLIGRAM(S): at 13:31

## 2023-08-28 RX ADMIN — Medication 975 MILLIGRAM(S): at 22:34

## 2023-08-28 RX ADMIN — OXYCODONE HYDROCHLORIDE 15 MILLIGRAM(S): 5 TABLET ORAL at 01:46

## 2023-08-28 RX ADMIN — SODIUM CHLORIDE 3 MILLILITER(S): 9 INJECTION INTRAMUSCULAR; INTRAVENOUS; SUBCUTANEOUS at 06:46

## 2023-08-28 RX ADMIN — OXYCODONE HYDROCHLORIDE 15 MILLIGRAM(S): 5 TABLET ORAL at 11:30

## 2023-08-28 RX ADMIN — Medication 975 MILLIGRAM(S): at 14:30

## 2023-08-28 RX ADMIN — Medication 100 MILLIGRAM(S): at 08:35

## 2023-08-28 RX ADMIN — OXYCODONE HYDROCHLORIDE 15 MILLIGRAM(S): 5 TABLET ORAL at 03:16

## 2023-08-28 RX ADMIN — POLYETHYLENE GLYCOL 3350 17 GRAM(S): 17 POWDER, FOR SOLUTION ORAL at 06:57

## 2023-08-28 RX ADMIN — OXYCODONE HYDROCHLORIDE 15 MILLIGRAM(S): 5 TABLET ORAL at 10:36

## 2023-08-28 RX ADMIN — Medication 975 MILLIGRAM(S): at 07:21

## 2023-08-28 RX ADMIN — PANTOPRAZOLE SODIUM 40 MILLIGRAM(S): 20 TABLET, DELAYED RELEASE ORAL at 06:51

## 2023-08-28 RX ADMIN — Medication 975 MILLIGRAM(S): at 21:34

## 2023-08-28 RX ADMIN — Medication 1 TABLET(S): at 17:15

## 2023-08-28 RX ADMIN — OXYCODONE HYDROCHLORIDE 15 MILLIGRAM(S): 5 TABLET ORAL at 18:39

## 2023-08-28 RX ADMIN — OXYCODONE HYDROCHLORIDE 10 MILLIGRAM(S): 5 TABLET ORAL at 22:08

## 2023-08-28 RX ADMIN — SODIUM CHLORIDE 250 MILLILITER(S): 9 INJECTION INTRAMUSCULAR; INTRAVENOUS; SUBCUTANEOUS at 08:35

## 2023-08-28 RX ADMIN — ATORVASTATIN CALCIUM 40 MILLIGRAM(S): 80 TABLET, FILM COATED ORAL at 21:39

## 2023-08-28 RX ADMIN — Medication 1 TABLET(S): at 08:35

## 2023-08-28 RX ADMIN — Medication 100 MILLIGRAM(S): at 17:15

## 2023-08-28 RX ADMIN — Medication 3 MILLIGRAM(S): at 21:39

## 2023-08-28 RX ADMIN — OXYCODONE HYDROCHLORIDE 15 MILLIGRAM(S): 5 TABLET ORAL at 19:40

## 2023-08-28 RX ADMIN — SODIUM CHLORIDE 3 MILLILITER(S): 9 INJECTION INTRAMUSCULAR; INTRAVENOUS; SUBCUTANEOUS at 13:10

## 2023-08-28 RX ADMIN — Medication 975 MILLIGRAM(S): at 06:51

## 2023-08-28 RX ADMIN — LOSARTAN POTASSIUM 100 MILLIGRAM(S): 100 TABLET, FILM COATED ORAL at 06:51

## 2023-08-28 RX ADMIN — CHOLESTYRAMINE 4 GRAM(S): 4 POWDER, FOR SUSPENSION ORAL at 08:35

## 2023-08-28 RX ADMIN — SODIUM CHLORIDE 3 MILLILITER(S): 9 INJECTION INTRAMUSCULAR; INTRAVENOUS; SUBCUTANEOUS at 21:42

## 2023-08-28 RX ADMIN — Medication 975 MILLIGRAM(S): at 13:31

## 2023-08-28 NOTE — DIETITIAN INITIAL EVALUATION ADULT - PHYSCIAL ASSESSMENT
Pt appears morbidly obese with no physical signs of malnutrition except for mild orbital depletion noted

## 2023-08-28 NOTE — PHYSICAL THERAPY INITIAL EVALUATION ADULT - GENERAL OBSERVATIONS, REHAB EVAL
Patient supine in bed. (+) Nasal cannula to 1Lpm O2, PIV infusion (locked by DELMAR Alvarez for PT), (L) continuous pulse oximetry, mid-low back dressing intact; urinary catheter

## 2023-08-28 NOTE — PHYSICAL THERAPY INITIAL EVALUATION ADULT - TRANSFER SAFETY CONCERNS NOTED: BED/CHAIR, REHAB EVAL
decreased balance during turns/decreased safety awareness/losing balance/decreased sequencing ability/decreased step length/stepping too close to front of assistive device

## 2023-08-28 NOTE — PHYSICAL THERAPY INITIAL EVALUATION ADULT - NSPTDISCHREC_GEN_A_CORE
Sub-Acute Rehab to further improve strength, balance and falls prevention. Patient demonstrates higher level of functional assistance on this encounter, compared to status pre-admission/Sub-acute Rehab

## 2023-08-28 NOTE — CHART NOTE - NSCHARTNOTEFT_GEN_A_CORE
Called to speak with Daughter at bedside. Discussed the status of patient Anita's mental status. The daughter states yesterday her mother seemed "sluggish"  and "slow to respond". Discussed with patient and daughter that the head CT was neg for acute dz and that she had a +UA that is currently being treated. Daughter states patient's mental status has improved today and she was able to get OOB and sit in a chair for an hour. Patient was able to tell me about the therapy session clearly and was alert during the entire converation. Dr. Adkins whom is the patients PCP was contacted and he is aware of the case and will see the patient tonight.   Daughter and patient agree with the plan and had no further questions

## 2023-08-28 NOTE — DIETITIAN INITIAL EVALUATION ADULT - PERTINENT MEDS FT
MEDICATIONS  (STANDING):  acetaminophen     Tablet .. 975 milliGRAM(s) Oral every 8 hours  aspirin enteric coated 81 milliGRAM(s) Oral daily  atorvastatin 40 milliGRAM(s) Oral at bedtime  cholestyramine Powder (Sugar-Free) 4 Gram(s) Oral daily  hydrochlorothiazide 12.5 milliGRAM(s) Oral daily  lactated ringers. 1000 milliLiter(s) (75 mL/Hr) IV Continuous <Continuous>  lactobacillus acidophilus 1 Tablet(s) Oral two times a day with meals  losartan 100 milliGRAM(s) Oral daily  melatonin 3 milliGRAM(s) Oral at bedtime  nitrofurantoin monohydrate/macrocrystals (MACROBID) 100 milliGRAM(s) Oral two times a day  pantoprazole    Tablet 40 milliGRAM(s) Oral before breakfast  polyethylene glycol 3350 17 Gram(s) Oral two times a day  senna 2 Tablet(s) Oral at bedtime  sodium chloride 0.9% lock flush 3 milliLiter(s) IV Push every 8 hours  sucralfate 1 Gram(s) Oral once    MEDICATIONS  (PRN):  cyclobenzaprine 10 milliGRAM(s) Oral every 8 hours PRN Muscle Spasm  ondansetron Injectable 4 milliGRAM(s) IV Push every 6 hours PRN Nausea and/or Vomiting  oxyCODONE    IR 10 milliGRAM(s) Oral every 4 hours PRN Moderate Pain (4 - 6)  oxyCODONE    IR 15 milliGRAM(s) Oral every 4 hours PRN Severe Pain (7 - 10)

## 2023-08-28 NOTE — DIETITIAN INITIAL EVALUATION ADULT - PERTINENT LABORATORY DATA
08-28    138  |  105  |  43<H>  ----------------------------<  129<H>  4.4   |  27  |  1.64<H>    Ca    8.3<L>      28 Aug 2023 06:25    A1C with Estimated Average Glucose Result: 6.1 % (07-25-23 @ 08:30)  A1C with Estimated Average Glucose Result: 6.1 % (06-26-23 @ 09:45)

## 2023-08-28 NOTE — PHYSICAL THERAPY INITIAL EVALUATION ADULT - PRECAUTIONS/LIMITATIONS, REHAB EVAL
1Lpm currently/fall precautions/oxygen therapy device and L/min 1Lpm currently/fall precautions/hearing precautions/oxygen therapy device and L/min

## 2023-08-28 NOTE — PHYSICAL THERAPY INITIAL EVALUATION ADULT - PHYSICAL ASSIST/NONPHYSICAL ASSIST, REHAB EVAL
verbal & tactile cues for log-rolling to minimize bending and rolling/1 person + 1 person to manage equipment

## 2023-08-28 NOTE — PROGRESS NOTE ADULT - SUBJECTIVE AND OBJECTIVE BOX
78yFemale s/p Lami L3-5 POD#3  Pt seen and examined at bedside.  Patient answering questions but is apprehensive and slow to answer  Pain controlled.  Pt denies any new complaints.   Pt denies CP/SOB/N/V/D/numbness/tingling/bowel or bladder dysfunction.   +nguyễn    Vital Signs Last 24 Hrs  T(C): 37.1 (28 Aug 2023 05:26), Max: 37.2 (27 Aug 2023 17:06)  T(F): 98.7 (28 Aug 2023 05:26), Max: 98.9 (27 Aug 2023 17:06)  HR: 86 (28 Aug 2023 05:26) (74 - 95)  BP: 123/74 (28 Aug 2023 05:26) (102/71 - 123/74)  BP(mean): --  RR: 18 (28 Aug 2023 05:26) (18 - 18)  SpO2: 100% (28 Aug 2023 05:26) (92% - 100%)    Parameters below as of 27 Aug 2023 11:23  Patient On (Oxygen Delivery Method): nasal cannula        PE:   General: A&Ox3, NAD  Spine: Dressing stained- Prineo dressing intact. Slight oozing. Pressure dressing applied  B/L UE: Skin intact. +ROM shoulder/elbow/wrist/fingers. +ok/thumbsup/fingercross signs.  strength: 5/5.  RP2+ NVI.   B/L LE: Skin intact. +ROM hip/knee/ankle/toes. Ankle Dorsi/plantarflexion: 5/5. Calf: soft, compressible and nontender. DP/PT 2+ NVI.                             11.9   19.24 )-----------( 455      ( 28 Aug 2023 06:25 )             37.3       08-28    138  |  105  |  43<H>  ----------------------------<  129<H>  4.4   |  27  |  1.64<H>    Ca    8.3<L>      28 Aug 2023 06:25          A/P: 78yFemale s/p L3-5 Lami POD#3  +UTI: MAcrobid  F/u Medicine Consult  DC nguyễn when OOB and ambulating  Wound care  Pain controlled  PT: WBAT: Spinal precautions  Isometric exercises  DVT ppx: SCDs  Incentive spirometry counseled   Discharge: planning p PT eval  All the above discussed and understood by pt

## 2023-08-28 NOTE — PHYSICAL THERAPY INITIAL EVALUATION ADULT - PATIENT/FAMILY/SIGNIFICANT OTHER GOALS STATEMENT, PT EVAL
(Patient currently confused c (+) UTI, per daughter by bedside. Daughter endorses patient would like to be moving with less pain.)

## 2023-08-28 NOTE — PHYSICAL THERAPY INITIAL EVALUATION ADULT - PERTINENT HX OF CURRENT PROBLEM, REHAB EVAL
no Patient comes in for elective spinal surgery on 8/25/23 c Dr. Chan due to lumbar stenosis. Now POD 3.

## 2023-08-28 NOTE — DIETITIAN INITIAL EVALUATION ADULT - OTHER CALCULATIONS
This was addressed during phone call today. Please refer to the telephone encounter from today.    Wt: 124.7 kg (08/25) Wt: 124.7 kg (08/25), IBW: 59.8 kg (IBW + 10%)

## 2023-08-28 NOTE — PHYSICAL THERAPY INITIAL EVALUATION ADULT - ADDITIONAL COMMENTS
Patient lives c daughter (whom works in the day) in private house c 3 stair steps c wide handrails to hold. Bedroom is at 2nd floor via 14 stair steps c x1 handrail. Had been sleeping on recliner chair at main level of home since back pains increased. On previous surgeries, stayed at recliner until about 2 week when she was able to climb up stairs to her bedroom. Denies recent falls, supplemental O2 use at home or home health aides.

## 2023-08-28 NOTE — DIETITIAN INITIAL EVALUATION ADULT - OTHER INFO
Pt with PMH HTN, HLD, GERD, morbid obesity, chronic back pain, & hx of joint, shoulder, knee, & back surgeries. Pt here for PST for scheduled decompression laminectomy L3-5. s/p POD#3 L3-5 Laminectomy. Ortho following.  Pt answering questions, however slow to answer and appears slightly confused; not able to obtain detailed information regarding diet and wt hx. +UTI noted.  Pt states good appetite PTA; no dietary restrictions at home. Pt with mostly good appetite and good PO intake during admission, however yesterday PO intake 50% or less, likely due to AMS 2/2 UTI. Denies any chew/swallowing difficulty or any N/V/D/C. Per previous RD note from last month (07/2023), pt's wt 124.2 kg & current wt 124.7 kg; wt stable.  Pt's HgbA1c 6.1% in 07/25/23; pre-DM range; per previous RD note, pt aware and was to follow up with MD, however pt reports no follow up yet.

## 2023-08-28 NOTE — PHYSICAL THERAPY INITIAL EVALUATION ADULT - IMPAIRMENTS FOUND, PT EVAL
aerobic capacity/endurance/arousal, attention, and cognition/cognitive impairment/cranial and peripheral nerve integrity/decreased midline orientation/ergonomics and body mechanics/gait, locomotion, and balance/gross motor/joint integrity and mobility/muscle strength/neuromotor development and sensory integration/poor safety awareness/posture

## 2023-08-28 NOTE — PHYSICAL THERAPY INITIAL EVALUATION ADULT - GAIT TRAINING, PT EVAL
GOAL: Patient will demonstrate independent and safe gait indoors with appropriate mobility/adaptive devices for =/> 100 feet, in 4 weeks.

## 2023-08-28 NOTE — PHYSICAL THERAPY INITIAL EVALUATION ADULT - TRANSFER SAFETY CONCERNS NOTED: SIT/STAND, REHAB EVAL
decreased balance during turns/decreased safety awareness/decreased sequencing ability/decreased step length/stepping too close to front of assistive device

## 2023-08-28 NOTE — PHYSICAL THERAPY INITIAL EVALUATION ADULT - TRANSFER TRAINING, PT EVAL
GOAL: Patient will demonstrate less dependent transfers across all surfaces with appropriate mobility/adaptive devices, with good balance, in 4 weeks.

## 2023-08-29 LAB
ANION GAP SERPL CALC-SCNC: 1 MMOL/L — LOW (ref 5–17)
BUN SERPL-MCNC: 34 MG/DL — HIGH (ref 7–23)
CALCIUM SERPL-MCNC: 8.5 MG/DL — SIGNIFICANT CHANGE UP (ref 8.5–10.1)
CHLORIDE SERPL-SCNC: 107 MMOL/L — SIGNIFICANT CHANGE UP (ref 96–108)
CO2 SERPL-SCNC: 29 MMOL/L — SIGNIFICANT CHANGE UP (ref 22–31)
CREAT SERPL-MCNC: 1.04 MG/DL — SIGNIFICANT CHANGE UP (ref 0.5–1.3)
CULTURE RESULTS: NO GROWTH — SIGNIFICANT CHANGE UP
EGFR: 54 ML/MIN/1.73M2 — LOW
GLUCOSE SERPL-MCNC: 131 MG/DL — HIGH (ref 70–99)
HCT VFR BLD CALC: 35.5 % — SIGNIFICANT CHANGE UP (ref 34.5–45)
HGB BLD-MCNC: 11.4 G/DL — LOW (ref 11.5–15.5)
MCHC RBC-ENTMCNC: 30.2 PG — SIGNIFICANT CHANGE UP (ref 27–34)
MCHC RBC-ENTMCNC: 32.1 G/DL — SIGNIFICANT CHANGE UP (ref 32–36)
MCV RBC AUTO: 94.2 FL — SIGNIFICANT CHANGE UP (ref 80–100)
NRBC # BLD: 0 /100 WBCS — SIGNIFICANT CHANGE UP (ref 0–0)
PLATELET # BLD AUTO: 462 K/UL — HIGH (ref 150–400)
POTASSIUM SERPL-MCNC: 4.1 MMOL/L — SIGNIFICANT CHANGE UP (ref 3.5–5.3)
POTASSIUM SERPL-SCNC: 4.1 MMOL/L — SIGNIFICANT CHANGE UP (ref 3.5–5.3)
RBC # BLD: 3.77 M/UL — LOW (ref 3.8–5.2)
RBC # FLD: 15.4 % — HIGH (ref 10.3–14.5)
SODIUM SERPL-SCNC: 137 MMOL/L — SIGNIFICANT CHANGE UP (ref 135–145)
SPECIMEN SOURCE: SIGNIFICANT CHANGE UP
WBC # BLD: 13.5 K/UL — HIGH (ref 3.8–10.5)
WBC # FLD AUTO: 13.5 K/UL — HIGH (ref 3.8–10.5)

## 2023-08-29 RX ORDER — OXYCODONE HYDROCHLORIDE 5 MG/1
5 TABLET ORAL EVERY 4 HOURS
Refills: 0 | Status: DISCONTINUED | OUTPATIENT
Start: 2023-08-29 | End: 2023-08-31

## 2023-08-29 RX ORDER — OXYCODONE HYDROCHLORIDE 5 MG/1
10 TABLET ORAL EVERY 4 HOURS
Refills: 0 | Status: DISCONTINUED | OUTPATIENT
Start: 2023-08-29 | End: 2023-08-31

## 2023-08-29 RX ADMIN — Medication 975 MILLIGRAM(S): at 05:28

## 2023-08-29 RX ADMIN — Medication 100 MILLIGRAM(S): at 17:32

## 2023-08-29 RX ADMIN — PANTOPRAZOLE SODIUM 40 MILLIGRAM(S): 20 TABLET, DELAYED RELEASE ORAL at 08:00

## 2023-08-29 RX ADMIN — OXYCODONE HYDROCHLORIDE 10 MILLIGRAM(S): 5 TABLET ORAL at 02:09

## 2023-08-29 RX ADMIN — Medication 975 MILLIGRAM(S): at 13:35

## 2023-08-29 RX ADMIN — Medication 975 MILLIGRAM(S): at 14:30

## 2023-08-29 RX ADMIN — Medication 81 MILLIGRAM(S): at 12:35

## 2023-08-29 RX ADMIN — SODIUM CHLORIDE 75 MILLILITER(S): 9 INJECTION, SOLUTION INTRAVENOUS at 17:32

## 2023-08-29 RX ADMIN — Medication 1 TABLET(S): at 08:00

## 2023-08-29 RX ADMIN — OXYCODONE HYDROCHLORIDE 15 MILLIGRAM(S): 5 TABLET ORAL at 04:26

## 2023-08-29 RX ADMIN — SODIUM CHLORIDE 75 MILLILITER(S): 9 INJECTION, SOLUTION INTRAVENOUS at 00:57

## 2023-08-29 RX ADMIN — CHOLESTYRAMINE 4 GRAM(S): 4 POWDER, FOR SUSPENSION ORAL at 08:01

## 2023-08-29 RX ADMIN — OXYCODONE HYDROCHLORIDE 10 MILLIGRAM(S): 5 TABLET ORAL at 15:00

## 2023-08-29 RX ADMIN — OXYCODONE HYDROCHLORIDE 15 MILLIGRAM(S): 5 TABLET ORAL at 05:26

## 2023-08-29 RX ADMIN — OXYCODONE HYDROCHLORIDE 10 MILLIGRAM(S): 5 TABLET ORAL at 16:00

## 2023-08-29 RX ADMIN — ATORVASTATIN CALCIUM 40 MILLIGRAM(S): 80 TABLET, FILM COATED ORAL at 22:48

## 2023-08-29 RX ADMIN — Medication 1 TABLET(S): at 17:32

## 2023-08-29 RX ADMIN — Medication 975 MILLIGRAM(S): at 22:47

## 2023-08-29 RX ADMIN — SODIUM CHLORIDE 3 MILLILITER(S): 9 INJECTION INTRAMUSCULAR; INTRAVENOUS; SUBCUTANEOUS at 05:27

## 2023-08-29 RX ADMIN — Medication 100 MILLIGRAM(S): at 05:29

## 2023-08-29 RX ADMIN — SODIUM CHLORIDE 3 MILLILITER(S): 9 INJECTION INTRAMUSCULAR; INTRAVENOUS; SUBCUTANEOUS at 13:23

## 2023-08-29 NOTE — PROGRESS NOTE ADULT - SUBJECTIVE AND OBJECTIVE BOX
78yFemale s/p  Lami L3-5 POD#4. Pt c/o [place]' new onset. Daughter concerned about slowed mentation since OR. +BM yesterday.     PE:   Neuro: AAOX3 - responds appropriately after being spoken to in loud voice.   Spine: Saturated with serosanguinous. Incision: prineo mostly non-adherent to incision. Incision with active serosanguinous drainage.   B/L UE: Skin intact. +ROM shoulder/elbow/wrist/fingers. +ok/thumbsup/fingercross signs.  strength: 5/5.  RP2+ NVI.   B/L LE: Skin intact. +ROM hip/knee/ankle/toes. Ankle Dorsi/plantarflexion: 5/5. Calf: soft, compressible and nontender. DP/PT 2+ NVI.                             11.4   13.50 )-----------( 462      ( 29 Aug 2023 09:45 )             35.5       08-29    137  |  107  |  34<H>  ----------------------------<  131<H>  4.1   |  29  |  1.04    Ca    8.5      29 Aug 2023 09:45          A/P: 78yFemale s/p Lami L3-5 POD#4.  Dressing changed - new dry clean dressing applied. Will order dressing changes per nursing Q8hrs   Pain control: narcotic doses decreased secondary to poor mentation reports. oxy 5/10mg PRN  Ua postive - suspected UTI, started on macrobid yesterday. DEMETRA nguyễn today and follow up urine cultures.   PT: WBAT - spinal precautions   DVT ppx: SCDs   CXR: Atelectasis noted. Incentive spirometry encouraged   Medical consult appreciated  Discharge: planning for home - pt refusing rehab. WIll need to clear stairs per PT team   All the above discussed and understood by pt  D/W Dr Chan

## 2023-08-29 NOTE — PROGRESS NOTE ADULT - SUBJECTIVE AND OBJECTIVE BOX
EVERETT WAY is a 78y Female s/p LEFT LAMINOFORAMINOTOMY L3-L4-L5 WITH IMAGE        denies any chest pain shortness of breath palpitation dizziness lightheadedness nausea vomiting fever or chills    T(C): 36.6 (08-29-23 @ 05:16), Max: 36.8 (08-28-23 @ 23:30)  HR: 71 (08-29-23 @ 05:16) (70 - 88)  BP: 142/80 (08-29-23 @ 05:16) (107/51 - 145/72)  RR: 16 (08-29-23 @ 07:24) (16 - 18)  SpO2: 95% (08-29-23 @ 07:24) (95% - 97%)  no jvd/bruit  s1 s2 rrr  cta  s/nt/nd  no calf tend                        11.4   13.50 )-----------( 462      ( 29 Aug 2023 09:45 )             35.5   08-29    137  |  107  |  34<H>  ----------------------------<  131<H>  4.1   |  29  |  1.04    Ca    8.5      29 Aug 2023 09:45        cont dvt px  pain control  bowel regimen  antiemetics  incentive spirometer

## 2023-08-30 ENCOUNTER — TRANSCRIPTION ENCOUNTER (OUTPATIENT)
Age: 78
End: 2023-08-30

## 2023-08-30 LAB
HCT VFR BLD CALC: 35.2 % — SIGNIFICANT CHANGE UP (ref 34.5–45)
HGB BLD-MCNC: 11.4 G/DL — LOW (ref 11.5–15.5)
MCHC RBC-ENTMCNC: 30.2 PG — SIGNIFICANT CHANGE UP (ref 27–34)
MCHC RBC-ENTMCNC: 32.4 G/DL — SIGNIFICANT CHANGE UP (ref 32–36)
MCV RBC AUTO: 93.4 FL — SIGNIFICANT CHANGE UP (ref 80–100)
NRBC # BLD: 0 /100 WBCS — SIGNIFICANT CHANGE UP (ref 0–0)
PLATELET # BLD AUTO: 473 K/UL — HIGH (ref 150–400)
RBC # BLD: 3.77 M/UL — LOW (ref 3.8–5.2)
RBC # FLD: 15 % — HIGH (ref 10.3–14.5)
WBC # BLD: 9.91 K/UL — SIGNIFICANT CHANGE UP (ref 3.8–10.5)
WBC # FLD AUTO: 9.91 K/UL — SIGNIFICANT CHANGE UP (ref 3.8–10.5)

## 2023-08-30 RX ORDER — CYCLOBENZAPRINE HYDROCHLORIDE 10 MG/1
1 TABLET, FILM COATED ORAL
Qty: 21 | Refills: 0
Start: 2023-08-30 | End: 2023-09-05

## 2023-08-30 RX ORDER — POLYETHYLENE GLYCOL 3350 17 G/17G
17 POWDER, FOR SOLUTION ORAL
Qty: 0 | Refills: 0 | DISCHARGE
Start: 2023-08-30

## 2023-08-30 RX ORDER — NALOXONE HYDROCHLORIDE 4 MG/.1ML
4 SPRAY NASAL
Qty: 1 | Refills: 0
Start: 2023-08-30 | End: 2023-08-30

## 2023-08-30 RX ORDER — SENNA PLUS 8.6 MG/1
2 TABLET ORAL
Qty: 0 | Refills: 0 | DISCHARGE
Start: 2023-08-30

## 2023-08-30 RX ORDER — ACETAMINOPHEN 500 MG
3 TABLET ORAL
Qty: 0 | Refills: 0 | DISCHARGE
Start: 2023-08-30

## 2023-08-30 RX ORDER — OXYCODONE HYDROCHLORIDE 5 MG/1
1 TABLET ORAL
Qty: 42 | Refills: 0
Start: 2023-08-30 | End: 2023-09-05

## 2023-08-30 RX ADMIN — Medication 3 MILLIGRAM(S): at 22:13

## 2023-08-30 RX ADMIN — Medication 975 MILLIGRAM(S): at 22:14

## 2023-08-30 RX ADMIN — Medication 100 MILLIGRAM(S): at 05:16

## 2023-08-30 RX ADMIN — SODIUM CHLORIDE 3 MILLILITER(S): 9 INJECTION INTRAMUSCULAR; INTRAVENOUS; SUBCUTANEOUS at 22:10

## 2023-08-30 RX ADMIN — Medication 81 MILLIGRAM(S): at 11:11

## 2023-08-30 RX ADMIN — Medication 975 MILLIGRAM(S): at 05:16

## 2023-08-30 RX ADMIN — Medication 975 MILLIGRAM(S): at 13:41

## 2023-08-30 RX ADMIN — Medication 975 MILLIGRAM(S): at 14:58

## 2023-08-30 RX ADMIN — Medication 975 MILLIGRAM(S): at 23:14

## 2023-08-30 RX ADMIN — SODIUM CHLORIDE 3 MILLILITER(S): 9 INJECTION INTRAMUSCULAR; INTRAVENOUS; SUBCUTANEOUS at 06:00

## 2023-08-30 RX ADMIN — PANTOPRAZOLE SODIUM 40 MILLIGRAM(S): 20 TABLET, DELAYED RELEASE ORAL at 08:03

## 2023-08-30 RX ADMIN — SODIUM CHLORIDE 3 MILLILITER(S): 9 INJECTION INTRAMUSCULAR; INTRAVENOUS; SUBCUTANEOUS at 11:08

## 2023-08-30 RX ADMIN — SODIUM CHLORIDE 75 MILLILITER(S): 9 INJECTION, SOLUTION INTRAVENOUS at 19:08

## 2023-08-30 RX ADMIN — Medication 1 TABLET(S): at 17:20

## 2023-08-30 RX ADMIN — Medication 1 TABLET(S): at 08:03

## 2023-08-30 RX ADMIN — ATORVASTATIN CALCIUM 40 MILLIGRAM(S): 80 TABLET, FILM COATED ORAL at 22:14

## 2023-08-30 NOTE — DISCHARGE NOTE PROVIDER - CARE PROVIDER_API CALL
Nish Chan  Orthopaedic Surgery  [address] [facility]  [place] [id number]  Phone: [phone]  Fax: [phone]  Follow Up Time:

## 2023-08-30 NOTE — DISCHARGE NOTE PROVIDER - NSDCMRMEDTOKEN_GEN_ALL_CORE_FT
acetaminophen 325 mg oral tablet: 3 tab(s) orally every 8 hours  Acidophilus oral tablet: 2 tab(s) orally 2 times a day  AcipHex 20 mg oral delayed release tablet: 1 tab(s) orally once a day  aspirin 81 mg oral delayed release tablet: 1 tab(s) orally once a day  Benicar HCT 40 mg-12.5 mg oral tablet: 1 tab(s) orally once a day  biotin: 1 tab(s) orally once a day  Carafate: 1 tab(s) orally once a day  cholestyramine compounding powder: compounding once a day one scoop daily  Multiple Vitamins oral tablet: 1 tab(s) orally once a day  simvastatin 20 mg oral tablet: 1 tab(s) orally once a day (in the evening)  Vitamin D2: 1 tab(s) orally once a day   acetaminophen 325 mg oral tablet: 3 tab(s) orally every 8 hours  Acidophilus oral tablet: 2 tab(s) orally 2 times a day  AcipHex 20 mg oral delayed release tablet: 1 tab(s) orally once a day  aspirin 81 mg oral delayed release tablet: 1 tab(s) orally once a day  Benicar HCT 40 mg-12.5 mg oral tablet: 1 tab(s) orally once a day  biotin: 1 tab(s) orally once a day  Carafate: 1 tab(s) orally once a day  cholestyramine compounding powder: compounding once a day one scoop daily  cyclobenzaprine 10 mg oral tablet: 1 tab(s) orally every 8 hours MDD: 3  Multiple Vitamins oral tablet: 1 tab(s) orally once a day  Narcan 4 mg/0.1 mL nasal spray: 4 milligram(s) intranasally once , repeat as necessary.   As needed. For suspected opiate overdose   Follow instructions on packet MDD: 0.2 ml  oxyCODONE 5 mg oral tablet: 1 tab(s) orally every 4 hours as needed for  pain 1 tab for mild/moderate pain, 2 tabs for severe pain MDD: 6  polyethylene glycol 3350 oral powder for reconstitution: 17 gram(s) orally 2 times a day  senna leaf extract oral tablet: 2 tab(s) orally once a day (at bedtime)  simvastatin 20 mg oral tablet: 1 tab(s) orally once a day (in the evening)  Vitamin D2: 1 tab(s) orally once a day

## 2023-08-30 NOTE — DISCHARGE NOTE PROVIDER - NSDCFUADDINST_GEN_ALL_CORE_FT
36.8
Daily dressing changes. Dry clean dressing.   Do not shower until drainage stops from incision.   There are no staples or sutures to remove.

## 2023-08-30 NOTE — DISCHARGE NOTE PROVIDER - NSDCFUADDAPPT_GEN_ALL_CORE_FT
Follow up with your surgeon in two weeks. Call for appointment.    If you need more pain medication, call your surgeon's office. For medication refills or authorizations, please call [phone]    We recommend that you call and schedule a follow up appointment with your primary care physician for repeat blood work (CBC and BMP) for post hospital discharge follow-up care 2-4 weeks after your surgery.     Make sure to have a bowel movement by 2 days after surgery. Take stool softeners and laxatives as needed.     Call your surgeon if you have increased redness/pain/drainage or fever. Return to ER for shortness of breath/calf tenderness.

## 2023-08-30 NOTE — PROGRESS NOTE ADULT - SUBJECTIVE AND OBJECTIVE BOX
78yFemale s/p  Lami L3-5 POD#5. Pt feeling much better today.      PE:   Neuro: AAOX3 - responds appropriately after being spoken to in loud voice.   Spine: Saturated with serosanguinous. Incision with active serosanguinous drainage.   B/L UE: Skin intact. +ROM shoulder/elbow/wrist/fingers. +ok/thumbsup/fingercross signs.  strength: 5/5.  RP2+ NVI.   B/L LE: Skin intact. +ROM hip/knee/ankle/toes. Ankle Dorsi/plantarflexion: 5/5. Calf: soft, compressible and nontender. DP/PT 2+ NVI.                             11.4   13.50 )-----------( 462      ( 29 Aug 2023 09:45 )             35.5       08-29    137  |  107  |  34<H>  ----------------------------<  131<H>  4.1   |  29  |  1.04    Ca    8.5      29 Aug 2023 09:45          A/P: 78yFemale s/p Lami L3-5 POD#5  Dressing changed - new dry clean dressing applied with aquacell AQ. Dressing changes per nursing Q8hrs   Pain control: narcotic doses decreased secondary to poor mentation reports. oxy 5/10mg PRN  Ua postive - suspected UTI, started on macrobid yesterday. DEMETRA nguyễn today and follow up urine cultures.   PT: WBAT - spinal precautions   DVT ppx: SCDs   CXR: Atelectasis noted. Incentive spirometry encouraged   Medical consult appreciated  Nuerology consult appreciated - no acute pathology. No intervention at this tiime.   Discharge: planning for home with home care   All the above discussed and understood by pt  D/W Dr Chan     78yFemale s/p  Lami L3-5 POD#5. Pt feeling much better today.      PE:   Neuro: AAOX3 - responds appropriately after being spoken to in loud voice.   Spine: Saturated with serosanguinous. Incision with active serosanguinous drainage.   B/L UE: Skin intact. +ROM shoulder/elbow/wrist/fingers. +ok/thumbsup/fingercross signs.  strength: 5/5.  RP2+ NVI.   B/L LE: Skin intact. +ROM hip/knee/ankle/toes. Ankle Dorsi/plantarflexion: 5/5. Calf: soft, compressible and nontender. DP/PT 2+ NVI.                                         11.4   9.91  )-----------( 473      ( 30 Aug 2023 10:00 )             35.2       08-29    137  |  107  |  34<H>  ----------------------------<  131<H>  4.1   |  29  |  1.04    Ca    8.5      29 Aug 2023 09:45          A/P: 78yFemale s/p Lami L3-5 POD#5  Dressing changed - new dry clean dressing applied with aquacell AQ. Dressing changes per nursing Q8hrs   Pain control: narcotic doses decreased secondary to poor mentation reports. oxy 5/10mg PRN  Ua postive but urine culture no growth final. started on macrobid julia MCCRARY. Steph MCCRARY'd yesterday - voiding.   PT: WBAT - spinal precautions   DVT ppx: SCDs   CXR: Atelectasis noted. Incentive spirometry encouraged   Medical consult appreciated  Nuerology consult appreciated - no acute pathology. No intervention at this tiime.   Discharge: planning for home with home care   All the above discussed and understood by pt  D/W Dr Chan

## 2023-08-30 NOTE — CONSULT NOTE ADULT - SUBJECTIVE AND OBJECTIVE BOX
EVERETT WAY is a 78y Female s/p LEFT LAMINOFORAMINOTOMY L3-L4-L5 WITH IMAGE      w/ h/o Essential hypertension    Hyperlipemia    GERD (gastroesophageal reflux disease)    Obese    Eczema      denies any chest pain shortness of breath palpitation dizziness lightheadedness nausea vomiting fever or chills    Hiatal hernia    Status post total shoulder arthroplasty, right    S/P total knee arthroplasty, right    S/P knee replacement    S/P cholecystectomy    S/P knee replacement    S/P arthroscopy of shoulder    S/P laminectomy      Family history of diabetes mellitus (Mother)    Family history of stroke (Mother, Father)      SH: doesnot smoke or drink at this time    ceftriaxone (Rash)  Flagyl (Diarrhea; Rash)  Augmentin (Rash)  hibiclens - skin swelling (Other)  Bee Stings (Anaphylaxis)  meropenem-vaborbactam (Flushing)  Cipro (Unknown)    acetaminophen     Tablet .. 975 milliGRAM(s) Oral every 8 hours  aspirin enteric coated 81 milliGRAM(s) Oral daily  atorvastatin 40 milliGRAM(s) Oral at bedtime  cholestyramine Powder (Sugar-Free) 4 Gram(s) Oral daily  cyclobenzaprine 10 milliGRAM(s) Oral every 8 hours PRN  hydrochlorothiazide 12.5 milliGRAM(s) Oral daily  lactated ringers. 1000 milliLiter(s) IV Continuous <Continuous>  lactobacillus acidophilus 1 Tablet(s) Oral two times a day with meals  losartan 100 milliGRAM(s) Oral daily  melatonin 3 milliGRAM(s) Oral at bedtime  nitrofurantoin monohydrate/macrocrystals (MACROBID) 100 milliGRAM(s) Oral two times a day  ondansetron Injectable 4 milliGRAM(s) IV Push every 6 hours PRN  oxyCODONE    IR 15 milliGRAM(s) Oral every 4 hours PRN  oxyCODONE    IR 10 milliGRAM(s) Oral every 4 hours PRN  pantoprazole    Tablet 40 milliGRAM(s) Oral before breakfast  polyethylene glycol 3350 17 Gram(s) Oral two times a day  senna 2 Tablet(s) Oral at bedtime  sodium chloride 0.9% lock flush 3 milliLiter(s) IV Push every 8 hours  sucralfate 1 Gram(s) Oral once    T(C): 36.7 (08-28-23 @ 17:09), Max: 37.1 (08-28-23 @ 05:26)  HR: 88 (08-28-23 @ 17:09) (70 - 95)  BP: 145/72 (08-28-23 @ 17:09) (107/51 - 145/72)  RR: 18 (08-28-23 @ 17:09) (18 - 18)  SpO2: 96% (08-28-23 @ 17:09) (95% - 100%)  HEENT unremarkable  neck no JVD or bruit  heart normal S1 S2 RRR no gallops or rubs  chest clear to auscultation  abd sof nontender non distended +bs  ext no calf tenderness    A/P   pt was reportedly more lethargic cth neg nonfocal neuro exam  dtr reports more alert minimize narcotics as much as possible  on Macrobid for uti  ^wbc monitor  ? post op vs uti fu ucx  ^cr hold hctz  and losartan encourage hydration fu labs  DVT PX  pain control  bowel regimen   wound care as per ortho  GI PX  antiemetics prn  incentive spirometer
Neurology consult    EVERETT [id number]     Patient is a 78y old  Female who presents with a chief complaint of radiculopathy (30 Aug 2023 09:29)      HPI:  s/p lumbar fusion. Neurologycalled for post op confusion since improved    MEDICATIONS    acetaminophen     Tablet .. 975 milliGRAM(s) Oral every 8 hours  aspirin enteric coated 81 milliGRAM(s) Oral daily  atorvastatin 40 milliGRAM(s) Oral at bedtime  cholestyramine Powder (Sugar-Free) 4 Gram(s) Oral daily  cyclobenzaprine 10 milliGRAM(s) Oral every 8 hours PRN  hydrochlorothiazide 12.5 milliGRAM(s) Oral daily  lactated ringers. 1000 milliLiter(s) IV Continuous <Continuous>  lactobacillus acidophilus 1 Tablet(s) Oral two times a day with meals  losartan 100 milliGRAM(s) Oral daily  melatonin 3 milliGRAM(s) Oral at bedtime  nitrofurantoin monohydrate/macrocrystals (MACROBID) 100 milliGRAM(s) Oral two times a day  ondansetron Injectable 4 milliGRAM(s) IV Push every 6 hours PRN  oxyCODONE    IR 5 milliGRAM(s) Oral every 4 hours PRN  oxyCODONE    IR 10 milliGRAM(s) Oral every 4 hours PRN  pantoprazole    Tablet 40 milliGRAM(s) Oral before breakfast  polyethylene glycol 3350 17 Gram(s) Oral two times a day  senna 2 Tablet(s) Oral at bedtime  sodium chloride 0.9% lock flush 3 milliLiter(s) IV Push every 8 hours  sucralfate 1 Gram(s) Oral once      PMH: Essential hypertension    Hyperlipemia    GERD (gastroesophageal reflux disease)    Obese    Eczema         PSH: Hiatal hernia    Status post total shoulder arthroplasty, right    S/P total knee arthroplasty, right    S/P knee replacement    S/P cholecystectomy    S/P knee replacement    S/P arthroscopy of shoulder    S/P laminectomy      FAMILY HISTORY:  Family history of diabetes mellitus (Mother)    Family history of stroke (Mother, Father)        SOCIAL HISTORY:  No history of tobacco or alcohol use     Allergies    ceftriaxone (Rash)  Flagyl (Diarrhea; Rash)  Augmentin (Rash)  hibiclens - skin swelling (Other)  Bee Stings (Anaphylaxis)  meropenem-vaborbactam (Flushing)  Cipro (Unknown)    Intolerances            Vital Signs Last 24 Hrs  T(C): 36.5 (30 Aug 2023 05:35), Max: 36.6 (30 Aug 2023 00:15)  T(F): 97.7 (30 Aug 2023 05:35), Max: 97.9 (30 Aug 2023 00:15)  HR: 73 (30 Aug 2023 05:35) (73 - 74)  BP: 155/85 (30 Aug 2023 05:35) (135/61 - 155/85)  BP(mean): --  RR: 16 (30 Aug 2023 05:35) (16 - 16)  SpO2: 94% (30 Aug 2023 05:35) (93% - 94%)    Parameters below as of 30 Aug 2023 05:35  Patient On (Oxygen Delivery Method): room air          On Neurological Examination:    Mental Status - Patient is alert, awake, oriented X3. fluent, names, no dysarthria no aphasia Follows commands well and able to answer questions appropriately. Mood and affect  normal    Cranial Nerves - PERRL, EOMI, VFF, V1-V3 intact, no gross facial asymmetry, tongue/uvula midline    Motor Exam -   UEs 5/5 Lowers 4+/5 pain limited  Sensory    Intact to light touch and pinprick bilaterally    Coord: FTN intact bilaterally     Gait -  normal      Reflexes:          2+ throughout                                                         LABS:  CBC Full  -  ( 29 Aug 2023 09:45 )  WBC Count : 13.50 K/uL  RBC Count : 3.77 M/uL  Hemoglobin : 11.4 g/dL  Hematocrit : 35.5 %  Platelet Count - Automated : 462 K/uL  Mean Cell Volume : 94.2 fl  Mean Cell Hemoglobin : 30.2 pg  Mean Cell Hemoglobin Concentration : 32.1 g/dL  Auto Neutrophil # : x  Auto Lymphocyte # : x  Auto Monocyte # : x  Auto Eosinophil # : x  Auto Basophil # : x  Auto Neutrophil % : x  Auto Lymphocyte % : x  Auto Monocyte % : x  Auto Eosinophil % : x  Auto Basophil % : x    Urinalysis Basic - ( 29 Aug 2023 09:45 )    Color: x / Appearance: x / SG: x / pH: x  Gluc: 131 mg/dL / Ketone: x  / Bili: x / Urobili: x   Blood: x / Protein: x / Nitrite: x   Leuk Esterase: x / RBC: x / WBC x   Sq Epi: x / Non Sq Epi: x / Bacteria: x      08-29    137  |  107  |  34<H>  ----------------------------<  131<H>  4.1   |  29  |  1.04    Ca    8.5      29 Aug 2023 09:45        Hemoglobin A1C:             RADIOLOGY  < from: CT Head No Cont (08.28.23 @ 00:06) >  IMPRESSION:  No acute intracranial hemorrhage, vasogenic edema or hydrocephalus. Mild   chronic microvascular changes.    Bilateral middle ear and mastoid opacification.    --- End of Report ---        < end of copied text >

## 2023-08-30 NOTE — DISCHARGE NOTE NURSING/CASE MANAGEMENT/SOCIAL WORK - NSDCFUADDAPPT_GEN_ALL_CORE_FT
Follow up with your surgeon in two weeks. Call for appointment.    If you need more pain medication, call your surgeon's office. For medication refills or authorizations, please call [phone]    We recommend that you call and schedule a follow up appointment with your primary care physician for repeat blood work (CBC and BMP) for post hospital discharge follow-up care 2-4 weeks after your surgery.     Make sure to have a bowel movement by 2 days after surgery. Take stool softeners and laxatives as needed.     Call your surgeon if you have increased redness/pain/drainage or fever. Return to ER for shortness of breath/calf tenderness.

## 2023-08-30 NOTE — PROGRESS NOTE ADULT - SUBJECTIVE AND OBJECTIVE BOX
EVERETT WAY is a 78y Female s/p LEFT LAMINOFORAMINOTOMY L3-L4-L5 WITH IMAGE        denies any chest pain shortness of breath palpitation dizziness lightheadedness nausea vomiting fever or chills    T(C): 36.5 (08-30-23 @ 05:35), Max: 36.6 (08-30-23 @ 00:15)  HR: 73 (08-30-23 @ 05:35) (73 - 74)  BP: 155/85 (08-30-23 @ 05:35) (135/61 - 155/85)  RR: 16 (08-30-23 @ 05:35) (16 - 16)  SpO2: 94% (08-30-23 @ 05:35) (93% - 94%)  no jvd/bruit  s1 s2 rrr  cta  s/nt/nd  no calf tend                          11.4   9.91  )-----------( 473      ( 30 Aug 2023 10:00 )             35.2   08-29    137  |  107  |  34<H>  ----------------------------<  131<H>  4.1   |  29  |  1.04    Ca    8.5      29 Aug 2023 09:45      cont dvt px  pain control  bowel regimen  antiemetics  incentive spirometer

## 2023-08-30 NOTE — DISCHARGE NOTE PROVIDER - NSDCCPCAREPLAN_GEN_ALL_CORE_FT
PRINCIPAL DISCHARGE DIAGNOSIS  Diagnosis: Lumbar radiculopathy  Assessment and Plan of Treatment:       SECONDARY DISCHARGE DIAGNOSES  Diagnosis: Urinary tract infection  Assessment and Plan of Treatment:     Diagnosis: Acute kidney injury  Assessment and Plan of Treatment:

## 2023-08-30 NOTE — DISCHARGE NOTE NURSING/CASE MANAGEMENT/SOCIAL WORK - NSDCPEFALRISK_GEN_ALL_CORE
For information on Fall & Injury Prevention, visit: [email or web address] OR  [email or web address] OR  https://www.cdc.gov/steadi/patient.html

## 2023-08-30 NOTE — CONSULT NOTE ADULT - ASSESSMENT
78yFemale s/p  Lami L3-5 POD#5. . Daughter concerned about slowed mentation since OR. PE today non-focal intact mentation  CT head negative    Impression: confusion in setting of postanesthesia, poly pharmacy improved      CTH negative  -No further inpatient Neurologic workup at this time

## 2023-08-30 NOTE — DISCHARGE NOTE PROVIDER - HOSPITAL COURSE
78yFemale with history of radiculopathy presenting for laminectomy L3-5 by Dr. Nish Chan on 8/30/23. Risk and benefits of surgery were explained to the patient. The patient understood and agreed to proceed with surgery. Patient underwent the procedure with no intraoperative complications. Pt was brought in stable condition to the PACU. Once stable in PACU, pt was brought to the floor. Pt had some confusion: labs were done and CXR showed some mild atelectasis, incentive spirometer was encouraged. She had an acute kidney injury which resolved. She had a UA done which showed a suspected UTI and she was started on antibiotics. Her urine culture was negative. During hospital stay pt was followed by Medicine, and neurology during this admission. There were no recommendations made for interventions. Pt hospital course was otherwise unremarkable. Pt is stable for discharge to home with home  care on POD# 78yFemale with history of radiculopathy presenting for laminectomy L3-5 by Dr. Nish Chan on 8/30/23. Risk and benefits of surgery were explained to the patient. The patient understood and agreed to proceed with surgery. Patient underwent the procedure with no intraoperative complications. Pt was brought in stable condition to the PACU. Once stable in PACU, pt was brought to the floor. Pt had some confusion: labs were done and CXR showed some mild atelectasis, incentive spirometer was encouraged. She had an acute kidney injury which resolved. She had a UA done which showed a suspected UTI and she was started on antibiotics. Her urine culture was negative. During hospital stay pt was followed by Medicine, and neurology during this admission. There were no recommendations made for interventions. Pt hospital course was otherwise unremarkable. Pt is stable for discharge to home with home  care on POD# 5 78yFemale with history of radiculopathy presenting for laminectomy L3-5 by Dr. Nish Chan on 8/30/23. Risk and benefits of surgery were explained to the patient. The patient understood and agreed to proceed with surgery. Patient underwent the procedure with no intraoperative complications. Pt was brought in stable condition to the PACU. Once stable in PACU, pt was brought to the floor. Pt had some confusion: labs were done and CXR showed some mild atelectasis, incentive spirometer was encouraged. She had an acute kidney injury which resolved. She had a UA done which showed a suspected UTI and she was started on antibiotics. Her urine culture was negative. During hospital stay pt was followed by Medicine, and neurology during this admission. There were no recommendations made for interventions. Pt hospital course was otherwise unremarkable. Pt is stable for discharge to home with home  care on POD# 6 once stable per physical therapy.

## 2023-08-31 VITALS
HEART RATE: 67 BPM | TEMPERATURE: 98 F | RESPIRATION RATE: 18 BRPM | SYSTOLIC BLOOD PRESSURE: 168 MMHG | OXYGEN SATURATION: 97 % | DIASTOLIC BLOOD PRESSURE: 86 MMHG

## 2023-08-31 RX ADMIN — LOSARTAN POTASSIUM 100 MILLIGRAM(S): 100 TABLET, FILM COATED ORAL at 05:39

## 2023-08-31 RX ADMIN — PANTOPRAZOLE SODIUM 40 MILLIGRAM(S): 20 TABLET, DELAYED RELEASE ORAL at 05:38

## 2023-08-31 RX ADMIN — Medication 81 MILLIGRAM(S): at 11:32

## 2023-08-31 RX ADMIN — Medication 1 TABLET(S): at 07:49

## 2023-08-31 RX ADMIN — CHOLESTYRAMINE 4 GRAM(S): 4 POWDER, FOR SUSPENSION ORAL at 09:03

## 2023-08-31 RX ADMIN — Medication 975 MILLIGRAM(S): at 05:38

## 2023-08-31 RX ADMIN — SODIUM CHLORIDE 3 MILLILITER(S): 9 INJECTION INTRAMUSCULAR; INTRAVENOUS; SUBCUTANEOUS at 05:35

## 2023-08-31 NOTE — PROGRESS NOTE ADULT - SUBJECTIVE AND OBJECTIVE BOX
78yFemale s/p  Lami L3-5 POD#6. Pt feeling much better today.  Hearing improved.     PE:   Neuro: AAOX3, no gross hearing deficits.    Spine: Dressing Saturated with serosanguinous. Incision with no active serosanguinous drainage.   B/L UE: Skin intact. +ROM shoulder/elbow/wrist/fingers. +ok/thumbsup/fingercross signs.  strength: 5/5.  RP2+ NVI.   B/L LE: Skin intact. +ROM hip/knee/ankle/toes. Ankle Dorsi/plantarflexion: 5/5. Calf: soft, compressible and nontender. DP/PT 2+ NVI.                                         11.4   9.91  )-----------( 473      ( 30 Aug 2023 10:00 )             35.2       08-29    137  |  107  |  34<H>  ----------------------------<  131<H>  4.1   |  29  |  1.04    Ca    8.5      29 Aug 2023 09:45          A/P: 78yFemale s/p Lami L3-5 POD#6  Dressing changed - new dry clean dressing applied with aquacell AQ. Dressing changes per nursing Q8hrs   Pain controlled  PT: WBAT - spinal precautions   DVT ppx: SCDs   CXR: Atelectasis noted. Incentive spirometry encouraged   Medically stable for DC home   Nuerologically stable for DC home   Discharge: planning for home with home care today  All the above discussed and understood by pt  D/W Dr Chan

## 2023-08-31 NOTE — PROGRESS NOTE ADULT - SUBJECTIVE AND OBJECTIVE BOX
EVERETT WAY is a 78y Female s/p LEFT LAMINOFORAMINOTOMY L3-L4-L5 WITH IMAGE        denies any chest pain shortness of breath palpitation dizziness lightheadedness nausea vomiting fever or chills    T(C): 36.4 (08-31-23 @ 05:12), Max: 36.6 (08-30-23 @ 23:49)  HR: 71 (08-31-23 @ 05:12) (65 - 71)  BP: 169/79 (08-31-23 @ 05:12) (126/62 - 169/79)  RR: 17 (08-31-23 @ 05:12) (17 - 17)  SpO2: 96% (08-31-23 @ 05:12) (94% - 96%)  no jvd/bruit  s1 s2 rrr  cta  s/nt/nd  no calf tend                        11.4   9.91  )-----------( 473      ( 30 Aug 2023 10:00 )             35.2       cont dvt px  pain control  bowel regimen  antiemetics  incentive spirometer

## 2023-08-31 NOTE — PROGRESS NOTE ADULT - NUTRITIONAL ASSESSMENT
This patient has been assessed with a concern for Malnutrition and has been determined to have a diagnosis/diagnoses of Morbid obesity (BMI > 40).    This patient is being managed with:   Diet Regular-  Entered: Aug 25 2023  9:04PM  

## 2023-08-31 NOTE — PROGRESS NOTE ADULT - PROVIDER SPECIALTY LIST ADULT
Internal Medicine
Internal Medicine
Orthopedics
Internal Medicine
Orthopedics

## 2023-09-05 ENCOUNTER — APPOINTMENT (OUTPATIENT)
Dept: ORTHOPEDIC SURGERY | Facility: CLINIC | Age: 78
End: 2023-09-05

## 2023-09-05 DIAGNOSIS — L30.9 DERMATITIS, UNSPECIFIED: ICD-10-CM

## 2023-09-05 DIAGNOSIS — Z88.1 ALLERGY STATUS TO OTHER ANTIBIOTIC AGENTS STATUS: ICD-10-CM

## 2023-09-05 DIAGNOSIS — M48.061 SPINAL STENOSIS, LUMBAR REGION WITHOUT NEUROGENIC CLAUDICATION: ICD-10-CM

## 2023-09-05 DIAGNOSIS — E78.5 HYPERLIPIDEMIA, UNSPECIFIED: ICD-10-CM

## 2023-09-05 DIAGNOSIS — J98.11 ATELECTASIS: ICD-10-CM

## 2023-09-05 DIAGNOSIS — Z98.1 ARTHRODESIS STATUS: ICD-10-CM

## 2023-09-05 DIAGNOSIS — Z88.8 ALLERGY STATUS TO OTHER DRUGS, MEDICAMENTS AND BIOLOGICAL SUBSTANCES: ICD-10-CM

## 2023-09-05 DIAGNOSIS — Z87.891 PERSONAL HISTORY OF NICOTINE DEPENDENCE: ICD-10-CM

## 2023-09-05 DIAGNOSIS — I10 ESSENTIAL (PRIMARY) HYPERTENSION: ICD-10-CM

## 2023-09-05 DIAGNOSIS — E66.01 MORBID (SEVERE) OBESITY DUE TO EXCESS CALORIES: ICD-10-CM

## 2023-09-05 DIAGNOSIS — N39.0 URINARY TRACT INFECTION, SITE NOT SPECIFIED: ICD-10-CM

## 2023-09-05 DIAGNOSIS — N17.9 ACUTE KIDNEY FAILURE, UNSPECIFIED: ICD-10-CM

## 2023-09-05 DIAGNOSIS — M54.16 RADICULOPATHY, LUMBAR REGION: ICD-10-CM

## 2023-09-05 DIAGNOSIS — Z96.653 PRESENCE OF ARTIFICIAL KNEE JOINT, BILATERAL: ICD-10-CM

## 2023-09-05 DIAGNOSIS — K21.9 GASTRO-ESOPHAGEAL REFLUX DISEASE WITHOUT ESOPHAGITIS: ICD-10-CM

## 2023-09-12 ENCOUNTER — APPOINTMENT (OUTPATIENT)
Dept: ORTHOPEDIC SURGERY | Facility: CLINIC | Age: 78
End: 2023-09-12
Payer: MEDICARE

## 2023-09-12 VITALS — HEIGHT: 64 IN | WEIGHT: 247 LBS | BODY MASS INDEX: 42.17 KG/M2

## 2023-09-12 PROCEDURE — 72100 X-RAY EXAM L-S SPINE 2/3 VWS: CPT

## 2023-09-12 PROCEDURE — 99024 POSTOP FOLLOW-UP VISIT: CPT

## 2023-10-02 ENCOUNTER — APPOINTMENT (OUTPATIENT)
Dept: ORTHOPEDIC SURGERY | Facility: CLINIC | Age: 78
End: 2023-10-02
Payer: MEDICARE

## 2023-10-02 VITALS — BODY MASS INDEX: 42.17 KG/M2 | WEIGHT: 247 LBS | HEIGHT: 64 IN

## 2023-10-02 DIAGNOSIS — E66.01 MORBID (SEVERE) OBESITY DUE TO EXCESS CALORIES: ICD-10-CM

## 2023-10-02 DIAGNOSIS — T81.31XD DISRUPTION OF EXTERNAL OPERATION (SURGICAL) WOUND, NOT ELSEWHERE CLASSIFIED, SUBSEQUENT ENCOUNTER: ICD-10-CM

## 2023-10-02 PROCEDURE — 99024 POSTOP FOLLOW-UP VISIT: CPT

## 2023-10-03 PROBLEM — E66.01 OBESITY, MORBID (MORE THAN 100 LBS OVER IDEAL WEIGHT OR BMI > 40): Status: ACTIVE | Noted: 2023-10-03

## 2023-10-03 PROBLEM — E66.01 MORBID OBESITY: Status: ACTIVE | Noted: 2023-10-03

## 2023-10-12 PROBLEM — T81.31XD POSTOPERATIVE WOUND DEHISCENCE, SUBSEQUENT ENCOUNTER: Status: ACTIVE | Noted: 2023-10-12

## 2023-10-12 RX ORDER — FOAM BANDAGE 8"X5.5"
BANDAGE TOPICAL
Qty: 7 | Refills: 0 | Status: ACTIVE | COMMUNITY
Start: 2023-10-12 | End: 1900-01-01

## 2023-10-17 ENCOUNTER — TRANSCRIPTION ENCOUNTER (OUTPATIENT)
Age: 78
End: 2023-10-17

## 2023-10-17 RX ORDER — SODIUM CHLORIDE 9 MG/ML
3 INJECTION INTRAMUSCULAR; INTRAVENOUS; SUBCUTANEOUS EVERY 8 HOURS
Refills: 0 | Status: DISCONTINUED | OUTPATIENT
Start: 2023-10-18 | End: 2023-11-08

## 2023-10-18 ENCOUNTER — TRANSCRIPTION ENCOUNTER (OUTPATIENT)
Age: 78
End: 2023-10-18

## 2023-10-18 ENCOUNTER — INPATIENT (INPATIENT)
Facility: HOSPITAL | Age: 78
LOS: 20 days | Discharge: HOME | End: 2023-11-08
Attending: ORTHOPAEDIC SURGERY | Admitting: ORTHOPAEDIC SURGERY
Payer: OTHER MISCELLANEOUS

## 2023-10-18 ENCOUNTER — APPOINTMENT (OUTPATIENT)
Dept: ORTHOPEDIC SURGERY | Facility: HOSPITAL | Age: 78
End: 2023-10-18
Payer: MEDICARE

## 2023-10-18 VITALS
RESPIRATION RATE: 16 BRPM | OXYGEN SATURATION: 97 % | SYSTOLIC BLOOD PRESSURE: 140 MMHG | TEMPERATURE: 98 F | WEIGHT: 246.04 LBS | HEART RATE: 74 BPM | HEIGHT: 64 IN | DIASTOLIC BLOOD PRESSURE: 69 MMHG

## 2023-10-18 DIAGNOSIS — Z96.611 PRESENCE OF RIGHT ARTIFICIAL SHOULDER JOINT: Chronic | ICD-10-CM

## 2023-10-18 DIAGNOSIS — Z96.653 PRESENCE OF ARTIFICIAL KNEE JOINT, BILATERAL: ICD-10-CM

## 2023-10-18 DIAGNOSIS — Z96.651 PRESENCE OF RIGHT ARTIFICIAL KNEE JOINT: Chronic | ICD-10-CM

## 2023-10-18 DIAGNOSIS — T81.31XA DISRUPTION OF EXTERNAL OPERATION (SURGICAL) WOUND, NOT ELSEWHERE CLASSIFIED, INITIAL ENCOUNTER: ICD-10-CM

## 2023-10-18 DIAGNOSIS — Z98.890 OTHER SPECIFIED POSTPROCEDURAL STATES: Chronic | ICD-10-CM
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
## 2023-10-18 DIAGNOSIS — Z90.49 ACQUIRED ABSENCE OF OTHER SPECIFIED PARTS OF DIGESTIVE TRACT: Chronic | ICD-10-CM

## 2023-10-18 DIAGNOSIS — Z98.1 ARTHRODESIS STATUS: ICD-10-CM

## 2023-10-18 DIAGNOSIS — I10 ESSENTIAL (PRIMARY) HYPERTENSION: ICD-10-CM

## 2023-10-18 DIAGNOSIS — Z96.659 PRESENCE OF UNSPECIFIED ARTIFICIAL KNEE JOINT: Chronic | ICD-10-CM

## 2023-10-18 DIAGNOSIS — E78.5 HYPERLIPIDEMIA, UNSPECIFIED: ICD-10-CM

## 2023-10-18 DIAGNOSIS — Z90.49 ACQUIRED ABSENCE OF OTHER SPECIFIED PARTS OF DIGESTIVE TRACT: ICD-10-CM
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
## 2023-10-18 DIAGNOSIS — Z79.82 LONG TERM (CURRENT) USE OF ASPIRIN: ICD-10-CM

## 2023-10-18 DIAGNOSIS — Z88.1 ALLERGY STATUS TO OTHER ANTIBIOTIC AGENTS STATUS: ICD-10-CM

## 2023-10-18 DIAGNOSIS — E66.01 MORBID (SEVERE) OBESITY DUE TO EXCESS CALORIES: ICD-10-CM

## 2023-10-18 DIAGNOSIS — T36.95XA ADVERSE EFFECT OF UNSPECIFIED SYSTEMIC ANTIBIOTIC, INITIAL ENCOUNTER: ICD-10-CM

## 2023-10-18 DIAGNOSIS — L27.0 GENERALIZED SKIN ERUPTION DUE TO DRUGS AND MEDICAMENTS TAKEN INTERNALLY: ICD-10-CM

## 2023-10-18 DIAGNOSIS — K21.9 GASTRO-ESOPHAGEAL REFLUX DISEASE WITHOUT ESOPHAGITIS: ICD-10-CM

## 2023-10-18 LAB
ANION GAP SERPL CALC-SCNC: 6 MMOL/L — SIGNIFICANT CHANGE UP (ref 5–17)
ANION GAP SERPL CALC-SCNC: 6 MMOL/L — SIGNIFICANT CHANGE UP (ref 5–17)
BASOPHILS # BLD AUTO: 0.1 K/UL — SIGNIFICANT CHANGE UP (ref 0–0.2)
BASOPHILS # BLD AUTO: 0.1 K/UL — SIGNIFICANT CHANGE UP (ref 0–0.2)
BASOPHILS NFR BLD AUTO: 0.9 % — SIGNIFICANT CHANGE UP (ref 0–2)
BASOPHILS NFR BLD AUTO: 0.9 % — SIGNIFICANT CHANGE UP (ref 0–2)
BUN SERPL-MCNC: 25 MG/DL — HIGH (ref 7–23)
BUN SERPL-MCNC: 25 MG/DL — HIGH (ref 7–23)
CALCIUM SERPL-MCNC: 8.4 MG/DL — LOW (ref 8.5–10.1)
CALCIUM SERPL-MCNC: 8.4 MG/DL — LOW (ref 8.5–10.1)
CHLORIDE SERPL-SCNC: 109 MMOL/L — HIGH (ref 96–108)
CHLORIDE SERPL-SCNC: 109 MMOL/L — HIGH (ref 96–108)
CO2 SERPL-SCNC: 25 MMOL/L — SIGNIFICANT CHANGE UP (ref 22–31)
CO2 SERPL-SCNC: 25 MMOL/L — SIGNIFICANT CHANGE UP (ref 22–31)
CREAT SERPL-MCNC: 1.2 MG/DL — SIGNIFICANT CHANGE UP (ref 0.5–1.3)
CREAT SERPL-MCNC: 1.2 MG/DL — SIGNIFICANT CHANGE UP (ref 0.5–1.3)
EGFR: 46 ML/MIN/1.73M2 — LOW
EGFR: 46 ML/MIN/1.73M2 — LOW
EOSINOPHIL # BLD AUTO: 0.14 K/UL — SIGNIFICANT CHANGE UP (ref 0–0.5)
EOSINOPHIL # BLD AUTO: 0.14 K/UL — SIGNIFICANT CHANGE UP (ref 0–0.5)
EOSINOPHIL NFR BLD AUTO: 1.3 % — SIGNIFICANT CHANGE UP (ref 0–6)
EOSINOPHIL NFR BLD AUTO: 1.3 % — SIGNIFICANT CHANGE UP (ref 0–6)
GLUCOSE SERPL-MCNC: 145 MG/DL — HIGH (ref 70–99)
GLUCOSE SERPL-MCNC: 145 MG/DL — HIGH (ref 70–99)
HCT VFR BLD CALC: 36.9 % — SIGNIFICANT CHANGE UP (ref 34.5–45)
HCT VFR BLD CALC: 36.9 % — SIGNIFICANT CHANGE UP (ref 34.5–45)
HGB BLD-MCNC: 12.1 G/DL — SIGNIFICANT CHANGE UP (ref 11.5–15.5)
HGB BLD-MCNC: 12.1 G/DL — SIGNIFICANT CHANGE UP (ref 11.5–15.5)
IMM GRANULOCYTES NFR BLD AUTO: 0.8 % — SIGNIFICANT CHANGE UP (ref 0–0.9)
IMM GRANULOCYTES NFR BLD AUTO: 0.8 % — SIGNIFICANT CHANGE UP (ref 0–0.9)
LYMPHOCYTES # BLD AUTO: 1.57 K/UL — SIGNIFICANT CHANGE UP (ref 1–3.3)
LYMPHOCYTES # BLD AUTO: 1.57 K/UL — SIGNIFICANT CHANGE UP (ref 1–3.3)
LYMPHOCYTES # BLD AUTO: 14.6 % — SIGNIFICANT CHANGE UP (ref 13–44)
LYMPHOCYTES # BLD AUTO: 14.6 % — SIGNIFICANT CHANGE UP (ref 13–44)
MCHC RBC-ENTMCNC: 30.9 PG — SIGNIFICANT CHANGE UP (ref 27–34)
MCHC RBC-ENTMCNC: 30.9 PG — SIGNIFICANT CHANGE UP (ref 27–34)
MCHC RBC-ENTMCNC: 32.8 G/DL — SIGNIFICANT CHANGE UP (ref 32–36)
MCHC RBC-ENTMCNC: 32.8 G/DL — SIGNIFICANT CHANGE UP (ref 32–36)
MCV RBC AUTO: 94.4 FL — SIGNIFICANT CHANGE UP (ref 80–100)
MCV RBC AUTO: 94.4 FL — SIGNIFICANT CHANGE UP (ref 80–100)
MONOCYTES # BLD AUTO: 0.43 K/UL — SIGNIFICANT CHANGE UP (ref 0–0.9)
MONOCYTES # BLD AUTO: 0.43 K/UL — SIGNIFICANT CHANGE UP (ref 0–0.9)
MONOCYTES NFR BLD AUTO: 4 % — SIGNIFICANT CHANGE UP (ref 2–14)
MONOCYTES NFR BLD AUTO: 4 % — SIGNIFICANT CHANGE UP (ref 2–14)
NEUTROPHILS # BLD AUTO: 8.46 K/UL — HIGH (ref 1.8–7.4)
NEUTROPHILS # BLD AUTO: 8.46 K/UL — HIGH (ref 1.8–7.4)
NEUTROPHILS NFR BLD AUTO: 78.4 % — HIGH (ref 43–77)
NEUTROPHILS NFR BLD AUTO: 78.4 % — HIGH (ref 43–77)
NRBC # BLD: 0 /100 WBCS — SIGNIFICANT CHANGE UP (ref 0–0)
NRBC # BLD: 0 /100 WBCS — SIGNIFICANT CHANGE UP (ref 0–0)
PLATELET # BLD AUTO: 523 K/UL — HIGH (ref 150–400)
PLATELET # BLD AUTO: 523 K/UL — HIGH (ref 150–400)
POTASSIUM SERPL-MCNC: 4.4 MMOL/L — SIGNIFICANT CHANGE UP (ref 3.5–5.3)
POTASSIUM SERPL-MCNC: 4.4 MMOL/L — SIGNIFICANT CHANGE UP (ref 3.5–5.3)
POTASSIUM SERPL-SCNC: 4.4 MMOL/L — SIGNIFICANT CHANGE UP (ref 3.5–5.3)
POTASSIUM SERPL-SCNC: 4.4 MMOL/L — SIGNIFICANT CHANGE UP (ref 3.5–5.3)
RBC # BLD: 3.91 M/UL — SIGNIFICANT CHANGE UP (ref 3.8–5.2)
RBC # BLD: 3.91 M/UL — SIGNIFICANT CHANGE UP (ref 3.8–5.2)
RBC # FLD: 15.8 % — HIGH (ref 10.3–14.5)
RBC # FLD: 15.8 % — HIGH (ref 10.3–14.5)
SODIUM SERPL-SCNC: 140 MMOL/L — SIGNIFICANT CHANGE UP (ref 135–145)
SODIUM SERPL-SCNC: 140 MMOL/L — SIGNIFICANT CHANGE UP (ref 135–145)
WBC # BLD: 10.79 K/UL — HIGH (ref 3.8–10.5)
WBC # BLD: 10.79 K/UL — HIGH (ref 3.8–10.5)
WBC # FLD AUTO: 10.79 K/UL — HIGH (ref 3.8–10.5)
WBC # FLD AUTO: 10.79 K/UL — HIGH (ref 3.8–10.5)

## 2023-10-18 PROCEDURE — 99223 1ST HOSP IP/OBS HIGH 75: CPT

## 2023-10-18 PROCEDURE — 22015 I&D ABSCESS P-SPINE L/S/LS: CPT | Mod: 78

## 2023-10-18 RX ORDER — HYDROMORPHONE HYDROCHLORIDE 2 MG/ML
0.5 INJECTION INTRAMUSCULAR; INTRAVENOUS; SUBCUTANEOUS EVERY 4 HOURS
Refills: 0 | Status: DISCONTINUED | OUTPATIENT
Start: 2023-10-18 | End: 2023-10-18

## 2023-10-18 RX ORDER — RABEPRAZOLE 20 MG/1
1 TABLET, DELAYED RELEASE ORAL
Qty: 0 | Refills: 0 | DISCHARGE

## 2023-10-18 RX ORDER — VANCOMYCIN HCL 1 G
1250 VIAL (EA) INTRAVENOUS EVERY 24 HOURS
Refills: 0 | Status: DISCONTINUED | OUTPATIENT
Start: 2023-10-18 | End: 2023-10-18

## 2023-10-18 RX ORDER — SUCRALFATE 1 G
1 TABLET ORAL ONCE
Refills: 0 | Status: DISCONTINUED | OUTPATIENT
Start: 2023-10-18 | End: 2023-11-08

## 2023-10-18 RX ORDER — HYDROMORPHONE HYDROCHLORIDE 2 MG/ML
0.5 INJECTION INTRAMUSCULAR; INTRAVENOUS; SUBCUTANEOUS
Refills: 0 | Status: DISCONTINUED | OUTPATIENT
Start: 2023-10-18 | End: 2023-10-20

## 2023-10-18 RX ORDER — AZTREONAM 2 G
2000 VIAL (EA) INJECTION EVERY 8 HOURS
Refills: 0 | Status: DISCONTINUED | OUTPATIENT
Start: 2023-10-18 | End: 2023-10-19

## 2023-10-18 RX ORDER — ATORVASTATIN CALCIUM 80 MG/1
10 TABLET, FILM COATED ORAL AT BEDTIME
Refills: 0 | Status: DISCONTINUED | OUTPATIENT
Start: 2023-10-18 | End: 2023-11-08

## 2023-10-18 RX ORDER — LOSARTAN POTASSIUM 100 MG/1
100 TABLET, FILM COATED ORAL DAILY
Refills: 0 | Status: DISCONTINUED | OUTPATIENT
Start: 2023-10-18 | End: 2023-11-08

## 2023-10-18 RX ORDER — PANTOPRAZOLE SODIUM 20 MG/1
40 TABLET, DELAYED RELEASE ORAL
Refills: 0 | Status: DISCONTINUED | OUTPATIENT
Start: 2023-10-18 | End: 2023-11-08

## 2023-10-18 RX ORDER — SODIUM CHLORIDE 9 MG/ML
1000 INJECTION, SOLUTION INTRAVENOUS
Refills: 0 | Status: DISCONTINUED | OUTPATIENT
Start: 2023-10-18 | End: 2023-11-05

## 2023-10-18 RX ORDER — POLYETHYLENE GLYCOL 3350 17 G/17G
17 POWDER, FOR SOLUTION ORAL
Refills: 0 | Status: DISCONTINUED | OUTPATIENT
Start: 2023-10-18 | End: 2023-11-08

## 2023-10-18 RX ORDER — MAGNESIUM HYDROXIDE 400 MG/1
30 TABLET, CHEWABLE ORAL EVERY 12 HOURS
Refills: 0 | Status: DISCONTINUED | OUTPATIENT
Start: 2023-10-18 | End: 2023-11-08

## 2023-10-18 RX ORDER — SODIUM CHLORIDE 9 MG/ML
1000 INJECTION, SOLUTION INTRAVENOUS
Refills: 0 | Status: DISCONTINUED | OUTPATIENT
Start: 2023-10-18 | End: 2023-10-18

## 2023-10-18 RX ORDER — CHOLESTYRAMINE 4 G/9G
1 POWDER, FOR SUSPENSION ORAL
Refills: 0 | DISCHARGE

## 2023-10-18 RX ORDER — LANOLIN ALCOHOL/MO/W.PET/CERES
3 CREAM (GRAM) TOPICAL AT BEDTIME
Refills: 0 | Status: DISCONTINUED | OUTPATIENT
Start: 2023-10-18 | End: 2023-11-08

## 2023-10-18 RX ORDER — LACTOBACILLUS ACIDOPHILUS 100MM CELL
2 CAPSULE ORAL
Qty: 0 | Refills: 0 | DISCHARGE

## 2023-10-18 RX ORDER — LANOLIN ALCOHOL/MO/W.PET/CERES
3 CREAM (GRAM) TOPICAL AT BEDTIME
Refills: 0 | Status: DISCONTINUED | OUTPATIENT
Start: 2023-10-18 | End: 2023-10-18

## 2023-10-18 RX ORDER — ACETAMINOPHEN 500 MG
1000 TABLET ORAL ONCE
Refills: 0 | Status: COMPLETED | OUTPATIENT
Start: 2023-10-18 | End: 2023-10-18

## 2023-10-18 RX ORDER — CYCLOBENZAPRINE HYDROCHLORIDE 10 MG/1
10 TABLET, FILM COATED ORAL THREE TIMES A DAY
Refills: 0 | Status: DISCONTINUED | OUTPATIENT
Start: 2023-10-18 | End: 2023-11-08

## 2023-10-18 RX ORDER — HYDROMORPHONE HYDROCHLORIDE 2 MG/ML
0.5 INJECTION INTRAMUSCULAR; INTRAVENOUS; SUBCUTANEOUS ONCE
Refills: 0 | Status: DISCONTINUED | OUTPATIENT
Start: 2023-10-18 | End: 2023-11-08

## 2023-10-18 RX ORDER — HYDROMORPHONE HYDROCHLORIDE 2 MG/ML
0.2 INJECTION INTRAMUSCULAR; INTRAVENOUS; SUBCUTANEOUS
Refills: 0 | Status: DISCONTINUED | OUTPATIENT
Start: 2023-10-18 | End: 2023-10-18

## 2023-10-18 RX ORDER — OXYCODONE HYDROCHLORIDE 5 MG/1
10 TABLET ORAL EVERY 6 HOURS
Refills: 0 | Status: DISCONTINUED | OUTPATIENT
Start: 2023-10-18 | End: 2023-10-19

## 2023-10-18 RX ORDER — OXYCODONE HYDROCHLORIDE 5 MG/1
10 TABLET ORAL EVERY 6 HOURS
Refills: 0 | Status: DISCONTINUED | OUTPATIENT
Start: 2023-10-18 | End: 2023-10-18

## 2023-10-18 RX ORDER — OXYCODONE HYDROCHLORIDE 5 MG/1
5 TABLET ORAL EVERY 6 HOURS
Refills: 0 | Status: DISCONTINUED | OUTPATIENT
Start: 2023-10-18 | End: 2023-10-19

## 2023-10-18 RX ORDER — ACETAMINOPHEN 500 MG
650 TABLET ORAL EVERY 6 HOURS
Refills: 0 | Status: DISCONTINUED | OUTPATIENT
Start: 2023-10-18 | End: 2023-11-08

## 2023-10-18 RX ORDER — OLMESARTAN MEDOXOMIL-HYDROCHLOROTHIAZIDE 25; 40 MG/1; MG/1
1 TABLET, FILM COATED ORAL
Qty: 0 | Refills: 0 | DISCHARGE

## 2023-10-18 RX ORDER — SUCRALFATE 1 G
1 TABLET ORAL
Qty: 0 | Refills: 0 | DISCHARGE

## 2023-10-18 RX ORDER — VANCOMYCIN HCL 1 G
1500 VIAL (EA) INTRAVENOUS EVERY 24 HOURS
Refills: 0 | Status: DISCONTINUED | OUTPATIENT
Start: 2023-10-19 | End: 2023-10-23

## 2023-10-18 RX ORDER — FENTANYL CITRATE 50 UG/ML
25 INJECTION INTRAVENOUS
Refills: 0 | Status: DISCONTINUED | OUTPATIENT
Start: 2023-10-18 | End: 2023-10-18

## 2023-10-18 RX ORDER — ERGOCALCIFEROL 1.25 MG/1
1 CAPSULE ORAL
Qty: 0 | Refills: 0 | DISCHARGE

## 2023-10-18 RX ORDER — TRAMADOL HYDROCHLORIDE 50 MG/1
25 TABLET ORAL EVERY 6 HOURS
Refills: 0 | Status: DISCONTINUED | OUTPATIENT
Start: 2023-10-18 | End: 2023-10-18

## 2023-10-18 RX ORDER — SIMVASTATIN 20 MG/1
1 TABLET, FILM COATED ORAL
Qty: 0 | Refills: 0 | DISCHARGE

## 2023-10-18 RX ORDER — SENNA PLUS 8.6 MG/1
2 TABLET ORAL AT BEDTIME
Refills: 0 | Status: DISCONTINUED | OUTPATIENT
Start: 2023-10-18 | End: 2023-10-18

## 2023-10-18 RX ADMIN — Medication 3 MILLIGRAM(S): at 22:32

## 2023-10-18 RX ADMIN — FENTANYL CITRATE 25 MICROGRAM(S): 50 INJECTION INTRAVENOUS at 14:56

## 2023-10-18 RX ADMIN — ATORVASTATIN CALCIUM 10 MILLIGRAM(S): 80 TABLET, FILM COATED ORAL at 22:31

## 2023-10-18 RX ADMIN — FENTANYL CITRATE 25 MICROGRAM(S): 50 INJECTION INTRAVENOUS at 15:37

## 2023-10-18 RX ADMIN — Medication 1000 MILLIGRAM(S): at 14:30

## 2023-10-18 RX ADMIN — SODIUM CHLORIDE 75 MILLILITER(S): 9 INJECTION, SOLUTION INTRAVENOUS at 16:58

## 2023-10-18 RX ADMIN — HYDROMORPHONE HYDROCHLORIDE 0.5 MILLIGRAM(S): 2 INJECTION INTRAMUSCULAR; INTRAVENOUS; SUBCUTANEOUS at 23:49

## 2023-10-18 RX ADMIN — HYDROMORPHONE HYDROCHLORIDE 0.2 MILLIGRAM(S): 2 INJECTION INTRAMUSCULAR; INTRAVENOUS; SUBCUTANEOUS at 15:54

## 2023-10-18 RX ADMIN — HYDROMORPHONE HYDROCHLORIDE 0.5 MILLIGRAM(S): 2 INJECTION INTRAMUSCULAR; INTRAVENOUS; SUBCUTANEOUS at 20:30

## 2023-10-18 RX ADMIN — HYDROMORPHONE HYDROCHLORIDE 0.2 MILLIGRAM(S): 2 INJECTION INTRAMUSCULAR; INTRAVENOUS; SUBCUTANEOUS at 16:16

## 2023-10-18 RX ADMIN — SODIUM CHLORIDE 3 MILLILITER(S): 9 INJECTION INTRAMUSCULAR; INTRAVENOUS; SUBCUTANEOUS at 22:41

## 2023-10-18 RX ADMIN — HYDROMORPHONE HYDROCHLORIDE 0.5 MILLIGRAM(S): 2 INJECTION INTRAMUSCULAR; INTRAVENOUS; SUBCUTANEOUS at 20:15

## 2023-10-18 RX ADMIN — Medication 100 MILLIGRAM(S): at 22:31

## 2023-10-18 RX ADMIN — FENTANYL CITRATE 25 MICROGRAM(S): 50 INJECTION INTRAVENOUS at 15:11

## 2023-10-18 RX ADMIN — HYDROMORPHONE HYDROCHLORIDE 0.5 MILLIGRAM(S): 2 INJECTION INTRAMUSCULAR; INTRAVENOUS; SUBCUTANEOUS at 23:34

## 2023-10-18 RX ADMIN — Medication 400 MILLIGRAM(S): at 14:45

## 2023-10-18 RX ADMIN — SODIUM CHLORIDE 75 MILLILITER(S): 9 INJECTION, SOLUTION INTRAVENOUS at 14:45

## 2023-10-18 RX ADMIN — OXYCODONE HYDROCHLORIDE 10 MILLIGRAM(S): 5 TABLET ORAL at 17:57

## 2023-10-18 RX ADMIN — OXYCODONE HYDROCHLORIDE 10 MILLIGRAM(S): 5 TABLET ORAL at 18:57

## 2023-10-18 NOTE — OCCUPATIONAL THERAPY INITIAL EVALUATION ADULT - ADDITIONAL COMMENTS
Pt lives with daughter in private house with 3 steps to enter. All amenities located on the main floor. Pt reports that her daughter will be available to assist. Has all DME from prior surgeries.

## 2023-10-18 NOTE — OCCUPATIONAL THERAPY INITIAL EVALUATION ADULT - GENERAL OBSERVATIONS, REHAB EVAL
Chart reviewed and event noted to date. Pt encountered semi-supine in bed, NAD, +YANA drain (1), alert and oriented.

## 2023-10-18 NOTE — DISCHARGE NOTE PROVIDER - CARE PROVIDER_API CALL
Nish Chan  Orthopaedic Surgery  [address], [address]  [place] [id number]  Phone: [phone]  Fax: [phone]  Follow Up Time:    Nish Chan  Orthopaedic Surgery  [address] [facility], [address]  [place] [address]  Phone: [phone]  Fax: [phone]  Follow Up Time:     Jake Gottlieb  Infectious Disease  [address] [place] [address]  Phone: [phone]  Fax: ()-  Follow Up Time:    Nish Chan  Orthopaedic Surgery  [address] [facility], [address]  [place] [address]  Phone: [phone]  Fax: [phone]  Follow Up Time:     Jake Gottlieb  Infectious Disease  [address] [place] [address]  Phone: [phone]  Fax: ()-  Follow Up Time:     Yuriy Nation  Plastic Surgery  [address] [place] [address]  Phone: [phone]  Fax: [phone]  Follow Up Time:

## 2023-10-18 NOTE — OCCUPATIONAL THERAPY INITIAL EVALUATION ADULT - PERTINENT HX OF CURRENT PROBLEM, REHAB EVAL
s/p Lumbar Wound Debridement    Per H&P, Pt is an 78 F who presents for lumbar wound debridement and closure s/p L3-4 laminectomy 08/25/23. Pain has gotten better since having sx. Feels pain at incision site. B/L leg weakness. Reports drainage from incision has increased and now has to change gauze 3xdays, rather than once a day.

## 2023-10-18 NOTE — DISCHARGE NOTE PROVIDER - NSDCFUADDINST_GEN_ALL_CORE_FT
No bending/lifting/twisting/pulling/pushing/carrying or driving. No blood thinners (not limited to but including) aspirin, motrin, alleve, naproxen, etc.  Keep Dressing Clean, Dry and Intact. May shower on POD#3 with dressing on let water run over dressing, no baths. Pat dry once out of shower. Dressing may be removed on POD#7. Please do not scrub, soak, peel or pick at the dressing. No creams, lotions, or oils over dressing.  If dressing is saturated from border to border. Remove dressing and cover with clean dry dressing   No bending/lifting/twisting/pulling/pushing/carrying or driving. No blood thinners (not limited to but including) aspirin, motrin, alleve, naproxen, etc.  Keep Dressing Clean, Dry and Intact. May shower on POD#3 with dressing on let water run over dressing, no baths. Pat dry once out of shower. Dressing may be removed on POD#7. Please do not scrub, soak, peel or pick at the dressing. No creams, lotions, or oils over dressing.  If dressing is saturated from border to border. Remove dressing and cover with clean dry dressing  PICC line care daily.  No bending/lifting/twisting/pulling/pushing/carrying or driving. No blood thinners (not limited to but including) aspirin, motrin, alleve, naproxen, etc.  Keep Dressing Clean, Dry and Intact. May shower on POD#3 with dressing on let water run over dressing, no baths. Pat dry once out of shower. Dressing may be removed on POD#7. Please do not scrub, soak, peel or pick at the dressing. No creams, lotions, or oils over dressing.  If dressing is saturated from border to border. Remove dressing and cover with clean dry dressing

## 2023-10-18 NOTE — DISCHARGE NOTE PROVIDER - NSDCFUADDAPPT_GEN_ALL_CORE_FT
Follow up with your surgeon in two weeks. Call for appointment.  If you need more pain medication, call your surgeon's office. For medication refills or authorizations, please call [phone] xt 2301  We recommend that you call and schedule a follow up appointment within 2-4 weeks with your primary care physician for repeat blood work (CBC and BMP) for post hospital discharge follow-up care.  Call your surgeon if you have increased redness/pain/drainage or fever. Return to ER for shortness of breath/calf tenderness.   Follow up with your surgeon in two weeks. Call for appointment.  If you need more pain medication, call your surgeon's office. For medication refills or authorizations, please call [phone] xt 2301  We recommend that you call and schedule a follow up appointment within 2-4 weeks with your primary care physician for repeat blood work (CBC and BMP) for post hospital discharge follow-up care.  Call your surgeon if you have increased redness/pain/drainage or fever. Return to ER for shortness of breath/calf tenderness.  Weekly labs: CBC, CMP, ESR, CRP: Fax to Dr Jake Giron office. Follow up with DR Giron ID 2-3 weeks - call and schedule appointment.  Follow up with your surgeon in two weeks. Call for appointment.  If you need more pain medication, call your surgeon's office. For medication refills or authorizations, please call [phone] xt 2301  We recommend that you call and schedule a follow up appointment within 2-4 weeks with your primary care physician for repeat blood work (CBC and BMP) for post hospital discharge follow-up care.  Call your surgeon if you have increased redness/pain/drainage or fever. Return to ER for shortness of breath/calf tenderness.  Weekly labs: CBC, CMP, ESR, CRP: Fax to Dr Jake Giron office. Follow up with DR Giron ID 2-3 weeks - call and schedule appointment.   Follow up with plastic surgeon Dr Nation this friday November 3: CALL FOR APPOINTMENT Follow up with your surgeon in two weeks. Call for appointment.  If you need more pain medication, call your surgeon's office. For medication refills or authorizations, please call [phone] xt 2301    Call your surgeon if you have increased redness/pain/drainage or fever. Return to ER for shortness of breath/calf tenderness.  Weekly labs: CBC, CMP, ESR, CRP: Fax to Dr Jake Giron office. Follow up with DR Giron ID 2-3 weeks - call and schedule appointment.   Follow up with plastic surgeon Dr Nation this friday November 3: CALL FOR APPOINTMENT

## 2023-10-18 NOTE — PROGRESS NOTE ADULT - SUBJECTIVE AND OBJECTIVE BOX
78yFemale s/p Lumbar I&D POD#0  Pt seen and examined in NAD.   Pain controlled.  Pt denies any new complaints.   Pt denies CP/SOB/N/V/D/numbness/tingling/bowel or bladder dysfunction.   +HV    Vital Signs Last 24 Hrs  T(C): 36.8 (18 Oct 2023 17:48), Max: 36.8 (18 Oct 2023 17:48)  T(F): 98.3 (18 Oct 2023 17:48), Max: 98.3 (18 Oct 2023 17:48)  HR: 75 (18 Oct 2023 17:48) (65 - 75)  BP: 113/65 (18 Oct 2023 17:48) (108/62 - 140/69)  BP(mean): --  RR: 16 (18 Oct 2023 17:48) (14 - 22)  SpO2: 93% (18 Oct 2023 17:48) (91% - 99%)    Parameters below as of 18 Oct 2023 16:48  Patient On (Oxygen Delivery Method): room air        PE:   General: A&Ox3, NAD  Spine: Dressing c/d/i +HV   B/L UE: Skin intact. +ROM shoulder/elbow/wrist/fingers. +ok/thumbsup/fingercross signs.  strength: 5/5.  RP2+ NVI.   B/L LE: Skin intact. +ROM hip/knee/ankle/toes. Ankle Dorsi/plantarflexion: 5/5. Calf: soft, compressible and nontender. DP/PT 2+ NVI.                             12.1   10.79 )-----------( 523      ( 18 Oct 2023 15:02 )             36.9       10-18    140  |  109<H>  |  25<H>  ----------------------------<  145<H>  4.4   |  25  |  1.20    Ca    8.4<L>      18 Oct 2023 15:02          A/P: 78yFemale s/p Lumbar I&D POD#0  Monitor drain  ABX while drain is in  DC nguyễn when OOB and ambulating  Wound care  Pain controlled  PT: WBAT: Spinal precautions  Isometric exercises  DVT ppx: SCDs  Incentive spirometry counseled   Discharge: planning   All the above discussed and understood by pt    78yFemale s/p Lumbar I&D POD#0  Pt seen and examined in NAD.   Pain controlled.  Pt denies any new complaints.   Pt denies CP/SOB/N/V/D/numbness/tingling/bowel or bladder dysfunction.   +HV    Vital Signs Last 24 Hrs  T(C): 36.8 (18 Oct 2023 17:48), Max: 36.8 (18 Oct 2023 17:48)  T(F): 98.3 (18 Oct 2023 17:48), Max: 98.3 (18 Oct 2023 17:48)  HR: 75 (18 Oct 2023 17:48) (65 - 75)  BP: 113/65 (18 Oct 2023 17:48) (108/62 - 140/69)  BP(mean): --  RR: 16 (18 Oct 2023 17:48) (14 - 22)  SpO2: 93% (18 Oct 2023 17:48) (91% - 99%)    Parameters below as of 18 Oct 2023 16:48  Patient On (Oxygen Delivery Method): room air        PE:   General: A&Ox3, NAD  Spine: Dressing c/d/i +HV   B/L UE: Skin intact. +ROM shoulder/elbow/wrist/fingers. +ok/thumbsup/fingercross signs.  strength: 5/5.  RP2+ NVI.   B/L LE: Skin intact. +ROM hip/knee/ankle/toes. Ankle Dorsi/plantarflexion: 5/5. Calf: soft, compressible and nontender. DP/PT 2+ NVI.                             12.1   10.79 )-----------( 523      ( 18 Oct 2023 15:02 )             36.9       10-18    140  |  109<H>  |  25<H>  ----------------------------<  145<H>  4.4   |  25  |  1.20    Ca    8.4<L>      18 Oct 2023 15:02          A/P: 78yFemale s/p Lumbar I&D POD#0  Monitor drain  ABX while drain is in  Wound care  Pain controlled  PT: WBAT: Spinal precautions  Isometric exercises  DVT ppx: SCDs  Incentive spirometry counseled   Discharge: planning   All the above discussed and understood by pt    78yFemale s/p Lumbar I&D POD#0  Pt seen and examined in NAD.   Pain controlled.  Pt denies any new complaints.   Pt denies CP/SOB/N/V/D/numbness/tingling/bowel or bladder dysfunction.   +HV    Vital Signs Last 24 Hrs  T(C): 36.8 (18 Oct 2023 17:48), Max: 36.8 (18 Oct 2023 17:48)  T(F): 98.3 (18 Oct 2023 17:48), Max: 98.3 (18 Oct 2023 17:48)  HR: 75 (18 Oct 2023 17:48) (65 - 75)  BP: 113/65 (18 Oct 2023 17:48) (108/62 - 140/69)  BP(mean): --  RR: 16 (18 Oct 2023 17:48) (14 - 22)  SpO2: 93% (18 Oct 2023 17:48) (91% - 99%)    Parameters below as of 18 Oct 2023 16:48  Patient On (Oxygen Delivery Method): room air        PE:   General: A&Ox3, NAD  Spine: Dressing c/d/i +HV   B/L UE: Skin intact. +ROM shoulder/elbow/wrist/fingers. +ok/thumbsup/fingercross signs.  strength: 5/5.  RP2+ NVI.   B/L LE: Skin intact. +ROM hip/knee/ankle/toes. Ankle Dorsi/plantarflexion: 5/5. Calf: soft, compressible and nontender. DP/PT 2+ NVI.                             12.1   10.79 )-----------( 523      ( 18 Oct 2023 15:02 )             36.9       10-18    140  |  109<H>  |  25<H>  ----------------------------<  145<H>  4.4   |  25  |  1.20    Ca    8.4<L>      18 Oct 2023 15:02          A/P: 78yFemale s/p Lumbar I&D POD#0  F/u ID consult  Monitor drain  ABX while drain is in  Wound care  Pain controlled  PT: WBAT: Spinal precautions  Isometric exercises  DVT ppx: SCDs  Incentive spirometry counseled   Discharge: planning   All the above discussed and understood by pt

## 2023-10-18 NOTE — ASU PREOP CHECKLIST - NS PREOP CHK HIBICLENS NA
Nursing Transfer Note      6/13/2020     Transfer To: OR From: [id number]    Transfer via bed    Transfer with portable monitor, IV pole, ambu bag    Transported by RNx3    Medicines sent: LR, Insulin, Propofol, Fentanyl, Levo, Vaso, Heparin gtts; vancomycin, cefepime     Chart send with patient: Yes    Notified: spouse    Report given to OR RNs and SICU RN      
N/A

## 2023-10-18 NOTE — DISCHARGE NOTE PROVIDER - DETAILS OF MALNUTRITION DIAGNOSIS/DIAGNOSES
This patient has been assessed with a concern for Malnutrition and was treated during this hospitalization for the following Nutrition diagnosis/diagnoses:     -  10/25/2023: Morbid obesity (BMI > 40)

## 2023-10-18 NOTE — ASU PATIENT PROFILE, ADULT - FALL HARM RISK - HARM RISK INTERVENTIONS

## 2023-10-18 NOTE — DISCHARGE NOTE PROVIDER - NSDCCPCAREPLAN_GEN_ALL_CORE_FT
PRINCIPAL DISCHARGE DIAGNOSIS  Diagnosis: Incisional irritation  Assessment and Plan of Treatment: drainage

## 2023-10-18 NOTE — DISCHARGE NOTE PROVIDER - NSDCMRMEDTOKEN_GEN_ALL_CORE_FT
acetaminophen 325 mg oral tablet: 3 tab(s) orally every 8 hours  Acidophilus oral tablet: 2 tab(s) orally 2 times a day  AcipHex 20 mg oral delayed release tablet: 1 tab(s) orally once a day  aspirin 81 mg oral delayed release tablet: 1 tab(s) orally once a day  Benicar HCT 40 mg-12.5 mg oral tablet: 1 tab(s) orally once a day  biotin: 1 tab(s) orally once a day  Carafate: 1 tab(s) orally once a day  cholestyramine compounding powder: compounding once a day one scoop daily  cyclobenzaprine 10 mg oral tablet: 1 tab(s) orally every 8 hours MDD: 3  Multiple Vitamins oral tablet: 1 tab(s) orally once a day  oxyCODONE 5 mg oral tablet: 1 tab(s) orally every 4 hours as needed for  pain 1 tab for mild/moderate pain, 2 tabs for severe pain MDD: 6  simvastatin 20 mg oral tablet: 1 tab(s) orally once a day (in the evening)  Vitamin D2: 1 tab(s) orally once a day   acetaminophen 325 mg oral tablet: 2 tab(s) orally every 6 hours As needed Mild Pain (1 - 3)  Acidophilus oral tablet: 2 tab(s) orally 2 times a day  AcipHex 20 mg oral delayed release tablet: 1 tab(s) orally once a day  aspirin 81 mg oral delayed release tablet: 1 tab(s) orally once a day  Benicar HCT 40 mg-12.5 mg oral tablet: 1 tab(s) orally once a day  biotin: 1 tab(s) orally once a day  Carafate: 1 tab(s) orally once a day  cholestyramine compounding powder: compounding once a day one scoop daily  cyclobenzaprine 10 mg oral tablet: 1 tab(s) orally every 8 hours MDD: 3  HYDROmorphone 4 mg oral tablet: 1 tab(s) orally every 6 hours As needed Severe Pain (7 - 10)  Multiple Vitamins oral tablet: 1 tab(s) orally once a day  nitrofurantoin macrocrystals-monohydrate 100 mg oral capsule: 1 cap(s) orally 2 times a day  oxyCODONE 5 mg oral tablet: 1 tab(s) orally every 4 hours as needed for  pain 1 tab for mild/moderate pain, 2 tabs for severe pain MDD: 6  simvastatin 20 mg oral tablet: 1 tab(s) orally once a day (in the evening)  Vitamin D2: 1 tab(s) orally once a day  Zosyn 3 g-0.375 g/50 mL intravenous solution: 3.375 gram(s) intravenously every 6 hours Zosyn IV 3.375 g q 6 hrs - last dose 11/28/2023  weekly lab work: cbc with diff, cmp, esr, crp - fax to Dr. Gottlieb at [phone]  follow up with ID in the office  remove picc line upon completion of the course of abx  flushes  Zosyn 3 g-0.375 g/50 mL intravenous solution: 18 gram(s) intravenously once a day Zosyn 18 g IV continuous infusion daily  weekly lab work: cbc with diff, cmp, esr, crp - fax to Dr. Gottlieb [phone]  follow up with ID in the office  remove picc line upon completion of the course of abx   acetaminophen 325 mg oral tablet: 2 tab(s) orally every 6 hours As needed Mild Pain (1 - 3)  Acidophilus oral tablet: 2 tab(s) orally 2 times a day  AcipHex 20 mg oral delayed release tablet: 1 tab(s) orally once a day  aspirin 81 mg oral delayed release tablet: 1 tab(s) orally once a day  Bactrim  mg-160 mg oral tablet: 2 tab(s) orally 2 times a day MDD: 4  Benicar HCT 40 mg-12.5 mg oral tablet: 1 tab(s) orally once a day  biotin: 1 tab(s) orally once a day  Carafate: 1 tab(s) orally once a day  cholestyramine compounding powder: compounding once a day one scoop daily  cyclobenzaprine 10 mg oral tablet: 1 tab(s) orally every 8 hours MDD: 3  cyclobenzaprine 10 mg oral tablet: 1 tab(s) orally every 8 hours MDD: 3  Dilaudid 2 mg oral tablet: 1 tab(s) orally every 4 hours as needed for  pain MDD: 6  Multiple Vitamins oral tablet: 1 tab(s) orally once a day  Narcan 4 mg/0.1 mL nasal spray: 4 milligram(s) intranasally once , repeat as necessary.   As needed. For suspected opiate overdose   Follow instructions on packet MDD: 0.2 ml  nitrofurantoin macrocrystals-monohydrate 100 mg oral capsule: 1 cap(s) orally 2 times a day  simvastatin 20 mg oral tablet: 1 tab(s) orally once a day (in the evening)  Vitamin D2: 1 tab(s) orally once a day  Zosyn 3 g-0.375 g/50 mL intravenous solution: 3.375 gram(s) intravenously every 6 hours Zosyn IV 3.375 g q 6 hrs - last dose 11/28/2023  weekly lab work: cbc with diff, cmp, esr, crp - fax to Dr. Gottlieb at [phone]  follow up with ID in the office  remove picc line upon completion of the course of abx  flushes

## 2023-10-18 NOTE — CONSULT NOTE ADULT - ASSESSMENT
Pt is a 78F who presents for lumbar wound debridement and closure s/p L3-4 laminectomy 08/25/23. Pain has gotten better since having sx. Feels pain at incision site. B/L leg weakness. Reports drainage from incision has increased and now has to change gauze 3xdays, rather than once a day. Doing home PT, persisting with some b/l LE pain.   no fevers   wbc ok   no outpatient cultures   went to OR for debridement and closure   OR cultures will be very helpful   has multiple antibiotics allergies including ceftriaxone, flagyl, cipro, meropenem    Plan:  received one dose of vancomycin intraop  should start aztreonam 2g q8hrs to offer gram negative and pseudomonas coverage   she would benefit from outpatient allergy testing   alternative would be to challenge here once we know a culprit organism   should continue vancomycin until cultures come back   Vanco 15mg/kg q24hrs  send vancomycin trough with target AUC/BOOM 400-600   (therapeutic drug monitoring required with IV vancomycin)   MRSA PCR nares   PT and OT per ortho       Discussed with Ortho team    Jake Gottlieb, DO  Infectious Disease Attending  Reachable via Microsoft Teams or ID office: [phone]  After 5pm/weekends please call [phone] for all inquiries and new consults

## 2023-10-18 NOTE — DATA REVIEWED
[Lumbar Spine] : lumbar spine [FreeTextEntry1] : construct stable versus last visit Dutasteride Pregnancy And Lactation Text: This medication is absolutely contraindicated in women, especially during pregnancy and breast feeding. Feminization of male fetuses is possible if taking while pregnant.

## 2023-10-18 NOTE — H&P ADULT - ATTENDING COMMENTS
wound dehiscence. not presenting as though there is a deep infection (feels well overall,  LBP stable, no fever/chills/night sweats, labs)

## 2023-10-18 NOTE — PATIENT PROFILE ADULT - FALL HARM RISK - HARM RISK INTERVENTIONS
Assistance with ambulation/Assistance OOB with selected safe patient handling equipment/Communicate Risk of Fall with Harm to all staff/Discuss with provider need for PT consult/Monitor gait and stability/Provide patient with walking aids - walker, cane, crutches/Reinforce activity limits and safety measures with patient and family/Sit up slowly, dangle for a short time, stand at bedside before walking/Tailored Fall Risk Interventions/Use of alarms - bed, chair and/or voice tab/Visual Cue: Yellow wristband and red socks/Bed in lowest position, wheels locked, appropriate side rails in place/Call bell, personal items and telephone in reach/Instruct patient to call for assistance before getting out of bed or chair/Non-slip footwear when patient is out of bed/[place] to call system/Physically safe environment - no spills, clutter or unnecessary equipment/Purposeful Proactive Rounding/Room/bathroom lighting operational, light cord in reach

## 2023-10-18 NOTE — DISCHARGE NOTE PROVIDER - PROVIDER TOKENS
PROVIDER:[TOKEN:[[id number]]] PROVIDER:[TOKEN:[[id number]]],PROVIDER:[TOKEN:[[id number]]] PROVIDER:[TOKEN:[[id number]]],PROVIDER:[TOKEN:[[id number]]],PROVIDER:[TOKEN:[[id number]]]

## 2023-10-18 NOTE — OCCUPATIONAL THERAPY INITIAL EVALUATION ADULT - BALANCE TRAINING, PT EVAL
Pt will improve dynamic standing balance to fair+ within 2 weeks in order to improve performance of functional transfers.

## 2023-10-18 NOTE — H&P ADULT - HISTORY OF PRESENT ILLNESS
Pt is a 78F who presents for lumbar wound debridement and closure s/p L3-4 laminectomy 08/25/23. Pain has gotten better since having sx. Feels pain at incision site. B/L leg weakness. Reports drainage from incision has increased and now has to change gauze 3xdays, rather than once a day. Doing home PT, persisting with some b/l LE pain.

## 2023-10-18 NOTE — DISCHARGE NOTE PROVIDER - CARE PROVIDERS DIRECT ADDRESSES
,DirectAddress_Unknown ,DirectAddress_Unknown,DirectAddress_Unknown ,DirectAddress_Unknown,DirectAddress_Unknown,[email or web address]

## 2023-10-18 NOTE — DISCHARGE NOTE PROVIDER - HOSPITAL COURSE
78yFemale with history of Drainage from lumbar incision presenting for Lumbar I&D by Dr. Chan on 10/18/23. Risk and benefits of surgery were explained to the patient. The patient understood and agreed to proceed with surgery. Patient underwent the procedure with no intraoperative complications. Pt was brought in stable condition to the PACU. Once stable in PACU, pt was brought to the floor. During hospital stay pt was followed by Medicine, physical therapy, Home Care during this admission. Pt is stable for discharge 78yFemale with history of Drainage from lumbar incision presenting for Lumbar I&D by Dr. Chan on 10/18/23. Risk and benefits of surgery were explained to the patient. The patient understood and agreed to proceed with surgery. Patient underwent the procedure with no intraoperative complications. Pt was brought in stable condition to the PACU. Once stable in PACU, pt was brought to the floor. During hospital stay pt was followed by Medicine, physical therapy, ID, Home Care during this admission. Pt is stable for discharge 78yFemale with history of Drainage from lumbar incision presenting for Lumbar I&D by Dr. Chan on 10/18/23. Risk and benefits of surgery were explained to the patient. The patient understood and agreed to proceed with surgery. Patient underwent the procedure with no intraoperative complications. Pt was brought in stable condition to the PACU. Once stable in PACU, pt was brought to the floor. During hospital stay pt was followed by Medicine, physical therapy, ID, Home Care during this admission. Pt had a PICC line inserted. Pt is stable for discharge home with PICC and prolonged IV antibiotics after home care was approved on POD#13. 78yFemale with history of Drainage from lumbar incision presenting for Lumbar I&D by Dr. Chan on 10/18/23. Risk and benefits of surgery were explained to the patient. The patient understood and agreed to proceed with surgery. Patient underwent the procedure with no intraoperative complications. Pt was brought in stable condition to the PACU. Once stable in PACU, pt was brought to the floor. During hospital stay pt was followed by Medicine, physical therapy, ID, Home Care during this admission. Pt had a PICC line inserted. Pt is stable for discharge home with PICC and prolonged IV antibiotics however secondary to prolonged delay from workers comp she was switched to PO antibiotics and discharged home on POD#21. 78yFemale with history of Drainage from lumbar incision presenting for Lumbar I&D by Dr. Chan on 10/18/23. Risk and benefits of surgery were explained to the patient. The patient understood and agreed to proceed with surgery. Patient underwent the procedure with no intraoperative complications. Pt was brought in stable condition to the PACU. Once stable in PACU, pt was brought to the floor. During hospital stay pt was followed by Medicine, physical therapy, ID, Home Care during this admission. Pt had a PICC line inserted. Pt is stable for discharge home with PICC and prolonged IV antibiotics however secondary to prolonged delay from workers comp she was switched to PO antibiotics and discharged home after PICC line removal prior to DC on POD#21.

## 2023-10-19 LAB
ANION GAP SERPL CALC-SCNC: 7 MMOL/L — SIGNIFICANT CHANGE UP (ref 5–17)
ANION GAP SERPL CALC-SCNC: 7 MMOL/L — SIGNIFICANT CHANGE UP (ref 5–17)
BASOPHILS # BLD AUTO: 0.05 K/UL — SIGNIFICANT CHANGE UP (ref 0–0.2)
BASOPHILS # BLD AUTO: 0.05 K/UL — SIGNIFICANT CHANGE UP (ref 0–0.2)
BASOPHILS NFR BLD AUTO: 0.4 % — SIGNIFICANT CHANGE UP (ref 0–2)
BASOPHILS NFR BLD AUTO: 0.4 % — SIGNIFICANT CHANGE UP (ref 0–2)
BUN SERPL-MCNC: 28 MG/DL — HIGH (ref 7–23)
BUN SERPL-MCNC: 28 MG/DL — HIGH (ref 7–23)
CALCIUM SERPL-MCNC: 8.4 MG/DL — LOW (ref 8.5–10.1)
CALCIUM SERPL-MCNC: 8.4 MG/DL — LOW (ref 8.5–10.1)
CHLORIDE SERPL-SCNC: 103 MMOL/L — SIGNIFICANT CHANGE UP (ref 96–108)
CHLORIDE SERPL-SCNC: 103 MMOL/L — SIGNIFICANT CHANGE UP (ref 96–108)
CO2 SERPL-SCNC: 26 MMOL/L — SIGNIFICANT CHANGE UP (ref 22–31)
CO2 SERPL-SCNC: 26 MMOL/L — SIGNIFICANT CHANGE UP (ref 22–31)
CREAT SERPL-MCNC: 1.38 MG/DL — HIGH (ref 0.5–1.3)
CREAT SERPL-MCNC: 1.38 MG/DL — HIGH (ref 0.5–1.3)
EGFR: 39 ML/MIN/1.73M2 — LOW
EGFR: 39 ML/MIN/1.73M2 — LOW
EOSINOPHIL # BLD AUTO: 0.04 K/UL — SIGNIFICANT CHANGE UP (ref 0–0.5)
EOSINOPHIL # BLD AUTO: 0.04 K/UL — SIGNIFICANT CHANGE UP (ref 0–0.5)
EOSINOPHIL NFR BLD AUTO: 0.3 % — SIGNIFICANT CHANGE UP (ref 0–6)
EOSINOPHIL NFR BLD AUTO: 0.3 % — SIGNIFICANT CHANGE UP (ref 0–6)
ERYTHROCYTE [SEDIMENTATION RATE] IN BLOOD: 53 MM/HR — HIGH (ref 0–20)
ERYTHROCYTE [SEDIMENTATION RATE] IN BLOOD: 53 MM/HR — HIGH (ref 0–20)
GLUCOSE SERPL-MCNC: 141 MG/DL — HIGH (ref 70–99)
GLUCOSE SERPL-MCNC: 141 MG/DL — HIGH (ref 70–99)
HCT VFR BLD CALC: 35.7 % — SIGNIFICANT CHANGE UP (ref 34.5–45)
HCT VFR BLD CALC: 35.7 % — SIGNIFICANT CHANGE UP (ref 34.5–45)
HGB BLD-MCNC: 11.1 G/DL — LOW (ref 11.5–15.5)
HGB BLD-MCNC: 11.1 G/DL — LOW (ref 11.5–15.5)
IMM GRANULOCYTES NFR BLD AUTO: 1.2 % — HIGH (ref 0–0.9)
IMM GRANULOCYTES NFR BLD AUTO: 1.2 % — HIGH (ref 0–0.9)
LYMPHOCYTES # BLD AUTO: 1.23 K/UL — SIGNIFICANT CHANGE UP (ref 1–3.3)
LYMPHOCYTES # BLD AUTO: 1.23 K/UL — SIGNIFICANT CHANGE UP (ref 1–3.3)
LYMPHOCYTES # BLD AUTO: 8.8 % — LOW (ref 13–44)
LYMPHOCYTES # BLD AUTO: 8.8 % — LOW (ref 13–44)
MCHC RBC-ENTMCNC: 30 PG — SIGNIFICANT CHANGE UP (ref 27–34)
MCHC RBC-ENTMCNC: 30 PG — SIGNIFICANT CHANGE UP (ref 27–34)
MCHC RBC-ENTMCNC: 31.1 G/DL — LOW (ref 32–36)
MCHC RBC-ENTMCNC: 31.1 G/DL — LOW (ref 32–36)
MCV RBC AUTO: 96.5 FL — SIGNIFICANT CHANGE UP (ref 80–100)
MCV RBC AUTO: 96.5 FL — SIGNIFICANT CHANGE UP (ref 80–100)
MONOCYTES # BLD AUTO: 0.72 K/UL — SIGNIFICANT CHANGE UP (ref 0–0.9)
MONOCYTES # BLD AUTO: 0.72 K/UL — SIGNIFICANT CHANGE UP (ref 0–0.9)
MONOCYTES NFR BLD AUTO: 5.2 % — SIGNIFICANT CHANGE UP (ref 2–14)
MONOCYTES NFR BLD AUTO: 5.2 % — SIGNIFICANT CHANGE UP (ref 2–14)
MRSA PCR RESULT.: SIGNIFICANT CHANGE UP
MRSA PCR RESULT.: SIGNIFICANT CHANGE UP
NEUTROPHILS # BLD AUTO: 11.7 K/UL — HIGH (ref 1.8–7.4)
NEUTROPHILS # BLD AUTO: 11.7 K/UL — HIGH (ref 1.8–7.4)
NEUTROPHILS NFR BLD AUTO: 84.1 % — HIGH (ref 43–77)
NEUTROPHILS NFR BLD AUTO: 84.1 % — HIGH (ref 43–77)
NRBC # BLD: 0 /100 WBCS — SIGNIFICANT CHANGE UP (ref 0–0)
NRBC # BLD: 0 /100 WBCS — SIGNIFICANT CHANGE UP (ref 0–0)
PLATELET # BLD AUTO: 551 K/UL — HIGH (ref 150–400)
PLATELET # BLD AUTO: 551 K/UL — HIGH (ref 150–400)
POTASSIUM SERPL-MCNC: 4.4 MMOL/L — SIGNIFICANT CHANGE UP (ref 3.5–5.3)
POTASSIUM SERPL-MCNC: 4.4 MMOL/L — SIGNIFICANT CHANGE UP (ref 3.5–5.3)
POTASSIUM SERPL-SCNC: 4.4 MMOL/L — SIGNIFICANT CHANGE UP (ref 3.5–5.3)
POTASSIUM SERPL-SCNC: 4.4 MMOL/L — SIGNIFICANT CHANGE UP (ref 3.5–5.3)
RBC # BLD: 3.7 M/UL — LOW (ref 3.8–5.2)
RBC # BLD: 3.7 M/UL — LOW (ref 3.8–5.2)
RBC # FLD: 15.9 % — HIGH (ref 10.3–14.5)
RBC # FLD: 15.9 % — HIGH (ref 10.3–14.5)
S AUREUS DNA NOSE QL NAA+PROBE: SIGNIFICANT CHANGE UP
S AUREUS DNA NOSE QL NAA+PROBE: SIGNIFICANT CHANGE UP
SODIUM SERPL-SCNC: 136 MMOL/L — SIGNIFICANT CHANGE UP (ref 135–145)
SODIUM SERPL-SCNC: 136 MMOL/L — SIGNIFICANT CHANGE UP (ref 135–145)
VANCOMYCIN TROUGH SERPL-MCNC: 6.4 UG/ML — LOW (ref 10–20)
VANCOMYCIN TROUGH SERPL-MCNC: 6.4 UG/ML — LOW (ref 10–20)
WBC # BLD: 13.9 K/UL — HIGH (ref 3.8–10.5)
WBC # BLD: 13.9 K/UL — HIGH (ref 3.8–10.5)
WBC # FLD AUTO: 13.9 K/UL — HIGH (ref 3.8–10.5)
WBC # FLD AUTO: 13.9 K/UL — HIGH (ref 3.8–10.5)

## 2023-10-19 PROCEDURE — 99232 SBSQ HOSP IP/OBS MODERATE 35: CPT

## 2023-10-19 RX ORDER — HYDROMORPHONE HYDROCHLORIDE 2 MG/ML
2 INJECTION INTRAMUSCULAR; INTRAVENOUS; SUBCUTANEOUS
Refills: 0 | Status: DISCONTINUED | OUTPATIENT
Start: 2023-10-19 | End: 2023-10-19

## 2023-10-19 RX ORDER — DIPHENHYDRAMINE HCL 50 MG
25 CAPSULE ORAL ONCE
Refills: 0 | Status: COMPLETED | OUTPATIENT
Start: 2023-10-19 | End: 2023-10-19

## 2023-10-19 RX ORDER — DIPHENHYDRAMINE HCL 50 MG
25 CAPSULE ORAL EVERY 6 HOURS
Refills: 0 | Status: DISCONTINUED | OUTPATIENT
Start: 2023-10-19 | End: 2023-11-08

## 2023-10-19 RX ORDER — HYDROMORPHONE HYDROCHLORIDE 2 MG/ML
4 INJECTION INTRAMUSCULAR; INTRAVENOUS; SUBCUTANEOUS EVERY 4 HOURS
Refills: 0 | Status: DISCONTINUED | OUTPATIENT
Start: 2023-10-19 | End: 2023-10-26

## 2023-10-19 RX ADMIN — SODIUM CHLORIDE 3 MILLILITER(S): 9 INJECTION INTRAMUSCULAR; INTRAVENOUS; SUBCUTANEOUS at 14:02

## 2023-10-19 RX ADMIN — HYDROMORPHONE HYDROCHLORIDE 0.5 MILLIGRAM(S): 2 INJECTION INTRAMUSCULAR; INTRAVENOUS; SUBCUTANEOUS at 19:57

## 2023-10-19 RX ADMIN — PANTOPRAZOLE SODIUM 40 MILLIGRAM(S): 20 TABLET, DELAYED RELEASE ORAL at 06:13

## 2023-10-19 RX ADMIN — HYDROMORPHONE HYDROCHLORIDE 0.5 MILLIGRAM(S): 2 INJECTION INTRAMUSCULAR; INTRAVENOUS; SUBCUTANEOUS at 18:44

## 2023-10-19 RX ADMIN — SODIUM CHLORIDE 3 MILLILITER(S): 9 INJECTION INTRAMUSCULAR; INTRAVENOUS; SUBCUTANEOUS at 05:22

## 2023-10-19 RX ADMIN — HYDROMORPHONE HYDROCHLORIDE 0.5 MILLIGRAM(S): 2 INJECTION INTRAMUSCULAR; INTRAVENOUS; SUBCUTANEOUS at 10:49

## 2023-10-19 RX ADMIN — HYDROMORPHONE HYDROCHLORIDE 0.5 MILLIGRAM(S): 2 INJECTION INTRAMUSCULAR; INTRAVENOUS; SUBCUTANEOUS at 10:34

## 2023-10-19 RX ADMIN — SODIUM CHLORIDE 3 MILLILITER(S): 9 INJECTION INTRAMUSCULAR; INTRAVENOUS; SUBCUTANEOUS at 21:29

## 2023-10-19 RX ADMIN — HYDROMORPHONE HYDROCHLORIDE 0.5 MILLIGRAM(S): 2 INJECTION INTRAMUSCULAR; INTRAVENOUS; SUBCUTANEOUS at 06:31

## 2023-10-19 RX ADMIN — HYDROMORPHONE HYDROCHLORIDE 0.5 MILLIGRAM(S): 2 INJECTION INTRAMUSCULAR; INTRAVENOUS; SUBCUTANEOUS at 06:46

## 2023-10-19 RX ADMIN — Medication 25 MILLIGRAM(S): at 15:17

## 2023-10-19 RX ADMIN — Medication 3 MILLIGRAM(S): at 21:28

## 2023-10-19 RX ADMIN — OXYCODONE HYDROCHLORIDE 10 MILLIGRAM(S): 5 TABLET ORAL at 05:37

## 2023-10-19 RX ADMIN — Medication 100 MILLIGRAM(S): at 06:12

## 2023-10-19 RX ADMIN — Medication 25 MILLIGRAM(S): at 21:28

## 2023-10-19 RX ADMIN — OXYCODONE HYDROCHLORIDE 10 MILLIGRAM(S): 5 TABLET ORAL at 04:37

## 2023-10-19 RX ADMIN — HYDROMORPHONE HYDROCHLORIDE 4 MILLIGRAM(S): 2 INJECTION INTRAMUSCULAR; INTRAVENOUS; SUBCUTANEOUS at 15:23

## 2023-10-19 RX ADMIN — Medication 300 MILLIGRAM(S): at 14:24

## 2023-10-19 RX ADMIN — ATORVASTATIN CALCIUM 10 MILLIGRAM(S): 80 TABLET, FILM COATED ORAL at 21:29

## 2023-10-19 NOTE — PROGRESS NOTE ADULT - NS ATTEND AMEND GEN_ALL_CORE FT
I have reviewed all pertinent clinical information and agree with the NP's note.   small faint macular rash on the lower extremities as well as chest   has prior hx of allergic reactions to multiple antibiotics   will stop aztreonam and monitor this rash   can add benadryl if rash worsens   continue vancomycin   send vancomycin trough with target AUC/BOOM 400-600   (therapeutic drug monitoring required with IV vancomycin)   will follow cultures and see if gram negative coverage is needed   should treat this as an infection until proven otherwise      Discussed with ortho DORI Gottlieb, DO  Infectious Disease Attending  Reachable via Microsoft Teams or ID office: [phone]  After 5pm/weekends please call [phone] for all inquiries and new consults

## 2023-10-19 NOTE — PROGRESS NOTE ADULT - SUBJECTIVE AND OBJECTIVE BOX
EVERETT [address]  MRN-[id number]    Follow Up:  s/p spinal surgical site debridement and plastic closure     Interval History: the pt was seen and examined earlier, no acute distress, pt is awake and alert, comfortable in recliner, complains of developing erythematous patches / rash on her b/l LEs and chest since this morning, not pruritic.     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      S/P total knee arthroplasty, right      S/P knee replacement  Left ( 2007 )      S/P cholecystectomy      S/P knee replacement  2004 left knee and revision      S/P arthroscopy of shoulder  left shoulder      S/P laminectomy          ROS:    [ ] Unobtainable because:  [ x] All other systems negative    Constitutional: no fever, no chills  Head: no trauma  Eyes: no vision changes, no eye pain  ENT:  no sore throat, no rhinorrhea  Cardiovascular:  no chest pain, no palpitation  Respiratory:  no SOB, no cough  GI:  no abd pain, no vomiting, no diarrhea  urinary: no dysuria, no hematuria, no flank pain  musculoskeletal: post op pain is controlled   skin:  + rash  neurology:  no headache, no seizure, no change in mental status  psych: no anxiety, no depression         Allergies  Cipro (Unknown)  ceftriaxone (Rash)  Bee Stings (Anaphylaxis)  Flagyl (Diarrhea; Rash)  hibiclens - skin swelling (Other)  meropenem-vaborbactam (Flushing)  Augmentin (Rash)        ANTIMICROBIALS:  vancomycin  IVPB 1500 every 24 hours      OTHER MEDS:  acetaminophen     Tablet .. 650 milliGRAM(s) Oral every 6 hours PRN  atorvastatin 10 milliGRAM(s) Oral at bedtime  cyclobenzaprine 10 milliGRAM(s) Oral three times a day PRN  HYDROmorphone   Tablet 4 milliGRAM(s) Oral every 4 hours PRN  HYDROmorphone   Tablet 2 milliGRAM(s) Oral every 3 hours PRN  HYDROmorphone  Injectable 0.5 milliGRAM(s) IV Push every 3 hours PRN  HYDROmorphone  Injectable 0.5 milliGRAM(s) IV Push once  lactated ringers. 1000 milliLiter(s) IV Continuous <Continuous>  losartan 100 milliGRAM(s) Oral daily  magnesium hydroxide Suspension 30 milliLiter(s) Oral every 12 hours PRN  melatonin 3 milliGRAM(s) Oral at bedtime  pantoprazole    Tablet 40 milliGRAM(s) Oral before breakfast  polyethylene glycol 3350 17 Gram(s) Oral two times a day PRN  sodium chloride 0.9% lock flush 3 milliLiter(s) IV Push every 8 hours  sucralfate 1 Gram(s) Oral once  traMADol 25 milliGRAM(s) Oral every 6 hours PRN      Vital Signs Last 24 Hrs  T(C): 36.7 (19 Oct 2023 12:00), Max: 36.8 (18 Oct 2023 17:48)  T(F): 98 (19 Oct 2023 12:00), Max: 98.3 (18 Oct 2023 17:48)  HR: 64 (19 Oct 2023 12:00) (57 - 75)  BP: 122/71 (19 Oct 2023 12:00) (109/52 - 137/68)  BP(mean): --  RR: 18 (19 Oct 2023 12:00) (16 - 19)  SpO2: 97% (19 Oct 2023 12:00) (92% - 97%)    Parameters below as of 19 Oct 2023 12:00  Patient On (Oxygen Delivery Method): room air        Physical Exam:  Constitutional: non-toxic, no distress, RA  HEAD/EYES: anicteric, no conjunctival injection  ENT:  supple, no thrush  Cardiovascular:   normal S1, S2, no murmur, trace edema  Respiratory:  clear BS bilaterally, no wheezes, no rales  GI:  soft, non-tender, normal bowel sounds, obese   Musculoskeletal:  bilateral knee replacements as well as surgical scar on left shoulder, back is dressed, c/d/i, no erythema or swelling of surrounding tissues, not tender, YANA in place with small amount of bloody drainage   Neurologic: awake and alert, sensation in tact, no focal findings  Skin: patchy erythematous rash on b/l LEs and upper chest, none on abdomen and back, no phlebitis  Heme/Onc: no lymphadenopathy   Psychiatric:  awake, alert, appropriate mood    WBC Count: 13.90 K/uL (10-19 @ 06:55)  WBC Count: 10.79 K/uL (10-18 @ 15:02)                            11.1   13.90 )-----------( 551      ( 19 Oct 2023 06:55 )             35.7       10-19    136  |  103  |  28<H>  ----------------------------<  141<H>  4.4   |  26  |  1.38<H>    Ca    8.4<L>      19 Oct 2023 06:55        Urinalysis Basic - ( 19 Oct 2023 06:55 )    Color: x / Appearance: x / SG: x / pH: x  Gluc: 141 mg/dL / Ketone: x  / Bili: x / Urobili: x   Blood: x / Protein: x / Nitrite: x   Leuk Esterase: x / RBC: x / WBC x   Sq Epi: x / Non Sq Epi: x / Bacteria: x        Creatinine Trend: 1.38<--, 1.20<--      MICROBIOLOGY:  Vancomycin Level, Trough: 6.4 ug/mL (10-19-23 @ 11:40)  v    RADIOLOGY:

## 2023-10-19 NOTE — PROGRESS NOTE ADULT - SUBJECTIVE AND OBJECTIVE BOX
Pt seen and examined  NAD    Lumbar dressing is CDI with no surrounding erythema, no palpable collections  YANA 20 cc ss    A/P: Cont care per primary team  Cont to monitor drain outputs  ABX per ID  Will follow

## 2023-10-19 NOTE — PROGRESS NOTE ADULT - ASSESSMENT
Pt is a 78F who presents for lumbar wound debridement and closure s/p L3-4 laminectomy 08/25/23. Pain has gotten better since having sx. Feels pain at incision site. B/L leg weakness. Reports drainage from incision has increased and now has to change gauze 3xdays, rather than once a day. Doing home PT, persisting with some b/l LE pain.   no fevers   wbc ok   no outpatient cultures   went to OR for debridement and closure   OR cultures will be very helpful   has multiple antibiotics allergies including ceftriaxone, flagyl, cipro, meropenem    10/19: pod#1 s/p debridement and closure of spinal surgical site, no fevers, RA, WBC elevated 13.9, Cr elevated 1.38, surgical culture and MRSA PCR are pending, Vanco level 6.4 today, received a dose of vanco intraop, Vancomycin IV continued. Developed rash, Azactam stopped.     Plan:  received one dose of vancomycin intraop  continue vancomycin IV 1500 q 24 hrs while awaiting for cultures   send vancomycin trough with target AUC/BOOM 400-600   (therapeutic drug monitoring required with IV vancomycin)   monitor pt's renal function   stop aztreonam 2g q8hrs and monitor pt's rash   she would benefit from outpatient allergy testing   alternative would be to challenge here once we know a culprit organism   MRSA PCR pending   PT and OT per ortho      Discussed with Dr. Gottlieb Pt is a 78F who presents for lumbar wound debridement and closure s/p L3-4 laminectomy 08/25/23. Pain has gotten better since having sx. Feels pain at incision site. B/L leg weakness. Reports drainage from incision has increased and now has to change gauze 3xdays, rather than once a day. Doing home PT, persisting with some b/l LE pain.   no fevers   wbc ok   no outpatient cultures   went to OR for debridement and closure   OR cultures will be very helpful   has multiple antibiotics allergies including ceftriaxone, flagyl, cipro, meropenem    10/19: pod#1 s/p debridement and closure of spinal surgical site, no fevers, RA, WBC elevated 13.9, Cr elevated 1.38, surgical culture and MRSA PCR are pending, Vanco level 6.4 today, received a dose of vanco intraop, Vancomycin IV continued. Developed rash, Azactam stopped.     Plan:  received one dose of vancomycin intraop  continue vancomycin IV 1500 q 24 hrs while awaiting for cultures   send vancomycin trough with target AUC/BOOM 400-600   (therapeutic drug monitoring required with IV vancomycin)   monitor pt's renal function   stop aztreonam 2g q8hrs and monitor pt's rash   she would benefit from outpatient allergy testing   alternative would be to challenge here once we know a culprit organism   MRSA PCR pending   PT and OT per ortho  follow OR cultures       Discussed with Dr. Gottlieb

## 2023-10-19 NOTE — PROGRESS NOTE ADULT - SUBJECTIVE AND OBJECTIVE BOX
78yFemale s/p I&D/plastics closure of lumbar wound POD#1. Pt seen and examined in NAD. Pain is not controlled. States oxycodone just makes her sleepy. Also notes new onset rash on her LE's - no pruritis "tender"  HV 8/20    PE:   Neuro: AAOX3  Spine: Dressing c/d/i +HV with serosanguinous   B/L UE: Skin intact. +ROM shoulder/elbow/wrist/fingers. +ok/thumbsup/fingercross signs.  strength: 5/5.  RP2+ NVI.   B/L LE: Skin intact with erythematous blancheable wheels B/L LE's. +ROM hip/knee/ankle/toes. Ankle Dorsi/plantarflexion: 5/5. Calf: soft, compressible and nontender.  DP/PT 2+ NVI.                             11.1   13.90 )-----------( 551      ( 19 Oct 2023 06:55 )             35.7       10-19    136  |  103  |  28<H>  ----------------------------<  141<H>  4.4   |  26  |  1.38<H>    Ca    8.4<L>      19 Oct 2023 06:55          A/P: 78yFemale s/p I&D/plastics closure of lumbar wound POD#1.  Monitor HV drain output   Pain control reviewed - dilaudid PO trial   PT: WBAT - spinal precautions   DVT ppx: SCDs   F/U OR cx's: ID consult reviewed - vanco/aztreonam. IV access concern per RN - PICC vs Midline awaiting ID input.    Monitor rash - allergy testing recommended per ID - probably will need to be done outpatient as there are no allergists on staff that round in this hospital   Discharge: planning pending ID final plan   All the above discussed and understood by pt   D/W DR Chan

## 2023-10-20 LAB
CRP SERPL-MCNC: 25 MG/L — HIGH
CRP SERPL-MCNC: 25 MG/L — HIGH
VANCOMYCIN TROUGH SERPL-MCNC: 9.4 UG/ML — LOW (ref 10–20)
VANCOMYCIN TROUGH SERPL-MCNC: 9.4 UG/ML — LOW (ref 10–20)

## 2023-10-20 PROCEDURE — 99232 SBSQ HOSP IP/OBS MODERATE 35: CPT

## 2023-10-20 RX ORDER — PIPERACILLIN AND TAZOBACTAM 4; .5 G/20ML; G/20ML
3.38 INJECTION, POWDER, LYOPHILIZED, FOR SOLUTION INTRAVENOUS ONCE
Refills: 0 | Status: COMPLETED | OUTPATIENT
Start: 2023-10-20 | End: 2023-10-20

## 2023-10-20 RX ORDER — PIPERACILLIN AND TAZOBACTAM 4; .5 G/20ML; G/20ML
3.38 INJECTION, POWDER, LYOPHILIZED, FOR SOLUTION INTRAVENOUS ONCE
Refills: 0 | Status: COMPLETED | OUTPATIENT
Start: 2023-10-21 | End: 2023-10-21

## 2023-10-20 RX ORDER — DIPHENHYDRAMINE HCL 50 MG
25 CAPSULE ORAL ONCE
Refills: 0 | Status: COMPLETED | OUTPATIENT
Start: 2023-10-20 | End: 2023-10-20

## 2023-10-20 RX ORDER — PIPERACILLIN AND TAZOBACTAM 4; .5 G/20ML; G/20ML
3.38 INJECTION, POWDER, LYOPHILIZED, FOR SOLUTION INTRAVENOUS EVERY 8 HOURS
Refills: 0 | Status: DISCONTINUED | OUTPATIENT
Start: 2023-10-21 | End: 2023-11-08

## 2023-10-20 RX ADMIN — Medication 300 MILLIGRAM(S): at 23:25

## 2023-10-20 RX ADMIN — Medication 3 MILLIGRAM(S): at 21:11

## 2023-10-20 RX ADMIN — SODIUM CHLORIDE 3 MILLILITER(S): 9 INJECTION INTRAMUSCULAR; INTRAVENOUS; SUBCUTANEOUS at 21:33

## 2023-10-20 RX ADMIN — Medication 25 MILLIGRAM(S): at 12:10

## 2023-10-20 RX ADMIN — SODIUM CHLORIDE 3 MILLILITER(S): 9 INJECTION INTRAMUSCULAR; INTRAVENOUS; SUBCUTANEOUS at 16:28

## 2023-10-20 RX ADMIN — HYDROMORPHONE HYDROCHLORIDE 4 MILLIGRAM(S): 2 INJECTION INTRAMUSCULAR; INTRAVENOUS; SUBCUTANEOUS at 21:10

## 2023-10-20 RX ADMIN — LOSARTAN POTASSIUM 100 MILLIGRAM(S): 100 TABLET, FILM COATED ORAL at 05:30

## 2023-10-20 RX ADMIN — HYDROMORPHONE HYDROCHLORIDE 0.5 MILLIGRAM(S): 2 INJECTION INTRAMUSCULAR; INTRAVENOUS; SUBCUTANEOUS at 10:46

## 2023-10-20 RX ADMIN — PIPERACILLIN AND TAZOBACTAM 200 GRAM(S): 4; .5 INJECTION, POWDER, LYOPHILIZED, FOR SOLUTION INTRAVENOUS at 16:27

## 2023-10-20 RX ADMIN — HYDROMORPHONE HYDROCHLORIDE 0.5 MILLIGRAM(S): 2 INJECTION INTRAMUSCULAR; INTRAVENOUS; SUBCUTANEOUS at 11:46

## 2023-10-20 RX ADMIN — ATORVASTATIN CALCIUM 10 MILLIGRAM(S): 80 TABLET, FILM COATED ORAL at 21:11

## 2023-10-20 RX ADMIN — Medication 25 MILLIGRAM(S): at 21:13

## 2023-10-20 RX ADMIN — HYDROMORPHONE HYDROCHLORIDE 4 MILLIGRAM(S): 2 INJECTION INTRAMUSCULAR; INTRAVENOUS; SUBCUTANEOUS at 22:10

## 2023-10-20 RX ADMIN — PANTOPRAZOLE SODIUM 40 MILLIGRAM(S): 20 TABLET, DELAYED RELEASE ORAL at 10:45

## 2023-10-20 RX ADMIN — PIPERACILLIN AND TAZOBACTAM 25 GRAM(S): 4; .5 INJECTION, POWDER, LYOPHILIZED, FOR SOLUTION INTRAVENOUS at 18:46

## 2023-10-20 RX ADMIN — SODIUM CHLORIDE 3 MILLILITER(S): 9 INJECTION INTRAMUSCULAR; INTRAVENOUS; SUBCUTANEOUS at 05:32

## 2023-10-20 NOTE — PROGRESS NOTE ADULT - SUBJECTIVE AND OBJECTIVE BOX
78yFemale s/p I&D/plastics closure of lumbar wound POD#2. Pt seen and examined in NAD. Pain is better controlled with dilaudid. Rash improving.   HV 10.5    PE:   Neuro: AAOX3  Spine: Dressing rolling upwards from distal aspect. +HV with serosanguinous.   B/L UE: Skin intact. +ROM shoulder/elbow/wrist/fingers. +ok/thumbsup/fingercross signs.  strength: 5/5.  RP2+ NVI.   B/L LE: Skin intact with persisting blanchable wheels - less prominent and no further eruption. B/L LE's. +ROM hip/knee/ankle/toes. Ankle Dorsi/plantarflexion: 5/5. Calf: soft, compressible and nontender.  DP/PT 2+ NVI.                           11.1   13.90 )-----------( 551      ( 19 Oct 2023 06:55 )             35.7     10-19    136  |  103  |  28<H>  ----------------------------<  141<H>  4.4   |  26  |  1.38<H>    Ca    8.4<L>      19 Oct 2023 06:55                    A/P: 78yFemale s/p I&D/plastics closure of lumbar wound POD#2  Dressing changed - dry clean dressing (guaze/tagaderm applied.)  Monitor HV drain output   Pain control reviewed - dilaudid PO controlling pain to pt satisfaction   PT: WBAT - spinal precautions   DVT ppx: SCDs   F/U OR cx's: ID consult reviewed - vancomycin. OR cultures with numerous GNR.    Monitor rash -improving. Benadryl PRN   Discharge: planning pending ID final plan   All the above discussed and understood by pt - pt counseled that we will need Cx/sensitivity results before submitting request for home care - DC planning could take until midweek next week as it is now friday and no arrangements will be made over the weekend   D/W DR Chan

## 2023-10-20 NOTE — PROGRESS NOTE ADULT - SUBJECTIVE AND OBJECTIVE BOX
EVERETT WAY is a 78y Female s/p LUMBAR WOUND DEBRIDEMENT        denies any chest pain shortness of breath palpitation dizziness lightheadedness nausea vomiting fever or chills    T(C): 36.8 (10-20-23 @ 14:06), Max: 37.1 (10-20-23 @ 11:27)  HR: 71 (10-20-23 @ 14:06) (66 - 71)  BP: 149/66 (10-20-23 @ 14:06) (123/63 - 149/66)  RR: 17 (10-20-23 @ 14:06) (17 - 18)  SpO2: 97% (10-20-23 @ 14:06) (94% - 97%)  no jvd/bruit  s1 s2 rrr  cta  s/nt/nd  no calf tend                        11.1   13.90 )-----------( 551      ( 19 Oct 2023 06:55 )             35.7   10-19    136  |  103  |  28<H>  ----------------------------<  141<H>  4.4   |  26  |  1.38<H>    Ca    8.4<L>      19 Oct 2023 06:55        cont dvt px  pain control  bowel regimen  antiemetics  incentive spirometer

## 2023-10-20 NOTE — PROGRESS NOTE ADULT - NS ATTEND AMEND GEN_ALL_CORE FT
I have reviewed all pertinent clinical information and agree with the NP's note.   patient had allergy presumably to aztreonam   she has tolerated Zosyn as recent as 2020 without reaction   will start zosyn to treat gram negative wound infection   monitor patients rash   picc line placement monday   can continue vancomycin until or cultures are final    Jake Gottlieb DO  Infectious Disease Attending  Reachable via Microsoft Teams or ID office: [phone]  After 5pm/weekends please call [phone] for all inquiries and new consults

## 2023-10-20 NOTE — PROGRESS NOTE ADULT - SUBJECTIVE AND OBJECTIVE BOX
EVERETT WAY  MRN-[id number]    Follow Up:  post op spinal infection     Interval History: the pt was seen and examined earlier, no acute distress, in recliner, no fevers, RA, no new cbc.     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      S/P total knee arthroplasty, right      S/P knee replacement  Left ( 2007 )      S/P cholecystectomy      S/P knee replacement  2004 left knee and revision      S/P arthroscopy of shoulder  left shoulder      S/P laminectomy          ROS:    [ ] Unobtainable because:  [x ] All other systems negative    Constitutional: no fever, no chills  Head: no trauma  Eyes: no vision changes, no eye pain  ENT:  no sore throat, no rhinorrhea  Cardiovascular:  no chest pain, no palpitation  Respiratory:  no SOB, no cough  GI:  no abd pain, no vomiting, no diarrhea  urinary: no dysuria, no hematuria, no flank pain  musculoskeletal:  no joint pain, no joint swelling  skin:  no rash  neurology:  no headache, no seizure, no change in mental status  psych: no anxiety, no depression         Allergies  Cipro (Unknown)  ceftriaxone (Rash)  Bee Stings (Anaphylaxis)  Flagyl (Diarrhea; Rash)  hibiclens - skin swelling (Other)  meropenem-vaborbactam (Flushing)  Augmentin (Rash)        ANTIMICROBIALS:  piperacillin/tazobactam IVPB. 3.375 once  piperacillin/tazobactam IVPB.- 3.375 once  vancomycin  IVPB 1500 every 24 hours      OTHER MEDS:  acetaminophen     Tablet .. 650 milliGRAM(s) Oral every 6 hours PRN  atorvastatin 10 milliGRAM(s) Oral at bedtime  cyclobenzaprine 10 milliGRAM(s) Oral three times a day PRN  diphenhydrAMINE 25 milliGRAM(s) Oral every 6 hours PRN  HYDROmorphone   Tablet 4 milliGRAM(s) Oral every 4 hours PRN  HYDROmorphone   Tablet 2 milliGRAM(s) Oral every 3 hours PRN  HYDROmorphone  Injectable 0.5 milliGRAM(s) IV Push every 3 hours PRN  HYDROmorphone  Injectable 0.5 milliGRAM(s) IV Push once  lactated ringers. 1000 milliLiter(s) IV Continuous <Continuous>  losartan 100 milliGRAM(s) Oral daily  magnesium hydroxide Suspension 30 milliLiter(s) Oral every 12 hours PRN  melatonin 3 milliGRAM(s) Oral at bedtime  pantoprazole    Tablet 40 milliGRAM(s) Oral before breakfast  polyethylene glycol 3350 17 Gram(s) Oral two times a day PRN  sodium chloride 0.9% lock flush 3 milliLiter(s) IV Push every 8 hours  sucralfate 1 Gram(s) Oral once  traMADol 25 milliGRAM(s) Oral every 6 hours PRN      Vital Signs Last 24 Hrs  T(C): 36.8 (20 Oct 2023 14:06), Max: 37.1 (20 Oct 2023 11:27)  T(F): 98.2 (20 Oct 2023 14:06), Max: 98.8 (20 Oct 2023 11:27)  HR: 71 (20 Oct 2023 14:06) (66 - 71)  BP: 149/66 (20 Oct 2023 14:06) (123/63 - 149/66)  BP(mean): --  RR: 17 (20 Oct 2023 14:06) (17 - 18)  SpO2: 97% (20 Oct 2023 14:06) (94% - 97%)    Parameters below as of 20 Oct 2023 14:06  Patient On (Oxygen Delivery Method): room air        Physical Exam:  Constitutional: non-toxic, no distress, RA  HEAD/EYES: anicteric, no conjunctival injection  ENT:  supple, no thrush  Cardiovascular:   normal S1, S2, no murmur,  edema b/l LEs  Respiratory:  clear BS bilaterally, no wheezes, no rales  GI:  soft, non-tender, normal bowel sounds, obese   Musculoskeletal:  bilateral knee replacements as well as surgical scar on left shoulder, back is dressed, c/d/i, no erythema or swelling of surrounding tissues, not tender, YANA in place with sanguineous drainage   Neurologic: awake and alert, sensation in tact, no focal findings  Skin: patchy erythematous rash on b/l LEs and upper chest, a couple spots on upper back, Right arm PIV with mild erythema, no swelling, RN aware    Heme/Onc: no lymphadenopathy   Psychiatric:  awake, alert, appropriate mood    WBC Count: 13.90 K/uL (10-19 @ 06:55)  WBC Count: 10.79 K/uL (10-18 @ 15:02)                            11.1   13.90 )-----------( 551      ( 19 Oct 2023 06:55 )             35.7       10-19    136  |  103  |  28<H>  ----------------------------<  141<H>  4.4   |  26  |  1.38<H>    Ca    8.4<L>      19 Oct 2023 06:55        Urinalysis Basic - ( 19 Oct 2023 06:55 )    Color: x / Appearance: x / SG: x / pH: x  Gluc: 141 mg/dL / Ketone: x  / Bili: x / Urobili: x   Blood: x / Protein: x / Nitrite: x   Leuk Esterase: x / RBC: x / WBC x   Sq Epi: x / Non Sq Epi: x / Bacteria: x        Creatinine Trend: 1.38<--, 1.20<--      MICROBIOLOGY:  Vancomycin Level, Trough: 9.4 ug/mL (10-20-23 @ 14:20)  v  .Surgical Swab DEEP WOUND LUMBAR CULTURE  10-18-23   Numerous Klebsiella pneumoniae  Numerous Proteus mirabilis  --  --      C-Reactive Protein, Serum: 25 (10-19)        RADIOLOGY:

## 2023-10-20 NOTE — PROGRESS NOTE ADULT - ASSESSMENT
Pt is a 78F who presents for lumbar wound debridement and closure s/p L3-4 laminectomy 08/25/23. Pain has gotten better since having sx. Feels pain at incision site. B/L leg weakness. Reports drainage from incision has increased and now has to change gauze 3xdays, rather than once a day. Doing home PT, persisting with some b/l LE pain.   no fevers   wbc ok   no outpatient cultures   went to OR for debridement and closure   OR cultures will be very helpful   has multiple antibiotics allergies including ceftriaxone, flagyl, cipro, meropenem    10/19: pod#1 s/p debridement and closure of spinal surgical site, no fevers, RA, WBC elevated 13.9, Cr elevated 1.38, surgical culture and MRSA PCR are pending, Vanco level 6.4 today, received a dose of vanco intraop, Vancomycin IV continued. Developed rash, Azactam stopped.   Attending addendum:  I have reviewed all pertinent clinical information and agree with the NP's note.   small faint macular rash on the lower extremities as well as chest   has prior hx of allergic reactions to multiple antibiotics   will stop aztreonam and monitor this rash   can add benadryl if rash worsens   continue vancomycin   send vancomycin trough with target AUC/BOOM 400-600   (therapeutic drug monitoring required with IV vancomycin)   will follow cultures and see if gram negative coverage is needed   should treat this as an infection until proven otherwise    10/20: no fevers, RA, no new cbc, surgical culture is growing Klebsiella pneumoniae and Proteus mirabilis, pending sensitivity, started on Zosyn IV (tolerated in the past), Vancomycin IV continued, today's level is 9.4 - reached out to pharmacy, will adjust the dose based on recommendations.      Plan:  continue vancomycin IV 1500 q 24 hrs - will adjust the dose based on pharmacy recommendations   monitor vancomycin trough with target AUC/BOOM 400-600   (therapeutic drug monitoring required with IV vancomycin)   monitor pt's renal function   start Zosyn IV - pt similar medication in the past  she would benefit from outpatient allergy testing   MRSA PCR not detected   PT and OT per ortho  follow OR cultures - grew Klebsiella and Proteus, pending sensitivity     Discussed with Dr. Gottlieb  reached out to pharmacy, awaiting for response

## 2023-10-20 NOTE — PROGRESS NOTE ADULT - SUBJECTIVE AND OBJECTIVE BOX
Pt seen and examined  NAD    Lumbar suture line is CDI with no surrounding erythema, no palpable collections  YANA 25 cc ss    A/P: All dressings changed  Cont dressing changes with DSD  Cont care per primary team  Cont to monitor drain outputs  ABX per ID  Will follow

## 2023-10-21 LAB
ANION GAP SERPL CALC-SCNC: 6 MMOL/L — SIGNIFICANT CHANGE UP (ref 5–17)
ANION GAP SERPL CALC-SCNC: 6 MMOL/L — SIGNIFICANT CHANGE UP (ref 5–17)
BUN SERPL-MCNC: 33 MG/DL — HIGH (ref 7–23)
BUN SERPL-MCNC: 33 MG/DL — HIGH (ref 7–23)
CALCIUM SERPL-MCNC: 8.2 MG/DL — LOW (ref 8.5–10.1)
CALCIUM SERPL-MCNC: 8.2 MG/DL — LOW (ref 8.5–10.1)
CHLORIDE SERPL-SCNC: 109 MMOL/L — HIGH (ref 96–108)
CHLORIDE SERPL-SCNC: 109 MMOL/L — HIGH (ref 96–108)
CO2 SERPL-SCNC: 25 MMOL/L — SIGNIFICANT CHANGE UP (ref 22–31)
CO2 SERPL-SCNC: 25 MMOL/L — SIGNIFICANT CHANGE UP (ref 22–31)
CREAT SERPL-MCNC: 1.39 MG/DL — HIGH (ref 0.5–1.3)
CREAT SERPL-MCNC: 1.39 MG/DL — HIGH (ref 0.5–1.3)
EGFR: 38 ML/MIN/1.73M2 — LOW
EGFR: 38 ML/MIN/1.73M2 — LOW
GLUCOSE SERPL-MCNC: 144 MG/DL — HIGH (ref 70–99)
GLUCOSE SERPL-MCNC: 144 MG/DL — HIGH (ref 70–99)
HCT VFR BLD CALC: 34.8 % — SIGNIFICANT CHANGE UP (ref 34.5–45)
HCT VFR BLD CALC: 34.8 % — SIGNIFICANT CHANGE UP (ref 34.5–45)
HGB BLD-MCNC: 11.3 G/DL — LOW (ref 11.5–15.5)
HGB BLD-MCNC: 11.3 G/DL — LOW (ref 11.5–15.5)
MCHC RBC-ENTMCNC: 30.6 PG — SIGNIFICANT CHANGE UP (ref 27–34)
MCHC RBC-ENTMCNC: 30.6 PG — SIGNIFICANT CHANGE UP (ref 27–34)
MCHC RBC-ENTMCNC: 32.5 G/DL — SIGNIFICANT CHANGE UP (ref 32–36)
MCHC RBC-ENTMCNC: 32.5 G/DL — SIGNIFICANT CHANGE UP (ref 32–36)
MCV RBC AUTO: 94.3 FL — SIGNIFICANT CHANGE UP (ref 80–100)
MCV RBC AUTO: 94.3 FL — SIGNIFICANT CHANGE UP (ref 80–100)
NRBC # BLD: 0 /100 WBCS — SIGNIFICANT CHANGE UP (ref 0–0)
NRBC # BLD: 0 /100 WBCS — SIGNIFICANT CHANGE UP (ref 0–0)
PLATELET # BLD AUTO: 556 K/UL — HIGH (ref 150–400)
PLATELET # BLD AUTO: 556 K/UL — HIGH (ref 150–400)
POTASSIUM SERPL-MCNC: 4.3 MMOL/L — SIGNIFICANT CHANGE UP (ref 3.5–5.3)
POTASSIUM SERPL-MCNC: 4.3 MMOL/L — SIGNIFICANT CHANGE UP (ref 3.5–5.3)
POTASSIUM SERPL-SCNC: 4.3 MMOL/L — SIGNIFICANT CHANGE UP (ref 3.5–5.3)
POTASSIUM SERPL-SCNC: 4.3 MMOL/L — SIGNIFICANT CHANGE UP (ref 3.5–5.3)
RBC # BLD: 3.69 M/UL — LOW (ref 3.8–5.2)
RBC # BLD: 3.69 M/UL — LOW (ref 3.8–5.2)
RBC # FLD: 15.9 % — HIGH (ref 10.3–14.5)
RBC # FLD: 15.9 % — HIGH (ref 10.3–14.5)
SODIUM SERPL-SCNC: 140 MMOL/L — SIGNIFICANT CHANGE UP (ref 135–145)
SODIUM SERPL-SCNC: 140 MMOL/L — SIGNIFICANT CHANGE UP (ref 135–145)
WBC # BLD: 10.99 K/UL — HIGH (ref 3.8–10.5)
WBC # BLD: 10.99 K/UL — HIGH (ref 3.8–10.5)
WBC # FLD AUTO: 10.99 K/UL — HIGH (ref 3.8–10.5)
WBC # FLD AUTO: 10.99 K/UL — HIGH (ref 3.8–10.5)

## 2023-10-21 RX ADMIN — ATORVASTATIN CALCIUM 10 MILLIGRAM(S): 80 TABLET, FILM COATED ORAL at 22:17

## 2023-10-21 RX ADMIN — PIPERACILLIN AND TAZOBACTAM 25 GRAM(S): 4; .5 INJECTION, POWDER, LYOPHILIZED, FOR SOLUTION INTRAVENOUS at 06:27

## 2023-10-21 RX ADMIN — Medication 25 MILLIGRAM(S): at 22:17

## 2023-10-21 RX ADMIN — SODIUM CHLORIDE 3 MILLILITER(S): 9 INJECTION INTRAMUSCULAR; INTRAVENOUS; SUBCUTANEOUS at 06:24

## 2023-10-21 RX ADMIN — SODIUM CHLORIDE 3 MILLILITER(S): 9 INJECTION INTRAMUSCULAR; INTRAVENOUS; SUBCUTANEOUS at 13:17

## 2023-10-21 RX ADMIN — LOSARTAN POTASSIUM 100 MILLIGRAM(S): 100 TABLET, FILM COATED ORAL at 05:26

## 2023-10-21 RX ADMIN — HYDROMORPHONE HYDROCHLORIDE 4 MILLIGRAM(S): 2 INJECTION INTRAMUSCULAR; INTRAVENOUS; SUBCUTANEOUS at 10:35

## 2023-10-21 RX ADMIN — Medication 3 MILLIGRAM(S): at 22:17

## 2023-10-21 RX ADMIN — PIPERACILLIN AND TAZOBACTAM 25 GRAM(S): 4; .5 INJECTION, POWDER, LYOPHILIZED, FOR SOLUTION INTRAVENOUS at 13:22

## 2023-10-21 RX ADMIN — PANTOPRAZOLE SODIUM 40 MILLIGRAM(S): 20 TABLET, DELAYED RELEASE ORAL at 07:42

## 2023-10-21 RX ADMIN — HYDROMORPHONE HYDROCHLORIDE 4 MILLIGRAM(S): 2 INJECTION INTRAMUSCULAR; INTRAVENOUS; SUBCUTANEOUS at 22:17

## 2023-10-21 RX ADMIN — HYDROMORPHONE HYDROCHLORIDE 4 MILLIGRAM(S): 2 INJECTION INTRAMUSCULAR; INTRAVENOUS; SUBCUTANEOUS at 09:35

## 2023-10-21 RX ADMIN — PIPERACILLIN AND TAZOBACTAM 25 GRAM(S): 4; .5 INJECTION, POWDER, LYOPHILIZED, FOR SOLUTION INTRAVENOUS at 01:15

## 2023-10-21 RX ADMIN — HYDROMORPHONE HYDROCHLORIDE 4 MILLIGRAM(S): 2 INJECTION INTRAMUSCULAR; INTRAVENOUS; SUBCUTANEOUS at 23:17

## 2023-10-21 RX ADMIN — HYDROMORPHONE HYDROCHLORIDE 4 MILLIGRAM(S): 2 INJECTION INTRAMUSCULAR; INTRAVENOUS; SUBCUTANEOUS at 14:49

## 2023-10-21 RX ADMIN — HYDROMORPHONE HYDROCHLORIDE 4 MILLIGRAM(S): 2 INJECTION INTRAMUSCULAR; INTRAVENOUS; SUBCUTANEOUS at 13:49

## 2023-10-21 NOTE — PROGRESS NOTE ADULT - SUBJECTIVE AND OBJECTIVE BOX
78yFemale s/p I&D/plastics closure of lumbar wound POD#3. Pt seen and examined in NAD. Pain is better controlled with dilaudid. Rash improving.   HV 10.5    PE:   Neuro: AAOX3  Spine: Dressing rolling upwards from distal aspect. +HV with serosanguinous.   B/L UE: Skin intact. +ROM shoulder/elbow/wrist/fingers. +ok/thumbsup/fingercross signs.  strength: 5/5.  RP2+ NVI.   B/L LE: Skin intact with persisting blanchable wheels - less prominent and no further eruption. B/L LE's. +ROM hip/knee/ankle/toes. Ankle Dorsi/plantarflexion: 5/5. Calf: soft, compressible and nontender.  DP/PT 2+ NVI.      Vital Signs Last 24 Hrs  T(C): 36.4 (21 Oct 2023 05:11), Max: 37.2 (20 Oct 2023 17:50)  T(F): 97.6 (21 Oct 2023 05:11), Max: 98.9 (20 Oct 2023 17:50)  HR: 61 (21 Oct 2023 05:11) (61 - 71)  BP: 130/72 (21 Oct 2023 05:11) (125/66 - 149/66)  BP(mean): --  RR: 18 (21 Oct 2023 05:11) (17 - 18)  SpO2: 92% (21 Oct 2023 05:11) (92% - 97%)    Parameters below as of 20 Oct 2023 23:42  Patient On (Oxygen Delivery Method): room air            A/P: 78yFemale s/p I&D/plastics closure of lumbar wound POD#2  Dressing changed - dry clean dressing (guaze/tagaderm applied.)  Monitor HV drain output   Pain control reviewed - dilaudid PO controlling pain to pt satisfaction   PT: WBAT - spinal precautions   DVT ppx: SCDs   F/U OR cx's: ID consult reviewed - vancomycin. OR cultures with numerous GNR.    Monitor rash -improving. Benadryl PRN   Discharge: planning pending ID final plan   All the above discussed and understood by pt - pt counseled that we will need Cx/sensitivity results before submitting request for home care - DC planning could take until midweek next week as it is now friday and no arrangements will be made over the weekend   D/W DR Chan

## 2023-10-22 LAB
ANION GAP SERPL CALC-SCNC: 6 MMOL/L — SIGNIFICANT CHANGE UP (ref 5–17)
ANION GAP SERPL CALC-SCNC: 6 MMOL/L — SIGNIFICANT CHANGE UP (ref 5–17)
BUN SERPL-MCNC: 27 MG/DL — HIGH (ref 7–23)
BUN SERPL-MCNC: 27 MG/DL — HIGH (ref 7–23)
CALCIUM SERPL-MCNC: 8.4 MG/DL — LOW (ref 8.5–10.1)
CALCIUM SERPL-MCNC: 8.4 MG/DL — LOW (ref 8.5–10.1)
CHLORIDE SERPL-SCNC: 109 MMOL/L — HIGH (ref 96–108)
CHLORIDE SERPL-SCNC: 109 MMOL/L — HIGH (ref 96–108)
CO2 SERPL-SCNC: 25 MMOL/L — SIGNIFICANT CHANGE UP (ref 22–31)
CO2 SERPL-SCNC: 25 MMOL/L — SIGNIFICANT CHANGE UP (ref 22–31)
CREAT SERPL-MCNC: 1.29 MG/DL — SIGNIFICANT CHANGE UP (ref 0.5–1.3)
CREAT SERPL-MCNC: 1.29 MG/DL — SIGNIFICANT CHANGE UP (ref 0.5–1.3)
EGFR: 42 ML/MIN/1.73M2 — LOW
EGFR: 42 ML/MIN/1.73M2 — LOW
GLUCOSE SERPL-MCNC: 113 MG/DL — HIGH (ref 70–99)
GLUCOSE SERPL-MCNC: 113 MG/DL — HIGH (ref 70–99)
HCT VFR BLD CALC: 36 % — SIGNIFICANT CHANGE UP (ref 34.5–45)
HCT VFR BLD CALC: 36 % — SIGNIFICANT CHANGE UP (ref 34.5–45)
HGB BLD-MCNC: 11.2 G/DL — LOW (ref 11.5–15.5)
HGB BLD-MCNC: 11.2 G/DL — LOW (ref 11.5–15.5)
MCHC RBC-ENTMCNC: 29.7 PG — SIGNIFICANT CHANGE UP (ref 27–34)
MCHC RBC-ENTMCNC: 29.7 PG — SIGNIFICANT CHANGE UP (ref 27–34)
MCHC RBC-ENTMCNC: 31.1 G/DL — LOW (ref 32–36)
MCHC RBC-ENTMCNC: 31.1 G/DL — LOW (ref 32–36)
MCV RBC AUTO: 95.5 FL — SIGNIFICANT CHANGE UP (ref 80–100)
MCV RBC AUTO: 95.5 FL — SIGNIFICANT CHANGE UP (ref 80–100)
NRBC # BLD: 0 /100 WBCS — SIGNIFICANT CHANGE UP (ref 0–0)
NRBC # BLD: 0 /100 WBCS — SIGNIFICANT CHANGE UP (ref 0–0)
PLATELET # BLD AUTO: 585 K/UL — HIGH (ref 150–400)
PLATELET # BLD AUTO: 585 K/UL — HIGH (ref 150–400)
POTASSIUM SERPL-MCNC: 4.1 MMOL/L — SIGNIFICANT CHANGE UP (ref 3.5–5.3)
POTASSIUM SERPL-MCNC: 4.1 MMOL/L — SIGNIFICANT CHANGE UP (ref 3.5–5.3)
POTASSIUM SERPL-SCNC: 4.1 MMOL/L — SIGNIFICANT CHANGE UP (ref 3.5–5.3)
POTASSIUM SERPL-SCNC: 4.1 MMOL/L — SIGNIFICANT CHANGE UP (ref 3.5–5.3)
RBC # BLD: 3.77 M/UL — LOW (ref 3.8–5.2)
RBC # BLD: 3.77 M/UL — LOW (ref 3.8–5.2)
RBC # FLD: 15.8 % — HIGH (ref 10.3–14.5)
RBC # FLD: 15.8 % — HIGH (ref 10.3–14.5)
SODIUM SERPL-SCNC: 140 MMOL/L — SIGNIFICANT CHANGE UP (ref 135–145)
SODIUM SERPL-SCNC: 140 MMOL/L — SIGNIFICANT CHANGE UP (ref 135–145)
VANCOMYCIN TROUGH SERPL-MCNC: 8.4 UG/ML — LOW (ref 10–20)
VANCOMYCIN TROUGH SERPL-MCNC: 8.4 UG/ML — LOW (ref 10–20)
WBC # BLD: 12.46 K/UL — HIGH (ref 3.8–10.5)
WBC # BLD: 12.46 K/UL — HIGH (ref 3.8–10.5)
WBC # FLD AUTO: 12.46 K/UL — HIGH (ref 3.8–10.5)
WBC # FLD AUTO: 12.46 K/UL — HIGH (ref 3.8–10.5)

## 2023-10-22 RX ADMIN — PIPERACILLIN AND TAZOBACTAM 25 GRAM(S): 4; .5 INJECTION, POWDER, LYOPHILIZED, FOR SOLUTION INTRAVENOUS at 17:49

## 2023-10-22 RX ADMIN — PIPERACILLIN AND TAZOBACTAM 25 GRAM(S): 4; .5 INJECTION, POWDER, LYOPHILIZED, FOR SOLUTION INTRAVENOUS at 23:51

## 2023-10-22 RX ADMIN — PANTOPRAZOLE SODIUM 40 MILLIGRAM(S): 20 TABLET, DELAYED RELEASE ORAL at 06:31

## 2023-10-22 RX ADMIN — ATORVASTATIN CALCIUM 10 MILLIGRAM(S): 80 TABLET, FILM COATED ORAL at 23:03

## 2023-10-22 RX ADMIN — HYDROMORPHONE HYDROCHLORIDE 4 MILLIGRAM(S): 2 INJECTION INTRAMUSCULAR; INTRAVENOUS; SUBCUTANEOUS at 14:13

## 2023-10-22 RX ADMIN — Medication 3 MILLIGRAM(S): at 23:03

## 2023-10-22 RX ADMIN — SODIUM CHLORIDE 3 MILLILITER(S): 9 INJECTION INTRAMUSCULAR; INTRAVENOUS; SUBCUTANEOUS at 22:27

## 2023-10-22 RX ADMIN — SODIUM CHLORIDE 3 MILLILITER(S): 9 INJECTION INTRAMUSCULAR; INTRAVENOUS; SUBCUTANEOUS at 13:47

## 2023-10-22 RX ADMIN — Medication 300 MILLIGRAM(S): at 13:51

## 2023-10-22 RX ADMIN — LOSARTAN POTASSIUM 100 MILLIGRAM(S): 100 TABLET, FILM COATED ORAL at 06:31

## 2023-10-22 RX ADMIN — Medication 25 MILLIGRAM(S): at 23:04

## 2023-10-22 RX ADMIN — HYDROMORPHONE HYDROCHLORIDE 4 MILLIGRAM(S): 2 INJECTION INTRAMUSCULAR; INTRAVENOUS; SUBCUTANEOUS at 23:03

## 2023-10-22 NOTE — PROGRESS NOTE ADULT - SUBJECTIVE AND OBJECTIVE BOX
78yFemale s/p I&D/plastics closure of lumbar wound POD#4. Pt seen and examined in NAD. Pain is better controlled with dilaudid. Rash improving.     PE:   Neuro: AAOX3  Spine: Dressing rolling upwards from distal aspect. +HV with serosanguinous.   B/L UE: Skin intact. +ROM shoulder/elbow/wrist/fingers. +ok/thumbsup/fingercross signs.  strength: 5/5.  RP2+ NVI.   B/L LE: Skin intact with persisting blanchable wheels - less prominent and no further eruption. B/L LE's. +ROM hip/knee/ankle/toes. Ankle Dorsi/plantarflexion: 5/5. Calf: soft, compressible and nontender.  DP/PT 2+ NVI.     Vital Signs (24 Hrs):  T(C): 36.6 (10-22-23 @ 05:21), Max: 36.6 (10-21-23 @ 11:44)  HR: 57 (10-22-23 @ 05:21) (57 - 74)  BP: 135/82 (10-22-23 @ 05:21) (108/65 - 140/75)  RR: 18 (10-22-23 @ 05:21) (17 - 18)  SpO2: 95% (10-22-23 @ 05:21) (94% - 96%)  Wt(kg): --    LABS:                          11.2   12.46 )-----------( 585      ( 22 Oct 2023 07:55 )             36.0     10-22    140  |  109<H>  |  27<H>  ----------------------------<  113<H>  4.1   |  25  |  1.29    Ca    8.4<L>      22 Oct 2023 07:55      A/P: 78yFemale s/p I&D/plastics closure of lumbar wound POD#4    Dressing changed - dry clean dressing (guaze/tagaderm applied.)  Monitor HV drain output   Pain control reviewed - dilaudid PO controlling pain to pt satisfaction   PT: WBAT - spinal precautions   DVT ppx: SCDs   F/U OR cx's: ID consult reviewed - vancomycin. OR cultures with numerous GNR.    Monitor rash -improving. Benadryl PRN   Discharge: planning pending ID final plan   All the above discussed and understood by pt - pt counseled that we will need Cx/sensitivity results before submitting request for home care - DC planning could take until midweek next week as it is now friday and no arrangements will be made over the weekend   D/W DR Chan

## 2023-10-22 NOTE — CHART NOTE - NSCHARTNOTEFT_GEN_A_CORE
YANA Drain removed, tip intact. Dressings changed, now C/D/I. Patient tolerated removal well. Discussed with Dr. Chan who agreed with above.

## 2023-10-23 PROCEDURE — ZZZZZ: CPT

## 2023-10-23 PROCEDURE — 99232 SBSQ HOSP IP/OBS MODERATE 35: CPT

## 2023-10-23 PROCEDURE — 36573 INSJ PICC RS&I 5 YR+: CPT | Mod: LT

## 2023-10-23 RX ORDER — PIPERACILLIN AND TAZOBACTAM 4; .5 G/20ML; G/20ML
3.38 INJECTION, POWDER, LYOPHILIZED, FOR SOLUTION INTRAVENOUS
Qty: 37 | Refills: 0
Start: 2023-10-23 | End: 2023-12-03

## 2023-10-23 RX ORDER — CHLORHEXIDINE GLUCONATE 213 G/1000ML
1 SOLUTION TOPICAL
Refills: 0 | Status: DISCONTINUED | OUTPATIENT
Start: 2023-10-23 | End: 2023-10-24

## 2023-10-23 RX ADMIN — HYDROMORPHONE HYDROCHLORIDE 4 MILLIGRAM(S): 2 INJECTION INTRAMUSCULAR; INTRAVENOUS; SUBCUTANEOUS at 21:20

## 2023-10-23 RX ADMIN — HYDROMORPHONE HYDROCHLORIDE 4 MILLIGRAM(S): 2 INJECTION INTRAMUSCULAR; INTRAVENOUS; SUBCUTANEOUS at 11:49

## 2023-10-23 RX ADMIN — SODIUM CHLORIDE 3 MILLILITER(S): 9 INJECTION INTRAMUSCULAR; INTRAVENOUS; SUBCUTANEOUS at 21:14

## 2023-10-23 RX ADMIN — HYDROMORPHONE HYDROCHLORIDE 4 MILLIGRAM(S): 2 INJECTION INTRAMUSCULAR; INTRAVENOUS; SUBCUTANEOUS at 00:03

## 2023-10-23 RX ADMIN — Medication 25 MILLIGRAM(S): at 21:19

## 2023-10-23 RX ADMIN — Medication 3 MILLIGRAM(S): at 21:20

## 2023-10-23 RX ADMIN — PANTOPRAZOLE SODIUM 40 MILLIGRAM(S): 20 TABLET, DELAYED RELEASE ORAL at 06:02

## 2023-10-23 RX ADMIN — HYDROMORPHONE HYDROCHLORIDE 4 MILLIGRAM(S): 2 INJECTION INTRAMUSCULAR; INTRAVENOUS; SUBCUTANEOUS at 10:49

## 2023-10-23 RX ADMIN — CYCLOBENZAPRINE HYDROCHLORIDE 10 MILLIGRAM(S): 10 TABLET, FILM COATED ORAL at 21:20

## 2023-10-23 RX ADMIN — LOSARTAN POTASSIUM 100 MILLIGRAM(S): 100 TABLET, FILM COATED ORAL at 06:01

## 2023-10-23 RX ADMIN — HYDROMORPHONE HYDROCHLORIDE 4 MILLIGRAM(S): 2 INJECTION INTRAMUSCULAR; INTRAVENOUS; SUBCUTANEOUS at 22:20

## 2023-10-23 RX ADMIN — ATORVASTATIN CALCIUM 10 MILLIGRAM(S): 80 TABLET, FILM COATED ORAL at 21:20

## 2023-10-23 RX ADMIN — PIPERACILLIN AND TAZOBACTAM 25 GRAM(S): 4; .5 INJECTION, POWDER, LYOPHILIZED, FOR SOLUTION INTRAVENOUS at 21:20

## 2023-10-23 RX ADMIN — SODIUM CHLORIDE 3 MILLILITER(S): 9 INJECTION INTRAMUSCULAR; INTRAVENOUS; SUBCUTANEOUS at 05:50

## 2023-10-23 RX ADMIN — PIPERACILLIN AND TAZOBACTAM 25 GRAM(S): 4; .5 INJECTION, POWDER, LYOPHILIZED, FOR SOLUTION INTRAVENOUS at 09:28

## 2023-10-23 NOTE — PROGRESS NOTE ADULT - SUBJECTIVE AND OBJECTIVE BOX
78yFemale s/p I&D/plastics closure of lumbar wound POD#5  Pt seen and examined in NAD. Pain is controlled with dilaudid. Rash improving.     PE:   Neuro: AAOX3  Spine: Dressing C/D/I.    B/L UE: Skin intact. +ROM shoulder/elbow/wrist/fingers. +ok/thumbsup/fingercross signs.  strength: 5/5.  RP2+ NVI.   B/L LE: Skin intact with resolving rash B/L LE's. +ROM hip/knee/ankle/toes. Ankle Dorsi/plantarflexion: 5/5. Calf: soft, compressible and nontender.  DP/PT 2+ NVI.                           11.2   12.46 )-----------( 585      ( 22 Oct 2023 07:55 )             36.0   10-22    140  |  109<H>  |  27<H>  ----------------------------<  113<H>  4.1   |  25  |  1.29    Ca    8.4<L>      22 Oct 2023 07:55                                        A/P: 78yFemale s/p I&D/plastics closure of lumbar wound POD#5  Pain control reviewed - dilaudid PO controlling pain to pt satisfaction   PT: WBAT - spinal precautions   DVT ppx: SCDs   OR Cx;s: Klebsiella Pneumonia, Proteus Mirabilis. ID final plan - Zosyn 3.375 gm IVP Q6.   F/U PICC line insertion - ordered   Monitor rash -improving. Benadryl PRN   Discharge: planning pending home care arrangements  All the above discussed and understood by pt - pt counseled that we will to place request for home care - DC planning could take until midweek or longer because of workers comp logistics.   D/W DR Chan

## 2023-10-23 NOTE — PROCEDURE NOTE - ADDITIONAL PROCEDURE DETAILS
Successful insertion of 4F 51cm single lumen PICC line via left basilic vein, using ultrasound and fluoroscopy guidance. Tip in SVC. Okay to use. No complications. Full report to follow.

## 2023-10-23 NOTE — PROGRESS NOTE ADULT - ASSESSMENT
Pt is a 78F who presents for lumbar wound debridement and closure s/p L3-4 laminectomy 08/25/23. Pain has gotten better since having sx. Feels pain at incision site. B/L leg weakness. Reports drainage from incision has increased and now has to change gauze 3xdays, rather than once a day. Doing home PT, persisting with some b/l LE pain.   no fevers   wbc ok   no outpatient cultures   went to OR for debridement and closure   OR cultures will be very helpful   has multiple antibiotics allergies including ceftriaxone, flagyl, cipro, meropenem    10/19: pod#1 s/p debridement and closure of spinal surgical site, no fevers, RA, WBC elevated 13.9, Cr elevated 1.38, surgical culture and MRSA PCR are pending, Vanco level 6.4 today, received a dose of vanco intraop, Vancomycin IV continued. Developed rash, Azactam stopped.   Attending addendum:  I have reviewed all pertinent clinical information and agree with the NP's note.   small faint macular rash on the lower extremities as well as chest   has prior hx of allergic reactions to multiple antibiotics   will stop aztreonam and monitor this rash   can add benadryl if rash worsens   continue vancomycin   send vancomycin trough with target AUC/BOOM 400-600   (therapeutic drug monitoring required with IV vancomycin)   will follow cultures and see if gram negative coverage is needed   should treat this as an infection until proven otherwise    10/20: no fevers, RA, no new cbc, surgical culture is growing Klebsiella pneumoniae and Proteus mirabilis, pending sensitivity, started on Zosyn IV (tolerated in the past), Vancomycin IV continued, today's level is 9.4 - reached out to pharmacy, will adjust the dose based on recommendations.    Attending addendum:  I have reviewed all pertinent clinical information and agree with the NP's note.   patient had allergy presumably to aztreonam   she has tolerated Zosyn as recent as 2020 without reaction   will start zosyn to treat gram negative wound infection   monitor patients rash   picc line placement monday   can continue vancomycin until or cultures are final    10/23: picc line place, YANA removed over the weekend, no fevers, RA, no new cbc, Cr normalized, no evidence of MRSA, vancomycin IV stopped, BC x 1 NGTD, surgical cultures grew Klebsiella and Proteus, Zosyn IV continued. Pt will need six weeks of abx from the day of her procedure, Rx provided. Tolerates medication well.     Plan:  stop vancomycin IV   continue Zosyn IV - pt tolerated similar medication in the past  she would benefit from outpatient allergy testing   MRSA PCR not detected   PT and OT per ortho  follow OR cultures - grew Klebsiella and Proteus  follow all culture data    when ready for discharge:  picc line placed 10/23/23  continue Zosyn IV 3.375 g q 6 hrs - last dose 11/28/2023  weekly lab work: cbc with diff, cmp, esr, crp - fax to Dr. Gottlieb at [phone]  follow up with Dr. Gottlieb in the office:  [address] [place] [address] [place] [address]  [phone]  remove picc line upon completion of the course of abx    discussed with Dr. Gottlieb  discussed with the   Pt is a 78F who presents for lumbar wound debridement and closure s/p L3-4 laminectomy 08/25/23. Pain has gotten better since having sx. Feels pain at incision site. B/L leg weakness. Reports drainage from incision has increased and now has to change gauze 3xdays, rather than once a day. Doing home PT, persisting with some b/l LE pain.   no fevers   wbc ok   no outpatient cultures   went to OR for debridement and closure   OR cultures will be very helpful   has multiple antibiotics allergies including ceftriaxone, flagyl, cipro, meropenem    10/19: pod#1 s/p debridement and closure of spinal surgical site, no fevers, RA, WBC elevated 13.9, Cr elevated 1.38, surgical culture and MRSA PCR are pending, Vanco level 6.4 today, received a dose of vanco intraop, Vancomycin IV continued. Developed rash, Azactam stopped.   Attending addendum:  I have reviewed all pertinent clinical information and agree with the NP's note.   small faint macular rash on the lower extremities as well as chest   has prior hx of allergic reactions to multiple antibiotics   will stop aztreonam and monitor this rash   can add benadryl if rash worsens   continue vancomycin   send vancomycin trough with target AUC/BOOM 400-600   (therapeutic drug monitoring required with IV vancomycin)   will follow cultures and see if gram negative coverage is needed   should treat this as an infection until proven otherwise    10/20: no fevers, RA, no new cbc, surgical culture is growing Klebsiella pneumoniae and Proteus mirabilis, pending sensitivity, started on Zosyn IV (tolerated in the past), Vancomycin IV continued, today's level is 9.4 - reached out to pharmacy, will adjust the dose based on recommendations.    Attending addendum:  I have reviewed all pertinent clinical information and agree with the NP's note.   patient had allergy presumably to aztreonam   she has tolerated Zosyn as recent as 2020 without reaction   will start zosyn to treat gram negative wound infection   monitor patients rash   picc line placement monday   can continue vancomycin until or cultures are final    10/23: picc line place, YANA removed over the weekend, no fevers, RA, no new cbc, Cr normalized, no evidence of MRSA, vancomycin IV stopped, BC x 1 NGTD, surgical cultures grew Klebsiella and Proteus, Zosyn IV continued. Pt will need six weeks of abx from the day of her procedure, Rx provided. Tolerates medication well.     Plan:  stop vancomycin IV   continue Zosyn IV - pt tolerated medication in the past  she would benefit from outpatient allergy testing   MRSA PCR not detected   PT and OT per ortho  follow OR cultures - grew Klebsiella and Proteus  follow all culture data    when ready for discharge:  picc line placed 10/23/23  continue Zosyn IV 3.375 g q 6 hrs - last dose 11/28/2023  ideally will try to arrange zosyn continuous infusion 18g over 24hrs  weekly lab work: cbc with diff, cmp, esr, crp - fax to Dr. Gottlieb at [phone]  follow up with Dr. Gottlieb in the office:  [address] [place] [address] [place] [address]  [phone]  remove picc line upon completion of the course of abx    discussed with Dr. Gottlieb  discussed with the

## 2023-10-23 NOTE — CONSULT NOTE ADULT - SUBJECTIVE AND OBJECTIVE BOX
Interventional Radiology    Evaluate for Procedure: PICC line placement    HPI: 78y Female presents for lumbar wound debridement and closure s/p L3-4 laminectomy 08/25/23. s/p I&D/plastics closure of lumbar wound POD#5. IR consulted for PICC line placement today.     Allergies: Cipro (Unknown)  ceftriaxone (Rash)  Tolerating piperacillin-tazobactam 10/2023 (Other)  Bee Stings (Anaphylaxis)  Flagyl (Diarrhea; Rash)  hibiclens - skin swelling (Other)  meropenem-vaborbactam (Flushing)  Augmentin (Rash)    Medications (Abx/Cardiac/Anticoagulation/Blood Products)    losartan: 100 milliGRAM(s) Oral (10-23 @ 06:01)  piperacillin/tazobactam IVPB..: 25 mL/Hr IV Intermittent (10-23 @ 09:28)  vancomycin  IVPB: 300 mL/Hr IV Intermittent (10-22 @ 13:51)    Data:    T(C): 36.5  HR: 64  BP: 123/71  RR: 18  SpO2: 94%    -WBC 12.46 / HgB 11.2 / Hct 36.0 / Plt 585  -Na 140 / Cl 109 / BUN 27 / Glucose 113  -K 4.1 / CO2 25 / Cr 1.29  -ALT -- / Alk Phos -- / T.Bili --  -INR 0.98 / PTT 31.6    Radiology: Reviewed    Assessment/Plan:   -78y Female presents for lumbar wound debridement and closure s/p L3-4 laminectomy 08/25/23. s/p I&D/plastics closure of lumbar wound POD#5. IR consulted for PICC line placement today.       - case reviewed and approved for PICC line placement  - IR PICC ordered  - Labs reviewed and appropriate.   - does not need to be NPO  - d/w primary team
[facility]  INFECTIOUS DISEASES   [address] [place] [address]  Tel: [phone]     Fax: [phone]  ==============================================================================  DO Cedric Esteban MD Alexandra Gutman, NP   ==============================================================================    [address]  MRN-[id number]  Female  78y ([birth year])        Patient is a 78y old  Female who presents with a chief complaint of L4-L5 I&D (18 Oct 2023 12:05)      HPI:  Pt is a 78F who presents for lumbar wound debridement and closure s/p L3-4 laminectomy 08/25/23. Pain has gotten better since having sx. Feels pain at incision site. B/L leg weakness. Reports drainage from incision has increased and now has to change gauze 3xdays, rather than once a day. Doing home PT, persisting with some b/l LE pain. (18 Oct 2023 12:05)    Outpatient note from 10/2 with Dr Chan: "still awaiting auth from her carrier to return to OR to debride wound and re do the closure; kind of shocking that it has not been approved yet; it is management of a post op complication; going to send her thru the ER if there is ongoing delay; spoke to plastics colleagues : the surgery to address this will be roughly the same whether it's done tomorrow as planned (pending auth) or next week ; topical measures in the meantime (aquacell Ag)    no purulence, but the edges are no longer opposed; broken down since she was last seen; there is a gap now that will best close be return to the OR; local dressing changes will take so much time that the risk of a secondary infection or seeding would be my concern; will have plastics colleagues see her in the near future (I called a colleague who is going to see her later this week); will put her on the OR schedule very soon; if she were clinically ill then we would address this in the next day or so but it can wait until she is seen by plastic and reconstructive surgery in their office, first.      wound continues to drain (serous fluid); am becoming more and more concerned about it as it might obligate a washout with plastic closure there is a zone of fibrinous exudate and nonviable skin edges, but the edges are opposed, if it does not begin to improve will seek plastic colleagues' input.    going to review case with visiting home care services to optimize wound care.    POV#2 s/p L3-4 laminectomy 08/25/23, s/p L3-4, L4-5 lumbar fusion 07/08/22. LS x-rays w/ fusion intact. Incision continues to drain serous fluid. Discussed possible surgical closure with plastic surgeon. 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      S/P total knee arthroplasty, right      S/P knee replacement  Left ( 2007 )      S/P cholecystectomy      S/P knee replacement  2004 left knee and revision      S/P arthroscopy of shoulder  left shoulder      S/P laminectomy          Allergies  Cipro (Unknown)  ceftriaxone (Rash)  Bee Stings (Anaphylaxis)  Flagyl (Diarrhea; Rash)  hibiclens - skin swelling (Other)  meropenem-vaborbactam (Flushing)  Augmentin (Rash)        ANTIMICROBIALS:      MEDICATIONS  (STANDING):        OTHER MEDS: MEDICATIONS  (STANDING):  fentaNYL    Injectable 25 every 5 minutes PRN  HYDROmorphone  Injectable 0.2 every 10 minutes PRN  HYDROmorphone  Injectable 0.5 every 4 hours      SOCIAL HISTORY:     Smoking Cigarettes [ ]Active [ ] Former [x ]Denies   ETOH [x ]denies [ ]Former [ ]Current Use denies   Drug Use [ x]Never [ ] Former [ ] Active     FAMILY HISTORY:  Family history of diabetes mellitus (Mother)    Family history of stroke (Mother, Father)        REVIEW OF SYSTEMS  [  ] ROS unobtainable because:    [ x ] All other systems negative except as noted below:	    Constitutional:  [ ] fever [ ] chills  [ ] weight loss  [ ] weakness  Skin:  [ ] rash [ ] phlebitis [x] drainage from wound 	  Eyes: [ ] icterus [ ] pain  [ ] discharge	  ENMT: [ ] sore throat  [ ] thrush [ ] ulcers [ ] exudates  Respiratory: [ ] dyspnea [ ] hemoptysis [ ] cough [ ] sputum	  Cardiovascular:  [ ] chest pain [ ] palpitations [ ] edema	  Gastrointestinal:  [ ] nausea [ ] vomiting [ ] diarrhea [ ] constipation [ ] pain	  Genitourinary:  [ ] dysuria [ ] frequency [ ] hematuria [ ] discharge [ ] flank pain  [ ] incontinence  Musculoskeletal:  [ ] myalgias [ ] arthralgias [ ] arthritis  [ ] back pain  Neurological:  [ ] headache [ ] seizures  [ ] confusion/altered mental status  Psychiatric:  [ ] anxiety [ ] depression	  Hematology/Lymphatics:  [ ] lymphadenopathy  Endocrine:  [ ] adrenal [ ] thyroid  Allergic/Immunologic:	 [ ] transplant [ ] seasonal    Vital Signs Last 24 Hrs  T(F): 98 (10-18-23 @ 14:30), Max: 98 (10-18-23 @ 11:13)    Vital Signs Last 24 Hrs  HR: 70 (10-18-23 @ 15:15) (70 - 74)  BP: 124/70 (10-18-23 @ 15:15) (108/62 - 140/69)  RR: 14 (10-18-23 @ 15:15)  SpO2: 98% (10-18-23 @ 15:15) (91% - 99%)  Wt(kg): --    PHYSICAL EXAM:  Constitutional: non-toxic, no distress  HEAD/EYES: anicteric, no conjunctival injection  ENT:  supple, no thrush  Cardiovascular:   normal S1, S2, no murmur, no edema  Respiratory:  clear BS bilaterally, no wheezes, no rales  GI:  soft, non-tender, normal bowel sounds  Musculoskeletal:  bilateral knee replacements as well as surgical scar on left shoulder, unable to examine back at this time   Neurologic: awake and alert, sensation in tact, no focal findings  Skin:  no rash, no erythema, no phlebitis  Heme/Onc: no lymphadenopathy   Psychiatric:  awake, alert, appropriate mood          WBC Count: 10.79 K/uL (10-18 @ 15:02)      Auto Neutrophil %: 78.4 % *H* (10-18-23 @ 15:02)  Auto Neutrophil #: 8.46 K/uL *H* (10-18-23 @ 15:02)                            12.1   10.79 )-----------( 523      ( 18 Oct 2023 15:02 )             36.9       10-18    140  |  109<H>  |  25<H>  ----------------------------<  145<H>  4.4   |  25  |  1.20    Ca    8.4<L>      18 Oct 2023 15:02        Creatinine Trend: 1.20<--    MICROBIOLOGY:      RADIOLOGY:  
 [address] is a 78y Female s/p LUMBAR WOUND DEBRIDEMENT      w/ h/o Essential hypertension    Hyperlipemia    GERD (gastroesophageal reflux disease)    Obese    Eczema      denies any chest pain shortness of breath palpitation dizziness lightheadedness nausea vomiting fever or chills    Hiatal hernia    Status post total shoulder arthroplasty, right    S/P total knee arthroplasty, right    S/P knee replacement    S/P cholecystectomy    S/P knee replacement    S/P arthroscopy of shoulder    S/P laminectomy      Family history of diabetes mellitus (Mother)    Family history of stroke (Mother, Father)      SH: doesnot smoke or drink at this time    Cipro (Unknown)  ceftriaxone (Rash)  Bee Stings (Anaphylaxis)  Flagyl (Diarrhea; Rash)  hibiclens - skin swelling (Other)  meropenem-vaborbactam (Flushing)  Augmentin (Rash)    acetaminophen     Tablet .. 650 milliGRAM(s) Oral every 6 hours PRN  atorvastatin 10 milliGRAM(s) Oral at bedtime  aztreonam  IVPB 2000 milliGRAM(s) IV Intermittent every 8 hours  cyclobenzaprine 10 milliGRAM(s) Oral three times a day PRN  HYDROmorphone  Injectable 0.5 milliGRAM(s) IV Push every 3 hours PRN  HYDROmorphone  Injectable 0.5 milliGRAM(s) IV Push once  lactated ringers. 1000 milliLiter(s) IV Continuous <Continuous>  losartan 100 milliGRAM(s) Oral daily  magnesium hydroxide Suspension 30 milliLiter(s) Oral every 12 hours PRN  melatonin 3 milliGRAM(s) Oral at bedtime  oxyCODONE    IR 10 milliGRAM(s) Oral every 6 hours PRN  oxyCODONE    IR 5 milliGRAM(s) Oral every 6 hours PRN  pantoprazole    Tablet 40 milliGRAM(s) Oral before breakfast  polyethylene glycol 3350 17 Gram(s) Oral two times a day PRN  sodium chloride 0.9% lock flush 3 milliLiter(s) IV Push every 8 hours  sucralfate 1 Gram(s) Oral once  traMADol 25 milliGRAM(s) Oral every 6 hours PRN  vancomycin  IVPB 1500 milliGRAM(s) IV Intermittent every 24 hours    T(C): 36.4 (10-19-23 @ 04:00), Max: 36.8 (10-18-23 @ 17:48)  HR: 57 (10-19-23 @ 04:00) (57 - 75)  BP: 137/68 (10-19-23 @ 04:00) (108/62 - 137/68)  RR: 17 (10-19-23 @ 04:00) (14 - 22)  SpO2: 95% (10-19-23 @ 04:00) (91% - 99%)  HEENT unremarkable  neck no JVD or bruit  heart normal S1 S2 RRR no gallops or rubs  chest clear to auscultation  abd sof nontender non distended +bs  ext no calf tenderness    A/P   DVT PX  pain control  bowel regimen   wound care as per ortho  GI PX  antiemetics prn  incentive spirometer

## 2023-10-23 NOTE — PROGRESS NOTE ADULT - NS ATTEND AMEND GEN_ALL_CORE FT
I have reviewed all pertinent clinical information and agree with the NP's note.   picc line placed 10/23/23  either dc on Zosyn IV 3.375 g q 6 hrs - last dose 11/28/2023  ideally will try to arrange zosyn continuous infusion 18g over 24hrs  weekly lab work: cbc with diff, cmp, esr, crp     Jake Gottlieb, DO  Infectious Disease Attending  Reachable via Microsoft Teams or ID office: [phone]  After 5pm/weekends please call [phone] for all inquiries and new consults

## 2023-10-23 NOTE — PROGRESS NOTE ADULT - SUBJECTIVE AND OBJECTIVE BOX
[address]  MRN-[id number]    Follow Up:  spinal infection     Interval History: the pt was seen and examined post PICC line placement, no acute distress, in the recliner, no new complaints. Pt is afebrile, RA, no new cbc.     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      S/P total knee arthroplasty, right      S/P knee replacement  Left ( 2007 )      S/P cholecystectomy      S/P knee replacement  2004 left knee and revision      S/P arthroscopy of shoulder  left shoulder      S/P laminectomy          ROS:    [ ] Unobtainable because:  [x ] All other systems negative    Constitutional: no fever, no chills  Head: no trauma  Eyes: no vision changes, no eye pain  ENT:  no sore throat, no rhinorrhea  Cardiovascular:  no chest pain, no palpitation  Respiratory:  no SOB, no cough  GI:  no abd pain, no vomiting, no diarrhea  urinary: no dysuria, no hematuria, no flank pain  musculoskeletal:  no joint pain, no joint swelling  skin:  no rash  neurology:  no headache, no seizure, no change in mental status  psych: no anxiety, no depression         Allergies  Cipro (Unknown)  ceftriaxone (Rash)  Tolerating piperacillin-tazobactam 10/2023 (Other)  Bee Stings (Anaphylaxis)  Flagyl (Diarrhea; Rash)  hibiclens - skin swelling (Other)  meropenem-vaborbactam (Flushing)  Augmentin (Rash)        ANTIMICROBIALS:  piperacillin/tazobactam IVPB.. 3.375 every 8 hours      OTHER MEDS:  acetaminophen     Tablet .. 650 milliGRAM(s) Oral every 6 hours PRN  atorvastatin 10 milliGRAM(s) Oral at bedtime  chlorhexidine 2% Cloths 1 Application(s) Topical <User Schedule>  cyclobenzaprine 10 milliGRAM(s) Oral three times a day PRN  diphenhydrAMINE 25 milliGRAM(s) Oral every 6 hours PRN  HYDROmorphone   Tablet 4 milliGRAM(s) Oral every 4 hours PRN  HYDROmorphone   Tablet 2 milliGRAM(s) Oral every 3 hours PRN  HYDROmorphone  Injectable 0.5 milliGRAM(s) IV Push every 3 hours PRN  HYDROmorphone  Injectable 0.5 milliGRAM(s) IV Push once  lactated ringers. 1000 milliLiter(s) IV Continuous <Continuous>  losartan 100 milliGRAM(s) Oral daily  magnesium hydroxide Suspension 30 milliLiter(s) Oral every 12 hours PRN  melatonin 3 milliGRAM(s) Oral at bedtime  pantoprazole    Tablet 40 milliGRAM(s) Oral before breakfast  polyethylene glycol 3350 17 Gram(s) Oral two times a day PRN  sodium chloride 0.9% lock flush 3 milliLiter(s) IV Push every 8 hours  sucralfate 1 Gram(s) Oral once  traMADol 25 milliGRAM(s) Oral every 6 hours PRN      Vital Signs Last 24 Hrs  T(C): 36.5 (23 Oct 2023 11:20), Max: 36.6 (22 Oct 2023 23:32)  T(F): 97.7 (23 Oct 2023 11:20), Max: 97.9 (22 Oct 2023 23:32)  HR: 64 (23 Oct 2023 11:20) (61 - 65)  BP: 123/71 (23 Oct 2023 11:20) (122/70 - 128/76)  BP(mean): --  RR: 18 (23 Oct 2023 11:20) (18 - 18)  SpO2: 94% (23 Oct 2023 11:20) (94% - 95%)    Parameters below as of 23 Oct 2023 11:20  Patient On (Oxygen Delivery Method): room air        Physical Exam:  Constitutional: non-toxic, no distress, RA  HEAD/EYES: anicteric, no conjunctival injection  ENT:  supple, no thrush  Cardiovascular:   normal S1, S2, no murmur,  edema b/l LEs  Respiratory:  clear BS bilaterally, no wheezes, no rales  GI:  soft, non-tender, normal bowel sounds, obese   Musculoskeletal:  bilateral knee replacements as well as surgical scar on left shoulder, back is dressed, c/d/i, no erythema or swelling of surrounding tissues, not tender, YANA removed  Neurologic: awake and alert, sensation in tact, no focal findings  Skin: patchy erythematous rash on b/l LEs and upper chest, a couple spots on upper back, Right arm PIV with mild erythema, no swelling, RN aware    Heme/Onc: no lymphadenopathy   Psychiatric:  awake, alert, appropriate mood    WBC Count: 12.46 K/uL (10-22 @ 07:55)  WBC Count: 10.99 K/uL (10-21 @ 09:56)  WBC Count: 13.90 K/uL (10-19 @ 06:55)  WBC Count: 10.79 K/uL (10-18 @ 15:02)                            11.2   12.46 )-----------( 585      ( 22 Oct 2023 07:55 )             36.0       10-22    140  |  109<H>  |  27<H>  ----------------------------<  113<H>  4.1   |  25  |  1.29    Ca    8.4<L>      22 Oct 2023 07:55        Urinalysis Basic - ( 22 Oct 2023 07:55 )    Color: x / Appearance: x / SG: x / pH: x  Gluc: 113 mg/dL / Ketone: x  / Bili: x / Urobili: x   Blood: x / Protein: x / Nitrite: x   Leuk Esterase: x / RBC: x / WBC x   Sq Epi: x / Non Sq Epi: x / Bacteria: x        Creatinine Trend: 1.29<--, 1.39<--, 1.38<--, 1.20<--      MICROBIOLOGY:  v  .Blood Arterial Catheter  10-19-23   No growth at 72 Hours  --  --      .Surgical Swab DEEP WOUND LUMBAR CULTURE  10-18-23   Numerous Klebsiella pneumoniae  Numerous Proteus mirabilis  --  Klebsiella pneumoniae  Proteus mirabilis                C-Reactive Protein, Serum: 25 (10-19)                RADIOLOGY:     [address]  MRN-[id number]    Follow Up:  spinal infection     Interval History: the pt was seen and examined post PICC line placement, no acute distress, in the recliner, no new complaints. Pt is afebrile, RA, no new cbc.     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      S/P total knee arthroplasty, right      S/P knee replacement  Left ( 2007 )      S/P cholecystectomy      S/P knee replacement  2004 left knee and revision      S/P arthroscopy of shoulder  left shoulder      S/P laminectomy          ROS:    [ ] Unobtainable because:  [x ] All other systems negative    Constitutional: no fever, no chills  Head: no trauma  Eyes: no vision changes, no eye pain  ENT:  no sore throat, no rhinorrhea  Cardiovascular:  no chest pain, no palpitation  Respiratory:  no SOB, no cough  GI:  no abd pain, no vomiting, no diarrhea  urinary: no dysuria, no hematuria, no flank pain  musculoskeletal:  no joint pain, no joint swelling  skin:  no rash  neurology:  no headache, no seizure, no change in mental status  psych: no anxiety, no depression         Allergies  Cipro (Unknown)  ceftriaxone (Rash)  Tolerating piperacillin-tazobactam 10/2023 (Other)  Bee Stings (Anaphylaxis)  Flagyl (Diarrhea; Rash)  hibiclens - skin swelling (Other)  meropenem-vaborbactam (Flushing)  Augmentin (Rash)        ANTIMICROBIALS:  piperacillin/tazobactam IVPB.. 3.375 every 8 hours      OTHER MEDS:  acetaminophen     Tablet .. 650 milliGRAM(s) Oral every 6 hours PRN  atorvastatin 10 milliGRAM(s) Oral at bedtime  chlorhexidine 2% Cloths 1 Application(s) Topical <User Schedule>  cyclobenzaprine 10 milliGRAM(s) Oral three times a day PRN  diphenhydrAMINE 25 milliGRAM(s) Oral every 6 hours PRN  HYDROmorphone   Tablet 4 milliGRAM(s) Oral every 4 hours PRN  HYDROmorphone   Tablet 2 milliGRAM(s) Oral every 3 hours PRN  HYDROmorphone  Injectable 0.5 milliGRAM(s) IV Push every 3 hours PRN  HYDROmorphone  Injectable 0.5 milliGRAM(s) IV Push once  lactated ringers. 1000 milliLiter(s) IV Continuous <Continuous>  losartan 100 milliGRAM(s) Oral daily  magnesium hydroxide Suspension 30 milliLiter(s) Oral every 12 hours PRN  melatonin 3 milliGRAM(s) Oral at bedtime  pantoprazole    Tablet 40 milliGRAM(s) Oral before breakfast  polyethylene glycol 3350 17 Gram(s) Oral two times a day PRN  sodium chloride 0.9% lock flush 3 milliLiter(s) IV Push every 8 hours  sucralfate 1 Gram(s) Oral once  traMADol 25 milliGRAM(s) Oral every 6 hours PRN      Vital Signs Last 24 Hrs  T(C): 36.5 (23 Oct 2023 11:20), Max: 36.6 (22 Oct 2023 23:32)  T(F): 97.7 (23 Oct 2023 11:20), Max: 97.9 (22 Oct 2023 23:32)  HR: 64 (23 Oct 2023 11:20) (61 - 65)  BP: 123/71 (23 Oct 2023 11:20) (122/70 - 128/76)  BP(mean): --  RR: 18 (23 Oct 2023 11:20) (18 - 18)  SpO2: 94% (23 Oct 2023 11:20) (94% - 95%)    Parameters below as of 23 Oct 2023 11:20  Patient On (Oxygen Delivery Method): room air        Physical Exam:  Constitutional: non-toxic, no distress, RA  HEAD/EYES: anicteric, no conjunctival injection  ENT:  supple, no thrush  Cardiovascular:   normal S1, S2, no murmur,  edema b/l LEs  Respiratory:  clear BS bilaterally, no wheezes, no rales  GI:  soft, non-tender, normal bowel sounds, obese   Musculoskeletal:  bilateral knee replacements as well as surgical scar on left shoulder, back is dressed, c/d/i, no erythema or swelling of surrounding tissues, not tender, YANA removed  Neurologic: awake and alert, sensation in tact, no focal findings  Skin: patchy erythematous rash on b/l LEs and upper chest, a couple spots on upper back, Right arm PIV with mild erythema, no swelling, RN aware    Heme/Onc: no lymphadenopathy   Psychiatric:  awake, alert, appropriate mood    WBC Count: 12.46 K/uL (10-22 @ 07:55)  WBC Count: 10.99 K/uL (10-21 @ 09:56)  WBC Count: 13.90 K/uL (10-19 @ 06:55)  WBC Count: 10.79 K/uL (10-18 @ 15:02)                            11.2   12.46 )-----------( 585      ( 22 Oct 2023 07:55 )             36.0       10-22    140  |  109<H>  |  27<H>  ----------------------------<  113<H>  4.1   |  25  |  1.29    Ca    8.4<L>      22 Oct 2023 07:55        Urinalysis Basic - ( 22 Oct 2023 07:55 )    Color: x / Appearance: x / SG: x / pH: x  Gluc: 113 mg/dL / Ketone: x  / Bili: x / Urobili: x   Blood: x / Protein: x / Nitrite: x   Leuk Esterase: x / RBC: x / WBC x   Sq Epi: x / Non Sq Epi: x / Bacteria: x        Creatinine Trend: 1.29<--, 1.39<--, 1.38<--, 1.20<--      MICROBIOLOGY:  v  .Blood Arterial Catheter  10-19-23   No growth at 72 Hours  --  --      .Surgical Swab DEEP WOUND LUMBAR CULTURE  10-18-23   Numerous Klebsiella pneumoniae  Numerous Proteus mirabilis  --  Klebsiella pneumoniae  Proteus mirabilis    C-Reactive Protein, Serum: 25 (10-19)      RADIOLOGY:

## 2023-10-24 PROCEDURE — 99232 SBSQ HOSP IP/OBS MODERATE 35: CPT

## 2023-10-24 RX ORDER — PIPERACILLIN AND TAZOBACTAM 4; .5 G/20ML; G/20ML
18 INJECTION, POWDER, LYOPHILIZED, FOR SOLUTION INTRAVENOUS
Qty: 35 | Refills: 0
Start: 2023-10-24 | End: 2023-12-04

## 2023-10-24 RX ADMIN — PIPERACILLIN AND TAZOBACTAM 25 GRAM(S): 4; .5 INJECTION, POWDER, LYOPHILIZED, FOR SOLUTION INTRAVENOUS at 22:41

## 2023-10-24 RX ADMIN — Medication 3 MILLIGRAM(S): at 22:41

## 2023-10-24 RX ADMIN — CHLORHEXIDINE GLUCONATE 1 APPLICATION(S): 213 SOLUTION TOPICAL at 05:54

## 2023-10-24 RX ADMIN — HYDROMORPHONE HYDROCHLORIDE 4 MILLIGRAM(S): 2 INJECTION INTRAMUSCULAR; INTRAVENOUS; SUBCUTANEOUS at 22:47

## 2023-10-24 RX ADMIN — SODIUM CHLORIDE 3 MILLILITER(S): 9 INJECTION INTRAMUSCULAR; INTRAVENOUS; SUBCUTANEOUS at 13:19

## 2023-10-24 RX ADMIN — ATORVASTATIN CALCIUM 10 MILLIGRAM(S): 80 TABLET, FILM COATED ORAL at 22:41

## 2023-10-24 RX ADMIN — PIPERACILLIN AND TAZOBACTAM 25 GRAM(S): 4; .5 INJECTION, POWDER, LYOPHILIZED, FOR SOLUTION INTRAVENOUS at 05:55

## 2023-10-24 RX ADMIN — SODIUM CHLORIDE 3 MILLILITER(S): 9 INJECTION INTRAMUSCULAR; INTRAVENOUS; SUBCUTANEOUS at 05:53

## 2023-10-24 RX ADMIN — LOSARTAN POTASSIUM 100 MILLIGRAM(S): 100 TABLET, FILM COATED ORAL at 05:54

## 2023-10-24 RX ADMIN — HYDROMORPHONE HYDROCHLORIDE 4 MILLIGRAM(S): 2 INJECTION INTRAMUSCULAR; INTRAVENOUS; SUBCUTANEOUS at 23:47

## 2023-10-24 RX ADMIN — PIPERACILLIN AND TAZOBACTAM 25 GRAM(S): 4; .5 INJECTION, POWDER, LYOPHILIZED, FOR SOLUTION INTRAVENOUS at 13:17

## 2023-10-24 RX ADMIN — CYCLOBENZAPRINE HYDROCHLORIDE 10 MILLIGRAM(S): 10 TABLET, FILM COATED ORAL at 22:47

## 2023-10-24 RX ADMIN — Medication 25 MILLIGRAM(S): at 22:47

## 2023-10-24 RX ADMIN — SODIUM CHLORIDE 3 MILLILITER(S): 9 INJECTION INTRAMUSCULAR; INTRAVENOUS; SUBCUTANEOUS at 22:39

## 2023-10-24 RX ADMIN — PANTOPRAZOLE SODIUM 40 MILLIGRAM(S): 20 TABLET, DELAYED RELEASE ORAL at 08:41

## 2023-10-24 NOTE — PROGRESS NOTE ADULT - SUBJECTIVE AND OBJECTIVE BOX
[address]  MRN-[id number]    Follow Up:  spinal infection     Interval History: the pt was seen and examined earlier, no acute distress, reports feeling somewhat tired. Pt is afebrile, RA, no new lab work, denies having pain.     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      S/P total knee arthroplasty, right      S/P knee replacement  Left ( 2007 )      S/P cholecystectomy      S/P knee replacement  2004 left knee and revision      S/P arthroscopy of shoulder  left shoulder      S/P laminectomy          ROS:    [ ] Unobtainable because:  [ x] All other systems negative    Constitutional: no fever, no chills  Head: no trauma  Eyes: no vision changes, no eye pain  ENT:  no sore throat, no rhinorrhea  Cardiovascular:  no chest pain, no palpitation  Respiratory:  no SOB, no cough  GI:  no abd pain, no vomiting, no diarrhea  urinary: no dysuria, no hematuria, no flank pain  musculoskeletal:  no joint pain, no joint swelling  skin:  no rash  neurology:  no headache, no seizure, no change in mental status  psych: no anxiety, no depression         Allergies  Cipro (Unknown)  ceftriaxone (Rash)  Hibiclens (Swelling)  Tolerating piperacillin-tazobactam 10/2023 (Other)  Bee Stings (Anaphylaxis)  Flagyl (Diarrhea; Rash)  meropenem-vaborbactam (Flushing)  Augmentin (Rash)        ANTIMICROBIALS:  piperacillin/tazobactam IVPB.. 3.375 every 8 hours      OTHER MEDS:  acetaminophen     Tablet .. 650 milliGRAM(s) Oral every 6 hours PRN  atorvastatin 10 milliGRAM(s) Oral at bedtime  cyclobenzaprine 10 milliGRAM(s) Oral three times a day PRN  diphenhydrAMINE 25 milliGRAM(s) Oral every 6 hours PRN  HYDROmorphone   Tablet 4 milliGRAM(s) Oral every 4 hours PRN  HYDROmorphone   Tablet 2 milliGRAM(s) Oral every 3 hours PRN  HYDROmorphone  Injectable 0.5 milliGRAM(s) IV Push every 3 hours PRN  HYDROmorphone  Injectable 0.5 milliGRAM(s) IV Push once  lactated ringers. 1000 milliLiter(s) IV Continuous <Continuous>  losartan 100 milliGRAM(s) Oral daily  magnesium hydroxide Suspension 30 milliLiter(s) Oral every 12 hours PRN  melatonin 3 milliGRAM(s) Oral at bedtime  pantoprazole    Tablet 40 milliGRAM(s) Oral before breakfast  polyethylene glycol 3350 17 Gram(s) Oral two times a day PRN  sodium chloride 0.9% lock flush 3 milliLiter(s) IV Push every 8 hours  sucralfate 1 Gram(s) Oral once  traMADol 25 milliGRAM(s) Oral every 6 hours PRN      Vital Signs Last 24 Hrs  T(C): 36.4 (24 Oct 2023 11:48), Max: 36.8 (23 Oct 2023 17:03)  T(F): 97.5 (24 Oct 2023 11:48), Max: 98.3 (23 Oct 2023 17:03)  HR: 59 (24 Oct 2023 11:48) (56 - 67)  BP: 121/70 (24 Oct 2023 11:48) (97/49 - 138/59)  BP(mean): --  RR: 17 (24 Oct 2023 11:48) (17 - 20)  SpO2: 94% (24 Oct 2023 11:48) (94% - 96%)    Parameters below as of 24 Oct 2023 09:03  Patient On (Oxygen Delivery Method): room air        Physical Exam:  Constitutional: non-toxic, no distress, RA  HEAD/EYES: anicteric, no conjunctival injection  ENT:  supple, no thrush  Cardiovascular:   normal S1, S2, no murmur,  edema b/l LEs  Respiratory:  clear BS bilaterally, no wheezes, no rales  GI:  soft, non-tender, normal bowel sounds, obese   Musculoskeletal:  bilateral knee replacements as well as surgical scar on left shoulder, back is dressed, c/d/i, no erythema or swelling of surrounding tissues, not tender  Neurologic: awake and alert, sensation in tact, no focal findings  Skin: patchy erythematous rash on b/l LEs and upper chest with improvement, PICC line in place  c/d/i  Heme/Onc: no lymphadenopathy   Psychiatric:  awake, alert, appropriate mood    WBC Count: 12.46 K/uL (10-22 @ 07:55)  WBC Count: 10.99 K/uL (10-21 @ 09:56)  WBC Count: 13.90 K/uL (10-19 @ 06:55)  WBC Count: 10.79 K/uL (10-18 @ 15:02)    Creatinine Trend: 1.29<--, 1.39<--, 1.38<--, 1.20<--      MICROBIOLOGY:  v  .Blood Arterial Catheter  10-19-23   No growth at 4 days  --  --      .Surgical Swab DEEP WOUND LUMBAR CULTURE  10-18-23   Numerous Klebsiella pneumoniae  Numerous Proteus mirabilis  --  Klebsiella pneumoniae  Proteus mirabilis    C-Reactive Protein, Serum: 25 (10-19)    RADIOLOGY:

## 2023-10-24 NOTE — PROGRESS NOTE ADULT - ASSESSMENT
Pt is a 78F who presents for lumbar wound debridement and closure s/p L3-4 laminectomy 08/25/23. Pain has gotten better since having sx. Feels pain at incision site. B/L leg weakness. Reports drainage from incision has increased and now has to change gauze 3xdays, rather than once a day. Doing home PT, persisting with some b/l LE pain.   no fevers   wbc ok   no outpatient cultures   went to OR for debridement and closure   OR cultures will be very helpful   has multiple antibiotics allergies including ceftriaxone, flagyl, cipro, meropenem    10/19: pod#1 s/p debridement and closure of spinal surgical site, no fevers, RA, WBC elevated 13.9, Cr elevated 1.38, surgical culture and MRSA PCR are pending, Vanco level 6.4 today, received a dose of vanco intraop, Vancomycin IV continued. Developed rash, Azactam stopped.   Attending addendum:  I have reviewed all pertinent clinical information and agree with the NP's note.   small faint macular rash on the lower extremities as well as chest   has prior hx of allergic reactions to multiple antibiotics   will stop aztreonam and monitor this rash   can add benadryl if rash worsens   continue vancomycin   send vancomycin trough with target AUC/BOOM 400-600   (therapeutic drug monitoring required with IV vancomycin)   will follow cultures and see if gram negative coverage is needed   should treat this as an infection until proven otherwise    10/20: no fevers, RA, no new cbc, surgical culture is growing Klebsiella pneumoniae and Proteus mirabilis, pending sensitivity, started on Zosyn IV (tolerated in the past), Vancomycin IV continued, today's level is 9.4 - reached out to pharmacy, will adjust the dose based on recommendations.    Attending addendum:  I have reviewed all pertinent clinical information and agree with the NP's note.   patient had allergy presumably to aztreonam   she has tolerated Zosyn as recent as 2020 without reaction   will start zosyn to treat gram negative wound infection   monitor patients rash   picc line placement monday   can continue vancomycin until or cultures are final    10/23: picc line place, YANA removed over the weekend, no fevers, RA, no new cbc, Cr normalized, no evidence of MRSA, vancomycin IV stopped, BC x 1 NGTD, surgical cultures grew Klebsiella and Proteus, Zosyn IV continued. Pt will need six weeks of abx from the day of her procedure, Rx provided. Tolerates medication well.   Attending addendum:  I have reviewed all pertinent clinical information and agree with the NP's note.   picc line placed 10/23/23  either dc on Zosyn IV 3.375 g q 6 hrs - last dose 11/28/2023  ideally will try to arrange zosyn continuous infusion 18g over 24hrs  weekly lab work: cbc with diff, cmp, esr, crp     10/24: doing well, no fevers, RA, no new lab work, awaiting for worker's comp authorization, Zosyn IV continued.     Plan:  continue Zosyn IV - pt tolerated medication in the past  she would benefit from outpatient allergy testing   MRSA PCR not detected   PT and OT per ortho  follow OR cultures - grew Klebsiella and Proteus  follow all culture data    when ready for discharge:  picc line placed 10/23/23  continue Zosyn IV 3.375 g q 6 hrs vs 18 grams IV continuous infusion over 24 hrs - last dose 11/28/2023 (Rx for both options provided)  ideally will try to arrange zosyn continuous infusion 18g over 24hrs  weekly lab work: cbc with diff, cmp, esr, crp - fax to Dr. Gottlieb at [phone]  follow up with Dr. Gottlieb in the office:  [address] [place] [address] [place] [address] [phone]  remove picc line upon completion of the course of abx    discussed with Dr. Gottlieb  discussed with the

## 2023-10-24 NOTE — PROGRESS NOTE ADULT - NS ATTEND AMEND GEN_ALL_CORE FT
I have reviewed all pertinent clinical information and agree with the NP's note.   picc line was placed  she is tolerating zosyn-will plan for 6 weeks   awaiting approval from workers comp   continue PT   weekly labs to be sent to my office   follow up in infectious disease office within 4 weeks of discharge    Jake Gottlieb, DO  Infectious Disease Attending  Reachable via Microsoft Teams or ID office: [phone]  After 5pm/weekends please call [phone] for all inquiries and new consults

## 2023-10-24 NOTE — PROGRESS NOTE ADULT - SUBJECTIVE AND OBJECTIVE BOX
78yFemale s/p I&D/plastics closure of lumbar wound POD#6  Pt seen and examined in NAD. Pain is controlled, Rash resolving. PICC placed yesterday.     PE:   Neuro: AAOX3  LUE: PICC in place.   Spine: Dressing C/D/I.    B/L UE: Skin intact. +ROM shoulder/elbow/wrist/fingers. +ok/thumbsup/fingercross signs.  strength: 5/5.  RP2+ NVI.   B/L LE: Skin intact with resolving rash B/L LE's. +ROM hip/knee/ankle/toes. Ankle Dorsi/plantarflexion: 5/5. Calf: soft, compressible and nontender.  DP/PT 2+ NVI.                           11.2   12.46 )-----------( 585      ( 22 Oct 2023 07:55 )             36.0   10-22    140  |  109<H>  |  27<H>  ----------------------------<  113<H>  4.1   |  25  |  1.29    Ca    8.4<L>      22 Oct 2023 07:55                                        A/P: 78yFemale s/p I&D/plastics closure of lumbar wound POD#6  Dressing changed - new dry clean dressing applied.   Pain controlled  PICC line care  PT: WBAT - spinal precautions   DVT ppx: SCDs   OR Cx;s: Klebsiella Pneumonia, Proteus Mirabilis. ID final plan - Zosyn 3.375 gm IVP Q6.   Monitor rash -improving.    Discharge: planning pending home care arrangements  All the above discussed and understood   D/W DR Chan

## 2023-10-25 RX ADMIN — HYDROMORPHONE HYDROCHLORIDE 4 MILLIGRAM(S): 2 INJECTION INTRAMUSCULAR; INTRAVENOUS; SUBCUTANEOUS at 22:20

## 2023-10-25 RX ADMIN — HYDROMORPHONE HYDROCHLORIDE 4 MILLIGRAM(S): 2 INJECTION INTRAMUSCULAR; INTRAVENOUS; SUBCUTANEOUS at 12:31

## 2023-10-25 RX ADMIN — ATORVASTATIN CALCIUM 10 MILLIGRAM(S): 80 TABLET, FILM COATED ORAL at 21:21

## 2023-10-25 RX ADMIN — HYDROMORPHONE HYDROCHLORIDE 4 MILLIGRAM(S): 2 INJECTION INTRAMUSCULAR; INTRAVENOUS; SUBCUTANEOUS at 21:20

## 2023-10-25 RX ADMIN — SODIUM CHLORIDE 3 MILLILITER(S): 9 INJECTION INTRAMUSCULAR; INTRAVENOUS; SUBCUTANEOUS at 14:51

## 2023-10-25 RX ADMIN — SODIUM CHLORIDE 3 MILLILITER(S): 9 INJECTION INTRAMUSCULAR; INTRAVENOUS; SUBCUTANEOUS at 21:19

## 2023-10-25 RX ADMIN — PIPERACILLIN AND TAZOBACTAM 25 GRAM(S): 4; .5 INJECTION, POWDER, LYOPHILIZED, FOR SOLUTION INTRAVENOUS at 05:32

## 2023-10-25 RX ADMIN — Medication 3 MILLIGRAM(S): at 21:20

## 2023-10-25 RX ADMIN — PIPERACILLIN AND TAZOBACTAM 25 GRAM(S): 4; .5 INJECTION, POWDER, LYOPHILIZED, FOR SOLUTION INTRAVENOUS at 14:48

## 2023-10-25 RX ADMIN — PIPERACILLIN AND TAZOBACTAM 25 GRAM(S): 4; .5 INJECTION, POWDER, LYOPHILIZED, FOR SOLUTION INTRAVENOUS at 21:20

## 2023-10-25 RX ADMIN — LOSARTAN POTASSIUM 100 MILLIGRAM(S): 100 TABLET, FILM COATED ORAL at 05:33

## 2023-10-25 RX ADMIN — SODIUM CHLORIDE 3 MILLILITER(S): 9 INJECTION INTRAMUSCULAR; INTRAVENOUS; SUBCUTANEOUS at 05:31

## 2023-10-25 RX ADMIN — PANTOPRAZOLE SODIUM 40 MILLIGRAM(S): 20 TABLET, DELAYED RELEASE ORAL at 09:03

## 2023-10-25 NOTE — PROGRESS NOTE ADULT - SUBJECTIVE AND OBJECTIVE BOX
78yFemale s/p I&D/plastics closure of lumbar wound POD#7.  Pt seen and examined in NAD. Pain is controlled,      PE:   Neuro: AAOX3  LUE: PICC in place.   Spine: Dressing C/D/I.  Prineo intact.  B/L UE: Skin intact. +ROM shoulder/elbow/wrist/fingers. +ok/thumbsup/fingercross signs.  strength: 5/5.  RP2+ NVI.   B/L LE: Skin intact with resolving rash B/L LE's. +ROM hip/knee/ankle/toes. Ankle Dorsi/plantarflexion: 5/5. Calf: soft, compressible and nontender.  DP/PT 2+ NVI.                           11.2   12.46 )-----------( 585      ( 22 Oct 2023 07:55 )             36.0   10-22    140  |  109<H>  |  27<H>  ----------------------------<  113<H>  4.1   |  25  |  1.29    Ca    8.4<L>      22 Oct 2023 07:55                                        A/P: 78yFemale s/p I&D/plastics closure of lumbar wound POD#7  Dressing changed - new dry clean dressing applied.   Pain controlled  PICC line care  PT: WBAT - spinal precautions   DVT ppx: SCDs   OR Cx;s: Klebsiella Pneumonia, Proteus Mirabilis. ID final plan - Zosyn 3.375 gm IVP Q6.   Discharge: planning pending home care arrangements  All the above discussed and understood   D/W DR Chan

## 2023-10-25 NOTE — DIETITIAN INITIAL EVALUATION ADULT - OTHER INFO
Pt not able to tolerate spicy foods, reports NKFA. Typically takes a multivitamin at home.   Briefly reinforced importance balanced, portioned meals throughout the day.  No difficulty chewing or swallowing reported.  No nausea/vomiting/diarrhea/constipation per patient.   Recalls a UBW of 245 pounds from about 1 month ago. Pt not able to tolerate spicy foods, reports NKFA. Typically takes a multivitamin at home.   Briefly reinforced importance of balanced, portioned meals throughout the day.  No difficulty chewing or swallowing reported.  No nausea/vomiting/diarrhea/constipation per patient.   Recalls a UBW of 245 pounds from about 1 month ago.

## 2023-10-25 NOTE — DIETITIAN INITIAL EVALUATION ADULT - PERTINENT MEDS FT
COVID-19 PCR test completed. Patient handout For Patients Who Have Been Tested for Covid-19 (Coronavirus) was given to the patient, which includes test result notification process.   MEDICATIONS  (STANDING):  atorvastatin 10 milliGRAM(s) Oral at bedtime  HYDROmorphone  Injectable 0.5 milliGRAM(s) IV Push once  lactated ringers. 1000 milliLiter(s) (75 mL/Hr) IV Continuous <Continuous>  losartan 100 milliGRAM(s) Oral daily  melatonin 3 milliGRAM(s) Oral at bedtime  pantoprazole    Tablet 40 milliGRAM(s) Oral before breakfast  piperacillin/tazobactam IVPB.. 3.375 Gram(s) IV Intermittent every 8 hours  sodium chloride 0.9% lock flush 3 milliLiter(s) IV Push every 8 hours  sucralfate 1 Gram(s) Oral once    MEDICATIONS  (PRN):  acetaminophen     Tablet .. 650 milliGRAM(s) Oral every 6 hours PRN Mild Pain (1 - 3)  cyclobenzaprine 10 milliGRAM(s) Oral three times a day PRN Muscle Spasm  diphenhydrAMINE 25 milliGRAM(s) Oral every 6 hours PRN Rash and/or Itching  HYDROmorphone   Tablet 4 milliGRAM(s) Oral every 4 hours PRN Severe Pain (7 - 10)  HYDROmorphone   Tablet 2 milliGRAM(s) Oral every 3 hours PRN Moderate Pain (4 - 6)  HYDROmorphone  Injectable 0.5 milliGRAM(s) IV Push every 3 hours PRN breakthrough pain  magnesium hydroxide Suspension 30 milliLiter(s) Oral every 12 hours PRN Constipation  polyethylene glycol 3350 17 Gram(s) Oral two times a day PRN Constipation  traMADol 25 milliGRAM(s) Oral every 6 hours PRN Mild Pain (1 - 3)

## 2023-10-25 NOTE — DIETITIAN INITIAL EVALUATION ADULT - NS FNS DIET ORDER
Diet, Regular (10-18-23 @ 17:23) [Active]  Diet, Clear Liquid (10-18-23 @ 11:37) [Available for Activation]

## 2023-10-25 NOTE — DIETITIAN INITIAL EVALUATION ADULT - REASON
Nutrition focused physical exam not warranted at this time; no overt signs of malnutrition noted. Nutrition focused physical exam not warranted at this time; no overt signs of malnutrition noted and pt continues with good PO intake.

## 2023-10-25 NOTE — DIETITIAN INITIAL EVALUATION ADULT - ORAL INTAKE PTA/DIET HISTORY
Pt lives with daughter who shops for food and cooks. Attests to eating balanced meals throughout the day, however does not follow any dietary restrictions.

## 2023-10-25 NOTE — DIETITIAN INITIAL EVALUATION ADULT - PERTINENT LABORATORY DATA
10/22:   Na: 140 WDL  Cl: 109 <H>  BUN: 27 <H>  Glu: 113 <H>  K+: 4.1 WDL  Cr: 1.29 WDL  Ca: 8.4 <L>    A1C: 6.1 % (07-25-23 @ 08:30)  Estimated Average Glucose Result: 128 mg/dL (07-25-23 @ 08:30)

## 2023-10-25 NOTE — DIETITIAN INITIAL EVALUATION ADULT - OTHER CALCULATIONS
Wt: 111.6 kg (dosing wt; 10/18)  IBW: 54.4 kg  IBW x 10%: 59.8 kg; used to calculate estimated nutrient needs Wt: 111.6 kg (dosing wt; 10/18)  IBW + 10%: 59.8 kg; used to calculate estimated nutrient needs

## 2023-10-26 LAB
APPEARANCE UR: CLEAR — SIGNIFICANT CHANGE UP
APPEARANCE UR: CLEAR — SIGNIFICANT CHANGE UP
BACTERIA # UR AUTO: ABNORMAL
BACTERIA # UR AUTO: ABNORMAL
BILIRUB UR-MCNC: NEGATIVE — SIGNIFICANT CHANGE UP
BILIRUB UR-MCNC: NEGATIVE — SIGNIFICANT CHANGE UP
COLOR SPEC: YELLOW — SIGNIFICANT CHANGE UP
COLOR SPEC: YELLOW — SIGNIFICANT CHANGE UP
DIFF PNL FLD: ABNORMAL
DIFF PNL FLD: ABNORMAL
EPI CELLS # UR: SIGNIFICANT CHANGE UP
EPI CELLS # UR: SIGNIFICANT CHANGE UP
GLUCOSE UR QL: NEGATIVE MG/DL — SIGNIFICANT CHANGE UP
GLUCOSE UR QL: NEGATIVE MG/DL — SIGNIFICANT CHANGE UP
KETONES UR-MCNC: NEGATIVE — SIGNIFICANT CHANGE UP
KETONES UR-MCNC: NEGATIVE — SIGNIFICANT CHANGE UP
LEUKOCYTE ESTERASE UR-ACNC: ABNORMAL
LEUKOCYTE ESTERASE UR-ACNC: ABNORMAL
NITRITE UR-MCNC: NEGATIVE — SIGNIFICANT CHANGE UP
NITRITE UR-MCNC: NEGATIVE — SIGNIFICANT CHANGE UP
PH UR: 6 — SIGNIFICANT CHANGE UP (ref 5–8)
PH UR: 6 — SIGNIFICANT CHANGE UP (ref 5–8)
PROT UR-MCNC: 15 MG/DL
PROT UR-MCNC: 15 MG/DL
RBC CASTS # UR COMP ASSIST: ABNORMAL /HPF (ref 0–4)
RBC CASTS # UR COMP ASSIST: ABNORMAL /HPF (ref 0–4)
SP GR SPEC: 1.01 — SIGNIFICANT CHANGE UP (ref 1.01–1.02)
SP GR SPEC: 1.01 — SIGNIFICANT CHANGE UP (ref 1.01–1.02)
UROBILINOGEN FLD QL: NEGATIVE MG/DL — SIGNIFICANT CHANGE UP
UROBILINOGEN FLD QL: NEGATIVE MG/DL — SIGNIFICANT CHANGE UP
WBC UR QL: SIGNIFICANT CHANGE UP
WBC UR QL: SIGNIFICANT CHANGE UP

## 2023-10-26 RX ORDER — HYDROMORPHONE HYDROCHLORIDE 2 MG/ML
4 INJECTION INTRAMUSCULAR; INTRAVENOUS; SUBCUTANEOUS EVERY 6 HOURS
Refills: 0 | Status: DISCONTINUED | OUTPATIENT
Start: 2023-10-26 | End: 2023-11-02

## 2023-10-26 RX ORDER — GABAPENTIN 400 MG/1
300 CAPSULE ORAL ONCE
Refills: 0 | Status: COMPLETED | OUTPATIENT
Start: 2023-10-26 | End: 2023-10-26

## 2023-10-26 RX ADMIN — LOSARTAN POTASSIUM 100 MILLIGRAM(S): 100 TABLET, FILM COATED ORAL at 05:46

## 2023-10-26 RX ADMIN — PIPERACILLIN AND TAZOBACTAM 25 GRAM(S): 4; .5 INJECTION, POWDER, LYOPHILIZED, FOR SOLUTION INTRAVENOUS at 05:46

## 2023-10-26 RX ADMIN — HYDROMORPHONE HYDROCHLORIDE 4 MILLIGRAM(S): 2 INJECTION INTRAMUSCULAR; INTRAVENOUS; SUBCUTANEOUS at 17:04

## 2023-10-26 RX ADMIN — PIPERACILLIN AND TAZOBACTAM 25 GRAM(S): 4; .5 INJECTION, POWDER, LYOPHILIZED, FOR SOLUTION INTRAVENOUS at 14:04

## 2023-10-26 RX ADMIN — HYDROMORPHONE HYDROCHLORIDE 4 MILLIGRAM(S): 2 INJECTION INTRAMUSCULAR; INTRAVENOUS; SUBCUTANEOUS at 18:04

## 2023-10-26 RX ADMIN — SODIUM CHLORIDE 3 MILLILITER(S): 9 INJECTION INTRAMUSCULAR; INTRAVENOUS; SUBCUTANEOUS at 14:09

## 2023-10-26 RX ADMIN — PANTOPRAZOLE SODIUM 40 MILLIGRAM(S): 20 TABLET, DELAYED RELEASE ORAL at 05:46

## 2023-10-26 RX ADMIN — HYDROMORPHONE HYDROCHLORIDE 4 MILLIGRAM(S): 2 INJECTION INTRAMUSCULAR; INTRAVENOUS; SUBCUTANEOUS at 06:46

## 2023-10-26 RX ADMIN — ATORVASTATIN CALCIUM 10 MILLIGRAM(S): 80 TABLET, FILM COATED ORAL at 21:43

## 2023-10-26 RX ADMIN — Medication 25 MILLIGRAM(S): at 05:46

## 2023-10-26 RX ADMIN — Medication 3 MILLIGRAM(S): at 21:43

## 2023-10-26 RX ADMIN — HYDROMORPHONE HYDROCHLORIDE 4 MILLIGRAM(S): 2 INJECTION INTRAMUSCULAR; INTRAVENOUS; SUBCUTANEOUS at 05:46

## 2023-10-26 RX ADMIN — HYDROMORPHONE HYDROCHLORIDE 4 MILLIGRAM(S): 2 INJECTION INTRAMUSCULAR; INTRAVENOUS; SUBCUTANEOUS at 13:10

## 2023-10-26 RX ADMIN — HYDROMORPHONE HYDROCHLORIDE 4 MILLIGRAM(S): 2 INJECTION INTRAMUSCULAR; INTRAVENOUS; SUBCUTANEOUS at 21:45

## 2023-10-26 RX ADMIN — SODIUM CHLORIDE 3 MILLILITER(S): 9 INJECTION INTRAMUSCULAR; INTRAVENOUS; SUBCUTANEOUS at 21:20

## 2023-10-26 RX ADMIN — SODIUM CHLORIDE 3 MILLILITER(S): 9 INJECTION INTRAMUSCULAR; INTRAVENOUS; SUBCUTANEOUS at 05:51

## 2023-10-26 RX ADMIN — HYDROMORPHONE HYDROCHLORIDE 4 MILLIGRAM(S): 2 INJECTION INTRAMUSCULAR; INTRAVENOUS; SUBCUTANEOUS at 22:45

## 2023-10-26 RX ADMIN — PIPERACILLIN AND TAZOBACTAM 25 GRAM(S): 4; .5 INJECTION, POWDER, LYOPHILIZED, FOR SOLUTION INTRAVENOUS at 21:43

## 2023-10-26 RX ADMIN — GABAPENTIN 300 MILLIGRAM(S): 400 CAPSULE ORAL at 21:43

## 2023-10-26 NOTE — PROGRESS NOTE ADULT - SUBJECTIVE AND OBJECTIVE BOX
78yFemale s/p I&D/plastics closure of lumbar wound POD#8.  Pt seen and examined in NAD. Pain is controlled,  Pt c/o new Left foot pain, she is stating its hurts so much she couldn't walk to today. She has had this symptom before after her past surgeries. She is also c/o UTi symptoms. She is not feeling like she is emptying all the way and its burning.     PE:   Neuro: AAOX3  LUE: PICC in place.   Spine: Dressing C/D/I.  Prineo intact.  B/L UE: Skin intact. +ROM shoulder/elbow/wrist/fingers. +ok/thumbsup/fingercross signs.  strength: 5/5.  RP2+ NVI.   B/L LE: Skin intact with resolving rash B/L LE's. +ROM hip/knee/ankle/toes. Ankle Dorsi/plantarflexion: 5/5. Calf: soft, compressible and nontender.  DP/PT 2+ NVI.   Left foot: (+)skin intact, no swelling/bruising or pain to touch. She is able to dorsi/plantarflex (+)pain with dorsiflexsion. NVI                A/P: 78yFemale s/p I&D/plastics closure of lumbar wound POD#8  UA ordered, will F/U  Pain control prn  PICC line care  PT: WBAT - spinal precautions   DVT ppx: SCDs and ambulation  WIll D/W Dr. Chan about new Foot pain symptoms   OR Cx;s: Klebsiella Pneumonia, Proteus Mirabilis. ID final plan - Zosyn 3.375 gm IVP Q6.   Discharge: planning pending home care arrangements  All the above discussed and understood   D/W DR Chan

## 2023-10-26 NOTE — CHART NOTE - NSCHARTNOTEFT_GEN_A_CORE
78yFemale s/p I&D/plastics closure of lumbar wound POD#8.     Notified by RN that pt is c/o "UTI symptoms." Upon arrival, pt sitting comfortably on chair. Pt claims has had many UTI's in the past, and feels like she has one. Pt claims last UTI was ~6-7 months ago. Pt claims sx began yesterday. Claims has the urge to urinate, but does not pee a lot. Claims her vaginal area feels irritated, noticed stinging with urination and blood after wiping. Claims vaginal area feels "raw". Pt denies any pain with urination, abdominal pain or vaginal discharge. Small blood spot noted on pad when patient stood up. No discharge or erythema noted. Patient already on antibiotics with gram (-) coverage.     Plan: UA with urine culture ordered, bladder scan ordered.  Pending Urine cultures   Notified Ortho DORI Chavez 78yFemale s/p I&D/plastics closure of lumbar wound POD#8.     Notified by RN that pt is c/o "UTI symptoms." Upon arrival, pt sitting comfortably on chair. Pt claims has had many UTI's in the past, and feels like she has one. Pt claims last UTI was ~6-7 months ago. Pt claims sx began yesterday. Claims has the urge to urinate, but does not pee a lot. Claims her vaginal area feels irritated, noticed stinging with urination and blood after wiping. Claims vaginal area feels "raw". Pt denies any pain with urination, N/V, abdominal pain or vaginal discharge. A small blood spot noted on pad when patient stood up. No discharge, or erythema noted. Patient already on antibiotics with gram (-) coverage.     Plan: UA with urine culture ordered, bladder scan ordered.  Pending Urine cultures   Notified Ortho DORI Chavez

## 2023-10-27 RX ORDER — NITROFURANTOIN MACROCRYSTAL 50 MG
1 CAPSULE ORAL
Qty: 0 | Refills: 0 | DISCHARGE
Start: 2023-10-27

## 2023-10-27 RX ORDER — ACETAMINOPHEN 500 MG
2 TABLET ORAL
Qty: 0 | Refills: 0 | DISCHARGE
Start: 2023-10-27

## 2023-10-27 RX ORDER — NITROFURANTOIN MACROCRYSTAL 50 MG
100 CAPSULE ORAL
Refills: 0 | Status: DISCONTINUED | OUTPATIENT
Start: 2023-10-27 | End: 2023-10-27

## 2023-10-27 RX ORDER — HYDROMORPHONE HYDROCHLORIDE 2 MG/ML
1 INJECTION INTRAMUSCULAR; INTRAVENOUS; SUBCUTANEOUS
Qty: 0 | Refills: 0 | DISCHARGE
Start: 2023-10-27

## 2023-10-27 RX ADMIN — HYDROMORPHONE HYDROCHLORIDE 4 MILLIGRAM(S): 2 INJECTION INTRAMUSCULAR; INTRAVENOUS; SUBCUTANEOUS at 23:14

## 2023-10-27 RX ADMIN — SODIUM CHLORIDE 3 MILLILITER(S): 9 INJECTION INTRAMUSCULAR; INTRAVENOUS; SUBCUTANEOUS at 06:17

## 2023-10-27 RX ADMIN — PIPERACILLIN AND TAZOBACTAM 25 GRAM(S): 4; .5 INJECTION, POWDER, LYOPHILIZED, FOR SOLUTION INTRAVENOUS at 22:15

## 2023-10-27 RX ADMIN — Medication 650 MILLIGRAM(S): at 20:32

## 2023-10-27 RX ADMIN — PIPERACILLIN AND TAZOBACTAM 25 GRAM(S): 4; .5 INJECTION, POWDER, LYOPHILIZED, FOR SOLUTION INTRAVENOUS at 06:20

## 2023-10-27 RX ADMIN — PIPERACILLIN AND TAZOBACTAM 25 GRAM(S): 4; .5 INJECTION, POWDER, LYOPHILIZED, FOR SOLUTION INTRAVENOUS at 14:37

## 2023-10-27 RX ADMIN — SODIUM CHLORIDE 3 MILLILITER(S): 9 INJECTION INTRAMUSCULAR; INTRAVENOUS; SUBCUTANEOUS at 22:15

## 2023-10-27 RX ADMIN — CYCLOBENZAPRINE HYDROCHLORIDE 10 MILLIGRAM(S): 10 TABLET, FILM COATED ORAL at 22:14

## 2023-10-27 RX ADMIN — Medication 25 MILLIGRAM(S): at 22:14

## 2023-10-27 RX ADMIN — PANTOPRAZOLE SODIUM 40 MILLIGRAM(S): 20 TABLET, DELAYED RELEASE ORAL at 06:19

## 2023-10-27 RX ADMIN — LOSARTAN POTASSIUM 100 MILLIGRAM(S): 100 TABLET, FILM COATED ORAL at 06:19

## 2023-10-27 RX ADMIN — HYDROMORPHONE HYDROCHLORIDE 4 MILLIGRAM(S): 2 INJECTION INTRAMUSCULAR; INTRAVENOUS; SUBCUTANEOUS at 22:14

## 2023-10-27 RX ADMIN — HYDROMORPHONE HYDROCHLORIDE 4 MILLIGRAM(S): 2 INJECTION INTRAMUSCULAR; INTRAVENOUS; SUBCUTANEOUS at 08:29

## 2023-10-27 RX ADMIN — Medication 3 MILLIGRAM(S): at 22:14

## 2023-10-27 RX ADMIN — HYDROMORPHONE HYDROCHLORIDE 4 MILLIGRAM(S): 2 INJECTION INTRAMUSCULAR; INTRAVENOUS; SUBCUTANEOUS at 15:10

## 2023-10-27 RX ADMIN — HYDROMORPHONE HYDROCHLORIDE 4 MILLIGRAM(S): 2 INJECTION INTRAMUSCULAR; INTRAVENOUS; SUBCUTANEOUS at 08:30

## 2023-10-27 RX ADMIN — ATORVASTATIN CALCIUM 10 MILLIGRAM(S): 80 TABLET, FILM COATED ORAL at 22:15

## 2023-10-27 RX ADMIN — HYDROMORPHONE HYDROCHLORIDE 4 MILLIGRAM(S): 2 INJECTION INTRAMUSCULAR; INTRAVENOUS; SUBCUTANEOUS at 15:41

## 2023-10-27 RX ADMIN — Medication 650 MILLIGRAM(S): at 21:32

## 2023-10-27 RX ADMIN — SODIUM CHLORIDE 3 MILLILITER(S): 9 INJECTION INTRAMUSCULAR; INTRAVENOUS; SUBCUTANEOUS at 15:04

## 2023-10-27 NOTE — PROGRESS NOTE ADULT - SUBJECTIVE AND OBJECTIVE BOX
78yFemale s/p I&D/plastics closure of lumbar wound POD#8.  Pt seen and examined in NAD. Pain is controlled,  Pt c/o new Left foot pain, but states she has had this type of pain immediately after previous spinal surgeries.  Patient  states she does not have any foot pain while at rest.  Patient states she has been ambulating back and forth to the bathroom.        PE:   Neuro: AAOX3  LUE: PICC in place.   Spine: Dressing C/D/I. No erythema no edema no ecchymosis noted   B/L UE: Skin intact. +ROM shoulder/elbow/wrist/fingers. +ok/thumbsup/fingercross signs.  strength: 5/5.  RP2+ NVI.   B/L LE: Skin intact with resolving rash B/L LE's. +ROM hip/knee/ankle/toes. Ankle Dorsi/plantarflexion: 5/5. Calf: soft, compressible and nontender.  DP/PT 2+ NVI.   Left foot: (+)skin intact, no swelling/bruising or pain to touch. She is able to dorsi/plantarflex (+)pain with dorsiflexsion. NVI                A/P: 78yFemale s/p I&D/plastics closure of lumbar wound POD#9  UA ordered, will F/U  Pain control prn  PICC line care  PT: WBAT - spinal precautions   DVT ppx: SCDs and ambulation   aware of left foot pain- Ortho to monitor    OR Cx;s: Klebsiella Pneumonia, Proteus Mirabilis. ID final plan - Zosyn 3.375 gm IVP Q6.   Discharge: planning pending home care arrangements  All the above discussed and understood   D/W DR Chan

## 2023-10-28 LAB
ANION GAP SERPL CALC-SCNC: 9 MMOL/L — SIGNIFICANT CHANGE UP (ref 5–17)
ANION GAP SERPL CALC-SCNC: 9 MMOL/L — SIGNIFICANT CHANGE UP (ref 5–17)
BUN SERPL-MCNC: 20 MG/DL — SIGNIFICANT CHANGE UP (ref 7–23)
BUN SERPL-MCNC: 20 MG/DL — SIGNIFICANT CHANGE UP (ref 7–23)
CALCIUM SERPL-MCNC: 8.4 MG/DL — LOW (ref 8.5–10.1)
CALCIUM SERPL-MCNC: 8.4 MG/DL — LOW (ref 8.5–10.1)
CHLORIDE SERPL-SCNC: 106 MMOL/L — SIGNIFICANT CHANGE UP (ref 96–108)
CHLORIDE SERPL-SCNC: 106 MMOL/L — SIGNIFICANT CHANGE UP (ref 96–108)
CO2 SERPL-SCNC: 24 MMOL/L — SIGNIFICANT CHANGE UP (ref 22–31)
CO2 SERPL-SCNC: 24 MMOL/L — SIGNIFICANT CHANGE UP (ref 22–31)
CREAT SERPL-MCNC: 1.24 MG/DL — SIGNIFICANT CHANGE UP (ref 0.5–1.3)
CREAT SERPL-MCNC: 1.24 MG/DL — SIGNIFICANT CHANGE UP (ref 0.5–1.3)
EGFR: 44 ML/MIN/1.73M2 — LOW
EGFR: 44 ML/MIN/1.73M2 — LOW
GLUCOSE SERPL-MCNC: 97 MG/DL — SIGNIFICANT CHANGE UP (ref 70–99)
GLUCOSE SERPL-MCNC: 97 MG/DL — SIGNIFICANT CHANGE UP (ref 70–99)
HCT VFR BLD CALC: 35.7 % — SIGNIFICANT CHANGE UP (ref 34.5–45)
HCT VFR BLD CALC: 35.7 % — SIGNIFICANT CHANGE UP (ref 34.5–45)
HGB BLD-MCNC: 11.4 G/DL — LOW (ref 11.5–15.5)
HGB BLD-MCNC: 11.4 G/DL — LOW (ref 11.5–15.5)
MCHC RBC-ENTMCNC: 30.5 PG — SIGNIFICANT CHANGE UP (ref 27–34)
MCHC RBC-ENTMCNC: 30.5 PG — SIGNIFICANT CHANGE UP (ref 27–34)
MCHC RBC-ENTMCNC: 31.9 G/DL — LOW (ref 32–36)
MCHC RBC-ENTMCNC: 31.9 G/DL — LOW (ref 32–36)
MCV RBC AUTO: 95.5 FL — SIGNIFICANT CHANGE UP (ref 80–100)
MCV RBC AUTO: 95.5 FL — SIGNIFICANT CHANGE UP (ref 80–100)
NRBC # BLD: 0 /100 WBCS — SIGNIFICANT CHANGE UP (ref 0–0)
NRBC # BLD: 0 /100 WBCS — SIGNIFICANT CHANGE UP (ref 0–0)
PLATELET # BLD AUTO: 501 K/UL — HIGH (ref 150–400)
PLATELET # BLD AUTO: 501 K/UL — HIGH (ref 150–400)
POTASSIUM SERPL-MCNC: 3.5 MMOL/L — SIGNIFICANT CHANGE UP (ref 3.5–5.3)
POTASSIUM SERPL-MCNC: 3.5 MMOL/L — SIGNIFICANT CHANGE UP (ref 3.5–5.3)
POTASSIUM SERPL-SCNC: 3.5 MMOL/L — SIGNIFICANT CHANGE UP (ref 3.5–5.3)
POTASSIUM SERPL-SCNC: 3.5 MMOL/L — SIGNIFICANT CHANGE UP (ref 3.5–5.3)
RBC # BLD: 3.74 M/UL — LOW (ref 3.8–5.2)
RBC # BLD: 3.74 M/UL — LOW (ref 3.8–5.2)
RBC # FLD: 16 % — HIGH (ref 10.3–14.5)
RBC # FLD: 16 % — HIGH (ref 10.3–14.5)
SODIUM SERPL-SCNC: 139 MMOL/L — SIGNIFICANT CHANGE UP (ref 135–145)
SODIUM SERPL-SCNC: 139 MMOL/L — SIGNIFICANT CHANGE UP (ref 135–145)
WBC # BLD: 12.68 K/UL — HIGH (ref 3.8–10.5)
WBC # BLD: 12.68 K/UL — HIGH (ref 3.8–10.5)
WBC # FLD AUTO: 12.68 K/UL — HIGH (ref 3.8–10.5)
WBC # FLD AUTO: 12.68 K/UL — HIGH (ref 3.8–10.5)

## 2023-10-28 RX ADMIN — ATORVASTATIN CALCIUM 10 MILLIGRAM(S): 80 TABLET, FILM COATED ORAL at 21:51

## 2023-10-28 RX ADMIN — PANTOPRAZOLE SODIUM 40 MILLIGRAM(S): 20 TABLET, DELAYED RELEASE ORAL at 08:21

## 2023-10-28 RX ADMIN — LOSARTAN POTASSIUM 100 MILLIGRAM(S): 100 TABLET, FILM COATED ORAL at 05:23

## 2023-10-28 RX ADMIN — SODIUM CHLORIDE 3 MILLILITER(S): 9 INJECTION INTRAMUSCULAR; INTRAVENOUS; SUBCUTANEOUS at 05:07

## 2023-10-28 RX ADMIN — HYDROMORPHONE HYDROCHLORIDE 4 MILLIGRAM(S): 2 INJECTION INTRAMUSCULAR; INTRAVENOUS; SUBCUTANEOUS at 09:19

## 2023-10-28 RX ADMIN — PIPERACILLIN AND TAZOBACTAM 25 GRAM(S): 4; .5 INJECTION, POWDER, LYOPHILIZED, FOR SOLUTION INTRAVENOUS at 14:14

## 2023-10-28 RX ADMIN — SODIUM CHLORIDE 3 MILLILITER(S): 9 INJECTION INTRAMUSCULAR; INTRAVENOUS; SUBCUTANEOUS at 22:03

## 2023-10-28 RX ADMIN — Medication 25 MILLIGRAM(S): at 21:50

## 2023-10-28 RX ADMIN — SODIUM CHLORIDE 3 MILLILITER(S): 9 INJECTION INTRAMUSCULAR; INTRAVENOUS; SUBCUTANEOUS at 14:14

## 2023-10-28 RX ADMIN — HYDROMORPHONE HYDROCHLORIDE 4 MILLIGRAM(S): 2 INJECTION INTRAMUSCULAR; INTRAVENOUS; SUBCUTANEOUS at 23:02

## 2023-10-28 RX ADMIN — CYCLOBENZAPRINE HYDROCHLORIDE 10 MILLIGRAM(S): 10 TABLET, FILM COATED ORAL at 21:49

## 2023-10-28 RX ADMIN — PIPERACILLIN AND TAZOBACTAM 25 GRAM(S): 4; .5 INJECTION, POWDER, LYOPHILIZED, FOR SOLUTION INTRAVENOUS at 21:50

## 2023-10-28 RX ADMIN — HYDROMORPHONE HYDROCHLORIDE 4 MILLIGRAM(S): 2 INJECTION INTRAMUSCULAR; INTRAVENOUS; SUBCUTANEOUS at 08:26

## 2023-10-28 RX ADMIN — HYDROMORPHONE HYDROCHLORIDE 4 MILLIGRAM(S): 2 INJECTION INTRAMUSCULAR; INTRAVENOUS; SUBCUTANEOUS at 22:02

## 2023-10-28 RX ADMIN — PIPERACILLIN AND TAZOBACTAM 25 GRAM(S): 4; .5 INJECTION, POWDER, LYOPHILIZED, FOR SOLUTION INTRAVENOUS at 05:23

## 2023-10-28 RX ADMIN — Medication 3 MILLIGRAM(S): at 21:49

## 2023-10-28 NOTE — PROGRESS NOTE ADULT - SUBJECTIVE AND OBJECTIVE BOX
Patient seen and examined at bedside. Patient reports pain well controlled on medications. No acute events overnight. Pt denies fevers, chills, new onset numbness, weakness or tingling in the extremities.    Vital Signs (24 Hrs):  T(C): 36.6 (10-28-23 @ 05:01), Max: 37.2 (10-27-23 @ 17:45)  HR: 60 (10-28-23 @ 05:01) (60 - 74)  BP: 112/67 (10-28-23 @ 05:01) (112/62 - 130/56)  RR: 18 (10-28-23 @ 05:01) (18 - 18)  SpO2: 93% (10-28-23 @ 05:01) (92% - 93%)  Wt(kg): --    LABS:                          11.4   12.68 )-----------( 501      ( 28 Oct 2023 06:10 )             35.7     10-28    139  |  106  |  20  ----------------------------<  97  3.5   |  24  |  1.24    Ca    8.4<L>      28 Oct 2023 06:10      Physical Exam:  General: NAD  Spine:  Dressings C/D/I  NTTP over C/T/L spine  Motor:                   C5                C6              C7               C8           T1   R            5/5                5/5            5/5             5/5          5/5  L             5/5               5/5             5/5             5/5          5/5                L2             L3             L4               L5            S1  R         5/5           5/5          5/5             5/5           5/5  L          5/5          5/5           5/5             5/5           5/5    Sensory:            C5         C6         C7      C8       T1        (0=absent, 1=impaired, 2=normal, NT=not testable)  R         2            2           2        2         2  L          2            2           2        2         2               L2          L3         L4      L5       S1         (0=absent, 1=impaired, 2=normal, NT=not testable)  R         2            2            2        2        2  L          2            2           2        2         2    Negative Cobos, Babinski, and Clonus    A&P:  78yFemale s/p I&D/plastics closure of lumbar wound POD#10    Pain control prn  PICC line care  PT: WBAT - spinal precautions   DVT ppx: SCDs and ambulation   aware of left foot pain- Ortho to monitor    OR Cx;s: Klebsiella Pneumonia, Proteus Mirabilis. ID final plan - Zosyn 3.375 gm IVP Q6.   Discharge: planning pending home care arrangements  All the above discussed and understood   Will discuss plan with Dr. Chan and will advise changes to plan as needed   Patient seen and examined at bedside. Patient reports pain well controlled on medications. No acute events overnight. Pt denies fevers, chills, new onset numbness, weakness or tingling in the extremities.    Vital Signs (24 Hrs):  T(C): 36.6 (10-28-23 @ 05:01), Max: 37.2 (10-27-23 @ 17:45)  HR: 60 (10-28-23 @ 05:01) (60 - 74)  BP: 112/67 (10-28-23 @ 05:01) (112/62 - 130/56)  RR: 18 (10-28-23 @ 05:01) (18 - 18)  SpO2: 93% (10-28-23 @ 05:01) (92% - 93%)  Wt(kg): --    LABS:                          11.4   12.68 )-----------( 501      ( 28 Oct 2023 06:10 )             35.7     10-28    139  |  106  |  20  ----------------------------<  97  3.5   |  24  |  1.24    Ca    8.4<L>      28 Oct 2023 06:10      Physical Exam:  Neuro: AAOX3  LUE: PICC in place.   Spine: Dressing C/D/I. No erythema no edema no ecchymosis noted   B/L UE: Skin intact. +ROM shoulder/elbow/wrist/fingers. +ok/thumbsup/fingercross signs.  strength: 5/5.  RP2+ NVI.   B/L LE: Skin intact with resolving rash B/L LE's. +ROM hip/knee/ankle/toes. Ankle Dorsi/plantarflexion: 5/5. Calf: soft, compressible and nontender.  DP/PT 2+ NVI.   Left foot: (+)skin intact, no swelling/bruising or pain to touch. She is able to dorsi/plantarflex (+)pain with dorsiflexsion. NVI    A&P:  78yFemale s/p I&D/plastics closure of lumbar wound POD#10    Pain control prn  PICC line care  PT: WBAT - spinal precautions   DVT ppx: SCDs and ambulation   aware of left foot pain- Ortho to monitor    OR Cx;s: Klebsiella Pneumonia, Proteus Mirabilis. ID final plan - Zosyn 3.375 gm IVP Q6.   Discharge: planning pending home care arrangements  All the above discussed and understood   Will discuss plan with Dr. Chan and will advise changes to plan as needed

## 2023-10-29 LAB
ANION GAP SERPL CALC-SCNC: 9 MMOL/L — SIGNIFICANT CHANGE UP (ref 5–17)
ANION GAP SERPL CALC-SCNC: 9 MMOL/L — SIGNIFICANT CHANGE UP (ref 5–17)
BUN SERPL-MCNC: 20 MG/DL — SIGNIFICANT CHANGE UP (ref 7–23)
BUN SERPL-MCNC: 20 MG/DL — SIGNIFICANT CHANGE UP (ref 7–23)
CALCIUM SERPL-MCNC: 8.6 MG/DL — SIGNIFICANT CHANGE UP (ref 8.5–10.1)
CALCIUM SERPL-MCNC: 8.6 MG/DL — SIGNIFICANT CHANGE UP (ref 8.5–10.1)
CHLORIDE SERPL-SCNC: 107 MMOL/L — SIGNIFICANT CHANGE UP (ref 96–108)
CHLORIDE SERPL-SCNC: 107 MMOL/L — SIGNIFICANT CHANGE UP (ref 96–108)
CO2 SERPL-SCNC: 23 MMOL/L — SIGNIFICANT CHANGE UP (ref 22–31)
CO2 SERPL-SCNC: 23 MMOL/L — SIGNIFICANT CHANGE UP (ref 22–31)
CREAT SERPL-MCNC: 1.2 MG/DL — SIGNIFICANT CHANGE UP (ref 0.5–1.3)
CREAT SERPL-MCNC: 1.2 MG/DL — SIGNIFICANT CHANGE UP (ref 0.5–1.3)
EGFR: 46 ML/MIN/1.73M2 — LOW
EGFR: 46 ML/MIN/1.73M2 — LOW
GLUCOSE SERPL-MCNC: 106 MG/DL — HIGH (ref 70–99)
GLUCOSE SERPL-MCNC: 106 MG/DL — HIGH (ref 70–99)
HCT VFR BLD CALC: 33.6 % — LOW (ref 34.5–45)
HCT VFR BLD CALC: 33.6 % — LOW (ref 34.5–45)
HGB BLD-MCNC: 10.8 G/DL — LOW (ref 11.5–15.5)
HGB BLD-MCNC: 10.8 G/DL — LOW (ref 11.5–15.5)
MCHC RBC-ENTMCNC: 30.3 PG — SIGNIFICANT CHANGE UP (ref 27–34)
MCHC RBC-ENTMCNC: 30.3 PG — SIGNIFICANT CHANGE UP (ref 27–34)
MCHC RBC-ENTMCNC: 32.1 G/DL — SIGNIFICANT CHANGE UP (ref 32–36)
MCHC RBC-ENTMCNC: 32.1 G/DL — SIGNIFICANT CHANGE UP (ref 32–36)
MCV RBC AUTO: 94.4 FL — SIGNIFICANT CHANGE UP (ref 80–100)
MCV RBC AUTO: 94.4 FL — SIGNIFICANT CHANGE UP (ref 80–100)
NRBC # BLD: 0 /100 WBCS — SIGNIFICANT CHANGE UP (ref 0–0)
NRBC # BLD: 0 /100 WBCS — SIGNIFICANT CHANGE UP (ref 0–0)
PLATELET # BLD AUTO: 482 K/UL — HIGH (ref 150–400)
PLATELET # BLD AUTO: 482 K/UL — HIGH (ref 150–400)
POTASSIUM SERPL-MCNC: 3.7 MMOL/L — SIGNIFICANT CHANGE UP (ref 3.5–5.3)
POTASSIUM SERPL-MCNC: 3.7 MMOL/L — SIGNIFICANT CHANGE UP (ref 3.5–5.3)
POTASSIUM SERPL-SCNC: 3.7 MMOL/L — SIGNIFICANT CHANGE UP (ref 3.5–5.3)
POTASSIUM SERPL-SCNC: 3.7 MMOL/L — SIGNIFICANT CHANGE UP (ref 3.5–5.3)
RBC # BLD: 3.56 M/UL — LOW (ref 3.8–5.2)
RBC # BLD: 3.56 M/UL — LOW (ref 3.8–5.2)
RBC # FLD: 15.9 % — HIGH (ref 10.3–14.5)
RBC # FLD: 15.9 % — HIGH (ref 10.3–14.5)
SODIUM SERPL-SCNC: 139 MMOL/L — SIGNIFICANT CHANGE UP (ref 135–145)
SODIUM SERPL-SCNC: 139 MMOL/L — SIGNIFICANT CHANGE UP (ref 135–145)
WBC # BLD: 12.18 K/UL — HIGH (ref 3.8–10.5)
WBC # BLD: 12.18 K/UL — HIGH (ref 3.8–10.5)
WBC # FLD AUTO: 12.18 K/UL — HIGH (ref 3.8–10.5)
WBC # FLD AUTO: 12.18 K/UL — HIGH (ref 3.8–10.5)

## 2023-10-29 RX ADMIN — Medication 3 MILLIGRAM(S): at 22:31

## 2023-10-29 RX ADMIN — PANTOPRAZOLE SODIUM 40 MILLIGRAM(S): 20 TABLET, DELAYED RELEASE ORAL at 09:02

## 2023-10-29 RX ADMIN — PIPERACILLIN AND TAZOBACTAM 25 GRAM(S): 4; .5 INJECTION, POWDER, LYOPHILIZED, FOR SOLUTION INTRAVENOUS at 05:44

## 2023-10-29 RX ADMIN — PIPERACILLIN AND TAZOBACTAM 25 GRAM(S): 4; .5 INJECTION, POWDER, LYOPHILIZED, FOR SOLUTION INTRAVENOUS at 22:29

## 2023-10-29 RX ADMIN — ATORVASTATIN CALCIUM 10 MILLIGRAM(S): 80 TABLET, FILM COATED ORAL at 22:30

## 2023-10-29 RX ADMIN — CYCLOBENZAPRINE HYDROCHLORIDE 10 MILLIGRAM(S): 10 TABLET, FILM COATED ORAL at 22:31

## 2023-10-29 RX ADMIN — PIPERACILLIN AND TAZOBACTAM 25 GRAM(S): 4; .5 INJECTION, POWDER, LYOPHILIZED, FOR SOLUTION INTRAVENOUS at 14:33

## 2023-10-29 RX ADMIN — SODIUM CHLORIDE 3 MILLILITER(S): 9 INJECTION INTRAMUSCULAR; INTRAVENOUS; SUBCUTANEOUS at 14:45

## 2023-10-29 RX ADMIN — LOSARTAN POTASSIUM 100 MILLIGRAM(S): 100 TABLET, FILM COATED ORAL at 05:44

## 2023-10-29 RX ADMIN — Medication 25 MILLIGRAM(S): at 22:30

## 2023-10-29 RX ADMIN — HYDROMORPHONE HYDROCHLORIDE 4 MILLIGRAM(S): 2 INJECTION INTRAMUSCULAR; INTRAVENOUS; SUBCUTANEOUS at 10:28

## 2023-10-29 RX ADMIN — HYDROMORPHONE HYDROCHLORIDE 4 MILLIGRAM(S): 2 INJECTION INTRAMUSCULAR; INTRAVENOUS; SUBCUTANEOUS at 23:43

## 2023-10-29 RX ADMIN — SODIUM CHLORIDE 3 MILLILITER(S): 9 INJECTION INTRAMUSCULAR; INTRAVENOUS; SUBCUTANEOUS at 22:43

## 2023-10-29 RX ADMIN — HYDROMORPHONE HYDROCHLORIDE 4 MILLIGRAM(S): 2 INJECTION INTRAMUSCULAR; INTRAVENOUS; SUBCUTANEOUS at 11:28

## 2023-10-29 RX ADMIN — HYDROMORPHONE HYDROCHLORIDE 4 MILLIGRAM(S): 2 INJECTION INTRAMUSCULAR; INTRAVENOUS; SUBCUTANEOUS at 22:43

## 2023-10-29 RX ADMIN — SODIUM CHLORIDE 3 MILLILITER(S): 9 INJECTION INTRAMUSCULAR; INTRAVENOUS; SUBCUTANEOUS at 05:36

## 2023-10-29 NOTE — PROGRESS NOTE ADULT - SUBJECTIVE AND OBJECTIVE BOX
Patient seen and examined at bedside. Patient reports pain well controlled on medications. No acute events overnight. Pt denies fevers, chills, new onset numbness, weakness or tingling in the extremities. Still complaining of paresthesias/numbness to dorsum of R foot     Vital Signs Last 24 Hrs  T(C): 36.4 (29 Oct 2023 05:04), Max: 36.9 (28 Oct 2023 17:21)  T(F): 97.5 (29 Oct 2023 05:04), Max: 98.5 (28 Oct 2023 17:21)  HR: 62 (29 Oct 2023 05:04) (62 - 76)  BP: 118/60 (29 Oct 2023 05:04) (110/64 - 129/72)  BP(mean): --  RR: 18 (29 Oct 2023 05:04) (17 - 19)  SpO2: 91% (29 Oct 2023 05:04) (91% - 94%)    Parameters below as of 28 Oct 2023 23:49  Patient On (Oxygen Delivery Method): room air                          10.8   12.18 )-----------( 482      ( 29 Oct 2023 05:50 )             33.6       10-29    139  |  107  |  20  ----------------------------<  106<H>  3.7   |  23  |  1.20    Ca    8.6      29 Oct 2023 05:50           Physical Exam:  Neuro: AAOX3  LUE: PICC in place.   Spine: Dressing C/D/I. No erythema no edema no ecchymosis noted   B/L UE: Skin intact. +ROM shoulder/elbow/wrist/fingers. +ok/thumbsup/fingercross signs.  strength: 5/5.  RP2+ NVI.   B/L LE: Skin intact with resolving rash B/L LE's. +ROM hip/knee/ankle/toes. Ankle Dorsi/plantarflexion: 5/5. Calf: soft, compressible and nontender.  DP/PT 2+ NVI.   Left foot: (+)skin intact, no swelling/bruising or pain to touch. She is able to dorsi/plantarflex (+)pain with dorsiflexsion. NVI    A&P:  78yFemale s/p I&D/plastics closure of lumbar wound POD#11    Pain control prn  PICC line care  PT: WBAT - spinal precautions   DVT ppx: SCDs and ambulation   aware of left foot pain- Ortho to monitor    OR Cx;s: Klebsiella Pneumonia, Proteus Mirabilis. ID final plan - Zosyn 3.375 gm IVP Q6.   Discharge: planning pending home care arrangements  All the above discussed and understood   Will discuss plan with Dr. Chan and will advise changes to plan as needed

## 2023-10-30 ENCOUNTER — APPOINTMENT (OUTPATIENT)
Dept: ORTHOPEDIC SURGERY | Facility: CLINIC | Age: 78
End: 2023-10-30

## 2023-10-30 LAB
ANION GAP SERPL CALC-SCNC: 8 MMOL/L — SIGNIFICANT CHANGE UP (ref 5–17)
ANION GAP SERPL CALC-SCNC: 8 MMOL/L — SIGNIFICANT CHANGE UP (ref 5–17)
BUN SERPL-MCNC: 20 MG/DL — SIGNIFICANT CHANGE UP (ref 7–23)
BUN SERPL-MCNC: 20 MG/DL — SIGNIFICANT CHANGE UP (ref 7–23)
CALCIUM SERPL-MCNC: 9 MG/DL — SIGNIFICANT CHANGE UP (ref 8.5–10.1)
CALCIUM SERPL-MCNC: 9 MG/DL — SIGNIFICANT CHANGE UP (ref 8.5–10.1)
CHLORIDE SERPL-SCNC: 109 MMOL/L — HIGH (ref 96–108)
CHLORIDE SERPL-SCNC: 109 MMOL/L — HIGH (ref 96–108)
CO2 SERPL-SCNC: 23 MMOL/L — SIGNIFICANT CHANGE UP (ref 22–31)
CO2 SERPL-SCNC: 23 MMOL/L — SIGNIFICANT CHANGE UP (ref 22–31)
CREAT SERPL-MCNC: 1.28 MG/DL — SIGNIFICANT CHANGE UP (ref 0.5–1.3)
CREAT SERPL-MCNC: 1.28 MG/DL — SIGNIFICANT CHANGE UP (ref 0.5–1.3)
EGFR: 42 ML/MIN/1.73M2 — LOW
EGFR: 42 ML/MIN/1.73M2 — LOW
GLUCOSE SERPL-MCNC: 100 MG/DL — HIGH (ref 70–99)
GLUCOSE SERPL-MCNC: 100 MG/DL — HIGH (ref 70–99)
HCT VFR BLD CALC: 36.4 % — SIGNIFICANT CHANGE UP (ref 34.5–45)
HCT VFR BLD CALC: 36.4 % — SIGNIFICANT CHANGE UP (ref 34.5–45)
HGB BLD-MCNC: 11.3 G/DL — LOW (ref 11.5–15.5)
HGB BLD-MCNC: 11.3 G/DL — LOW (ref 11.5–15.5)
MCHC RBC-ENTMCNC: 29.3 PG — SIGNIFICANT CHANGE UP (ref 27–34)
MCHC RBC-ENTMCNC: 29.3 PG — SIGNIFICANT CHANGE UP (ref 27–34)
MCHC RBC-ENTMCNC: 31 G/DL — LOW (ref 32–36)
MCHC RBC-ENTMCNC: 31 G/DL — LOW (ref 32–36)
MCV RBC AUTO: 94.3 FL — SIGNIFICANT CHANGE UP (ref 80–100)
MCV RBC AUTO: 94.3 FL — SIGNIFICANT CHANGE UP (ref 80–100)
NRBC # BLD: 0 /100 WBCS — SIGNIFICANT CHANGE UP (ref 0–0)
NRBC # BLD: 0 /100 WBCS — SIGNIFICANT CHANGE UP (ref 0–0)
PLATELET # BLD AUTO: 520 K/UL — HIGH (ref 150–400)
PLATELET # BLD AUTO: 520 K/UL — HIGH (ref 150–400)
POTASSIUM SERPL-MCNC: 3.9 MMOL/L — SIGNIFICANT CHANGE UP (ref 3.5–5.3)
POTASSIUM SERPL-MCNC: 3.9 MMOL/L — SIGNIFICANT CHANGE UP (ref 3.5–5.3)
POTASSIUM SERPL-SCNC: 3.9 MMOL/L — SIGNIFICANT CHANGE UP (ref 3.5–5.3)
POTASSIUM SERPL-SCNC: 3.9 MMOL/L — SIGNIFICANT CHANGE UP (ref 3.5–5.3)
RBC # BLD: 3.86 M/UL — SIGNIFICANT CHANGE UP (ref 3.8–5.2)
RBC # BLD: 3.86 M/UL — SIGNIFICANT CHANGE UP (ref 3.8–5.2)
RBC # FLD: 15.9 % — HIGH (ref 10.3–14.5)
RBC # FLD: 15.9 % — HIGH (ref 10.3–14.5)
SODIUM SERPL-SCNC: 140 MMOL/L — SIGNIFICANT CHANGE UP (ref 135–145)
SODIUM SERPL-SCNC: 140 MMOL/L — SIGNIFICANT CHANGE UP (ref 135–145)
WBC # BLD: 13.33 K/UL — HIGH (ref 3.8–10.5)
WBC # BLD: 13.33 K/UL — HIGH (ref 3.8–10.5)
WBC # FLD AUTO: 13.33 K/UL — HIGH (ref 3.8–10.5)
WBC # FLD AUTO: 13.33 K/UL — HIGH (ref 3.8–10.5)

## 2023-10-30 RX ADMIN — PIPERACILLIN AND TAZOBACTAM 25 GRAM(S): 4; .5 INJECTION, POWDER, LYOPHILIZED, FOR SOLUTION INTRAVENOUS at 14:28

## 2023-10-30 RX ADMIN — Medication 3 MILLIGRAM(S): at 21:43

## 2023-10-30 RX ADMIN — HYDROMORPHONE HYDROCHLORIDE 4 MILLIGRAM(S): 2 INJECTION INTRAMUSCULAR; INTRAVENOUS; SUBCUTANEOUS at 21:57

## 2023-10-30 RX ADMIN — PIPERACILLIN AND TAZOBACTAM 25 GRAM(S): 4; .5 INJECTION, POWDER, LYOPHILIZED, FOR SOLUTION INTRAVENOUS at 05:57

## 2023-10-30 RX ADMIN — SODIUM CHLORIDE 3 MILLILITER(S): 9 INJECTION INTRAMUSCULAR; INTRAVENOUS; SUBCUTANEOUS at 21:41

## 2023-10-30 RX ADMIN — HYDROMORPHONE HYDROCHLORIDE 4 MILLIGRAM(S): 2 INJECTION INTRAMUSCULAR; INTRAVENOUS; SUBCUTANEOUS at 11:47

## 2023-10-30 RX ADMIN — ATORVASTATIN CALCIUM 10 MILLIGRAM(S): 80 TABLET, FILM COATED ORAL at 21:43

## 2023-10-30 RX ADMIN — SODIUM CHLORIDE 3 MILLILITER(S): 9 INJECTION INTRAMUSCULAR; INTRAVENOUS; SUBCUTANEOUS at 05:48

## 2023-10-30 RX ADMIN — PANTOPRAZOLE SODIUM 40 MILLIGRAM(S): 20 TABLET, DELAYED RELEASE ORAL at 07:52

## 2023-10-30 RX ADMIN — HYDROMORPHONE HYDROCHLORIDE 4 MILLIGRAM(S): 2 INJECTION INTRAMUSCULAR; INTRAVENOUS; SUBCUTANEOUS at 22:57

## 2023-10-30 RX ADMIN — SODIUM CHLORIDE 3 MILLILITER(S): 9 INJECTION INTRAMUSCULAR; INTRAVENOUS; SUBCUTANEOUS at 15:01

## 2023-10-30 RX ADMIN — PIPERACILLIN AND TAZOBACTAM 25 GRAM(S): 4; .5 INJECTION, POWDER, LYOPHILIZED, FOR SOLUTION INTRAVENOUS at 21:43

## 2023-10-30 RX ADMIN — HYDROMORPHONE HYDROCHLORIDE 4 MILLIGRAM(S): 2 INJECTION INTRAMUSCULAR; INTRAVENOUS; SUBCUTANEOUS at 12:47

## 2023-10-30 RX ADMIN — LOSARTAN POTASSIUM 100 MILLIGRAM(S): 100 TABLET, FILM COATED ORAL at 05:57

## 2023-10-30 NOTE — PROGRESS NOTE ADULT - SUBJECTIVE AND OBJECTIVE BOX
78yFemale s/p I&D/plastics closure of lumbar wound POD#12.  Pt seen and examined in NAD. Pain is controlled,      PE:   Neuro: AAOX3  LUE: PICC in place.   Spine: Dressing C/D/I.  Prineo intact. Sutures C/D/I.  B/L UE: Skin intact. +ROM shoulder/elbow/wrist/fingers. +ok/thumbsup/fingercross signs.  strength: 5/5.  RP2+ NVI.   B/L LE: Skin intact with resolving rash B/L LE's. +ROM hip/knee/ankle/toes. Ankle Dorsi/plantarflexion: 5/5. Calf: soft, compressible and nontender.  DP/PT 2+ NVI.                                  A/P: 78yFemale s/p I&D/plastics closure of lumbar wound POD#12  Dressing changed - new dry clean dressing applied.   Pain controlled  PICC line care  PT: WBAT - spinal precautions   DVT ppx: SCDs   OR Cx;s: Klebsiella Pneumonia, Proteus Mirabilis. ID final plan - Zosyn 3.375 gm IVP Q6.   Discharge: planning pending home care arrangements  All the above discussed and understood   D/W DR Chan

## 2023-10-31 PROCEDURE — 99232 SBSQ HOSP IP/OBS MODERATE 35: CPT

## 2023-10-31 RX ORDER — FLUCONAZOLE 150 MG/1
150 TABLET ORAL ONCE
Refills: 0 | Status: COMPLETED | OUTPATIENT
Start: 2023-10-31 | End: 2023-10-31

## 2023-10-31 RX ADMIN — PIPERACILLIN AND TAZOBACTAM 25 GRAM(S): 4; .5 INJECTION, POWDER, LYOPHILIZED, FOR SOLUTION INTRAVENOUS at 14:04

## 2023-10-31 RX ADMIN — HYDROMORPHONE HYDROCHLORIDE 4 MILLIGRAM(S): 2 INJECTION INTRAMUSCULAR; INTRAVENOUS; SUBCUTANEOUS at 22:28

## 2023-10-31 RX ADMIN — HYDROMORPHONE HYDROCHLORIDE 4 MILLIGRAM(S): 2 INJECTION INTRAMUSCULAR; INTRAVENOUS; SUBCUTANEOUS at 23:28

## 2023-10-31 RX ADMIN — SODIUM CHLORIDE 3 MILLILITER(S): 9 INJECTION INTRAMUSCULAR; INTRAVENOUS; SUBCUTANEOUS at 05:09

## 2023-10-31 RX ADMIN — SODIUM CHLORIDE 3 MILLILITER(S): 9 INJECTION INTRAMUSCULAR; INTRAVENOUS; SUBCUTANEOUS at 22:01

## 2023-10-31 RX ADMIN — PANTOPRAZOLE SODIUM 40 MILLIGRAM(S): 20 TABLET, DELAYED RELEASE ORAL at 05:32

## 2023-10-31 RX ADMIN — PIPERACILLIN AND TAZOBACTAM 25 GRAM(S): 4; .5 INJECTION, POWDER, LYOPHILIZED, FOR SOLUTION INTRAVENOUS at 22:29

## 2023-10-31 RX ADMIN — FLUCONAZOLE 150 MILLIGRAM(S): 150 TABLET ORAL at 14:03

## 2023-10-31 RX ADMIN — ATORVASTATIN CALCIUM 10 MILLIGRAM(S): 80 TABLET, FILM COATED ORAL at 22:29

## 2023-10-31 RX ADMIN — Medication 3 MILLIGRAM(S): at 22:29

## 2023-10-31 RX ADMIN — LOSARTAN POTASSIUM 100 MILLIGRAM(S): 100 TABLET, FILM COATED ORAL at 05:32

## 2023-10-31 RX ADMIN — PIPERACILLIN AND TAZOBACTAM 25 GRAM(S): 4; .5 INJECTION, POWDER, LYOPHILIZED, FOR SOLUTION INTRAVENOUS at 05:32

## 2023-10-31 RX ADMIN — SODIUM CHLORIDE 3 MILLILITER(S): 9 INJECTION INTRAMUSCULAR; INTRAVENOUS; SUBCUTANEOUS at 14:48

## 2023-10-31 NOTE — PROGRESS NOTE ADULT - NS ATTEND AMEND GEN_ALL_CORE FT
I have reviewed all pertinent clinical information and agree with the NP's note.   picc line was placed  she is tolerating zosyn-will plan for 6 weeks   awaiting approval from workers comp   continue PT   weekly labs to be sent to my office   follow up in infectious disease office within 4 weeks of discharge    Jake Gottlieb, DO  Infectious Disease Attending  Reachable via Microsoft Teams or ID office: [phone]  After 5pm/weekends please call [phone] for all inquiries and new consults I have reviewed all pertinent clinical information and agree with the NP's note.   continue Zosyn  s/p diflucan for yeast infection   awaiting authorization from workers comp  continue rest of care per medicine     Jake Gottlieb, DO  Infectious Disease Attending  Reachable via Microsoft Teams or ID office: [phone]  After 5pm/weekends please call [phone] for all inquiries and new consults

## 2023-10-31 NOTE — PROGRESS NOTE ADULT - SUBJECTIVE AND OBJECTIVE BOX
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      S/P total knee arthroplasty, right      S/P knee replacement  Left ( 2007 )      S/P cholecystectomy      S/P knee replacement  2004 left knee and revision      S/P arthroscopy of shoulder  left shoulder      S/P laminectomy          ROS:    [ ] Unobtainable because:  [ ] All other systems negative    Constitutional: no fever, no chills  Head: no trauma  Eyes: no vision changes, no eye pain  ENT:  no sore throat, no rhinorrhea  Cardiovascular:  no chest pain, no palpitation  Respiratory:  no SOB, no cough  GI:  no abd pain, no vomiting, no diarrhea  urinary: no dysuria, no hematuria, no flank pain  musculoskeletal:  no joint pain, no joint swelling  skin:  no rash  neurology:  no headache, no seizure, no change in mental status  psych: no anxiety, no depression         Allergies  Cipro (Unknown)  ceftriaxone (Rash)  Hibiclens (Swelling)  Tolerating piperacillin-tazobactam 10/2023 (Other)  Bee Stings (Anaphylaxis)  Flagyl (Diarrhea; Rash)  meropenem-vaborbactam (Flushing)  Augmentin (Rash)        ANTIMICROBIALS:  piperacillin/tazobactam IVPB.. 3.375 every 8 hours      OTHER MEDS:  acetaminophen     Tablet .. 650 milliGRAM(s) Oral every 6 hours PRN  atorvastatin 10 milliGRAM(s) Oral at bedtime  cyclobenzaprine 10 milliGRAM(s) Oral three times a day PRN  diphenhydrAMINE 25 milliGRAM(s) Oral every 6 hours PRN  HYDROmorphone   Tablet 4 milliGRAM(s) Oral every 6 hours PRN  HYDROmorphone  Injectable 0.5 milliGRAM(s) IV Push once  lactated ringers. 1000 milliLiter(s) IV Continuous <Continuous>  losartan 100 milliGRAM(s) Oral daily  magnesium hydroxide Suspension 30 milliLiter(s) Oral every 12 hours PRN  melatonin 3 milliGRAM(s) Oral at bedtime  pantoprazole    Tablet 40 milliGRAM(s) Oral before breakfast  polyethylene glycol 3350 17 Gram(s) Oral two times a day PRN  sodium chloride 0.9% lock flush 3 milliLiter(s) IV Push every 8 hours  sucralfate 1 Gram(s) Oral once      Vital Signs Last 24 Hrs  T(C): 36.6 (31 Oct 2023 10:52), Max: 36.7 (30 Oct 2023 18:05)  T(F): 97.9 (31 Oct 2023 10:52), Max: 98 (30 Oct 2023 18:05)  HR: 61 (31 Oct 2023 10:52) (58 - 66)  BP: 146/73 (31 Oct 2023 10:52) (111/68 - 155/67)  BP(mean): --  RR: 17 (31 Oct 2023 10:52) (17 - 18)  SpO2: 95% (31 Oct 2023 10:52) (95% - 96%)    Parameters below as of 31 Oct 2023 05:03  Patient On (Oxygen Delivery Method): room air        Physical Exam:  Constitutional: non-toxic, no distress, RA  HEAD/EYES: anicteric, no conjunctival injection  ENT:  supple, no thrush  Cardiovascular:   normal S1, S2, no murmur,  edema b/l LEs  Respiratory:  clear BS bilaterally, no wheezes, no rales  GI:  soft, non-tender, normal bowel sounds, obese   Musculoskeletal:  bilateral knee replacements as well as surgical scar on left shoulder, back is dressed, c/d/i, no erythema or swelling of surrounding tissues, not tender  Neurologic: awake and alert, sensation in tact, no focal findings  Skin: patchy erythematous rash on b/l LEs and upper chest with improvement, PICC line in place  c/d/i  Heme/Onc: no lymphadenopathy   Psychiatric:  awake, alert, appropriate mood    WBC Count: 13.33 K/uL (10-30 @ 06:10)  WBC Count: 12.18 K/uL (10-29 @ 05:50)  WBC Count: 12.68 K/uL (10-28 @ 06:10)                            11.3   13.33 )-----------( 520      ( 30 Oct 2023 06:10 )             36.4       10-30    140  |  109<H>  |  20  ----------------------------<  100<H>  3.9   |  23  |  1.28    Ca    9.0      30 Oct 2023 06:10        Urinalysis Basic - ( 30 Oct 2023 06:10 )    Color: x / Appearance: x / SG: x / pH: x  Gluc: 100 mg/dL / Ketone: x  / Bili: x / Urobili: x   Blood: x / Protein: x / Nitrite: x   Leuk Esterase: x / RBC: x / WBC x   Sq Epi: x / Non Sq Epi: x / Bacteria: x        Creatinine Trend: 1.28<--, 1.20<--, 1.24<--, 1.29<--, 1.39<--, 1.38<--      MICROBIOLOGY:  v  Clean Catch Clean Catch (Midstream)  10-26-23   <10,000 CFU/mL Normal Urogenital Amy  --  --      .Blood Arterial Catheter  10-19-23   No growth at 5 days  --  --      .Surgical Swab DEEP WOUND LUMBAR CULTURE  10-18-23   Numerous Klebsiella pneumoniae  Numerous Proteus mirabilis  --  Klebsiella pneumoniae  Proteus mirabilis                C-Reactive Protein, Serum: 25 (10-19)                RADIOLOGY:     [address]  MRN-[id number]    Follow Up:  spinal infection     Interval History: the pt was seen and examined earlier, no acute distress, no new complaints, frustrated about waiting for worker's comp approval. Pt is afebrile, RA, complains of pruritus in vaginal area.     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      S/P total knee arthroplasty, right      S/P knee replacement  Left ( 2007 )      S/P cholecystectomy      S/P knee replacement  2004 left knee and revision      S/P arthroscopy of shoulder  left shoulder      S/P laminectomy          ROS:    [ ] Unobtainable because:  [ x] All other systems negative    Constitutional: no fever, no chills  Head: no trauma  Eyes: no vision changes, no eye pain  ENT:  no sore throat, no rhinorrhea  Cardiovascular:  no chest pain, no palpitation  Respiratory:  no SOB, no cough  GI:  no abd pain, no vomiting, no diarrhea  urinary: no dysuria, no hematuria, no flank pain  musculoskeletal:  no joint pain, no joint swelling  skin:  no rash  neurology:  no headache, no seizure, no change in mental status  psych: no anxiety, no depression         Allergies  Cipro (Unknown)  ceftriaxone (Rash)  Hibiclens (Swelling)  Tolerating piperacillin-tazobactam 10/2023 (Other)  Bee Stings (Anaphylaxis)  Flagyl (Diarrhea; Rash)  meropenem-vaborbactam (Flushing)  Augmentin (Rash)        ANTIMICROBIALS:  piperacillin/tazobactam IVPB.. 3.375 every 8 hours      OTHER MEDS:  acetaminophen     Tablet .. 650 milliGRAM(s) Oral every 6 hours PRN  atorvastatin 10 milliGRAM(s) Oral at bedtime  cyclobenzaprine 10 milliGRAM(s) Oral three times a day PRN  diphenhydrAMINE 25 milliGRAM(s) Oral every 6 hours PRN  HYDROmorphone   Tablet 4 milliGRAM(s) Oral every 6 hours PRN  HYDROmorphone  Injectable 0.5 milliGRAM(s) IV Push once  lactated ringers. 1000 milliLiter(s) IV Continuous <Continuous>  losartan 100 milliGRAM(s) Oral daily  magnesium hydroxide Suspension 30 milliLiter(s) Oral every 12 hours PRN  melatonin 3 milliGRAM(s) Oral at bedtime  pantoprazole    Tablet 40 milliGRAM(s) Oral before breakfast  polyethylene glycol 3350 17 Gram(s) Oral two times a day PRN  sodium chloride 0.9% lock flush 3 milliLiter(s) IV Push every 8 hours  sucralfate 1 Gram(s) Oral once      Vital Signs Last 24 Hrs  T(C): 36.6 (31 Oct 2023 10:52), Max: 36.7 (30 Oct 2023 18:05)  T(F): 97.9 (31 Oct 2023 10:52), Max: 98 (30 Oct 2023 18:05)  HR: 61 (31 Oct 2023 10:52) (58 - 66)  BP: 146/73 (31 Oct 2023 10:52) (111/68 - 155/67)  BP(mean): --  RR: 17 (31 Oct 2023 10:52) (17 - 18)  SpO2: 95% (31 Oct 2023 10:52) (95% - 96%)    Parameters below as of 31 Oct 2023 05:03  Patient On (Oxygen Delivery Method): room air        Physical Exam:  Constitutional: non-toxic, no distress, RA  HEAD/EYES: anicteric, no conjunctival injection  ENT:  supple, no thrush  Cardiovascular:   normal S1, S2, no murmur,  edema b/l LEs  Respiratory:  clear BS bilaterally, no wheezes, no rales  GI:  soft, non-tender, normal bowel sounds, obese   : + vaginal discharge, white   Musculoskeletal:  bilateral knee replacements as well as surgical scar on left shoulder, back is dressed, c/d/i  Neurologic: awake and alert, sensation in tact, no focal findings  Skin: patchy erythematous rash on b/l LEs and upper chest with improvement, PICC line in place  c/d/i  Heme/Onc: no lymphadenopathy   Psychiatric:  awake, alert, appropriate mood    WBC Count: 13.33 K/uL (10-30 @ 06:10)  WBC Count: 12.18 K/uL (10-29 @ 05:50)  WBC Count: 12.68 K/uL (10-28 @ 06:10)                            11.3   13.33 )-----------( 520      ( 30 Oct 2023 06:10 )             36.4       10-30    140  |  109<H>  |  20  ----------------------------<  100<H>  3.9   |  23  |  1.28    Ca    9.0      30 Oct 2023 06:10        Urinalysis Basic - ( 30 Oct 2023 06:10 )    Color: x / Appearance: x / SG: x / pH: x  Gluc: 100 mg/dL / Ketone: x  / Bili: x / Urobili: x   Blood: x / Protein: x / Nitrite: x   Leuk Esterase: x / RBC: x / WBC x   Sq Epi: x / Non Sq Epi: x / Bacteria: x        Creatinine Trend: 1.28<--, 1.20<--, 1.24<--, 1.29<--, 1.39<--, 1.38<--      MICROBIOLOGY:  v  Clean Catch Clean Catch (Midstream)  10-26-23   <10,000 CFU/mL Normal Urogenital Amy  --  --      .Blood Arterial Catheter  10-19-23   No growth at 5 days  --  --      .Surgical Swab DEEP WOUND LUMBAR CULTURE  10-18-23   Numerous Klebsiella pneumoniae  Numerous Proteus mirabilis  --  Klebsiella pneumoniae  Proteus mirabilis          C-Reactive Protein, Serum: 25 (10-19)        RADIOLOGY:

## 2023-10-31 NOTE — PROGRESS NOTE ADULT - SUBJECTIVE AND OBJECTIVE BOX
78yFemale s/p I&D/plastics closure of lumbar wound POD#13.  Pt seen and examined in NAD. Pain is controlled,  Foot pain RLE improving.     PE:   Neuro: AAOX3  LUE: PICC in place.   Spine: Dressing C/D/I.   B/L UE: Skin intact. +ROM shoulder/elbow/wrist/fingers. +ok/thumbsup/fingercross signs.  strength: 5/5.  RP2+ NVI.   B/L LE: Skin intact. Rash B/L LE's mostly resolved. +ROM hip/knee/ankle/toes. Ankle Dorsi/plantarflexion: 5/5. Calf: soft, compressible and nontender.  DP/PT 2+ NVI.                                  A/P: 78yFemale s/p I&D/plastics closure of lumbar wound POD#13  Pain controlled  PICC line care  PT: WBAT - spinal precautions   DVT ppx: SCDs   OR Cx;s: Klebsiella Pneumonia, Proteus Mirabilis. ID final plan - Zosyn 3.375 gm IVP Q6 until 11/28/23  Discharge: planning pending home care arrangements  All the above discussed and understood   D/W DR Chan

## 2023-10-31 NOTE — PROGRESS NOTE ADULT - ASSESSMENT
Pt is a 78F who presents for lumbar wound debridement and closure s/p L3-4 laminectomy 08/25/23. Pain has gotten better since having sx. Feels pain at incision site. B/L leg weakness. Reports drainage from incision has increased and now has to change gauze 3xdays, rather than once a day. Doing home PT, persisting with some b/l LE pain.   no fevers   wbc ok   no outpatient cultures   went to OR for debridement and closure   OR cultures will be very helpful   has multiple antibiotics allergies including ceftriaxone, flagyl, cipro, meropenem    10/19: pod#1 s/p debridement and closure of spinal surgical site, no fevers, RA, WBC elevated 13.9, Cr elevated 1.38, surgical culture and MRSA PCR are pending, Vanco level 6.4 today, received a dose of vanco intraop, Vancomycin IV continued. Developed rash, Azactam stopped.   Attending addendum:  I have reviewed all pertinent clinical information and agree with the NP's note.   small faint macular rash on the lower extremities as well as chest   has prior hx of allergic reactions to multiple antibiotics   will stop aztreonam and monitor this rash   can add benadryl if rash worsens   continue vancomycin   send vancomycin trough with target AUC/BOOM 400-600   (therapeutic drug monitoring required with IV vancomycin)   will follow cultures and see if gram negative coverage is needed   should treat this as an infection until proven otherwise    10/20: no fevers, RA, no new cbc, surgical culture is growing Klebsiella pneumoniae and Proteus mirabilis, pending sensitivity, started on Zosyn IV (tolerated in the past), Vancomycin IV continued, today's level is 9.4 - reached out to pharmacy, will adjust the dose based on recommendations.    Attending addendum:  I have reviewed all pertinent clinical information and agree with the NP's note.   patient had allergy presumably to aztreonam   she has tolerated Zosyn as recent as 2020 without reaction   will start zosyn to treat gram negative wound infection   monitor patients rash   picc line placement monday   can continue vancomycin until or cultures are final    10/23: picc line place, YANA removed over the weekend, no fevers, RA, no new cbc, Cr normalized, no evidence of MRSA, vancomycin IV stopped, BC x 1 NGTD, surgical cultures grew Klebsiella and Proteus, Zosyn IV continued. Pt will need six weeks of abx from the day of her procedure, Rx provided. Tolerates medication well.   Attending addendum:  I have reviewed all pertinent clinical information and agree with the NP's note.   picc line placed 10/23/23  either dc on Zosyn IV 3.375 g q 6 hrs - last dose 11/28/2023  ideally will try to arrange zosyn continuous infusion 18g over 24hrs  weekly lab work: cbc with diff, cmp, esr, crp     10/24: doing well, no fevers, RA, no new lab work, awaiting for worker's comp authorization, Zosyn IV continued.     Plan:  continue Zosyn IV - pt tolerated medication in the past  she would benefit from outpatient allergy testing   MRSA PCR not detected   PT and OT per ortho  follow OR cultures - grew Klebsiella and Proteus  follow all culture data    when ready for discharge:  picc line placed 10/23/23  continue Zosyn IV 3.375 g q 6 hrs vs 18 grams IV continuous infusion over 24 hrs - last dose 11/28/2023 (Rx for both options provided)  ideally will try to arrange zosyn continuous infusion 18g over 24hrs  weekly lab work: cbc with diff, cmp, esr, crp - fax to Dr. Gottlieb at [phone]  follow up with Dr. Gottlieb in the office:  [address] [place] [address] [place] [address] [phone]  remove picc line upon completion of the course of abx    discussed with Dr. Gottlieb  discussed with the   Pt is a 78F who presents for lumbar wound debridement and closure s/p L3-4 laminectomy 08/25/23. Pain has gotten better since having sx. Feels pain at incision site. B/L leg weakness. Reports drainage from incision has increased and now has to change gauze 3xdays, rather than once a day. Doing home PT, persisting with some b/l LE pain.   no fevers   wbc ok   no outpatient cultures   went to OR for debridement and closure   OR cultures will be very helpful   has multiple antibiotics allergies including ceftriaxone, flagyl, cipro, meropenem    10/19: pod#1 s/p debridement and closure of spinal surgical site, no fevers, RA, WBC elevated 13.9, Cr elevated 1.38, surgical culture and MRSA PCR are pending, Vanco level 6.4 today, received a dose of vanco intraop, Vancomycin IV continued. Developed rash, Azactam stopped.   Attending addendum:  I have reviewed all pertinent clinical information and agree with the NP's note.   small faint macular rash on the lower extremities as well as chest   has prior hx of allergic reactions to multiple antibiotics   will stop aztreonam and monitor this rash   can add benadryl if rash worsens   continue vancomycin   send vancomycin trough with target AUC/BOOM 400-600   (therapeutic drug monitoring required with IV vancomycin)   will follow cultures and see if gram negative coverage is needed   should treat this as an infection until proven otherwise    10/20: no fevers, RA, no new cbc, surgical culture is growing Klebsiella pneumoniae and Proteus mirabilis, pending sensitivity, started on Zosyn IV (tolerated in the past), Vancomycin IV continued, today's level is 9.4 - reached out to pharmacy, will adjust the dose based on recommendations.    Attending addendum:  I have reviewed all pertinent clinical information and agree with the NP's note.   patient had allergy presumably to aztreonam   she has tolerated Zosyn as recent as 2020 without reaction   will start zosyn to treat gram negative wound infection   monitor patients rash   picc line placement monday   can continue vancomycin until or cultures are final    10/23: picc line place, YANA removed over the weekend, no fevers, RA, no new cbc, Cr normalized, no evidence of MRSA, vancomycin IV stopped, BC x 1 NGTD, surgical cultures grew Klebsiella and Proteus, Zosyn IV continued. Pt will need six weeks of abx from the day of her procedure, Rx provided. Tolerates medication well.   Attending addendum:  I have reviewed all pertinent clinical information and agree with the NP's note.   picc line placed 10/23/23  either dc on Zosyn IV 3.375 g q 6 hrs - last dose 11/28/2023  ideally will try to arrange zosyn continuous infusion 18g over 24hrs  weekly lab work: cbc with diff, cmp, esr, crp     10/24: doing well, no fevers, RA, no new lab work, awaiting for [employer] comp authorization, Zosyn IV continued.   Attending addendum:  I have reviewed all pertinent clinical information and agree with the NP's note.   picc line was placed  she is tolerating zosyn-will plan for 6 weeks   awaiting approval from workers comp   continue PT   weekly labs to be sent to my office   follow up in infectious disease office within 4 weeks of discharge    10/31: no fevers, RA, no new lab work, Cr ok, culture data reviewed, + vaginal discharge on today's exam, complains of pruritic sensation in the area, will give a dose of po Diflucan, Zosyn IV continued.     Plan:  continue Zosyn IV   pt would benefit from outpatient allergy testing   give one time dose of Diflucan po - ordered   MRSA PCR not detected   PT and OT per ortho  follow OR cultures - grew Klebsiella and Proteus  follow all culture data    when ready for discharge:  picc line placed 10/23/23  continue Zosyn IV 3.375 g q 6 hrs vs 18 grams IV continuous infusion over 24 hrs - last dose 11/28/2023 (Rx for both options provided)  ideally will try to arrange zosyn continuous infusion 18g over 24hrs  weekly lab work: cbc with diff, cmp, esr, crp - fax to Dr. Gottlieb at [phone]  follow up with Dr. Gottlieb in the office:  [address] [place] [address] [place] [address] [phone]  remove picc line upon completion of the course of abx    discussed with Dr. Gottlieb  discussed with the

## 2023-11-01 RX ADMIN — PIPERACILLIN AND TAZOBACTAM 25 GRAM(S): 4; .5 INJECTION, POWDER, LYOPHILIZED, FOR SOLUTION INTRAVENOUS at 14:33

## 2023-11-01 RX ADMIN — SODIUM CHLORIDE 3 MILLILITER(S): 9 INJECTION INTRAMUSCULAR; INTRAVENOUS; SUBCUTANEOUS at 05:38

## 2023-11-01 RX ADMIN — HYDROMORPHONE HYDROCHLORIDE 4 MILLIGRAM(S): 2 INJECTION INTRAMUSCULAR; INTRAVENOUS; SUBCUTANEOUS at 22:47

## 2023-11-01 RX ADMIN — SODIUM CHLORIDE 3 MILLILITER(S): 9 INJECTION INTRAMUSCULAR; INTRAVENOUS; SUBCUTANEOUS at 17:10

## 2023-11-01 RX ADMIN — PANTOPRAZOLE SODIUM 40 MILLIGRAM(S): 20 TABLET, DELAYED RELEASE ORAL at 06:00

## 2023-11-01 RX ADMIN — PIPERACILLIN AND TAZOBACTAM 25 GRAM(S): 4; .5 INJECTION, POWDER, LYOPHILIZED, FOR SOLUTION INTRAVENOUS at 06:00

## 2023-11-01 RX ADMIN — Medication 3 MILLIGRAM(S): at 22:04

## 2023-11-01 RX ADMIN — LOSARTAN POTASSIUM 100 MILLIGRAM(S): 100 TABLET, FILM COATED ORAL at 06:00

## 2023-11-01 RX ADMIN — HYDROMORPHONE HYDROCHLORIDE 4 MILLIGRAM(S): 2 INJECTION INTRAMUSCULAR; INTRAVENOUS; SUBCUTANEOUS at 11:33

## 2023-11-01 RX ADMIN — PIPERACILLIN AND TAZOBACTAM 25 GRAM(S): 4; .5 INJECTION, POWDER, LYOPHILIZED, FOR SOLUTION INTRAVENOUS at 22:07

## 2023-11-01 RX ADMIN — Medication 650 MILLIGRAM(S): at 13:15

## 2023-11-01 RX ADMIN — Medication 650 MILLIGRAM(S): at 12:15

## 2023-11-01 RX ADMIN — HYDROMORPHONE HYDROCHLORIDE 4 MILLIGRAM(S): 2 INJECTION INTRAMUSCULAR; INTRAVENOUS; SUBCUTANEOUS at 10:33

## 2023-11-01 RX ADMIN — HYDROMORPHONE HYDROCHLORIDE 4 MILLIGRAM(S): 2 INJECTION INTRAMUSCULAR; INTRAVENOUS; SUBCUTANEOUS at 22:17

## 2023-11-01 RX ADMIN — SODIUM CHLORIDE 3 MILLILITER(S): 9 INJECTION INTRAMUSCULAR; INTRAVENOUS; SUBCUTANEOUS at 22:13

## 2023-11-01 RX ADMIN — ATORVASTATIN CALCIUM 10 MILLIGRAM(S): 80 TABLET, FILM COATED ORAL at 22:05

## 2023-11-01 NOTE — ASU PREOP CHECKLIST - DNR CLARIFICATION FORM COMPLETED
n/a
Expected Date Of Service: 11/01/2023
Billing Type: [employer]
Bill For Surgical Tray: no
Performing Laboratory: [phone]

## 2023-11-01 NOTE — PROGRESS NOTE ADULT - SUBJECTIVE AND OBJECTIVE BOX
78yFemale s/p I&D/plastics closure of lumbar wound POD#14.  Pt seen and examined in NAD. Pain is controlled,  Foot pain RLE improving. Has h/o same periodically and it lasts a few weeks.     PE:   Neuro: AAOX3  LUE: PICC in place.   Spine: Dressing C/D/I.   B/L UE: Skin intact. +ROM shoulder/elbow/wrist/fingers. +ok/thumbsup/fingercross signs.  strength: 5/5.  RP2+ NVI.   B/L LE: Skin intact. +ROM hip/knee/ankle/toes. Ankle Dorsi/plantarflexion: 5/5. Calf: soft, compressible and nontender.  DP/PT 2+ NVI.                                  A/P: 78yFemale s/p I&D/plastics closure of lumbar wound POD#14  Pain controlled  PICC line care  PT: WBAT - spinal precautions   DVT ppx: SCDs   OR Cx;s: Klebsiella Pneumonia, Proteus Mirabilis. ID final plan - Zosyn 3.375 gm IVP Q6 until 11/28/23  Discharge: planning pending home care arrangements - ID doctor in touch with workers comp   All the above discussed and understood

## 2023-11-02 RX ORDER — HYDROMORPHONE HYDROCHLORIDE 2 MG/ML
4 INJECTION INTRAMUSCULAR; INTRAVENOUS; SUBCUTANEOUS EVERY 6 HOURS
Refills: 0 | Status: DISCONTINUED | OUTPATIENT
Start: 2023-11-02 | End: 2023-11-08

## 2023-11-02 RX ADMIN — PIPERACILLIN AND TAZOBACTAM 25 GRAM(S): 4; .5 INJECTION, POWDER, LYOPHILIZED, FOR SOLUTION INTRAVENOUS at 06:12

## 2023-11-02 RX ADMIN — HYDROMORPHONE HYDROCHLORIDE 4 MILLIGRAM(S): 2 INJECTION INTRAMUSCULAR; INTRAVENOUS; SUBCUTANEOUS at 22:24

## 2023-11-02 RX ADMIN — Medication 3 MILLIGRAM(S): at 21:44

## 2023-11-02 RX ADMIN — SODIUM CHLORIDE 3 MILLILITER(S): 9 INJECTION INTRAMUSCULAR; INTRAVENOUS; SUBCUTANEOUS at 06:30

## 2023-11-02 RX ADMIN — SODIUM CHLORIDE 3 MILLILITER(S): 9 INJECTION INTRAMUSCULAR; INTRAVENOUS; SUBCUTANEOUS at 21:45

## 2023-11-02 RX ADMIN — HYDROMORPHONE HYDROCHLORIDE 4 MILLIGRAM(S): 2 INJECTION INTRAMUSCULAR; INTRAVENOUS; SUBCUTANEOUS at 15:11

## 2023-11-02 RX ADMIN — SODIUM CHLORIDE 3 MILLILITER(S): 9 INJECTION INTRAMUSCULAR; INTRAVENOUS; SUBCUTANEOUS at 16:33

## 2023-11-02 RX ADMIN — Medication 25 MILLIGRAM(S): at 21:44

## 2023-11-02 RX ADMIN — PIPERACILLIN AND TAZOBACTAM 25 GRAM(S): 4; .5 INJECTION, POWDER, LYOPHILIZED, FOR SOLUTION INTRAVENOUS at 21:45

## 2023-11-02 RX ADMIN — LOSARTAN POTASSIUM 100 MILLIGRAM(S): 100 TABLET, FILM COATED ORAL at 06:12

## 2023-11-02 RX ADMIN — HYDROMORPHONE HYDROCHLORIDE 4 MILLIGRAM(S): 2 INJECTION INTRAMUSCULAR; INTRAVENOUS; SUBCUTANEOUS at 16:33

## 2023-11-02 RX ADMIN — PIPERACILLIN AND TAZOBACTAM 25 GRAM(S): 4; .5 INJECTION, POWDER, LYOPHILIZED, FOR SOLUTION INTRAVENOUS at 14:32

## 2023-11-02 RX ADMIN — ATORVASTATIN CALCIUM 10 MILLIGRAM(S): 80 TABLET, FILM COATED ORAL at 21:44

## 2023-11-02 RX ADMIN — HYDROMORPHONE HYDROCHLORIDE 4 MILLIGRAM(S): 2 INJECTION INTRAMUSCULAR; INTRAVENOUS; SUBCUTANEOUS at 21:44

## 2023-11-02 RX ADMIN — PANTOPRAZOLE SODIUM 40 MILLIGRAM(S): 20 TABLET, DELAYED RELEASE ORAL at 09:29

## 2023-11-02 NOTE — CHART NOTE - NSCHARTNOTEFT_GEN_A_CORE
Pt with PMH of HTN, HLD, morbid obesity, & GERD; s/p I&D/plastics closure of lumbar wound    Factors impacting intake: [ ] none [ ] nausea  [ ] vomiting [ ] diarrhea [ ] constipation  [ ]chewing problems [ ] swallowing issues  [ ] other:     Diet Prescription: Diet, Regular (10-18-23 @ 17:23)  Diet, Full Liquid (10-18-23 @ 11:37)  Diet, Clear Liquid (10-18-23 @ 11:37)    Intake:     Current Weight:   % Weight Change    Pertinent Medications: MEDICATIONS  (STANDING):  atorvastatin 10 milliGRAM(s) Oral at bedtime  HYDROmorphone  Injectable 0.5 milliGRAM(s) IV Push once  lactated ringers. 1000 milliLiter(s) (75 mL/Hr) IV Continuous <Continuous>  losartan 100 milliGRAM(s) Oral daily  melatonin 3 milliGRAM(s) Oral at bedtime  pantoprazole    Tablet 40 milliGRAM(s) Oral before breakfast  piperacillin/tazobactam IVPB.. 3.375 Gram(s) IV Intermittent every 8 hours  sodium chloride 0.9% lock flush 3 milliLiter(s) IV Push every 8 hours  sucralfate 1 Gram(s) Oral once    MEDICATIONS  (PRN):  acetaminophen     Tablet .. 650 milliGRAM(s) Oral every 6 hours PRN Mild Pain (1 - 3)  cyclobenzaprine 10 milliGRAM(s) Oral three times a day PRN Muscle Spasm  diphenhydrAMINE 25 milliGRAM(s) Oral every 6 hours PRN Rash and/or Itching  HYDROmorphone   Tablet 4 milliGRAM(s) Oral every 6 hours PRN Severe Pain (7 - 10)  magnesium hydroxide Suspension 30 milliLiter(s) Oral every 12 hours PRN Constipation  polyethylene glycol 3350 17 Gram(s) Oral two times a day PRN Constipation    Pertinent Labs:    Skin:     Estimated Needs:   [ ] no change since previous assessment  [ ] recalculated:     Previous Nutrition Diagnosis:   [ ] Inadequate Energy Intake [ ]Inadequate Oral Intake [ ] Excessive Energy Intake   [ ] Underweight [ ] Increased Nutrient Needs [ ] Overweight/Obesity   [ ] Altered GI Function [ ] Unintended Weight Loss [ ] Food & Nutrition Related Knowledge Deficit [ ] Malnutrition     Nutrition Diagnosis is [ ] ongoing  [ ] resolved [ ] not applicable     New Nutrition Diagnosis: [ ] not applicable       Interventions:   Recommend  [ ] Change Diet To:  [ ] Nutrition Supplement  [ ] Nutrition Support  [ ] Other:     Monitoring and Evaluation:   [ ] PO intake [ x ] Tolerance to diet prescription [ x ] weights [ x ] labs[ x ] follow up per protocol  [ ] other: Pt with PMH of HTN, HLD, morbid obesity, & GERD; s/p I&D/plastics closure of lumbar wound on 10/18. Cultures grew Klebsiella pneumonia, proteus mirabilis; on IV abx. ID following.  Discharge planning pending home care arrangements - ID doctor in touch with workers comp.    Factors impacting intake: [x] none [ ] nausea  [ ] vomiting [ ] diarrhea [ ] constipation  [ ]chewing problems [ ] swallowing issues  [ ] other:     Diet Prescription: Diet, Regular (10-18-23 @ 17:23)    Intake: %; denies any chew/swallowing difficulty or any N/V/D/C    Current Weight: No recent weights to trend; request current wt  % Weight Change: N/A    No edema noted    Pertinent Medications: MEDICATIONS  (STANDING):  atorvastatin 10 milliGRAM(s) Oral at bedtime  HYDROmorphone  Injectable 0.5 milliGRAM(s) IV Push once  lactated ringers. 1000 milliLiter(s) (75 mL/Hr) IV Continuous <Continuous>  losartan 100 milliGRAM(s) Oral daily  melatonin 3 milliGRAM(s) Oral at bedtime  pantoprazole    Tablet 40 milliGRAM(s) Oral before breakfast  piperacillin/tazobactam IVPB.. 3.375 Gram(s) IV Intermittent every 8 hours  sodium chloride 0.9% lock flush 3 milliLiter(s) IV Push every 8 hours  sucralfate 1 Gram(s) Oral once    MEDICATIONS  (PRN):  acetaminophen     Tablet .. 650 milliGRAM(s) Oral every 6 hours PRN Mild Pain (1 - 3)  cyclobenzaprine 10 milliGRAM(s) Oral three times a day PRN Muscle Spasm  diphenhydrAMINE 25 milliGRAM(s) Oral every 6 hours PRN Rash and/or Itching  HYDROmorphone   Tablet 4 milliGRAM(s) Oral every 6 hours PRN Severe Pain (7 - 10)  magnesium hydroxide Suspension 30 milliLiter(s) Oral every 12 hours PRN Constipation  polyethylene glycol 3350 17 Gram(s) Oral two times a day PRN Constipation    Pertinent Labs: No labs x 2 days    Skin: No pressure ulcers as per flow sheets    Estimated Needs:   [x] no change since previous assessment on 10/24  [ ] recalculated:     Previous Nutrition Diagnosis:   [x] Overweight/Obesity  Etiology: prolonged excessive energy intake history  Signs/Symptoms: BMI of 42.2    Goal: Pt to consume balanced portioned meals during length of stay - goal met    Nutrition Diagnosis is [x] ongoing  [ ] resolved [ ] not applicable     New Nutrition Diagnosis: [x] not applicable       Interventions:   Recommend  [x] Continue with current diet rx as noted  [ ] Change Diet To:  [ ] Nutrition Supplement  [ ] Nutrition Support  [ ] Other:     Monitoring and Evaluation:   [ x ] PO intake [ x ] Tolerance to diet prescription [ x ] weights [ x ] labs[ x ] follow up per protocol  [ ] other: Pt s/p I&D/plastics closure of lumbar wound on 10/18. Cultures grew Klebsiella pneumonia, proteus mirabilis; on IV abx. ID following.  Discharge planning pending home care arrangements - ID doctor in touch with workers comp.    Factors impacting intake: [x] none [ ] nausea  [ ] vomiting [ ] diarrhea [ ] constipation  [ ]chewing problems [ ] swallowing issues  [ ] other:     Diet Prescription: Diet, Regular (10-18-23 @ 17:23)    Intake: %; denies any chew/swallowing difficulty or any N/V/D/C    Current Weight: No recent weights to trend; request current wt  % Weight Change: N/A    No edema noted    Pertinent Medications: MEDICATIONS  (STANDING):  atorvastatin 10 milliGRAM(s) Oral at bedtime  HYDROmorphone  Injectable 0.5 milliGRAM(s) IV Push once  lactated ringers. 1000 milliLiter(s) (75 mL/Hr) IV Continuous <Continuous>  losartan 100 milliGRAM(s) Oral daily  melatonin 3 milliGRAM(s) Oral at bedtime  pantoprazole    Tablet 40 milliGRAM(s) Oral before breakfast  piperacillin/tazobactam IVPB.. 3.375 Gram(s) IV Intermittent every 8 hours  sodium chloride 0.9% lock flush 3 milliLiter(s) IV Push every 8 hours  sucralfate 1 Gram(s) Oral once    MEDICATIONS  (PRN):  acetaminophen     Tablet .. 650 milliGRAM(s) Oral every 6 hours PRN Mild Pain (1 - 3)  cyclobenzaprine 10 milliGRAM(s) Oral three times a day PRN Muscle Spasm  diphenhydrAMINE 25 milliGRAM(s) Oral every 6 hours PRN Rash and/or Itching  HYDROmorphone   Tablet 4 milliGRAM(s) Oral every 6 hours PRN Severe Pain (7 - 10)  magnesium hydroxide Suspension 30 milliLiter(s) Oral every 12 hours PRN Constipation  polyethylene glycol 3350 17 Gram(s) Oral two times a day PRN Constipation    Pertinent Labs: No labs x 2 days    Skin: No pressure ulcers as per flow sheets    Estimated Needs:   [x] no change since previous assessment on 10/24  [ ] recalculated:     Previous Nutrition Diagnosis:   [x] Overweight/Obesity  Etiology: prolonged excessive energy intake history  Signs/Symptoms: BMI of 42.2    Goal: Pt to consume balanced portioned meals during length of stay - goal met    Nutrition Diagnosis is [x] ongoing  [ ] resolved [ ] not applicable     New Nutrition Diagnosis: [x] not applicable       Interventions:   Recommend  [x] Continue with current diet rx as noted  [ ] Change Diet To:  [ ] Nutrition Supplement  [ ] Nutrition Support  [ ] Other:     Monitoring and Evaluation:   [ x ] PO intake [ x ] Tolerance to diet prescription [ x ] weights [ x ] labs[ x ] follow up per protocol  [ ] other: Pt s/p I&D/plastics closure of lumbar wound on 10/18. Cultures grew Klebsiella pneumonia, proteus mirabilis; on IV abx. ID following.  Discharge planning pending home care arrangements - ID doctor in touch with workers comp.    Factors impacting intake: [x] none [ ] nausea  [ ] vomiting [ ] diarrhea [ ] constipation  [ ]chewing problems [ ] swallowing issues  [ ] other:     Diet Prescription: Diet, Regular (10-18-23 @ 17:23)    Intake: %; denies any chew/swallowing difficulty or any N/V/D/C    Current Weight: No recent weights to trend; request current wt  % Weight Change: N/A    No edema noted    Pertinent Medications: MEDICATIONS  (STANDING):  atorvastatin 10 milliGRAM(s) Oral at bedtime  HYDROmorphone  Injectable 0.5 milliGRAM(s) IV Push once  lactated ringers. 1000 milliLiter(s) (75 mL/Hr) IV Continuous <Continuous>  losartan 100 milliGRAM(s) Oral daily  melatonin 3 milliGRAM(s) Oral at bedtime  pantoprazole    Tablet 40 milliGRAM(s) Oral before breakfast  piperacillin/tazobactam IVPB.. 3.375 Gram(s) IV Intermittent every 8 hours  sodium chloride 0.9% lock flush 3 milliLiter(s) IV Push every 8 hours  sucralfate 1 Gram(s) Oral once    MEDICATIONS  (PRN):  acetaminophen     Tablet .. 650 milliGRAM(s) Oral every 6 hours PRN Mild Pain (1 - 3)  cyclobenzaprine 10 milliGRAM(s) Oral three times a day PRN Muscle Spasm  diphenhydrAMINE 25 milliGRAM(s) Oral every 6 hours PRN Rash and/or Itching  HYDROmorphone   Tablet 4 milliGRAM(s) Oral every 6 hours PRN Severe Pain (7 - 10)  magnesium hydroxide Suspension 30 milliLiter(s) Oral every 12 hours PRN Constipation  polyethylene glycol 3350 17 Gram(s) Oral two times a day PRN Constipation    Pertinent Labs: No labs x 2 days    Skin: No pressure ulcers as per flow sheets    Estimated Needs:   [x] no change since previous assessment on 10/25  [ ] recalculated:     Previous Nutrition Diagnosis:   [x] Overweight/Obesity  Etiology: prolonged excessive energy intake history  Signs/Symptoms: BMI of 42.2    Goal: Pt to consume balanced portioned meals during length of stay - goal met    Nutrition Diagnosis is [x] ongoing  [ ] resolved [ ] not applicable     New Nutrition Diagnosis: [x] not applicable       Interventions:   Recommend  [x] Continue with current diet rx as noted  [ ] Change Diet To:  [ ] Nutrition Supplement  [ ] Nutrition Support  [ ] Other:     Monitoring and Evaluation:   [ x ] PO intake [ x ] Tolerance to diet prescription [ x ] weights [ x ] labs[ x ] follow up per protocol  [ ] other:

## 2023-11-02 NOTE — PROGRESS NOTE ADULT - SUBJECTIVE AND OBJECTIVE BOX
78yFemale s/p I&D/plastics closure of lumbar wound POD#14.  Pt seen and examined in NAD. Pain is controlled, no change in Right Foot pain from yesterday Has h/o same periodically and it lasts a few weeks.       PE: AAOX3  LUE: PICC in place.   Spine: Dressing C/D/I.   B/L UE: Skin intact. +ROM shoulder/elbow/wrist/fingers. +ok/thumbsup/fingercross signs.  strength: 5/5.  RP2+ NVI.   B/L LE: Skin intact. +ROM hip/knee/ankle/toes. Ankle Dorsi/plantarflexion: 5/5. Calf: soft, compressible and nontender.  DP/PT 2+ NVI.             A/P: 78yFemale s/p I&D/plastics closure of lumbar wound POD#15  Pain control prn  PICC line care  PT: WBAT - spinal precautions   DVT ppx: SCDs and ambulation  OR Cx;s: Klebsiella Pneumonia, Proteus Mirabilis. ID final plan - Zosyn 3.375 gm IVP Q6 until 11/28/23  Discharge: planning pending home care arrangements - ID doctor in touch with workers comp   All the above discussed and understood

## 2023-11-03 RX ORDER — CYCLOBENZAPRINE HYDROCHLORIDE 10 MG/1
1 TABLET, FILM COATED ORAL
Qty: 21 | Refills: 0
Start: 2023-11-03 | End: 2023-11-09

## 2023-11-03 RX ORDER — ASPIRIN/CALCIUM CARB/MAGNESIUM 324 MG
81 TABLET ORAL DAILY
Refills: 0 | Status: DISCONTINUED | OUTPATIENT
Start: 2023-11-03 | End: 2023-11-08

## 2023-11-03 RX ORDER — HYDROMORPHONE HYDROCHLORIDE 2 MG/ML
1 INJECTION INTRAMUSCULAR; INTRAVENOUS; SUBCUTANEOUS
Qty: 30 | Refills: 0
Start: 2023-11-03 | End: 2023-11-07

## 2023-11-03 RX ORDER — NALOXONE HYDROCHLORIDE 4 MG/.1ML
4 SPRAY NASAL
Qty: 1 | Refills: 0
Start: 2023-11-03 | End: 2023-11-03

## 2023-11-03 RX ADMIN — SODIUM CHLORIDE 3 MILLILITER(S): 9 INJECTION INTRAMUSCULAR; INTRAVENOUS; SUBCUTANEOUS at 22:11

## 2023-11-03 RX ADMIN — HYDROMORPHONE HYDROCHLORIDE 4 MILLIGRAM(S): 2 INJECTION INTRAMUSCULAR; INTRAVENOUS; SUBCUTANEOUS at 22:54

## 2023-11-03 RX ADMIN — HYDROMORPHONE HYDROCHLORIDE 4 MILLIGRAM(S): 2 INJECTION INTRAMUSCULAR; INTRAVENOUS; SUBCUTANEOUS at 21:54

## 2023-11-03 RX ADMIN — PANTOPRAZOLE SODIUM 40 MILLIGRAM(S): 20 TABLET, DELAYED RELEASE ORAL at 13:42

## 2023-11-03 RX ADMIN — SODIUM CHLORIDE 3 MILLILITER(S): 9 INJECTION INTRAMUSCULAR; INTRAVENOUS; SUBCUTANEOUS at 06:02

## 2023-11-03 RX ADMIN — Medication 25 MILLIGRAM(S): at 21:54

## 2023-11-03 RX ADMIN — PIPERACILLIN AND TAZOBACTAM 25 GRAM(S): 4; .5 INJECTION, POWDER, LYOPHILIZED, FOR SOLUTION INTRAVENOUS at 21:56

## 2023-11-03 RX ADMIN — PIPERACILLIN AND TAZOBACTAM 25 GRAM(S): 4; .5 INJECTION, POWDER, LYOPHILIZED, FOR SOLUTION INTRAVENOUS at 14:04

## 2023-11-03 RX ADMIN — SODIUM CHLORIDE 3 MILLILITER(S): 9 INJECTION INTRAMUSCULAR; INTRAVENOUS; SUBCUTANEOUS at 14:02

## 2023-11-03 RX ADMIN — Medication 3 MILLIGRAM(S): at 21:54

## 2023-11-03 RX ADMIN — Medication 81 MILLIGRAM(S): at 12:43

## 2023-11-03 RX ADMIN — CYCLOBENZAPRINE HYDROCHLORIDE 10 MILLIGRAM(S): 10 TABLET, FILM COATED ORAL at 22:04

## 2023-11-03 RX ADMIN — ATORVASTATIN CALCIUM 10 MILLIGRAM(S): 80 TABLET, FILM COATED ORAL at 21:54

## 2023-11-03 RX ADMIN — LOSARTAN POTASSIUM 100 MILLIGRAM(S): 100 TABLET, FILM COATED ORAL at 06:03

## 2023-11-03 RX ADMIN — PIPERACILLIN AND TAZOBACTAM 25 GRAM(S): 4; .5 INJECTION, POWDER, LYOPHILIZED, FOR SOLUTION INTRAVENOUS at 06:03

## 2023-11-03 NOTE — PROGRESS NOTE ADULT - SUBJECTIVE AND OBJECTIVE BOX
78yFemale s/p I&D/plastics closure of lumbar wound POD#16.  Pt seen and examined in NAD. Pain is controlled, no change in right dorsal foot pain - relieved with dilaudid.       PE: AAOX3  LUE: PICC in place.   Spine: Dressing C/D/I.   B/L UE: Skin intact. +ROM shoulder/elbow/wrist/fingers. +ok/thumbsup/fingercross signs.  strength: 5/5.  RP2+ NVI.   B/L LE: Skin intact. +ROM hip/knee/ankle/toes. Ankle Dorsi/plantarflexion: 5/5. Calf: soft, compressible and nontender.  DP/PT 2+ NVI.             A/P: 78yFemale s/p I&D/plastics closure of lumbar wound POD#16  Pain control prn  PICC line care  PT: WBAT - spinal precautions   DVT ppx: SCDs and ambulation  OR Cx;s: Klebsiella Pneumonia, Proteus Mirabilis. ID final plan - Zosyn 3.375 gm IVP Q6 until 11/28/23  Discharge: planning pending home care arrangements - ID doctor in touch with workers comp   All the above discussed and understood

## 2023-11-04 RX ADMIN — SODIUM CHLORIDE 3 MILLILITER(S): 9 INJECTION INTRAMUSCULAR; INTRAVENOUS; SUBCUTANEOUS at 22:24

## 2023-11-04 RX ADMIN — SODIUM CHLORIDE 3 MILLILITER(S): 9 INJECTION INTRAMUSCULAR; INTRAVENOUS; SUBCUTANEOUS at 05:17

## 2023-11-04 RX ADMIN — HYDROMORPHONE HYDROCHLORIDE 4 MILLIGRAM(S): 2 INJECTION INTRAMUSCULAR; INTRAVENOUS; SUBCUTANEOUS at 21:29

## 2023-11-04 RX ADMIN — PIPERACILLIN AND TAZOBACTAM 25 GRAM(S): 4; .5 INJECTION, POWDER, LYOPHILIZED, FOR SOLUTION INTRAVENOUS at 21:25

## 2023-11-04 RX ADMIN — HYDROMORPHONE HYDROCHLORIDE 4 MILLIGRAM(S): 2 INJECTION INTRAMUSCULAR; INTRAVENOUS; SUBCUTANEOUS at 22:29

## 2023-11-04 RX ADMIN — LOSARTAN POTASSIUM 100 MILLIGRAM(S): 100 TABLET, FILM COATED ORAL at 06:10

## 2023-11-04 RX ADMIN — SODIUM CHLORIDE 3 MILLILITER(S): 9 INJECTION INTRAMUSCULAR; INTRAVENOUS; SUBCUTANEOUS at 14:30

## 2023-11-04 RX ADMIN — Medication 81 MILLIGRAM(S): at 11:41

## 2023-11-04 RX ADMIN — ATORVASTATIN CALCIUM 10 MILLIGRAM(S): 80 TABLET, FILM COATED ORAL at 21:25

## 2023-11-04 RX ADMIN — PIPERACILLIN AND TAZOBACTAM 25 GRAM(S): 4; .5 INJECTION, POWDER, LYOPHILIZED, FOR SOLUTION INTRAVENOUS at 13:18

## 2023-11-04 RX ADMIN — Medication 3 MILLIGRAM(S): at 21:25

## 2023-11-04 RX ADMIN — PIPERACILLIN AND TAZOBACTAM 25 GRAM(S): 4; .5 INJECTION, POWDER, LYOPHILIZED, FOR SOLUTION INTRAVENOUS at 06:10

## 2023-11-04 RX ADMIN — PANTOPRAZOLE SODIUM 40 MILLIGRAM(S): 20 TABLET, DELAYED RELEASE ORAL at 07:43

## 2023-11-04 NOTE — PROGRESS NOTE ADULT - SUBJECTIVE AND OBJECTIVE BOX
Patient seen and examined at bedside. Patient reports pain well controlled on medications. No acute events overnight. Pt denies fevers, chills, new onset numbness, weakness or tingling in the extremities.    Vital Signs (24 Hrs):  T(C): 36.3 (11-04-23 @ 05:11), Max: 36.7 (11-03-23 @ 16:31)  HR: 61 (11-04-23 @ 05:11) (61 - 62)  BP: 131/71 (11-04-23 @ 05:11) (131/71 - 167/71)  RR: 18 (11-04-23 @ 05:11) (17 - 18)  SpO2: 95% (11-04-23 @ 05:11) (93% - 95%)  Wt(kg): --    PE: AAOX3  LUE: PICC in place.   Spine: Dressing C/D/I.   B/L UE: Skin intact. +ROM shoulder/elbow/wrist/fingers. +ok/thumbsup/fingercross signs.  strength: 5/5.  RP2+ NVI.   B/L LE: Skin intact. +ROM hip/knee/ankle/toes. Ankle Dorsi/plantarflexion: 5/5. Calf: soft, compressible and nontender.  DP/PT 2+ NVI.     A/P: 78yFemale s/p I&D/plastics closure of lumbar wound POD#17  Pain control prn  PICC line care  PT: WBAT - spinal precautions   DVT ppx: Aspirin 81 qd, SCDs and ambulation  OR Cx;s: Klebsiella Pneumonia, Proteus Mirabilis. ID final plan - Zosyn 3.375 gm IVP Q6 until 11/28/23  Discharge: planning pending home care arrangements - ID doctor in touch with workers comp   All the above discussed and understood   Will discuss plan with Dr. Chan and will advise changes to plan as needed

## 2023-11-05 RX ADMIN — HYDROMORPHONE HYDROCHLORIDE 4 MILLIGRAM(S): 2 INJECTION INTRAMUSCULAR; INTRAVENOUS; SUBCUTANEOUS at 12:20

## 2023-11-05 RX ADMIN — HYDROMORPHONE HYDROCHLORIDE 4 MILLIGRAM(S): 2 INJECTION INTRAMUSCULAR; INTRAVENOUS; SUBCUTANEOUS at 21:50

## 2023-11-05 RX ADMIN — Medication 81 MILLIGRAM(S): at 14:07

## 2023-11-05 RX ADMIN — SODIUM CHLORIDE 3 MILLILITER(S): 9 INJECTION INTRAMUSCULAR; INTRAVENOUS; SUBCUTANEOUS at 05:26

## 2023-11-05 RX ADMIN — HYDROMORPHONE HYDROCHLORIDE 4 MILLIGRAM(S): 2 INJECTION INTRAMUSCULAR; INTRAVENOUS; SUBCUTANEOUS at 05:28

## 2023-11-05 RX ADMIN — PANTOPRAZOLE SODIUM 40 MILLIGRAM(S): 20 TABLET, DELAYED RELEASE ORAL at 07:55

## 2023-11-05 RX ADMIN — HYDROMORPHONE HYDROCHLORIDE 4 MILLIGRAM(S): 2 INJECTION INTRAMUSCULAR; INTRAVENOUS; SUBCUTANEOUS at 12:48

## 2023-11-05 RX ADMIN — SODIUM CHLORIDE 3 MILLILITER(S): 9 INJECTION INTRAMUSCULAR; INTRAVENOUS; SUBCUTANEOUS at 14:07

## 2023-11-05 RX ADMIN — Medication 3 MILLIGRAM(S): at 21:50

## 2023-11-05 RX ADMIN — PIPERACILLIN AND TAZOBACTAM 25 GRAM(S): 4; .5 INJECTION, POWDER, LYOPHILIZED, FOR SOLUTION INTRAVENOUS at 21:51

## 2023-11-05 RX ADMIN — PIPERACILLIN AND TAZOBACTAM 25 GRAM(S): 4; .5 INJECTION, POWDER, LYOPHILIZED, FOR SOLUTION INTRAVENOUS at 05:22

## 2023-11-05 RX ADMIN — HYDROMORPHONE HYDROCHLORIDE 4 MILLIGRAM(S): 2 INJECTION INTRAMUSCULAR; INTRAVENOUS; SUBCUTANEOUS at 22:50

## 2023-11-05 RX ADMIN — HYDROMORPHONE HYDROCHLORIDE 4 MILLIGRAM(S): 2 INJECTION INTRAMUSCULAR; INTRAVENOUS; SUBCUTANEOUS at 06:28

## 2023-11-05 RX ADMIN — ATORVASTATIN CALCIUM 10 MILLIGRAM(S): 80 TABLET, FILM COATED ORAL at 21:50

## 2023-11-05 RX ADMIN — PIPERACILLIN AND TAZOBACTAM 25 GRAM(S): 4; .5 INJECTION, POWDER, LYOPHILIZED, FOR SOLUTION INTRAVENOUS at 14:05

## 2023-11-05 RX ADMIN — SODIUM CHLORIDE 3 MILLILITER(S): 9 INJECTION INTRAMUSCULAR; INTRAVENOUS; SUBCUTANEOUS at 22:01

## 2023-11-05 NOTE — PROGRESS NOTE ADULT - SUBJECTIVE AND OBJECTIVE BOX
Patient seen and examined at bedside. Patient reports pain well controlled on medications. No acute events overnight. Pt denies fevers, chills, new onset numbness, weakness or tingling in the extremities.    T(C): 36.5 (11-05-23 @ 04:57), Max: 36.9 (11-04-23 @ 23:36)  T(F): 97.7 (11-05-23 @ 04:57), Max: 98.4 (11-04-23 @ 23:36)  HR: 55 (11-05-23 @ 04:57) (55 - 65)  BP: 144/74 (11-05-23 @ 04:57) (121/72 - 153/70)  RR: 17 (11-05-23 @ 04:57) (14 - 17)  SpO2: 95% (11-05-23 @ 04:57) (94% - 95%)    PE: AAOX3  LUE: PICC in place.   Spine: Dressing C/D/I.   B/L UE: Skin intact. +ROM shoulder/elbow/wrist/fingers. +ok/thumbsup/fingercross signs.  strength: 5/5.  RP2+ NVI.   B/L LE: Skin intact. +ROM hip/knee/ankle/toes. Ankle Dorsi/plantarflexion: 5/5. Calf: soft, compressible and nontender.  DP/PT 2+ NVI.     A/P: 78yFemale s/p I&D/plastics closure of lumbar wound POD#17  Pain control prn  PICC line care  PT: WBAT - spinal precautions   DVT ppx: Aspirin 81 qd, SCDs and ambulation  OR Cx;s: Klebsiella Pneumonia, Proteus Mirabilis. ID final plan - Zosyn 3.375 gm IVP Q6 until 11/28/23  Discharge: planning pending home care arrangements - ID doctor in touch with workers comp   All the above discussed and understood   Will discuss plan with Dr. Chan and will advise changes to plan as needed

## 2023-11-06 RX ADMIN — HYDROMORPHONE HYDROCHLORIDE 4 MILLIGRAM(S): 2 INJECTION INTRAMUSCULAR; INTRAVENOUS; SUBCUTANEOUS at 21:59

## 2023-11-06 RX ADMIN — PIPERACILLIN AND TAZOBACTAM 25 GRAM(S): 4; .5 INJECTION, POWDER, LYOPHILIZED, FOR SOLUTION INTRAVENOUS at 05:28

## 2023-11-06 RX ADMIN — Medication 3 MILLIGRAM(S): at 22:00

## 2023-11-06 RX ADMIN — PIPERACILLIN AND TAZOBACTAM 25 GRAM(S): 4; .5 INJECTION, POWDER, LYOPHILIZED, FOR SOLUTION INTRAVENOUS at 22:00

## 2023-11-06 RX ADMIN — SODIUM CHLORIDE 3 MILLILITER(S): 9 INJECTION INTRAMUSCULAR; INTRAVENOUS; SUBCUTANEOUS at 14:34

## 2023-11-06 RX ADMIN — SODIUM CHLORIDE 3 MILLILITER(S): 9 INJECTION INTRAMUSCULAR; INTRAVENOUS; SUBCUTANEOUS at 05:25

## 2023-11-06 RX ADMIN — HYDROMORPHONE HYDROCHLORIDE 4 MILLIGRAM(S): 2 INJECTION INTRAMUSCULAR; INTRAVENOUS; SUBCUTANEOUS at 22:59

## 2023-11-06 RX ADMIN — Medication 25 MILLIGRAM(S): at 21:59

## 2023-11-06 RX ADMIN — SODIUM CHLORIDE 3 MILLILITER(S): 9 INJECTION INTRAMUSCULAR; INTRAVENOUS; SUBCUTANEOUS at 22:01

## 2023-11-06 RX ADMIN — ATORVASTATIN CALCIUM 10 MILLIGRAM(S): 80 TABLET, FILM COATED ORAL at 22:00

## 2023-11-06 RX ADMIN — PANTOPRAZOLE SODIUM 40 MILLIGRAM(S): 20 TABLET, DELAYED RELEASE ORAL at 08:06

## 2023-11-06 RX ADMIN — Medication 81 MILLIGRAM(S): at 11:24

## 2023-11-06 RX ADMIN — PIPERACILLIN AND TAZOBACTAM 25 GRAM(S): 4; .5 INJECTION, POWDER, LYOPHILIZED, FOR SOLUTION INTRAVENOUS at 14:02

## 2023-11-06 RX ADMIN — CYCLOBENZAPRINE HYDROCHLORIDE 10 MILLIGRAM(S): 10 TABLET, FILM COATED ORAL at 21:59

## 2023-11-06 NOTE — PHYSICAL THERAPY INITIAL EVALUATION ADULT - IMPAIRMENTS FOUND, PT EVAL
aerobic capacity/endurance/ergonomics and body mechanics/gait, locomotion, and balance/muscle strength
aerobic capacity/endurance/gait, locomotion, and balance/muscle strength

## 2023-11-06 NOTE — PHYSICAL THERAPY INITIAL EVALUATION ADULT - CRITERIA FOR SKILLED THERAPEUTIC INTERVENTIONS
impairments found
impairments found/functional limitations in following categories/risk reduction/prevention

## 2023-11-06 NOTE — PHYSICAL THERAPY INITIAL EVALUATION ADULT - ADDITIONAL COMMENTS
January 31, 2023     Patient: Elizabeth Wu   YOB: 1980   Date of Visit: 1/31/2023       To Whom it May Concern:    Josh Paniagua was seen in my clinic on 1/31/2023  He may return to work on 2/2/2023  If you have any questions or concerns, please don't hesitate to call           Sincerely,          Marcos Rouse PA-C        CC: No Recipients
Pt lives with daughter in private house with 3 steps to enter. Everything she needs is on first floor. Has all DME from prior surgeries.
Pt lives with daughter in private house with 3 steps to enter. Everything she needs is on first floor. Has all DME from prior surgeries.

## 2023-11-06 NOTE — PHYSICAL THERAPY INITIAL EVALUATION ADULT - PLANNED THERAPY INTERVENTIONS, PT EVAL
balance training/bed mobility training/gait training/strengthening/transfer training
stair climbing ex/balance training/bed mobility training/gait training/strengthening/transfer training

## 2023-11-06 NOTE — PROGRESS NOTE ADULT - SUBJECTIVE AND OBJECTIVE BOX
Patient seen and examined at bedside. Patient reports pain well controlled on medications. No acute events overnight. Pt denies fevers, chills, new onset numbness, weakness or tingling in the extremities.    Vital Signs Last 24 Hrs  T(C): 37.1 (06 Nov 2023 11:49), Max: 37.1 (06 Nov 2023 11:49)  T(F): 98.7 (06 Nov 2023 11:49), Max: 98.7 (06 Nov 2023 11:49)  HR: 61 (06 Nov 2023 11:49) (54 - 72)  BP: 152/84 (06 Nov 2023 11:49) (116/53 - 152/84)  BP(mean): --  RR: 18 (06 Nov 2023 11:49) (17 - 19)  SpO2: 96% (06 Nov 2023 11:49) (94% - 98%)    Parameters below as of 06 Nov 2023 11:49  Patient On (Oxygen Delivery Method): room air        PE: AAOX3  LUE: PICC in place.   Spine: Dressing C/D/I.   B/L UE: Skin intact. +ROM shoulder/elbow/wrist/fingers. +ok/thumbsup/fingercross signs.  strength: 5/5.  RP2+ NVI.   B/L LE: Skin intact. +ROM hip/knee/ankle/toes. Ankle Dorsi/plantarflexion: 5/5. Calf: soft, compressible and nontender.  DP/PT 2+ NVI.     A/P: 78yFemale s/p I&D/plastics closure of lumbar wound POD#19  Pain control prn  PICC line care  PT: WBAT - spinal precautions   DVT ppx: Aspirin 81 qd, SCDs and ambulation  OR Cx;s: Klebsiella Pneumonia, Proteus Mirabilis. ID final plan - Zosyn 3.375 gm IVP Q6 until 11/28/23  Discharge: planning pending home care arrangements - ID doctor in touch with workers comp   All the above discussed and understood   Will discuss plan with Dr. Chan and will advise changes to plan as needed

## 2023-11-06 NOTE — PHYSICAL THERAPY INITIAL EVALUATION ADULT - PERTINENT HX OF CURRENT PROBLEM, REHAB EVAL
Per H&P, Pt is an 78 F who presents for lumbar wound debridement and closure s/p L3-4 laminectomy 08/25/23.
Per H&P, Pt is an 78 F who presents for lumbar wound debridement and closure s/p L3-4 laminectomy 08/25/23. Pain has gotten better since having sx. Feels pain at incision site. B/L leg weakness. Reports drainage from incision has increased and now has to change gauze 3xdays, rather than once a day.

## 2023-11-06 NOTE — PHYSICAL THERAPY INITIAL EVALUATION ADULT - STRENGTHENING, PT EVAL
Pt will increase overall strength by half grade to improve bed mobility, transfers, gait by 3 weeks
Improve strength in the extremities especially the lower extremities to 5/5, improve gen endurance to good and be able to perform functional tasks-bed mobility, sitting, standing, transfers and ambulate in a safe manner with or without  assistive device and prevent falls.

## 2023-11-06 NOTE — PHYSICAL THERAPY INITIAL EVALUATION ADULT - BED MOBILITY TRAINING, PT EVAL
to be assessed
Independent in bed mobility- supine<>sit, rolling side<>side observing proper body mechanics, proper positioning, body alignment and precautions.

## 2023-11-06 NOTE — PHYSICAL THERAPY INITIAL EVALUATION ADULT - BALANCE TRAINING, PT EVAL
Pt will increase static/dynamic standing balance to good- to perform all functional mobility without LOB, by 3 weeks
Independent sitting, transfers, standing and ambulation with good balance using appropriate assistive device and prevent falls.

## 2023-11-06 NOTE — PHYSICAL THERAPY INITIAL EVALUATION ADULT - LIVES WITH, PROFILE
children
Pt lives with daughter in private house with 3 steps to enter. Everything she needs is on first floor. Has all DME from prior surgeries./children

## 2023-11-06 NOTE — PHYSICAL THERAPY INITIAL EVALUATION ADULT - LEVEL OF INDEPENDENCE: SIT/STAND, REHAB EVAL
supervision
Siliq Counseling:  I discussed with the patient the risks of Siliq including but not limited to new or worsening depression, suicidal thoughts and behavior, immunosuppression, malignancy, posterior leukoencephalopathy syndrome, and serious infections.  The patient understands that monitoring is required including a PPD at baseline and must alert us or the primary physician if symptoms of infection or other concerning signs are noted. There is also a special program designed to monitor depression which is required with Siliq.
supervision/independent

## 2023-11-06 NOTE — PHYSICAL THERAPY INITIAL EVALUATION ADULT - GAIT TRAINING, PT EVAL
Pt will independently ambulate 100 feet with rolling walker without loss of balance, by 3 weeks
Pt will be able to ambulate using assistive device up to 200 ft or more, be able to negotiate steps safely observing proper gait, posture and prevent falls.

## 2023-11-06 NOTE — PHYSICAL THERAPY INITIAL EVALUATION ADULT - GENERAL OBSERVATIONS, REHAB EVAL
Pt was seen sitting in recliner with YANA drain.
Pt is alert, oriented x 4, coherent, Picc line LUE, not in distress, on room air, denies pain in lumbar at rest.

## 2023-11-06 NOTE — PHYSICAL THERAPY INITIAL EVALUATION ADULT - NSPTDISCHREC_GEN_A_CORE
pending steps assessment/Home PT
PT discussed with pt and agreed, also to  So Bhagat RN and ortho DORI Vergara./Outpatient PT

## 2023-11-06 NOTE — PHYSICAL THERAPY INITIAL EVALUATION ADULT - DIAGNOSIS, PT EVAL
Difficulty walking
Pt presents with decreased strength in the lower extremities, gen endurance and ambulation balance.

## 2023-11-06 NOTE — PHYSICAL THERAPY INITIAL EVALUATION ADULT - TRANSFER TRAINING, PT EVAL
Pt will independently perform sit to/from stand transfers without LOB using rolling walker by 3 weeks
Independent in  transfer ability bed to chair and vice versa using appropriate assistive device  and prevent falls.

## 2023-11-07 LAB
ALBUMIN SERPL ELPH-MCNC: 2.7 G/DL — LOW (ref 3.3–5)
ALBUMIN SERPL ELPH-MCNC: 2.7 G/DL — LOW (ref 3.3–5)
ALP SERPL-CCNC: 51 U/L — SIGNIFICANT CHANGE UP (ref 40–120)
ALP SERPL-CCNC: 51 U/L — SIGNIFICANT CHANGE UP (ref 40–120)
ALT FLD-CCNC: 12 U/L — SIGNIFICANT CHANGE UP (ref 12–78)
ALT FLD-CCNC: 12 U/L — SIGNIFICANT CHANGE UP (ref 12–78)
ANION GAP SERPL CALC-SCNC: 6 MMOL/L — SIGNIFICANT CHANGE UP (ref 5–17)
ANION GAP SERPL CALC-SCNC: 6 MMOL/L — SIGNIFICANT CHANGE UP (ref 5–17)
AST SERPL-CCNC: 14 U/L — LOW (ref 15–37)
AST SERPL-CCNC: 14 U/L — LOW (ref 15–37)
BASOPHILS # BLD AUTO: 0.07 K/UL — SIGNIFICANT CHANGE UP (ref 0–0.2)
BASOPHILS # BLD AUTO: 0.07 K/UL — SIGNIFICANT CHANGE UP (ref 0–0.2)
BASOPHILS NFR BLD AUTO: 0.7 % — SIGNIFICANT CHANGE UP (ref 0–2)
BASOPHILS NFR BLD AUTO: 0.7 % — SIGNIFICANT CHANGE UP (ref 0–2)
BILIRUB SERPL-MCNC: 0.2 MG/DL — SIGNIFICANT CHANGE UP (ref 0.2–1.2)
BILIRUB SERPL-MCNC: 0.2 MG/DL — SIGNIFICANT CHANGE UP (ref 0.2–1.2)
BUN SERPL-MCNC: 17 MG/DL — SIGNIFICANT CHANGE UP (ref 7–23)
BUN SERPL-MCNC: 17 MG/DL — SIGNIFICANT CHANGE UP (ref 7–23)
CALCIUM SERPL-MCNC: 8.6 MG/DL — SIGNIFICANT CHANGE UP (ref 8.5–10.1)
CALCIUM SERPL-MCNC: 8.6 MG/DL — SIGNIFICANT CHANGE UP (ref 8.5–10.1)
CHLORIDE SERPL-SCNC: 110 MMOL/L — HIGH (ref 96–108)
CHLORIDE SERPL-SCNC: 110 MMOL/L — HIGH (ref 96–108)
CO2 SERPL-SCNC: 26 MMOL/L — SIGNIFICANT CHANGE UP (ref 22–31)
CO2 SERPL-SCNC: 26 MMOL/L — SIGNIFICANT CHANGE UP (ref 22–31)
CREAT SERPL-MCNC: 1.13 MG/DL — SIGNIFICANT CHANGE UP (ref 0.5–1.3)
CREAT SERPL-MCNC: 1.13 MG/DL — SIGNIFICANT CHANGE UP (ref 0.5–1.3)
EGFR: 49 ML/MIN/1.73M2 — LOW
EGFR: 49 ML/MIN/1.73M2 — LOW
EOSINOPHIL # BLD AUTO: 0.35 K/UL — SIGNIFICANT CHANGE UP (ref 0–0.5)
EOSINOPHIL # BLD AUTO: 0.35 K/UL — SIGNIFICANT CHANGE UP (ref 0–0.5)
EOSINOPHIL NFR BLD AUTO: 3.6 % — SIGNIFICANT CHANGE UP (ref 0–6)
EOSINOPHIL NFR BLD AUTO: 3.6 % — SIGNIFICANT CHANGE UP (ref 0–6)
ERYTHROCYTE [SEDIMENTATION RATE] IN BLOOD: 70 MM/HR — HIGH (ref 0–20)
ERYTHROCYTE [SEDIMENTATION RATE] IN BLOOD: 70 MM/HR — HIGH (ref 0–20)
GLUCOSE SERPL-MCNC: 121 MG/DL — HIGH (ref 70–99)
GLUCOSE SERPL-MCNC: 121 MG/DL — HIGH (ref 70–99)
HCT VFR BLD CALC: 37.1 % — SIGNIFICANT CHANGE UP (ref 34.5–45)
HCT VFR BLD CALC: 37.1 % — SIGNIFICANT CHANGE UP (ref 34.5–45)
HGB BLD-MCNC: 11.7 G/DL — SIGNIFICANT CHANGE UP (ref 11.5–15.5)
HGB BLD-MCNC: 11.7 G/DL — SIGNIFICANT CHANGE UP (ref 11.5–15.5)
IMM GRANULOCYTES NFR BLD AUTO: 1 % — HIGH (ref 0–0.9)
IMM GRANULOCYTES NFR BLD AUTO: 1 % — HIGH (ref 0–0.9)
LYMPHOCYTES # BLD AUTO: 1.72 K/UL — SIGNIFICANT CHANGE UP (ref 1–3.3)
LYMPHOCYTES # BLD AUTO: 1.72 K/UL — SIGNIFICANT CHANGE UP (ref 1–3.3)
LYMPHOCYTES # BLD AUTO: 17.5 % — SIGNIFICANT CHANGE UP (ref 13–44)
LYMPHOCYTES # BLD AUTO: 17.5 % — SIGNIFICANT CHANGE UP (ref 13–44)
MCHC RBC-ENTMCNC: 29.8 PG — SIGNIFICANT CHANGE UP (ref 27–34)
MCHC RBC-ENTMCNC: 29.8 PG — SIGNIFICANT CHANGE UP (ref 27–34)
MCHC RBC-ENTMCNC: 31.5 G/DL — LOW (ref 32–36)
MCHC RBC-ENTMCNC: 31.5 G/DL — LOW (ref 32–36)
MCV RBC AUTO: 94.6 FL — SIGNIFICANT CHANGE UP (ref 80–100)
MCV RBC AUTO: 94.6 FL — SIGNIFICANT CHANGE UP (ref 80–100)
MONOCYTES # BLD AUTO: 0.52 K/UL — SIGNIFICANT CHANGE UP (ref 0–0.9)
MONOCYTES # BLD AUTO: 0.52 K/UL — SIGNIFICANT CHANGE UP (ref 0–0.9)
MONOCYTES NFR BLD AUTO: 5.3 % — SIGNIFICANT CHANGE UP (ref 2–14)
MONOCYTES NFR BLD AUTO: 5.3 % — SIGNIFICANT CHANGE UP (ref 2–14)
NEUTROPHILS # BLD AUTO: 7.08 K/UL — SIGNIFICANT CHANGE UP (ref 1.8–7.4)
NEUTROPHILS # BLD AUTO: 7.08 K/UL — SIGNIFICANT CHANGE UP (ref 1.8–7.4)
NEUTROPHILS NFR BLD AUTO: 71.9 % — SIGNIFICANT CHANGE UP (ref 43–77)
NEUTROPHILS NFR BLD AUTO: 71.9 % — SIGNIFICANT CHANGE UP (ref 43–77)
NRBC # BLD: 0 /100 WBCS — SIGNIFICANT CHANGE UP (ref 0–0)
NRBC # BLD: 0 /100 WBCS — SIGNIFICANT CHANGE UP (ref 0–0)
PLATELET # BLD AUTO: 540 K/UL — HIGH (ref 150–400)
PLATELET # BLD AUTO: 540 K/UL — HIGH (ref 150–400)
POTASSIUM SERPL-MCNC: 4.3 MMOL/L — SIGNIFICANT CHANGE UP (ref 3.5–5.3)
POTASSIUM SERPL-MCNC: 4.3 MMOL/L — SIGNIFICANT CHANGE UP (ref 3.5–5.3)
POTASSIUM SERPL-SCNC: 4.3 MMOL/L — SIGNIFICANT CHANGE UP (ref 3.5–5.3)
POTASSIUM SERPL-SCNC: 4.3 MMOL/L — SIGNIFICANT CHANGE UP (ref 3.5–5.3)
PROT SERPL-MCNC: 6.8 GM/DL — SIGNIFICANT CHANGE UP (ref 6–8.3)
PROT SERPL-MCNC: 6.8 GM/DL — SIGNIFICANT CHANGE UP (ref 6–8.3)
RBC # BLD: 3.92 M/UL — SIGNIFICANT CHANGE UP (ref 3.8–5.2)
RBC # BLD: 3.92 M/UL — SIGNIFICANT CHANGE UP (ref 3.8–5.2)
RBC # FLD: 15.5 % — HIGH (ref 10.3–14.5)
RBC # FLD: 15.5 % — HIGH (ref 10.3–14.5)
SODIUM SERPL-SCNC: 142 MMOL/L — SIGNIFICANT CHANGE UP (ref 135–145)
SODIUM SERPL-SCNC: 142 MMOL/L — SIGNIFICANT CHANGE UP (ref 135–145)
WBC # BLD: 9.84 K/UL — SIGNIFICANT CHANGE UP (ref 3.8–10.5)
WBC # BLD: 9.84 K/UL — SIGNIFICANT CHANGE UP (ref 3.8–10.5)
WBC # FLD AUTO: 9.84 K/UL — SIGNIFICANT CHANGE UP (ref 3.8–10.5)
WBC # FLD AUTO: 9.84 K/UL — SIGNIFICANT CHANGE UP (ref 3.8–10.5)

## 2023-11-07 RX ADMIN — PIPERACILLIN AND TAZOBACTAM 25 GRAM(S): 4; .5 INJECTION, POWDER, LYOPHILIZED, FOR SOLUTION INTRAVENOUS at 05:55

## 2023-11-07 RX ADMIN — PIPERACILLIN AND TAZOBACTAM 25 GRAM(S): 4; .5 INJECTION, POWDER, LYOPHILIZED, FOR SOLUTION INTRAVENOUS at 22:07

## 2023-11-07 RX ADMIN — HYDROMORPHONE HYDROCHLORIDE 4 MILLIGRAM(S): 2 INJECTION INTRAMUSCULAR; INTRAVENOUS; SUBCUTANEOUS at 23:00

## 2023-11-07 RX ADMIN — HYDROMORPHONE HYDROCHLORIDE 4 MILLIGRAM(S): 2 INJECTION INTRAMUSCULAR; INTRAVENOUS; SUBCUTANEOUS at 22:02

## 2023-11-07 RX ADMIN — PANTOPRAZOLE SODIUM 40 MILLIGRAM(S): 20 TABLET, DELAYED RELEASE ORAL at 08:19

## 2023-11-07 RX ADMIN — SODIUM CHLORIDE 3 MILLILITER(S): 9 INJECTION INTRAMUSCULAR; INTRAVENOUS; SUBCUTANEOUS at 21:55

## 2023-11-07 RX ADMIN — PIPERACILLIN AND TAZOBACTAM 25 GRAM(S): 4; .5 INJECTION, POWDER, LYOPHILIZED, FOR SOLUTION INTRAVENOUS at 14:09

## 2023-11-07 RX ADMIN — ATORVASTATIN CALCIUM 10 MILLIGRAM(S): 80 TABLET, FILM COATED ORAL at 22:02

## 2023-11-07 RX ADMIN — Medication 81 MILLIGRAM(S): at 12:04

## 2023-11-07 RX ADMIN — SODIUM CHLORIDE 3 MILLILITER(S): 9 INJECTION INTRAMUSCULAR; INTRAVENOUS; SUBCUTANEOUS at 05:42

## 2023-11-07 RX ADMIN — Medication 3 MILLIGRAM(S): at 22:03

## 2023-11-07 NOTE — PROGRESS NOTE ADULT - SUBJECTIVE AND OBJECTIVE BOX
Progress note POD#20    Patient seen and examined at bedside. Patient reports pain well controlled on medications. No acute events overnight. Pt denies fevers, chills, new onset numbness, weakness or tingling in the extremities.    Vital Signs Last 24 Hrs  T(C): 36.7 (07 Nov 2023 12:49), Max: 36.7 (07 Nov 2023 12:49)  T(F): 98.1 (07 Nov 2023 12:49), Max: 98.1 (07 Nov 2023 12:49)  HR: 54 (07 Nov 2023 12:49) (54 - 67)  BP: 135/74 (07 Nov 2023 12:49) (123/68 - 151/73)  BP(mean): --  RR: 18 (07 Nov 2023 12:49) (18 - 18)  SpO2: 96% (07 Nov 2023 12:49) (94% - 96%)    Parameters below as of 07 Nov 2023 12:49  Patient On (Oxygen Delivery Method): room air    PE: AAOX3  LUE: PICC in place.   Spine: Dressing C/D/I.   B/L UE: Skin intact. +ROM shoulder/elbow/wrist/fingers. +ok/thumbsup/fingercross signs.  strength: 5/5.  RP2+ NVI.   B/L LE: Skin intact. +ROM hip/knee/ankle/toes. Ankle Dorsi/plantarflexion: 5/5. Calf: soft, compressible and nontender.  DP/PT 2+ NVI.     A/P: 78yFemale s/p I&D/plastics closure of lumbar wound POD#20  Pain control prn  Dressing change as needed  PICC line care  PT: WBAT - spinal precautions   DVT ppx: SCDs and ambulation  OR Cx;s: Klebsiella Pneumonia, Proteus Mirabilis. ID final plan - Zosyn 3.375 gm IVP Q6 until 11/28/23  Discharge: planning pending home care arrangements - Rocio in touch with workers comp   All the above discussed and understood   Will discuss plan with Dr. Chan and will advise changes to plan as needed

## 2023-11-08 ENCOUNTER — TRANSCRIPTION ENCOUNTER (OUTPATIENT)
Age: 78
End: 2023-11-08

## 2023-11-08 VITALS
HEART RATE: 71 BPM | TEMPERATURE: 99 F | DIASTOLIC BLOOD PRESSURE: 62 MMHG | SYSTOLIC BLOOD PRESSURE: 159 MMHG | RESPIRATION RATE: 17 BRPM | OXYGEN SATURATION: 99 %

## 2023-11-08 LAB
CRP SERPL-MCNC: 17 MG/L — HIGH
CRP SERPL-MCNC: 17 MG/L — HIGH

## 2023-11-08 RX ADMIN — LOSARTAN POTASSIUM 100 MILLIGRAM(S): 100 TABLET, FILM COATED ORAL at 05:41

## 2023-11-08 RX ADMIN — PANTOPRAZOLE SODIUM 40 MILLIGRAM(S): 20 TABLET, DELAYED RELEASE ORAL at 05:41

## 2023-11-08 RX ADMIN — PIPERACILLIN AND TAZOBACTAM 25 GRAM(S): 4; .5 INJECTION, POWDER, LYOPHILIZED, FOR SOLUTION INTRAVENOUS at 13:54

## 2023-11-08 RX ADMIN — HYDROMORPHONE HYDROCHLORIDE 4 MILLIGRAM(S): 2 INJECTION INTRAMUSCULAR; INTRAVENOUS; SUBCUTANEOUS at 06:40

## 2023-11-08 RX ADMIN — SODIUM CHLORIDE 3 MILLILITER(S): 9 INJECTION INTRAMUSCULAR; INTRAVENOUS; SUBCUTANEOUS at 14:40

## 2023-11-08 RX ADMIN — Medication 81 MILLIGRAM(S): at 13:54

## 2023-11-08 RX ADMIN — PIPERACILLIN AND TAZOBACTAM 25 GRAM(S): 4; .5 INJECTION, POWDER, LYOPHILIZED, FOR SOLUTION INTRAVENOUS at 05:41

## 2023-11-08 RX ADMIN — HYDROMORPHONE HYDROCHLORIDE 4 MILLIGRAM(S): 2 INJECTION INTRAMUSCULAR; INTRAVENOUS; SUBCUTANEOUS at 13:55

## 2023-11-08 RX ADMIN — HYDROMORPHONE HYDROCHLORIDE 4 MILLIGRAM(S): 2 INJECTION INTRAMUSCULAR; INTRAVENOUS; SUBCUTANEOUS at 05:43

## 2023-11-08 RX ADMIN — Medication 1 TABLET(S): at 11:35

## 2023-11-08 RX ADMIN — SODIUM CHLORIDE 3 MILLILITER(S): 9 INJECTION INTRAMUSCULAR; INTRAVENOUS; SUBCUTANEOUS at 05:40

## 2023-11-08 RX ADMIN — HYDROMORPHONE HYDROCHLORIDE 4 MILLIGRAM(S): 2 INJECTION INTRAMUSCULAR; INTRAVENOUS; SUBCUTANEOUS at 16:41

## 2023-11-08 NOTE — DISCHARGE NOTE NURSING/CASE MANAGEMENT/SOCIAL WORK - NSDCFUADDAPPT_GEN_ALL_CORE_FT
Follow up with your surgeon in two weeks. Call for appointment.  If you need more pain medication, call your surgeon's office. For medication refills or authorizations, please call [phone]    Call your surgeon if you have increased redness/pain/drainage or fever. Return to ER for shortness of breath/calf tenderness.  Weekly labs: CBC, CMP, ESR, CRP: Fax to Dr Jake Giron office. Follow up with DR Giron ID 2-3 weeks - call and schedule appointment.   Follow up with plastic surgeon Dr Nation this friday November 3: CALL FOR APPOINTMENT

## 2023-11-08 NOTE — CHART NOTE - NSCHARTNOTEFT_GEN_A_CORE
Medicine PA     Patient seen and examined at bedside for removal of PICC line. Area cleaned with Betadine, adhesive dressing removed. PICC line removed, with tip of catheter visualized upon removal. Pressure applied for 5-10 minutes with gauze. No signs of active bleeding noted. Tegaderm and gauze/tape used to create pressure dressing to maintain pressure on PICC line site. Patient tolerated well without complications.  RN to call/page if any changes.

## 2023-11-08 NOTE — PROGRESS NOTE ADULT - NUTRITIONAL ASSESSMENT
This patient has been assessed with a concern for Malnutrition and has been determined to have a diagnosis/diagnoses of Morbid obesity (BMI > 40).    This patient is being managed with:   Diet Regular-  Entered: Oct 18 2023  5:23PM  

## 2023-11-08 NOTE — DISCHARGE NOTE NURSING/CASE MANAGEMENT/SOCIAL WORK - PATIENT PORTAL LINK FT
You can access the FollowMyHealth Patient Portal offered by [facility] by registering at the following website: [email or web address]. By joining [facility]’s FollowMyHealth portal, you will also be able to view your health information using other applications (apps) compatible with our system.

## 2023-11-08 NOTE — PROGRESS NOTE ADULT - REASON FOR ADMISSION
L4-L5 I&D

## 2023-11-08 NOTE — PROGRESS NOTE ADULT - SUBJECTIVE AND OBJECTIVE BOX
78yFemale s/p lumbar wound debridement with pls closure POD#21. Pt seen and examined in NAD. Right dorsal foot pain intermittent - neuropathic. Reports frequent ambulation.       PE:   Neuro: AAOX3  Spine: Dressing c/d/i    B/L UE: Skin intact. +ROM shoulder/elbow/wrist/fingers. +ok/thumbsup/fingercross signs.  strength: 5/5.  RP2+ NVI.   B/L LE: Skin intact. +ROM hip/knee/ankle/toes. Ankle Dorsi/plantarflexion: 5/5. Calf: soft, compressible and nontender. DP/PT 2+ NVI.                             11.7   9.84  )-----------( 540      ( 07 Nov 2023 15:23 )             37.1       11-07    142  |  110<H>  |  17  ----------------------------<  121<H>  4.3   |  26  |  1.13    Ca    8.6      07 Nov 2023 15:23    TPro  6.8  /  Alb  2.7<L>  /  TBili  0.2  /  DBili  x   /  AST  14<L>  /  ALT  12  /  AlkPhos  51  11-07        A/P: 78yFemale s/p lumbar wound debridement with pls closure POD#21.  Wound: Per plastics - DC sutures this friday    Pain controlled  PT: WBAT - spinal precautions   ID: PICC in place ,on zosyn. Awaiting W/C auth for home infusion but process has been prolonged by W/C. ID would like to trial Bactrim DS. Spoke with pt at length - she is apprehensive secondary to extensive allergy history.    DVT ppx: SCDs   Wound care, Isometric exercises, incentive spirometry   Medical consult appreciated  Discharge: planning for home pending bactrim trial vs home with home care pending auth from W/C  All the above discussed and understood by pt

## 2023-11-08 NOTE — PHARMACOTHERAPY INTERVENTION NOTE - INTERVENTION CATEGORIES
500cc normal saline bolus given.  Shell encourage to take in PO, and had emesis of all PO fluids consumed.  Feeling better after emesis.  Resting in chair.  Tolerated ambulation and urination.  VSS.  Will continue to monitor.  
Arrived from home for a new pacemaker/defibrillator placement.  PIV placed.  Labs resulted.  H&P current.  Needs consent.  Denies pain or discomfort.    
Feeling somewhat better, but still slightly nauseated.  Wanting to go home to get to bed.  VSS.  Denies pain.  Left chest incision site covered with a [place], CDI.  Denies pain.  Reviewed discharge instructions with Shell and her fimyah.  PIV removed.  Discharged to home with ramiro.   
Pt arrived to unit 2A s/p ICD placement. Left chest site WNL. VSS and pt tolerating PO intake. Will continue to monitor.   
Slept for a solid 3 hours.  Tolerating food and fluids.  Left chest site CDI.  Resting in bed.  
Up to edge of bed to ambulate.  Not feeling well.  Pale in face.  VSS.  Resting in chair.  EP paged.  
Med Safety
Patient Education

## 2023-11-08 NOTE — PROGRESS NOTE ADULT - PROVIDER SPECIALTY LIST ADULT
Orthopedics
Plastic Surgery
Infectious Disease
Infectious Disease
Internal Medicine
Orthopedics
Infectious Disease
Infectious Disease
Orthopedics
Plastic Surgery
Infectious Disease
Orthopedics

## 2023-11-08 NOTE — DISCHARGE NOTE NURSING/CASE MANAGEMENT/SOCIAL WORK - NSDCPEFALRISK_GEN_ALL_CORE
For information on Fall & Injury Prevention, visit: [email or web address] OR  [email or web address] OR  https://www.cdc.gov/steadi/patient.html

## 2023-11-08 NOTE — PHARMACOTHERAPY INTERVENTION NOTE - COMMENTS
Spoke to patient Anita on 11/08/23 patient in for Lumbar Wound Debridement. Patient has history of allergy to Augmentin, Rocephin, Flagyl and Cipro. Patient developed severe rash to antibiotics in past. Very hesitant to take Bactrim DS. Which she is being discharged on. Told patient about medication and how it is different drug class from other medications she has had reactions to in past. Patient given trial dose inpatient before being discharged. Educated patient to take medication same time everyday with glass of water. Also let patient know it can cause some upset stomach but that is normal. Patient had no further questions and  understood the counseling.

## 2023-11-13 ENCOUNTER — APPOINTMENT (OUTPATIENT)
Dept: ORTHOPEDIC SURGERY | Facility: CLINIC | Age: 78
End: 2023-11-13

## 2023-11-14 NOTE — H&P PST ADULT - ENDOCRINE
Requested medication(s) are due for refill today: Yes  Patient has already received a courtesy refill: No  Other reason request has been forwarded to provider: negative

## 2023-11-20 ENCOUNTER — APPOINTMENT (OUTPATIENT)
Dept: ORTHOPEDIC SURGERY | Facility: CLINIC | Age: 78
End: 2023-11-20
Payer: MEDICARE

## 2023-11-20 DIAGNOSIS — M54.16 RADICULOPATHY, LUMBAR REGION: ICD-10-CM

## 2023-11-20 PROCEDURE — 72100 X-RAY EXAM L-S SPINE 2/3 VWS: CPT

## 2023-11-20 PROCEDURE — 99024 POSTOP FOLLOW-UP VISIT: CPT

## 2023-11-20 RX ORDER — CYCLOBENZAPRINE HYDROCHLORIDE 10 MG/1
10 TABLET, FILM COATED ORAL
Qty: 30 | Refills: 0 | Status: ACTIVE | COMMUNITY
Start: 2022-08-07 | End: 1900-01-01

## 2023-12-19 RX ORDER — HYDROMORPHONE HYDROCHLORIDE 2 MG/1
2 TABLET ORAL 3 TIMES DAILY
Qty: 75 | Refills: 0 | Status: ACTIVE | COMMUNITY
Start: 1900-01-01 | End: 1900-01-01

## 2024-01-06 NOTE — OCCUPATIONAL THERAPY INITIAL EVALUATION ADULT - BED MOBILITY LIMITATIONS, REHAB EVAL
Principal Discharge DX:	Acute respiratory failure with hypoxia  Secondary Diagnosis:	Morbid obesity  Secondary Diagnosis:	ESRD (end stage renal disease)  Secondary Diagnosis:	Hypertensive emergency   1 decreased ability to use legs for bridging/pushing

## 2024-01-08 ENCOUNTER — APPOINTMENT (OUTPATIENT)
Dept: ORTHOPEDIC SURGERY | Facility: CLINIC | Age: 79
End: 2024-01-08
Payer: OTHER MISCELLANEOUS

## 2024-01-08 PROCEDURE — 99024 POSTOP FOLLOW-UP VISIT: CPT

## 2024-01-11 ENCOUNTER — APPOINTMENT (OUTPATIENT)
Dept: INFECTIOUS DISEASE | Facility: CLINIC | Age: 79
End: 2024-01-11

## 2024-01-17 NOTE — ASU PATIENT PROFILE, ADULT - PAIN LOCATION
Chief Complaint   Patient presents with    Vaginal Bleeding     Discuss Fibroid        HISTORY OF PRESENT ILLNESS:   Peyton Qiu is a 26 y.o. female  who presents for well woman exam.  Patient's last menstrual period was 12/09/2023..  She has complains of AUB. Had an episode of 3 months of bleeding on the pills, changed pill and it stopped but she was considering having the fibroid removed.   Cycles are irregular with PCOS. Desires STD testing. Desires OCPs for birth control.      Past Medical History:   Diagnosis Date    Eczema     Seasonal allergic rhinitis     Vertigo        Past Surgical History:   Procedure Laterality Date    removal of cyst in head  12/20/2023    WISDOM TOOTH EXTRACTION         Social History     Socioeconomic History    Marital status: Single   Tobacco Use    Smoking status: Never    Smokeless tobacco: Never   Substance and Sexual Activity    Alcohol use: No    Drug use: Never    Sexual activity: Yes     Partners: Male     Social Determinants of Health     Financial Resource Strain: Low Risk  (12/1/2023)    Overall Financial Resource Strain (CARDIA)     Difficulty of Paying Living Expenses: Not very hard   Food Insecurity: No Food Insecurity (12/1/2023)    Hunger Vital Sign     Worried About Running Out of Food in the Last Year: Never true     Ran Out of Food in the Last Year: Never true   Transportation Needs: No Transportation Needs (12/1/2023)    PRAPARE - Transportation     Lack of Transportation (Medical): No     Lack of Transportation (Non-Medical): No   Physical Activity: Insufficiently Active (12/1/2023)    Exercise Vital Sign     Days of Exercise per Week: 2 days     Minutes of Exercise per Session: 20 min   Stress: No Stress Concern Present (12/1/2023)    [employer] [place] [employer] - Occupational Stress Questionnaire     Feeling of Stress : Only a little   Social Connections: Unknown (12/1/2023)    Social Connection and Isolation Panel [NHANES]      Frequency of Communication with Friends and Family: More than three times a week     Frequency of Social Gatherings with Friends and Family: Once a week     Active Member of Clubs or Organizations: Yes     Attends Club or Organization Meetings: More than 4 times per year     Marital Status: Never    Housing Stability: High Risk (2023)    Housing Stability Vital Sign     Unable to Pay for Housing in the Last Year: Yes     Unstable Housing in the Last Year: No       Family History   Problem Relation Age of Onset    Coronary artery disease Father     Diabetes Maternal Grandmother     Heart disease Maternal Grandmother     No Known Problems Mother        OB History    Para Term  AB Living   1 1 1 0 0 1   SAB IAB Ectopic Multiple Live Births   0 0 0 0 1      # Outcome Date GA Lbr Zaid/2nd Weight Sex Delivery Anes PTL Lv   1 Term 22 39w3d / 00:20 3.126 kg (6 lb 14.3 oz) M Vag-Vacuum EPI N KEKE      Complications: Fetal Intolerance       COMPREHENSIVE GYN HISTORY:  PAP History: Denies abnormal Paps  Infection History: Denies STDs. Denies PID.  Benign History:Denies uterine fibroids. Denies ovarian cysts. Denies endometriosis PCOS  Cancer History: Denies cervical cancer. Denies uterine cancer or hyperplasia. Denies ovarian cancer. Denies vulvar cancer or pre-cancer. Denies vaginal cancer or pre-cancer. Denies breast cancer. Denies colon cancer.  Cycle: 12/irregular   Was on ocps, currently not on anything and is sexually active   Had hpv vaccine    ROS:  Negative       /87   Wt (!) 147.2 kg (324 lb 8.3 oz)   LMP 2023   Breastfeeding No   BMI 57.49 kg/m²     APPEARANCE: Well nourished, well developed, in no acute distress.  NECK: Neck symmetric   ABDOMEN: Soft. No tenderness or masses. No hernias. No hepatosplenomegaly.  BREASTS: Symmetrical, no skin changes or visible lesions. No palpable masses, nipple discharge or adenopathy bilaterally.  PELVIC:   VULVA: No lesions. Normal  female genitalia.  URETHRAL MEATUS: Normal size and location, no lesions, no prolapse.  URETHRA: No masses, tenderness, prolapse or scarring.  VAGINA: Moist and well rugated, no discharge, no significant cystocele or rectocele.  CERVIX: No lesions and discharge.  UTERUS: Normal size, regular shape, mobile, non-tender, bladder base nontender.  ADNEXA: No masses or tenderness.    12/2023 TVUS The uterus is upper normal in size and measures 9.5 x 5.1 x 7.1 cm.  The endometrial stripe is normal and measures 8mm.  There is a left-sided subserosal uterine fibroid which measures 5.4 x 3.8 x 4.7 cm.     The ovaries are normal in size and appearance.     The right ovary measures 3 x 2.7 x 1.9 cm.   The left ovary measures 3.1 x 2.9 x 2.1cm.    There is appropriate flow in the ovaries.    1. Encounter for annual routine gynecological examination    2. PCOS (polycystic ovarian syndrome)    3. Uterine leiomyoma, unspecified location    4. Class 3 severe obesity with body mass index (BMI) of 50.0 to 59.9 in adult, unspecified obesity type, unspecified whether serious comorbidity present    5. Other specified noninflammatory disorders of uterus    6. Pap smear for cervical cancer screening    7. Other problems related to lifestyle    8. Screening examination for STD (sexually transmitted disease)          Plan:  Routine gyn s/p normal breast exam. Pap without HPV cotesting ordered . STD testing: GC/CT/ ordered. Counseled on contraception and desires  OCPs.  2. Discussed the AUB can be complex, besides fibroid, could also have adeno that could be contributing and removing the fibroid may not fix it completely. Discussed risks that may need c-sections in the future if extends to the cavity.   3. Discussed PCOS and weight loss, will refer to bariatrics. Will check for insulin resistance. Taking metformin 500 daily but should increase, discussed how to go up.     F/u in 1 yr or PRN         left shoulder

## 2024-02-12 ENCOUNTER — APPOINTMENT (OUTPATIENT)
Dept: ORTHOPEDIC SURGERY | Facility: CLINIC | Age: 79
End: 2024-02-12
Payer: OTHER MISCELLANEOUS

## 2024-02-12 PROCEDURE — 99214 OFFICE O/P EST MOD 30 MIN: CPT

## 2024-02-12 NOTE — HISTORY OF PRESENT ILLNESS
[Lower back] : lower back [Gradual] : gradual [10] : 10 [0] : 0 [Dull/Aching] : dull/aching [Sharp] : sharp [Shooting] : shooting [Frequent] : frequent [Nothing helps with pain getting better] : Nothing helps with pain getting better [Standing] : standing [Walking] : walking [Not working due to injury] : Work status: not working due to injury [de-identified] : DOS: L3-4 laminectomy on 08/25/23.   2/12/24: about 7 months s/p laminectomy, 4 months s/p wound revision. Doing well. Improving overall with PT. Getting stronger, walking further. Taking gabapentin 300mg 2-3x/day which has been helping the nerve pain. ambulating with cane when out of the house.   01/08/24: PO#2 s/p wound debridement on 10/18/23 and ~5 months s/p laminectomy. Started PT with good improvement.   11/20/23: first pov s/p wound debridement, irrigation and primary closure with plastic surgery - 3 months s/p laminectomy. Doing well. Some R shin pain with prolonged sitting. Using walker to ambulate prn. taking flexeril prn which helps. Saw Dr Trevizo last week who advised to wait 6 weeks prior to doing PT.  [] : no [FreeTextEntry5] : PO#2- L Spine. Started PT; has been helping pain & mobility.  [FreeTextEntry7] : b/l legs [de-identified] : MRI/CT L SPINE @ [facility]

## 2024-02-12 NOTE — PHYSICAL EXAM
[de-identified] : L Spine Inspection: incision healed Palpation: No tenderness or spasms in b/l lumbar paraspinal musculature ROM: Full with stiffness or diminished in all planes Strength: 5/5 b/l hip flexors, knee extensors, ankle dorsiflexors, EHL, ankle plantarflexors Neuro: Sensation LT I Negative b/l SLR Toe and heal walk intact Gait non antalgic

## 2024-02-12 NOTE — IMAGING
[de-identified] : wound healed. no signs of infection or wound dehiscence.  ambulating with cane lumbar spasms diminished ROM antalgic gait motor and sensation intact [Implants in position] : Implants in position [No loosening of hardware] : No loosening of hardware

## 2024-02-12 NOTE — ASSESSMENT
[FreeTextEntry1] : 77 yo F 6 months s/p lumbar laminectomy; progressively improving and making gains;  responding well to PT and more is planned ;  progressively more mobile; continue gabapentin, refilled today. f/u in 6 weeks.  Patient seen by Lori Estrada PA-C under the supervision of Nish Chan MD

## 2024-02-20 ENCOUNTER — APPOINTMENT (OUTPATIENT)
Dept: ORTHOPEDIC SURGERY | Facility: CLINIC | Age: 79
End: 2024-02-20

## 2024-03-18 ENCOUNTER — APPOINTMENT (OUTPATIENT)
Dept: ORTHOPEDIC SURGERY | Facility: CLINIC | Age: 79
End: 2024-03-18
Payer: OTHER MISCELLANEOUS

## 2024-03-18 PROCEDURE — 99214 OFFICE O/P EST MOD 30 MIN: CPT

## 2024-03-18 NOTE — HISTORY OF PRESENT ILLNESS
[Lower back] : lower back [Gradual] : gradual [10] : 10 [0] : 0 [Dull/Aching] : dull/aching [Shooting] : shooting [Sharp] : sharp [Frequent] : frequent [Nothing helps with pain getting better] : Nothing helps with pain getting better [Standing] : standing [Walking] : walking [Not working due to injury] : Work status: not working due to injury [de-identified] :  DOI 5/12/04 DOS: L3-4 laminectomy on 08/25/23.   3/18/24: Follow Up- L Spine. Hasn't had PT script for 1-month due information not being submitted to .   2/12/24: about 7 months s/p laminectomy, 4 months s/p wound revision. Doing well. Improving overall with PT. Getting stronger, walking further. Taking gabapentin 300mg 2-3x/day which has been helping the nerve pain. ambulating with cane when out of the house.   01/08/24: PO#2 s/p wound debridement on 10/18/23 and ~5 months s/p laminectomy. Started PT with good improvement.   11/20/23: first pov s/p wound debridement, irrigation and primary closure with plastic surgery - 3 months s/p laminectomy. Doing well. Some R shin pain with prolonged sitting. Using walker to ambulate prn. taking flexeril prn which helps. Saw Dr Trevizo last week who advised to wait 6 weeks prior to doing PT.  [] : no [FreeTextEntry7] : b/l legs [de-identified] : MRI/CT L SPINE @ [facility]

## 2024-03-18 NOTE — ASSESSMENT
[FreeTextEntry1] : 79 yo F ~7 months s/p lumbar laminectomy; progressively improving and making gains; responding well to PT and more is planned; progressively more mobile; will continue with PT, hasn't been able to do therapy for the past month d/t WC delayed approval. Will resume therapy, provided new rx (minimal 4 months postop of PT is required). F/up in 6-8 weeks.

## 2024-03-18 NOTE — IMAGING
[Implants in position] : Implants in position [No loosening of hardware] : No loosening of hardware [de-identified] : wound healed. no signs of infection or wound dehiscence.  ambulating with cane lumbar spasms diminished ROM antalgic gait motor and sensation intact

## 2024-03-18 NOTE — WORK
[Other: ___] : [unfilled] [Was the competent medical cause of the injury] : was the competent medical cause of the injury [Consistent with my objective findings] : consistent with my objective findings [Are consistent with the injury] : are consistent with the injury [Total (100%)] : total (100%)

## 2024-03-30 ENCOUNTER — NON-APPOINTMENT (OUTPATIENT)
Age: 79
End: 2024-03-30

## 2024-05-01 NOTE — PHYSICAL THERAPY INITIAL EVALUATION ADULT - PERTINENT HX OF CURRENT PROBLEM, REHAB EVAL
Pharmacist Admission Medication History    Admission medication history is complete. The information provided in this note is only as accurate as the sources available at the time of the update.    Information Source(s): Hospital records    Pertinent Information: Pt returning from [facility] following ERCP today. Med rec updated using previous [facility] admission med rec as well as MAR to get last doses administered.       Medication History Completed By: Jena Harris, PharmD, BCPS  4/30/2024 9:34 PM    PTA Med List   Medication Sig Last Dose    amLODIPine (NORVASC) 5 MG tablet TAKE 1 TABLET DAILY 4/30/2024 at 0805    aspirin (ASA) 81 MG EC tablet Take 81 mg by mouth daily 4/29/2024 at 0813    atorvastatin (LIPITOR) 40 MG tablet TAKE 1 TABLET EVERY 24 HOURS 4/19/2024    calcium carbonate (TUMS) 500 MG chewable tablet Take 1,000 mg by mouth 3 times daily as needed for heartburn     colchicine (COLCYRS) 0.6 MG tablet TAKE 1 TABLET DAILY 4/19/2024    diclofenac (VOLTAREN) 1 % topical gel Apply 4 g topically 4 times daily as needed for moderate pain     emollient (VANICREAM) external cream Apply topically 3 times daily 4/28/2024    famotidine (PEPCID) 20 MG tablet Take 1 tablet (20 mg) by mouth 2 times daily as needed (itching)     Ferrous Gluconate 240 (27 Fe) MG TABS Take 1 tablet by mouth daily  4/19/2024    fluticasone (FLONASE) 50 MCG/ACT nasal spray Spray 1-2 sprays in nostril daily as needed     gabapentin (NEURONTIN) 300 MG capsule Take 600 mg by mouth 3 times daily as needed     HEMP OIL OR EXTRACT OR OTHER CBD CANNABINOID, NOT MEDICAL CANNABIS, Take 1 chew tab by mouth at bedtime CBD gummy     hydrOXYzine HCl (ATARAX) 25 MG tablet Take 1 tablet (25 mg) by mouth 3 times daily as needed for itching 4/22/2024    isosorbide mononitrate (IMDUR) 30 MG 24 hr tablet TAKE 1 TABLET DAILY 4/30/2024 at 0804    Lidocaine (LIDOCARE) 4 % Patch Place 1 patch onto the skin daily as needed To prevent lidocaine toxicity,  patient should be patch free for 12 hrs daily.  at not on    lisinopril (ZESTRIL) 10 MG tablet Take 1 tablet (10 mg) by mouth every 24 hours 4/19/2024    loratadine (CLARITIN) 10 MG tablet Take 10 mg by mouth daily as needed for allergies 4/30/2024 at 0804    melatonin 5 MG CAPS Take 5 mg by mouth at bedtime 4/19/2024    multivitamin w/minerals (THERA-VIT-M) tablet Take 1 tablet by mouth daily 4/19/2024    nortriptyline (PAMELOR) 10 MG capsule Take 20 mg by mouth nightly as needed     Probiotic Product (FORTIFY 30 BILLION PROBIOT 50+) CPDR Take 1 5 Pack by mouth daily 4/19/2024    Semaglutide (RYBELSUS) 14 MG tablet Take 14 mg by mouth daily 4/19/2024    sodium bicarbonate 650 MG tablet Take 3 tablets (1,950 mg) by mouth 3 times daily 4/30/2024 at 0804    torsemide (DEMADEX) 20 MG tablet TAKE 1 TABLET DAILY 4/19/2024        77 y Female s/p PSF L3-5 POD #0.

## 2024-05-06 ENCOUNTER — APPOINTMENT (OUTPATIENT)
Dept: ORTHOPEDIC SURGERY | Facility: CLINIC | Age: 79
End: 2024-05-06
Payer: OTHER MISCELLANEOUS

## 2024-05-06 PROCEDURE — 72100 X-RAY EXAM L-S SPINE 2/3 VWS: CPT

## 2024-05-06 PROCEDURE — 99214 OFFICE O/P EST MOD 30 MIN: CPT

## 2024-05-06 NOTE — IMAGING
[Implants in position] : Implants in position [No loosening of hardware] : No loosening of hardware [de-identified] : wound healed. no signs of infection or wound dehiscence.  ambulating with cane lumbar spasms diminished ROM antalgic gait motor and sensation intact standing straighter

## 2024-05-06 NOTE — ASSESSMENT
[FreeTextEntry1] : 77 yo F ~9 months s/p lumbar laminectomy; progressively improving and making gains; responding well to PT, it is medically necessary for her to continue working on her core, leg strength as well as gait balance training; Rosie has had multiple spine surgeries and had to have wound revision 3 months after her last operation which set the clock back on when she was able to start PT; PT has been improving her function, pain and strength and more is needed; 90 day supply of gabapentin renewed; XRs show construct stable  Patient seen by Lori Estrada PA-C under the supervision of Nish Chan MD

## 2024-05-06 NOTE — WORK
[Other: ___] : [unfilled] [Was the competent medical cause of the injury] : was the competent medical cause of the injury [Are consistent with the injury] : are consistent with the injury [Consistent with my objective findings] : consistent with my objective findings [Total (100%)] : total (100%)

## 2024-05-06 NOTE — HISTORY OF PRESENT ILLNESS
[Lower back] : lower back [Gradual] : gradual [10] : 10 [0] : 0 [Dull/Aching] : dull/aching [Sharp] : sharp [Shooting] : shooting [Frequent] : frequent [Nothing helps with pain getting better] : Nothing helps with pain getting better [Standing] : standing [Walking] : walking [Not working due to injury] : Work status: not working due to injury [de-identified] :  DOI 5/12/04 DOS: L3-4 laminectomy on 08/25/23. Wound revision 10/2023  5/6/24: f/u LS. Has been progressively improving since surgery with PT. Still working on strengthening and gait/balance training with PT.   3/18/24: Follow Up- L Spine. Hasn't had PT script for 1-month due information not being submitted to .   2/12/24: about 7 months s/p laminectomy, 4 months s/p wound revision. Doing well. Improving overall with PT. Getting stronger, walking further. Taking gabapentin 300mg 2-3x/day which has been helping the nerve pain. ambulating with cane when out of the house.   01/08/24: PO#2 s/p wound debridement on 10/18/23 and ~5 months s/p laminectomy. Started PT with good improvement.   11/20/23: first pov s/p wound debridement, irrigation and primary closure with plastic surgery - 3 months s/p laminectomy. Doing well. Some R shin pain with prolonged sitting. Using walker to ambulate prn. taking flexeril prn which helps. Saw Dr Trevizo last week who advised to wait 6 weeks prior to doing PT.  [] : no [FreeTextEntry5] : Doing well; sore after PT. Needs gabapentin refill.  [FreeTextEntry7] : b/l legs [de-identified] : MRI/CT L SPINE @ [facility]

## 2024-06-05 NOTE — PATIENT PROFILE ADULT - LEGAL HELP
Anesthesia Post Evaluation    Patient: Margie Cifuentes    Procedure(s) Performed: Procedure(s) (LRB):  COLONOSCOPY (N/A)    Final Anesthesia Type: general      Patient location during evaluation: PACU  Patient participation: Yes- Able to Participate  Level of consciousness: awake and alert  Post-procedure vital signs: reviewed and stable  Pain management: adequate  Airway patency: patent    PONV status at discharge: No PONV  Anesthetic complications: no      Cardiovascular status: stable  Respiratory status: spontaneous ventilation  Hydration status: euvolemic  Follow-up not needed.              Vitals Value Taken Time   /59 06/05/24 1344   Temp 36.6 °C (97.8 °F) 06/05/24 1344   Pulse 80 06/05/24 1344   Resp 18 06/05/24 1344   SpO2 100 % 06/05/24 1344         No case tracking events are documented in the log.      Pain/Case Score: Case Score: 9 (6/5/2024  1:44 PM)          
no

## 2024-06-11 NOTE — PHYSICAL THERAPY INITIAL EVALUATION ADULT - LEVEL OF INDEPENDENCE: SCOOT/BRIDGE, REHAB EVAL
[Yes] : Yes [1 or 2 (0 pts)] : 1 or 2 (0 points) [Never (0 pts)] : Never (0 points) [0] : 2) Feeling down, depressed, or hopeless: Not at all (0) [Monthly or less (1 pt)] : Monthly or less (1 point) [No] : In the past 12 months have you used drugs other than those required for medical reasons? No [No falls in past year] : Patient reported no falls in the past year [de-identified] : no [de-identified] : GI  [Audit-CScore] : 1 [de-identified] : walking more  [de-identified] : healthy  [[id number]] : 0 supervision

## 2024-06-24 ENCOUNTER — APPOINTMENT (OUTPATIENT)
Dept: ORTHOPEDIC SURGERY | Facility: CLINIC | Age: 79
End: 2024-06-24
Payer: OTHER MISCELLANEOUS

## 2024-06-24 DIAGNOSIS — Z98.1 ARTHRODESIS STATUS: ICD-10-CM

## 2024-06-24 DIAGNOSIS — Z98.890 OTHER SPECIFIED POSTPROCEDURAL STATES: ICD-10-CM

## 2024-06-24 PROCEDURE — 99214 OFFICE O/P EST MOD 30 MIN: CPT

## 2024-06-24 RX ORDER — GABAPENTIN 300 MG/1
300 CAPSULE ORAL
Qty: 270 | Refills: 0 | Status: ACTIVE | COMMUNITY
Start: 2024-02-12 | End: 1900-01-01

## 2024-07-15 ENCOUNTER — APPOINTMENT (OUTPATIENT)
Dept: ORTHOPEDIC SURGERY | Facility: CLINIC | Age: 79
End: 2024-07-15

## 2024-07-17 ENCOUNTER — APPOINTMENT (OUTPATIENT)
Dept: CT IMAGING | Facility: CLINIC | Age: 79
End: 2024-07-17
Payer: OTHER MISCELLANEOUS

## 2024-07-17 PROCEDURE — 72131 CT LUMBAR SPINE W/O DYE: CPT

## 2024-08-12 ENCOUNTER — APPOINTMENT (OUTPATIENT)
Dept: ORTHOPEDIC SURGERY | Facility: CLINIC | Age: 79
End: 2024-08-12
Payer: OTHER MISCELLANEOUS

## 2024-08-12 DIAGNOSIS — M54.50 LOW BACK PAIN, UNSPECIFIED: ICD-10-CM

## 2024-08-12 DIAGNOSIS — Z98.1 ARTHRODESIS STATUS: ICD-10-CM

## 2024-08-12 DIAGNOSIS — Z98.890 OTHER SPECIFIED POSTPROCEDURAL STATES: ICD-10-CM

## 2024-08-12 PROCEDURE — 99214 OFFICE O/P EST MOD 30 MIN: CPT

## 2024-08-12 NOTE — HISTORY OF PRESENT ILLNESS
[de-identified] :  DOI 5/12/04 DOS: L3-4 laminectomy on 08/25/23. Wound revision 10/18/2023  8/12/24: Follow up L Spine. Persisting pain in lower back. CT scan review.   6/24/24: Follow up L Spine. 10 months s/p L3-4 laminectomy. Doing well attending PT. Remains with low back pain when walking for prolonged distances. Ambulating with a cane.   5/6/24: f/u LS. Has been progressively improving since surgery with PT. Still working on strengthening and gait/balance training with PT.   3/18/24: Follow Up- L Spine. Hasn't had PT script for 1-month due information not being submitted to .   2/12/24: about 7 months s/p laminectomy, 4 months s/p wound revision. Doing well. Improving overall with PT. Getting stronger, walking further. Taking gabapentin 300mg 2-3x/day which has been helping the nerve pain. ambulating with cane when out of the house.   01/08/24: PO#2 s/p wound debridement on 10/18/23 and ~5 months s/p laminectomy. Started PT with good improvement.   11/20/23: first pov s/p wound debridement, irrigation and primary closure with plastic surgery - 3 months s/p laminectomy. Doing well. Some R shin pain with prolonged sitting. Using walker to ambulate prn. taking flexeril prn which helps. Saw Dr Trevizo last week who advised to wait 6 weeks prior to doing PT.  [FreeTextEntry5] : CT REVIEW L Spine

## 2024-08-12 NOTE — IMAGING
[Implants in position] : Implants in position [No loosening of hardware] : No loosening of hardware [de-identified] : wound healed. no signs of infection or wound dehiscence.  ambulating with cane lumbar spasms diminished ROM antalgic gait motor and sensation intact standing straighter

## 2024-08-12 NOTE — DATA REVIEWED
[MRI] : MRI [Lumbar Spine] : lumbar spine [Report was reviewed and noted in the chart] : The report was reviewed and noted in the chart [I independently reviewed and interpreted images and report] : I independently reviewed and interpreted images and report [FreeTextEntry1] : [facility] Spine MRI 7/17/24 1. L3-4, 4-5 fused, diffused spondylosis.

## 2024-08-12 NOTE — IMAGING
[Implants in position] : Implants in position [No loosening of hardware] : No loosening of hardware [de-identified] : wound healed. no signs of infection or wound dehiscence.  ambulating with cane lumbar spasms diminished ROM antalgic gait motor and sensation intact standing straighter

## 2024-08-12 NOTE — ASSESSMENT
[FreeTextEntry1] : back pain interferes with walking distances that she would like;  it is not radicular nor claudicatory pain;  the pain is not from a nonunion or residual L345 stenosis;  instead I think it is due to L5S1 facet pain;  so the MBBs and RFA PRN was proposed ;  if that falls short then I don't think I'll have any other suggestions;  it could be the L5S1 disc but that has always looked ok on the MRI.  ~1 year s/p lumbar laminectomy and hx of L3-5 fusion with persistent low back pain. CT scan shows fusion accomplished at L3-5, moderate facet OA at L5-S1.  - Referred to pain mgmt for MBB at L5-S1.  - Recommend one more course of PT w/o any restrictions.  - F/up 6 weeks after injection.

## 2024-08-29 ENCOUNTER — APPOINTMENT (OUTPATIENT)
Dept: PAIN MANAGEMENT | Facility: CLINIC | Age: 79
End: 2024-08-29
Payer: OTHER MISCELLANEOUS

## 2024-08-29 VITALS — HEIGHT: 64 IN | WEIGHT: 242 LBS | BODY MASS INDEX: 41.32 KG/M2

## 2024-08-29 DIAGNOSIS — M54.50 LOW BACK PAIN, UNSPECIFIED: ICD-10-CM

## 2024-08-29 DIAGNOSIS — M54.16 RADICULOPATHY, LUMBAR REGION: ICD-10-CM

## 2024-08-29 PROCEDURE — 99214 OFFICE O/P EST MOD 30 MIN: CPT

## 2024-08-29 NOTE — HISTORY OF PRESENT ILLNESS
[Lower back] : lower back [Work related] : work related [8] : 8 [6] : 6 [Sharp] : sharp [Shooting] : shooting [Intermittent] : intermittent [Household chores] : household chores [Rest] : rest [Standing] : standing [Walking] : walking [FreeTextEntry1] : 08/29/2024 : follow up today.  since last visit on8/5/22- XLIF L3/4,L4/5 L3-4 Laminecotmy on 8/25/23 and wound revision on 10/1/8/23.  She can not walk any length.  Now has pain in legs.  Shes on gabapentin for neuropathy.  Was advised to try MBB L5/S1 again.  Her low back is very painful.  She is in PT currently.   Has pain when standing for 5 minutes or half a block. Pain in the lower back.   Ct lumbar spine: 7/17/24: No significant interval changes compared with the prior examination Grade 1 anterior listhesis of L4 over L5 and retrolisthesis of L1 over L2 and L2 over L3 Levoscoliosis of the lumbar spine centered at L2-3 Status post lumbar fusion Lumbar spondylosis with multilevel degenerative disc disease and joint disease most severe from L3-4 through L5-S1  4/18/22: follow up today. Since last visit had seen Dr. Chan. Dilaudid not effective for her pain. She has failed conservative therapies. failed gabapentin. Only relief she gets is when she lays on her right side. Pain in the lower back with radiation to the leg. MRI c/s 1/28/22: Impression:  1. Nonspecific cystic areas in the region of the pituitary gland and posterior to the sella turcica and clivus which extends bilaterally. Clinical significance and etiology is uncertain. Recommend MRI of the brain with intravenous contrast material to further evaluate as clinically indicated. 2. Straightening of the cervical lordosis and multilevel degenerative disc disease with broad-based posterior disc herniation impinging the right exiting C7 nerve root at C6-C7 as well as impingement upon the cord asymmetric on the right at C4-C5 and multilevel foraminal narrowing without acute fracture or cord compression.    12/31/21: follow up today. Pt had seen Dr. Eaton yesterday with continued left shoulder pain despite numerous  surgeries. also has some neck and trap pain. pain over the anterior and posterior shoulder. has been going to chiro forher neck for years.    11/22/21: follow up today after repeat left lumbar RFA on 11/14/21.    9/14/21: follow up today to B/L l3-4 TFESI. Patient had 50% relief. Still has pain in the left lower back. I would  recommend repeat left lumbar RFA.    7/14/21- Patient had 70% relief from LESI. Still has some pain in left leg. Will repeat epidural.    3/31/21: follow up today. MRI Lspine (2/22/21) At L1-2: Disc bulge with posterior osseous ridging without significant spinal canal stenosis. There is mild bilateral neural foraminal narrowing.  At L2-3: Disc bulge without significant spinal canal stenosis. There is mild  left-sided neural foraminal narrowing. The right neural foramen is patent.  At L3-4: Disc bulge mildly narrowing the spinal canal. There is thickening of  the ligamentum flavum and moderate facet arthrosis, worse on the right. There  are bilateral facet effusions. There is severe right-sided neural foraminal  narrowing with impingement of the exiting L3 nerve root. There is mild  left-sided neural foraminal narrowing.  At L4-5: Circumferential disc bulge moderately narrowing the spinal canal. There  is thickening of the ligamentum flavum and moderate facet arthrosis. There is  moderate bilateral facet effusions. There is severe bilateral neural foraminal  narrowing with impingement of the exiting L4 nerve roots.  At L5-S1: Disc bulge without significant spinal canal stenosis. There is  moderate bilateral facet arthrosis of bilateral facet effusions. There is  moderate left and mild right-sided neural foraminal narrowing.  Overall grossly stable examination compared to prior 7/3/2017.  pain in the lower back with radiation to the anterior thigh.    2/15/21: follow up today. since last visit had surgery with Dr. Eaton, had infection that needed abx spacer and DARIO, and had reverse shoulder replacement, currently in PT for this. She reports her lower back pain has gotten worse. She had lumbar RFA's with relief, last was in 2019. She had >60% relief from the last RFA with improvement of her pain and overall function and QOL. Since last visit had a fall in November. She was getting into the car and her right leg collapsed. Had a stress fracture and soft tissue damage of the right foot.  her pain is currently in the back with radiation to the right anterior thigh. her pain correlates with her MRI. I would  recommend a right L3/4 and L4/5 TFESI. subjective and objective findings all correlate.  MRI (6/16/19) Impression:  1. Progression of multilevel disc pathology with mild central stenosis and impingement upon bilateral exiting L3 nerve roots at L3-L4, moderate central stenosis with impingement upon the left greater than right exiting L4 nerve roots with severe left foraminal narrowing at L4-L5 and encroachment upon the left exiting L5 nerve root at L5-S1 with convexity of the lumbar spine towards the left, anterolisthesis at L4-L5 and multilevel degenerative disc disease without acute fracture.  2. Clinical correlation is advised as to whether the findings may be related to acute and/or traumatic injury Injury Details:   Location of Problem: neck, lower back.   On a scale of 0-10, patient rated 10 for pain when active.   On a scale of 0-10, patient rated 10 for pain at rest.   Pain Quality: sharp.   Pain is Radiating: yes. left leg   Pain is constant.   Pain affects the following activities: household chores.   Pain improves with:. Laying on right side, elevating left leg.   Pain worsen with: sitting, standing, walking, lying in bed.  [] : no [FreeTextEntry3] : 05/12/2004 [FreeTextEntry7] : B/L LEGS  [de-identified] : DR SHORT  [de-identified] : CT

## 2024-08-29 NOTE — ASSESSMENT

## 2024-09-04 NOTE — H&P PST ADULT - FUNCTIONAL ASSESSMENT - BASIC MOBILITY ASSESSMENT TYPE
BP improved nicely after Hydralazine. 154/73, HR 76.     At this time reports not feeling well. \" I haven't felt like thid all day.\" Vitals remain stable. Afebrile. Oxycodone and Melatonin given for H/A and sore throat.     Admission

## 2024-10-18 ENCOUNTER — APPOINTMENT (OUTPATIENT)
Age: 79
End: 2024-10-18

## 2024-10-28 ENCOUNTER — APPOINTMENT (OUTPATIENT)
Dept: PAIN MANAGEMENT | Facility: CLINIC | Age: 79
End: 2024-10-28

## 2024-11-01 ENCOUNTER — APPOINTMENT (OUTPATIENT)
Age: 79
End: 2024-11-01

## 2024-11-11 ENCOUNTER — APPOINTMENT (OUTPATIENT)
Dept: PAIN MANAGEMENT | Facility: CLINIC | Age: 79
End: 2024-11-11

## 2024-11-12 NOTE — ASU DISCHARGE PLAN (ADULT/PEDIATRIC) - PROCEDURE
Please review the decision aid used during our discussion regarding the Low dose lung cancer screening visit today.                           S/P Left shoulder arthroscopic debridement, synovial biopsy

## 2024-11-15 ENCOUNTER — APPOINTMENT (OUTPATIENT)
Age: 79
End: 2024-11-15
Payer: OTHER MISCELLANEOUS

## 2024-11-15 PROCEDURE — 64493 INJ PARAVERT F JNT L/S 1 LEV: CPT | Mod: 50

## 2024-11-18 ENCOUNTER — APPOINTMENT (OUTPATIENT)
Dept: ORTHOPEDIC SURGERY | Facility: CLINIC | Age: 79
End: 2024-11-18
Payer: OTHER MISCELLANEOUS

## 2024-11-18 VITALS — BODY MASS INDEX: 41.32 KG/M2 | WEIGHT: 242 LBS | HEIGHT: 64 IN

## 2024-11-18 DIAGNOSIS — Z98.890 OTHER SPECIFIED POSTPROCEDURAL STATES: ICD-10-CM

## 2024-11-18 DIAGNOSIS — Z98.1 ARTHRODESIS STATUS: ICD-10-CM

## 2024-11-18 PROCEDURE — 99212 OFFICE O/P EST SF 10 MIN: CPT

## 2024-12-02 ENCOUNTER — APPOINTMENT (OUTPATIENT)
Dept: PAIN MANAGEMENT | Facility: CLINIC | Age: 79
End: 2024-12-02
Payer: OTHER MISCELLANEOUS

## 2024-12-02 VITALS — HEIGHT: 64 IN | WEIGHT: 244 LBS | BODY MASS INDEX: 41.66 KG/M2

## 2024-12-02 DIAGNOSIS — M54.16 RADICULOPATHY, LUMBAR REGION: ICD-10-CM

## 2024-12-02 PROCEDURE — 99214 OFFICE O/P EST MOD 30 MIN: CPT

## 2025-02-07 ENCOUNTER — APPOINTMENT (OUTPATIENT)
Age: 80
End: 2025-02-07
Payer: OTHER MISCELLANEOUS

## 2025-02-07 PROCEDURE — 64493 INJ PARAVERT F JNT L/S 1 LEV: CPT | Mod: 50

## 2025-02-20 ENCOUNTER — APPOINTMENT (OUTPATIENT)
Dept: PAIN MANAGEMENT | Facility: CLINIC | Age: 80
End: 2025-02-20
Payer: OTHER MISCELLANEOUS

## 2025-02-20 VITALS — BODY MASS INDEX: 41.66 KG/M2 | WEIGHT: 244 LBS | HEIGHT: 64 IN

## 2025-02-20 DIAGNOSIS — Z98.1 ARTHRODESIS STATUS: ICD-10-CM

## 2025-02-20 DIAGNOSIS — M54.50 LOW BACK PAIN, UNSPECIFIED: ICD-10-CM

## 2025-02-20 PROCEDURE — 99214 OFFICE O/P EST MOD 30 MIN: CPT

## 2025-05-30 ENCOUNTER — APPOINTMENT (OUTPATIENT)
Age: 80
End: 2025-05-30
Payer: OTHER MISCELLANEOUS

## 2025-05-30 PROCEDURE — 64635 DESTROY LUMB/SAC FACET JNT: CPT | Mod: 50

## 2025-05-30 PROCEDURE — 64636 DESTROY L/S FACET JNT ADDL: CPT | Mod: 50,59

## 2025-06-09 ENCOUNTER — APPOINTMENT (OUTPATIENT)
Dept: PAIN MANAGEMENT | Facility: CLINIC | Age: 80
End: 2025-06-09
Payer: OTHER MISCELLANEOUS

## 2025-06-09 VITALS — WEIGHT: 244 LBS | BODY MASS INDEX: 41.66 KG/M2 | HEIGHT: 64 IN

## 2025-06-09 PROCEDURE — 99214 OFFICE O/P EST MOD 30 MIN: CPT | Mod: 24

## 2025-07-25 ENCOUNTER — APPOINTMENT (OUTPATIENT)
Dept: PAIN MANAGEMENT | Facility: CLINIC | Age: 80
End: 2025-07-25
Payer: OTHER MISCELLANEOUS

## 2025-07-25 VITALS — HEIGHT: 64 IN | BODY MASS INDEX: 41.66 KG/M2 | WEIGHT: 244 LBS

## 2025-07-25 DIAGNOSIS — Z98.1 ARTHRODESIS STATUS: ICD-10-CM

## 2025-07-25 DIAGNOSIS — M47.816 SPONDYLOSIS W/OUT MYELOPATHY OR RADICULOPATHY, LUMBAR REGION: ICD-10-CM

## 2025-07-25 DIAGNOSIS — Z98.890 OTHER SPECIFIED POSTPROCEDURAL STATES: ICD-10-CM

## 2025-07-25 PROCEDURE — 99214 OFFICE O/P EST MOD 30 MIN: CPT

## 2025-08-15 ENCOUNTER — NON-APPOINTMENT (OUTPATIENT)
Age: 80
End: 2025-08-15

## 2025-08-25 VITALS — BODY MASS INDEX: 41.66 KG/M2 | WEIGHT: 244 LBS | HEIGHT: 64 IN

## 2025-09-05 ENCOUNTER — APPOINTMENT (OUTPATIENT)
Age: 80
End: 2025-09-05

## 2025-09-08 ENCOUNTER — APPOINTMENT (OUTPATIENT)
Dept: ORTHOPEDIC SURGERY | Facility: CLINIC | Age: 80
End: 2025-09-08
Payer: OTHER MISCELLANEOUS

## 2025-09-08 ENCOUNTER — NON-APPOINTMENT (OUTPATIENT)
Age: 80
End: 2025-09-08

## 2025-09-08 VITALS — BODY MASS INDEX: 41.66 KG/M2 | HEIGHT: 64 IN | WEIGHT: 244 LBS

## 2025-09-08 DIAGNOSIS — E66.01 MORBID (SEVERE) OBESITY DUE TO EXCESS CALORIES: ICD-10-CM

## 2025-09-08 DIAGNOSIS — M47.816 SPONDYLOSIS W/OUT MYELOPATHY OR RADICULOPATHY, LUMBAR REGION: ICD-10-CM

## 2025-09-08 DIAGNOSIS — Z98.1 ARTHRODESIS STATUS: ICD-10-CM

## 2025-09-08 PROCEDURE — 99214 OFFICE O/P EST MOD 30 MIN: CPT

## 2025-09-15 ENCOUNTER — APPOINTMENT (OUTPATIENT)
Dept: PAIN MANAGEMENT | Facility: CLINIC | Age: 80
End: 2025-09-15

## 2025-09-19 ENCOUNTER — APPOINTMENT (OUTPATIENT)
Age: 80
End: 2025-09-19

## (undated) DEVICE — POSITIONER FOAM LAMINECTOMY ARM CRADLE (PINK)

## (undated) DEVICE — PREP DURAPREP 26CC

## (undated) DEVICE — ELCTR BOVIE TIP BLADE INSULATED 2.75" EDGE WITH SAFETY

## (undated) DEVICE — SUT STRATAFIX SPIRAL MONOCRYL PLUS 4-0 45CM PS-2 UNDYED

## (undated) DEVICE — SUT VICRYL 1 18" CT-1 UNDYED (POP-OFF)

## (undated) DEVICE — GLV 7 PROTEXIS (WHITE)

## (undated) DEVICE — GLV 8 PROTEXIS (BLUE)

## (undated) DEVICE — DRAPE C ARM 41X140"

## (undated) DEVICE — DRAPE SHOWER CURTAIN ISOLATION

## (undated) DEVICE — BIPOLAR FORCEP HENSLER BAYONET 10" X 1MM (YELLOW)

## (undated) DEVICE — SUT VICRYL 0 18" CT-1 UNDYED (POP-OFF)

## (undated) DEVICE — POSITIONER PATIENT SAFETY STRAP 3X60"

## (undated) DEVICE — GLV 7.5 PROTEXIS (WHITE)

## (undated) DEVICE — DRAPE TOWEL BLUE 17" X 24"

## (undated) DEVICE — NUVASIVE NVJJB / M5 I-PAS III (BEVELED TIP)

## (undated) DEVICE — GLV 7 PROTEXIS (BLUE)

## (undated) DEVICE — WARMING BLANKET UPPER ADULT

## (undated) DEVICE — DRSG STERISTRIPS 0.5 X 4"

## (undated) DEVICE — DRSG KERLIX ROLL 4.5"

## (undated) DEVICE — FRA-ESU BOVIE FORCE TRIAD T6D04558DX: Type: DURABLE MEDICAL EQUIPMENT

## (undated) DEVICE — NDL SPINAL 18G X 3.5" (PINK)

## (undated) DEVICE — SUT MONOCRYL 3-0 27" PS-2 UNDYED

## (undated) DEVICE — DRAPE INSTRUMENT POUCH 6.75" X 11"

## (undated) DEVICE — MEDICATION LABELS W MARKER

## (undated) DEVICE — FRAZIER SUCTION TIP 12FR

## (undated) DEVICE — DRSG EYE PAD OVAL STERILE

## (undated) DEVICE — DRSG XEROFORM 5 X 9"

## (undated) DEVICE — MISONIX BONESCALPEL BLUNT BLADE & TUBESET 20MM

## (undated) DEVICE — SUT PDS II 0 36" CT-1

## (undated) DEVICE — DRAIN JACKSON PRATT 7MM FLAT FULL NO TROCAR

## (undated) DEVICE — TUBING BIPOLAR IRRIGATOR AND CORD SET

## (undated) DEVICE — DRAPE SURGICAL #1010

## (undated) DEVICE — FRAZIER SUCTION TIP 10FR

## (undated) DEVICE — DRAPE TOWEL 1010

## (undated) DEVICE — PACK POSTERIOR SPINAL FUSION

## (undated) DEVICE — STAPLER SKIN VISI-STAT 35 WIDE

## (undated) DEVICE — VENODYNE/SCD SLEEVE CALF MEDIUM

## (undated) DEVICE — Device

## (undated) DEVICE — DRSG DERMABOND PRINEO 22CM

## (undated) DEVICE — ELCTR STRYKER NEPTUNE SMOKE EVACUATION PENCIL (GREEN)

## (undated) DEVICE — GOWN IMPERV BREATHABLE XL

## (undated) DEVICE — GLV 6.5 PROTEXIS (BLUE)

## (undated) DEVICE — ELCTR BOVIE TIP BLADE INSULATED 4" EDGE

## (undated) DEVICE — GLV 7.5 PROTEXIS (BLUE)

## (undated) DEVICE — ELCTR GROUNDING PAD ADULT COVIDIEN

## (undated) DEVICE — SUT PDS II 2-0 27" CT-1

## (undated) DEVICE — DRSG TEGADERM 4X4.75"

## (undated) DEVICE — TAPE SILK 3"

## (undated) DEVICE — KIT ARRAY PULSE PATIENT REFERENCE

## (undated) DEVICE — DRAPE 3/4 SHEET W REINFORCEMENT 56X77"

## (undated) DEVICE — PROBE STIM MONOPOLAR

## (undated) DEVICE — DRSG MASTISOL

## (undated) DEVICE — MIDAS REX LEGEND MATCH HEAD FLUTED LG BORE 3.0MM X 14CM

## (undated) DEVICE — DRAIN RESERVOIR FOR JACKSON PRATT 100CC CARDINAL

## (undated) DEVICE — PREP BETADINE KIT

## (undated) DEVICE — FOLEY TRAY 16FR 5CC LTX UMETER CLOSED

## (undated) DEVICE — PROBE STIM CLIP-ACTIVATOR SNGL

## (undated) DEVICE — WOUND IRR IRRISEPT W 0.5 CHG

## (undated) DEVICE — SOL IRR POUR NS 0.9% 1000ML

## (undated) DEVICE — SOL INJ NS 0.9% 500ML 1-PORT

## (undated) DEVICE — SUT VICRYL 2-0 18" CP-2 UNDYED (POP-OFF)

## (undated) DEVICE — SUT ETHILON 2-0 18" FS

## (undated) DEVICE — POSITIONER FOAM HEAD DONUT 9" (PINK)

## (undated) DEVICE — DRSG 4 X 4" 4PLY STERILE

## (undated) DEVICE — KIT NDL PULSE EMG ELECTRODE

## (undated) DEVICE — FRA-ESU BOVIE FORCE TRIAD T0E42286E: Type: DURABLE MEDICAL EQUIPMENT

## (undated) DEVICE — NDL MODULE NV EMG M5

## (undated) DEVICE — BIPOLAR FORCEP HENSLER BAYONET 12" X 1MM (YELLOW)

## (undated) DEVICE — GLV 6.5 PROTEXIS (WHITE)

## (undated) DEVICE — TUBING SUCTION NONCONDUCTIVE 6MM X 12FT

## (undated) DEVICE — POSITIONER CUSHION INSERT PRONE VIEW LG

## (undated) DEVICE — ELCTR BOVIE TIP BLADE INSULATED 6.5" EDGE

## (undated) DEVICE — PACK BASIC

## (undated) DEVICE — PACK DILATOR NV DISP M5